# Patient Record
Sex: FEMALE | Race: WHITE | NOT HISPANIC OR LATINO | Employment: UNEMPLOYED | URBAN - METROPOLITAN AREA
[De-identification: names, ages, dates, MRNs, and addresses within clinical notes are randomized per-mention and may not be internally consistent; named-entity substitution may affect disease eponyms.]

---

## 2017-09-16 ENCOUNTER — ANESTHESIA (OUTPATIENT)
Dept: PERIOP | Facility: HOSPITAL | Age: 22
End: 2017-09-16
Payer: COMMERCIAL

## 2017-09-16 ENCOUNTER — APPOINTMENT (EMERGENCY)
Dept: ULTRASOUND IMAGING | Facility: HOSPITAL | Age: 22
End: 2017-09-16
Payer: COMMERCIAL

## 2017-09-16 ENCOUNTER — HOSPITAL ENCOUNTER (EMERGENCY)
Facility: HOSPITAL | Age: 22
End: 2017-09-16
Attending: EMERGENCY MEDICINE
Payer: COMMERCIAL

## 2017-09-16 ENCOUNTER — ANESTHESIA EVENT (OUTPATIENT)
Dept: PERIOP | Facility: HOSPITAL | Age: 22
End: 2017-09-16
Payer: COMMERCIAL

## 2017-09-16 ENCOUNTER — HOSPITAL ENCOUNTER (OUTPATIENT)
Facility: HOSPITAL | Age: 22
Setting detail: OBSERVATION
Discharge: HOME | End: 2017-09-17
Attending: OBSTETRICS & GYNECOLOGY | Admitting: OBSTETRICS & GYNECOLOGY
Payer: COMMERCIAL

## 2017-09-16 VITALS
RESPIRATION RATE: 16 BRPM | SYSTOLIC BLOOD PRESSURE: 106 MMHG | WEIGHT: 125 LBS | DIASTOLIC BLOOD PRESSURE: 69 MMHG | HEART RATE: 79 BPM | OXYGEN SATURATION: 100 % | TEMPERATURE: 98.2 F

## 2017-09-16 DIAGNOSIS — O00.90 ECTOPIC PREGNANCY: Primary | ICD-10-CM

## 2017-09-16 DIAGNOSIS — R10.32 LLQ PAIN: ICD-10-CM

## 2017-09-16 DIAGNOSIS — E87.6 HYPOKALEMIA: ICD-10-CM

## 2017-09-16 LAB
ABO GROUP BLD: NORMAL
ALBUMIN SERPL BCP-MCNC: 4.3 G/DL (ref 3.5–5)
ALP SERPL-CCNC: 45 U/L (ref 46–116)
ALT SERPL W P-5'-P-CCNC: 17 U/L (ref 12–78)
ANION GAP SERPL CALCULATED.3IONS-SCNC: 10 MMOL/L (ref 4–13)
APTT PPP: 30 SECONDS (ref 23–35)
AST SERPL W P-5'-P-CCNC: 7 U/L (ref 5–45)
B-HCG SERPL-ACNC: 193 MIU/ML
BACTERIA UR QL AUTO: ABNORMAL /HPF
BASOPHILS # BLD AUTO: 0.02 THOUSANDS/ΜL (ref 0–0.1)
BASOPHILS # BLD AUTO: 0.04 THOUSANDS/ΜL (ref 0–0.1)
BASOPHILS NFR BLD AUTO: 0 % (ref 0–1)
BASOPHILS NFR BLD AUTO: 0 % (ref 0–1)
BILIRUB SERPL-MCNC: 0.4 MG/DL (ref 0.2–1)
BILIRUB UR QL STRIP: ABNORMAL
BLD GP AB SCN SERPL QL: NEGATIVE
BUN SERPL-MCNC: 7 MG/DL (ref 5–25)
CALCIUM SERPL-MCNC: 8.3 MG/DL (ref 8.3–10.1)
CHLORIDE SERPL-SCNC: 106 MMOL/L (ref 100–108)
CLARITY UR: ABNORMAL
CO2 SERPL-SCNC: 26 MMOL/L (ref 21–32)
COLOR UR: ABNORMAL
CREAT SERPL-MCNC: 0.59 MG/DL (ref 0.6–1.3)
EOSINOPHIL # BLD AUTO: 0.07 THOUSAND/ΜL (ref 0–0.61)
EOSINOPHIL # BLD AUTO: 0.16 THOUSAND/ΜL (ref 0–0.61)
EOSINOPHIL NFR BLD AUTO: 1 % (ref 0–6)
EOSINOPHIL NFR BLD AUTO: 2 % (ref 0–6)
ERYTHROCYTE [DISTWIDTH] IN BLOOD BY AUTOMATED COUNT: 12.1 % (ref 11.6–15.1)
ERYTHROCYTE [DISTWIDTH] IN BLOOD BY AUTOMATED COUNT: 12.3 % (ref 11.6–15.1)
EXT PREG TEST URINE: NORMAL
GFR SERPL CREATININE-BSD FRML MDRD: 129 ML/MIN/1.73SQ M
GLUCOSE SERPL-MCNC: 108 MG/DL (ref 65–140)
GLUCOSE UR STRIP-MCNC: NEGATIVE MG/DL
HCT VFR BLD AUTO: 22.9 % (ref 34.8–46.1)
HCT VFR BLD AUTO: 39.7 % (ref 34.8–46.1)
HGB BLD-MCNC: 13.6 G/DL (ref 11.5–15.4)
HGB BLD-MCNC: 7.6 G/DL (ref 11.5–15.4)
HGB UR QL STRIP.AUTO: ABNORMAL
INR PPP: 1.07 (ref 0.86–1.16)
KETONES UR STRIP-MCNC: NEGATIVE MG/DL
LACTATE SERPL-SCNC: 0.9 MMOL/L (ref 0.5–2)
LEUKOCYTE ESTERASE UR QL STRIP: NEGATIVE
LYMPHOCYTES # BLD AUTO: 2.16 THOUSANDS/ΜL (ref 0.6–4.47)
LYMPHOCYTES # BLD AUTO: 2.78 THOUSANDS/ΜL (ref 0.6–4.47)
LYMPHOCYTES NFR BLD AUTO: 27 % (ref 14–44)
LYMPHOCYTES NFR BLD AUTO: 33 % (ref 14–44)
MCH RBC QN AUTO: 30.4 PG (ref 26.8–34.3)
MCH RBC QN AUTO: 31.1 PG (ref 26.8–34.3)
MCHC RBC AUTO-ENTMCNC: 33.2 G/DL (ref 31.4–37.4)
MCHC RBC AUTO-ENTMCNC: 34.3 G/DL (ref 31.4–37.4)
MCV RBC AUTO: 91 FL (ref 82–98)
MCV RBC AUTO: 92 FL (ref 82–98)
MONOCYTES # BLD AUTO: 0.42 THOUSAND/ΜL (ref 0.17–1.22)
MONOCYTES # BLD AUTO: 0.66 THOUSAND/ΜL (ref 0.17–1.22)
MONOCYTES NFR BLD AUTO: 6 % (ref 4–12)
MONOCYTES NFR BLD AUTO: 7 % (ref 4–12)
NEUTROPHILS # BLD AUTO: 3.81 THOUSANDS/ΜL (ref 1.85–7.62)
NEUTROPHILS # BLD AUTO: 6.69 THOUSANDS/ΜL (ref 1.85–7.62)
NEUTS SEG NFR BLD AUTO: 59 % (ref 43–75)
NEUTS SEG NFR BLD AUTO: 65 % (ref 43–75)
NITRITE UR QL STRIP: NEGATIVE
NON-SQ EPI CELLS URNS QL MICRO: ABNORMAL /HPF
NRBC BLD AUTO-RTO: 0 /100 WBCS
PH UR STRIP.AUTO: 5.5 [PH] (ref 4.5–8)
PLATELET # BLD AUTO: 171 THOUSANDS/UL (ref 149–390)
PLATELET # BLD AUTO: 299 THOUSANDS/UL (ref 149–390)
PMV BLD AUTO: 9.2 FL (ref 8.9–12.7)
PMV BLD AUTO: 9.3 FL (ref 8.9–12.7)
POTASSIUM SERPL-SCNC: 3.3 MMOL/L (ref 3.5–5.3)
PROT SERPL-MCNC: 6.9 G/DL (ref 6.4–8.2)
PROT UR STRIP-MCNC: ABNORMAL MG/DL
PROTHROMBIN TIME: 13.8 SECONDS (ref 12.1–14.4)
RBC # BLD AUTO: 2.5 MILLION/UL (ref 3.81–5.12)
RBC # BLD AUTO: 4.38 MILLION/UL (ref 3.81–5.12)
RBC #/AREA URNS AUTO: ABNORMAL /HPF
RH BLD: POSITIVE
SODIUM SERPL-SCNC: 142 MMOL/L (ref 136–145)
SP GR UR STRIP.AUTO: >=1.03 (ref 1–1.03)
SPECIMEN EXPIRATION DATE: NORMAL
UROBILINOGEN UR QL STRIP.AUTO: 0.2 E.U./DL
WBC # BLD AUTO: 10.33 THOUSAND/UL (ref 4.31–10.16)
WBC # BLD AUTO: 6.49 THOUSAND/UL (ref 4.31–10.16)
WBC #/AREA URNS AUTO: ABNORMAL /HPF

## 2017-09-16 PROCEDURE — 96366 THER/PROPH/DIAG IV INF ADDON: CPT

## 2017-09-16 PROCEDURE — 76815 OB US LIMITED FETUS(S): CPT

## 2017-09-16 PROCEDURE — 96376 TX/PRO/DX INJ SAME DRUG ADON: CPT

## 2017-09-16 PROCEDURE — 96365 THER/PROPH/DIAG IV INF INIT: CPT

## 2017-09-16 PROCEDURE — 88305 TISSUE EXAM BY PATHOLOGIST: CPT | Performed by: OBSTETRICS & GYNECOLOGY

## 2017-09-16 PROCEDURE — 81001 URINALYSIS AUTO W/SCOPE: CPT | Performed by: EMERGENCY MEDICINE

## 2017-09-16 PROCEDURE — 83605 ASSAY OF LACTIC ACID: CPT | Performed by: EMERGENCY MEDICINE

## 2017-09-16 PROCEDURE — 36415 COLL VENOUS BLD VENIPUNCTURE: CPT | Performed by: OBSTETRICS & GYNECOLOGY

## 2017-09-16 PROCEDURE — 86850 RBC ANTIBODY SCREEN: CPT | Performed by: OBSTETRICS & GYNECOLOGY

## 2017-09-16 PROCEDURE — 82947 ASSAY GLUCOSE BLOOD QUANT: CPT

## 2017-09-16 PROCEDURE — 87591 N.GONORRHOEAE DNA AMP PROB: CPT | Performed by: EMERGENCY MEDICINE

## 2017-09-16 PROCEDURE — 82803 BLOOD GASES ANY COMBINATION: CPT

## 2017-09-16 PROCEDURE — 86900 BLOOD TYPING SEROLOGIC ABO: CPT | Performed by: OBSTETRICS & GYNECOLOGY

## 2017-09-16 PROCEDURE — 99285 EMERGENCY DEPT VISIT HI MDM: CPT

## 2017-09-16 PROCEDURE — 85025 COMPLETE CBC W/AUTO DIFF WBC: CPT | Performed by: EMERGENCY MEDICINE

## 2017-09-16 PROCEDURE — 87491 CHLMYD TRACH DNA AMP PROBE: CPT | Performed by: EMERGENCY MEDICINE

## 2017-09-16 PROCEDURE — 85730 THROMBOPLASTIN TIME PARTIAL: CPT | Performed by: EMERGENCY MEDICINE

## 2017-09-16 PROCEDURE — 76770 US EXAM ABDO BACK WALL COMP: CPT

## 2017-09-16 PROCEDURE — 84295 ASSAY OF SERUM SODIUM: CPT

## 2017-09-16 PROCEDURE — 85610 PROTHROMBIN TIME: CPT | Performed by: EMERGENCY MEDICINE

## 2017-09-16 PROCEDURE — 85025 COMPLETE CBC W/AUTO DIFF WBC: CPT | Performed by: OBSTETRICS & GYNECOLOGY

## 2017-09-16 PROCEDURE — 84702 CHORIONIC GONADOTROPIN TEST: CPT | Performed by: EMERGENCY MEDICINE

## 2017-09-16 PROCEDURE — 80053 COMPREHEN METABOLIC PANEL: CPT | Performed by: EMERGENCY MEDICINE

## 2017-09-16 PROCEDURE — 86901 BLOOD TYPING SEROLOGIC RH(D): CPT | Performed by: OBSTETRICS & GYNECOLOGY

## 2017-09-16 PROCEDURE — 96361 HYDRATE IV INFUSION ADD-ON: CPT

## 2017-09-16 PROCEDURE — 96375 TX/PRO/DX INJ NEW DRUG ADDON: CPT

## 2017-09-16 PROCEDURE — 85014 HEMATOCRIT: CPT

## 2017-09-16 PROCEDURE — 82330 ASSAY OF CALCIUM: CPT

## 2017-09-16 PROCEDURE — 84132 ASSAY OF SERUM POTASSIUM: CPT

## 2017-09-16 PROCEDURE — 81025 URINE PREGNANCY TEST: CPT | Performed by: EMERGENCY MEDICINE

## 2017-09-16 RX ORDER — FENTANYL CITRATE 50 UG/ML
INJECTION, SOLUTION INTRAMUSCULAR; INTRAVENOUS AS NEEDED
Status: DISCONTINUED | OUTPATIENT
Start: 2017-09-16 | End: 2017-09-17 | Stop reason: SURG

## 2017-09-16 RX ORDER — SODIUM CHLORIDE, SODIUM LACTATE, POTASSIUM CHLORIDE, CALCIUM CHLORIDE 600; 310; 30; 20 MG/100ML; MG/100ML; MG/100ML; MG/100ML
INJECTION, SOLUTION INTRAVENOUS CONTINUOUS PRN
Status: DISCONTINUED | OUTPATIENT
Start: 2017-09-16 | End: 2017-09-17 | Stop reason: SURG

## 2017-09-16 RX ORDER — DIPHENHYDRAMINE HCL 25 MG
25 TABLET ORAL ONCE
Status: DISCONTINUED | OUTPATIENT
Start: 2017-09-16 | End: 2021-08-24

## 2017-09-16 RX ORDER — POTASSIUM CHLORIDE 14.9 MG/ML
20 INJECTION INTRAVENOUS ONCE
Status: COMPLETED | OUTPATIENT
Start: 2017-09-16 | End: 2017-09-16

## 2017-09-16 RX ORDER — PROPOFOL 10 MG/ML
INJECTION, EMULSION INTRAVENOUS AS NEEDED
Status: DISCONTINUED | OUTPATIENT
Start: 2017-09-16 | End: 2017-09-17 | Stop reason: SURG

## 2017-09-16 RX ORDER — ONDANSETRON 2 MG/ML
INJECTION INTRAMUSCULAR; INTRAVENOUS AS NEEDED
Status: DISCONTINUED | OUTPATIENT
Start: 2017-09-16 | End: 2017-09-17 | Stop reason: SURG

## 2017-09-16 RX ORDER — KETOROLAC TROMETHAMINE 30 MG/ML
INJECTION, SOLUTION INTRAMUSCULAR; INTRAVENOUS AS NEEDED
Status: DISCONTINUED | OUTPATIENT
Start: 2017-09-16 | End: 2017-09-17 | Stop reason: SURG

## 2017-09-16 RX ORDER — HYDROCODONE BITARTRATE AND ACETAMINOPHEN 5; 325 MG/1; MG/1
1 TABLET ORAL EVERY 6 HOURS PRN
Status: DISCONTINUED | OUTPATIENT
Start: 2017-09-16 | End: 2017-09-17

## 2017-09-16 RX ORDER — LIDOCAINE HYDROCHLORIDE 10 MG/ML
INJECTION, SOLUTION INFILTRATION; PERINEURAL AS NEEDED
Status: DISCONTINUED | OUTPATIENT
Start: 2017-09-16 | End: 2017-09-17 | Stop reason: SURG

## 2017-09-16 RX ORDER — HYDROCODONE BITARTRATE AND ACETAMINOPHEN 5; 325 MG/1; MG/1
2 TABLET ORAL EVERY 6 HOURS PRN
Status: DISCONTINUED | OUTPATIENT
Start: 2017-09-16 | End: 2017-09-17

## 2017-09-16 RX ORDER — FENTANYL CITRATE 50 UG/ML
INJECTION, SOLUTION INTRAMUSCULAR; INTRAVENOUS
Status: COMPLETED
Start: 2017-09-16 | End: 2017-09-16

## 2017-09-16 RX ORDER — ONDANSETRON 2 MG/ML
4 INJECTION INTRAMUSCULAR; INTRAVENOUS ONCE
Status: COMPLETED | OUTPATIENT
Start: 2017-09-16 | End: 2017-09-16

## 2017-09-16 RX ORDER — ACETAMINOPHEN 325 MG/1
650 TABLET ORAL ONCE
Status: DISCONTINUED | OUTPATIENT
Start: 2017-09-16 | End: 2021-08-24

## 2017-09-16 RX ORDER — FENTANYL CITRATE 50 UG/ML
25 INJECTION, SOLUTION INTRAMUSCULAR; INTRAVENOUS ONCE
Status: COMPLETED | OUTPATIENT
Start: 2017-09-16 | End: 2017-09-16

## 2017-09-16 RX ORDER — ROCURONIUM BROMIDE 10 MG/ML
INJECTION, SOLUTION INTRAVENOUS AS NEEDED
Status: DISCONTINUED | OUTPATIENT
Start: 2017-09-16 | End: 2017-09-17 | Stop reason: SURG

## 2017-09-16 RX ADMIN — SODIUM CHLORIDE, SODIUM LACTATE, POTASSIUM CHLORIDE, AND CALCIUM CHLORIDE 500 ML: .6; .31; .03; .02 INJECTION, SOLUTION INTRAVENOUS at 21:51

## 2017-09-16 RX ADMIN — SODIUM CHLORIDE 1000 ML: 0.9 INJECTION, SOLUTION INTRAVENOUS at 16:42

## 2017-09-16 RX ADMIN — DEXAMETHASONE SODIUM PHOSPHATE 6 MG: 10 INJECTION INTRAMUSCULAR; INTRAVENOUS at 23:26

## 2017-09-16 RX ADMIN — POTASSIUM CHLORIDE 20 MEQ: 200 INJECTION, SOLUTION INTRAVENOUS at 16:41

## 2017-09-16 RX ADMIN — ONDANSETRON 4 MG: 2 INJECTION INTRAMUSCULAR; INTRAVENOUS at 17:51

## 2017-09-16 RX ADMIN — SODIUM CHLORIDE, SODIUM LACTATE, POTASSIUM CHLORIDE, AND CALCIUM CHLORIDE: .6; .31; .03; .02 INJECTION, SOLUTION INTRAVENOUS at 23:10

## 2017-09-16 RX ADMIN — HYDROCODONE BITARTRATE AND ACETAMINOPHEN 2 TABLET: 5; 325 TABLET ORAL at 22:02

## 2017-09-16 RX ADMIN — FENTANYL CITRATE 50 MCG: 50 INJECTION, SOLUTION INTRAMUSCULAR; INTRAVENOUS at 23:19

## 2017-09-16 RX ADMIN — LIDOCAINE HYDROCHLORIDE 50 MG: 10 INJECTION, SOLUTION INFILTRATION; PERINEURAL at 23:19

## 2017-09-16 RX ADMIN — FENTANYL CITRATE 25 MCG: 50 INJECTION INTRAMUSCULAR; INTRAVENOUS at 17:45

## 2017-09-16 RX ADMIN — SODIUM CHLORIDE 1000 ML: 0.9 INJECTION, SOLUTION INTRAVENOUS at 15:38

## 2017-09-16 RX ADMIN — FENTANYL CITRATE 25 MCG: 50 INJECTION INTRAMUSCULAR; INTRAVENOUS at 16:44

## 2017-09-16 RX ADMIN — KETOROLAC TROMETHAMINE 15 MG: 30 INJECTION, SOLUTION INTRAMUSCULAR at 23:53

## 2017-09-16 RX ADMIN — ONDANSETRON 4 MG: 2 INJECTION INTRAMUSCULAR; INTRAVENOUS at 23:53

## 2017-09-16 RX ADMIN — ROCURONIUM BROMIDE 30 MG: 10 INJECTION, SOLUTION INTRAVENOUS at 23:19

## 2017-09-16 RX ADMIN — PROPOFOL 200 MG: 10 INJECTION, EMULSION INTRAVENOUS at 23:19

## 2017-09-17 VITALS
RESPIRATION RATE: 20 BRPM | TEMPERATURE: 98.3 F | OXYGEN SATURATION: 99 % | BODY MASS INDEX: 22.15 KG/M2 | SYSTOLIC BLOOD PRESSURE: 101 MMHG | HEART RATE: 53 BPM | DIASTOLIC BLOOD PRESSURE: 57 MMHG | HEIGHT: 63 IN | WEIGHT: 125 LBS

## 2017-09-17 PROBLEM — O00.90 ECTOPIC PREGNANCY: Status: ACTIVE | Noted: 2017-09-17

## 2017-09-17 LAB
BASE EXCESS BLDA CALC-SCNC: -2 MMOL/L (ref -2–3)
BASOPHILS # BLD MANUAL: 0 THOUSAND/UL (ref 0–0.1)
BASOPHILS NFR MAR MANUAL: 0 % (ref 0–1)
BURR CELLS BLD QL SMEAR: PRESENT
CA-I BLD-SCNC: 1.11 MMOL/L (ref 1.12–1.32)
EOSINOPHIL # BLD MANUAL: 0 THOUSAND/UL (ref 0–0.4)
EOSINOPHIL NFR BLD MANUAL: 0 % (ref 0–6)
ERYTHROCYTE [DISTWIDTH] IN BLOOD BY AUTOMATED COUNT: 12.3 % (ref 11.6–15.1)
GIANT PLATELETS BLD QL SMEAR: PRESENT
GLUCOSE SERPL-MCNC: 84 MG/DL (ref 65–140)
HCO3 BLDA-SCNC: 23.7 MMOL/L (ref 24–30)
HCT VFR BLD AUTO: 37.8 % (ref 34.8–46.1)
HCT VFR BLD CALC: 37 % (ref 34.8–46.1)
HGB BLD-MCNC: 13.1 G/DL (ref 11.5–15.4)
HGB BLDA-MCNC: 12.6 G/DL (ref 11.5–15.4)
LYMPHOCYTES # BLD AUTO: 0.44 THOUSAND/UL (ref 0.6–4.47)
LYMPHOCYTES # BLD AUTO: 4 % (ref 14–44)
MCH RBC QN AUTO: 31 PG (ref 26.8–34.3)
MCHC RBC AUTO-ENTMCNC: 34.7 G/DL (ref 31.4–37.4)
MCV RBC AUTO: 90 FL (ref 82–98)
MONOCYTES # BLD AUTO: 0 THOUSAND/UL (ref 0–1.22)
MONOCYTES NFR BLD: 0 % (ref 4–12)
NEUTROPHILS # BLD MANUAL: 10.39 THOUSAND/UL (ref 1.85–7.62)
NEUTS SEG NFR BLD AUTO: 95 % (ref 43–75)
NRBC BLD AUTO-RTO: 0 /100 WBCS
PCO2 BLD: 25 MMOL/L (ref 21–32)
PCO2 BLD: 41.1 MM HG (ref 42–50)
PH BLD: 7.37 [PH] (ref 7.3–7.4)
PLATELET # BLD AUTO: 280 THOUSANDS/UL (ref 149–390)
PLATELET BLD QL SMEAR: ADEQUATE
PMV BLD AUTO: 9.8 FL (ref 8.9–12.7)
PO2 BLD: 502 MM HG (ref 35–45)
POIKILOCYTOSIS BLD QL SMEAR: PRESENT
POTASSIUM BLD-SCNC: 3.4 MMOL/L (ref 3.5–5.3)
RBC # BLD AUTO: 4.22 MILLION/UL (ref 3.81–5.12)
RBC MORPH BLD: PRESENT
SAO2 % BLD FROM PO2: 100 % (ref 95–98)
SODIUM BLD-SCNC: 139 MMOL/L (ref 136–145)
SPECIMEN SOURCE: ABNORMAL
VARIANT LYMPHS # BLD AUTO: 1 %
WBC # BLD AUTO: 10.94 THOUSAND/UL (ref 4.31–10.16)

## 2017-09-17 PROCEDURE — 85007 BL SMEAR W/DIFF WBC COUNT: CPT | Performed by: OBSTETRICS & GYNECOLOGY

## 2017-09-17 PROCEDURE — C9399 UNCLASSIFIED DRUGS OR BIOLOG: HCPCS | Performed by: NURSE ANESTHETIST, CERTIFIED REGISTERED

## 2017-09-17 PROCEDURE — 85027 COMPLETE CBC AUTOMATED: CPT | Performed by: OBSTETRICS & GYNECOLOGY

## 2017-09-17 RX ORDER — SODIUM CHLORIDE, SODIUM LACTATE, POTASSIUM CHLORIDE, CALCIUM CHLORIDE 600; 310; 30; 20 MG/100ML; MG/100ML; MG/100ML; MG/100ML
100 INJECTION, SOLUTION INTRAVENOUS CONTINUOUS
Status: DISCONTINUED | OUTPATIENT
Start: 2017-09-17 | End: 2017-09-17

## 2017-09-17 RX ORDER — HYDROCODONE BITARTRATE AND ACETAMINOPHEN 5; 325 MG/1; MG/1
1-2 TABLET ORAL EVERY 6 HOURS PRN
Qty: 28 TABLET | Refills: 0 | Status: SHIPPED | OUTPATIENT
Start: 2017-09-17 | End: 2017-09-17

## 2017-09-17 RX ORDER — DIPHENHYDRAMINE HCL 25 MG
25 TABLET ORAL EVERY 6 HOURS PRN
Status: DISCONTINUED | OUTPATIENT
Start: 2017-09-17 | End: 2017-09-17 | Stop reason: HOSPADM

## 2017-09-17 RX ORDER — ONDANSETRON 2 MG/ML
4 INJECTION INTRAMUSCULAR; INTRAVENOUS ONCE AS NEEDED
Status: DISCONTINUED | OUTPATIENT
Start: 2017-09-17 | End: 2017-09-17 | Stop reason: HOSPADM

## 2017-09-17 RX ORDER — ONDANSETRON 2 MG/ML
4 INJECTION INTRAMUSCULAR; INTRAVENOUS EVERY 6 HOURS PRN
Status: DISCONTINUED | OUTPATIENT
Start: 2017-09-17 | End: 2017-09-17 | Stop reason: HOSPADM

## 2017-09-17 RX ORDER — HYDROCODONE BITARTRATE AND ACETAMINOPHEN 5; 325 MG/1; MG/1
1-2 TABLET ORAL EVERY 6 HOURS PRN
Qty: 28 TABLET | Refills: 0 | Status: SHIPPED | OUTPATIENT
Start: 2017-09-17 | End: 2017-09-27

## 2017-09-17 RX ORDER — SODIUM CHLORIDE 9 MG/ML
INJECTION, SOLUTION INTRAVENOUS AS NEEDED
Status: DISCONTINUED | OUTPATIENT
Start: 2017-09-16 | End: 2017-09-17 | Stop reason: HOSPADM

## 2017-09-17 RX ORDER — CALCIUM CARBONATE 200(500)MG
1000 TABLET,CHEWABLE ORAL DAILY PRN
Status: DISCONTINUED | OUTPATIENT
Start: 2017-09-17 | End: 2017-09-17 | Stop reason: HOSPADM

## 2017-09-17 RX ORDER — HYDROCODONE BITARTRATE AND ACETAMINOPHEN 5; 325 MG/1; MG/1
2 TABLET ORAL EVERY 6 HOURS PRN
Status: DISCONTINUED | OUTPATIENT
Start: 2017-09-17 | End: 2017-09-17 | Stop reason: HOSPADM

## 2017-09-17 RX ORDER — SODIUM CHLORIDE, SODIUM LACTATE, POTASSIUM CHLORIDE, CALCIUM CHLORIDE 600; 310; 30; 20 MG/100ML; MG/100ML; MG/100ML; MG/100ML
125 INJECTION, SOLUTION INTRAVENOUS CONTINUOUS
Status: DISCONTINUED | OUTPATIENT
Start: 2017-09-17 | End: 2017-09-17 | Stop reason: HOSPADM

## 2017-09-17 RX ORDER — PROMETHAZINE HYDROCHLORIDE 25 MG/ML
12.5 INJECTION, SOLUTION INTRAMUSCULAR; INTRAVENOUS ONCE AS NEEDED
Status: DISCONTINUED | OUTPATIENT
Start: 2017-09-17 | End: 2017-09-17 | Stop reason: HOSPADM

## 2017-09-17 RX ORDER — IBUPROFEN 600 MG/1
600 TABLET ORAL EVERY 6 HOURS PRN
Qty: 60 TABLET | Refills: 0 | Status: SHIPPED | OUTPATIENT
Start: 2017-09-17 | End: 2017-11-26

## 2017-09-17 RX ORDER — FENTANYL CITRATE/PF 50 MCG/ML
50 SYRINGE (ML) INJECTION
Status: DISCONTINUED | OUTPATIENT
Start: 2017-09-17 | End: 2017-09-17 | Stop reason: HOSPADM

## 2017-09-17 RX ORDER — DIPHENHYDRAMINE HYDROCHLORIDE 50 MG/ML
25 INJECTION INTRAMUSCULAR; INTRAVENOUS ONCE
Status: COMPLETED | OUTPATIENT
Start: 2017-09-17 | End: 2017-09-17

## 2017-09-17 RX ORDER — MORPHINE SULFATE 2 MG/ML
2 INJECTION, SOLUTION INTRAMUSCULAR; INTRAVENOUS EVERY 4 HOURS PRN
Status: DISCONTINUED | OUTPATIENT
Start: 2017-09-17 | End: 2017-09-17 | Stop reason: HOSPADM

## 2017-09-17 RX ORDER — IBUPROFEN 600 MG/1
600 TABLET ORAL EVERY 6 HOURS PRN
Qty: 30 TABLET | Refills: 0
Start: 2017-09-17 | End: 2017-09-17

## 2017-09-17 RX ORDER — BUPIVACAINE HYDROCHLORIDE 2.5 MG/ML
INJECTION, SOLUTION EPIDURAL; INFILTRATION; INTRACAUDAL AS NEEDED
Status: DISCONTINUED | OUTPATIENT
Start: 2017-09-16 | End: 2017-09-17 | Stop reason: HOSPADM

## 2017-09-17 RX ORDER — IBUPROFEN 600 MG/1
600 TABLET ORAL EVERY 6 HOURS PRN
Status: DISCONTINUED | OUTPATIENT
Start: 2017-09-17 | End: 2017-09-17 | Stop reason: HOSPADM

## 2017-09-17 RX ORDER — HYDROCODONE BITARTRATE AND ACETAMINOPHEN 5; 325 MG/1; MG/1
1 TABLET ORAL EVERY 6 HOURS PRN
Status: DISCONTINUED | OUTPATIENT
Start: 2017-09-17 | End: 2017-09-17 | Stop reason: HOSPADM

## 2017-09-17 RX ORDER — SIMETHICONE 80 MG
80 TABLET,CHEWABLE ORAL EVERY 6 HOURS PRN
Status: DISCONTINUED | OUTPATIENT
Start: 2017-09-17 | End: 2017-09-17 | Stop reason: HOSPADM

## 2017-09-17 RX ORDER — FENTANYL CITRATE/PF 50 MCG/ML
25 SYRINGE (ML) INJECTION
Status: COMPLETED | OUTPATIENT
Start: 2017-09-17 | End: 2017-09-17

## 2017-09-17 RX ORDER — SODIUM CHLORIDE, SODIUM LACTATE, POTASSIUM CHLORIDE, CALCIUM CHLORIDE 600; 310; 30; 20 MG/100ML; MG/100ML; MG/100ML; MG/100ML
125 INJECTION, SOLUTION INTRAVENOUS CONTINUOUS
Status: DISCONTINUED | OUTPATIENT
Start: 2017-09-17 | End: 2017-09-17 | Stop reason: SDUPTHER

## 2017-09-17 RX ORDER — ALBUTEROL SULFATE 2.5 MG/3ML
2.5 SOLUTION RESPIRATORY (INHALATION) EVERY 4 HOURS PRN
Status: DISCONTINUED | OUTPATIENT
Start: 2017-09-17 | End: 2017-09-17 | Stop reason: HOSPADM

## 2017-09-17 RX ADMIN — FENTANYL CITRATE 25 MCG: 50 INJECTION INTRAMUSCULAR; INTRAVENOUS at 00:32

## 2017-09-17 RX ADMIN — DIPHENHYDRAMINE HYDROCHLORIDE 25 MG: 50 INJECTION, SOLUTION INTRAMUSCULAR; INTRAVENOUS at 00:53

## 2017-09-17 RX ADMIN — FENTANYL CITRATE 25 MCG: 50 INJECTION INTRAMUSCULAR; INTRAVENOUS at 00:37

## 2017-09-17 RX ADMIN — SIMETHICONE CHEW TAB 80 MG 80 MG: 80 TABLET ORAL at 02:46

## 2017-09-17 RX ADMIN — MORPHINE SULFATE 2 MG: 2 INJECTION, SOLUTION INTRAMUSCULAR; INTRAVENOUS at 11:01

## 2017-09-17 RX ADMIN — SUGAMMADEX 227 MG: 100 INJECTION, SOLUTION INTRAVENOUS at 00:00

## 2017-09-17 RX ADMIN — FENTANYL CITRATE 50 MCG: 50 INJECTION, SOLUTION INTRAMUSCULAR; INTRAVENOUS at 00:07

## 2017-09-17 RX ADMIN — HYDROCODONE BITARTRATE AND ACETAMINOPHEN 2 TABLET: 5; 325 TABLET ORAL at 17:52

## 2017-09-17 RX ADMIN — HYDROCODONE BITARTRATE AND ACETAMINOPHEN 2 TABLET: 5; 325 TABLET ORAL at 06:44

## 2017-09-17 RX ADMIN — MORPHINE SULFATE 2 MG: 2 INJECTION, SOLUTION INTRAMUSCULAR; INTRAVENOUS at 02:46

## 2017-09-17 RX ADMIN — HYDROMORPHONE HYDROCHLORIDE 0.4 MG: 1 INJECTION, SOLUTION INTRAMUSCULAR; INTRAVENOUS; SUBCUTANEOUS at 00:40

## 2017-09-17 RX ADMIN — IBUPROFEN 600 MG: 600 TABLET, FILM COATED ORAL at 10:08

## 2017-09-17 RX ADMIN — SODIUM CHLORIDE, SODIUM LACTATE, POTASSIUM CHLORIDE, AND CALCIUM CHLORIDE 125 ML/HR: .6; .31; .03; .02 INJECTION, SOLUTION INTRAVENOUS at 01:23

## 2017-09-17 RX ADMIN — FENTANYL CITRATE 25 MCG: 50 INJECTION INTRAMUSCULAR; INTRAVENOUS at 00:26

## 2017-09-17 RX ADMIN — FENTANYL CITRATE 25 MCG: 50 INJECTION INTRAMUSCULAR; INTRAVENOUS at 00:20

## 2017-09-17 RX ADMIN — HYDROCODONE BITARTRATE AND ACETAMINOPHEN 2 TABLET: 5; 325 TABLET ORAL at 12:44

## 2017-09-18 LAB
CHLAMYDIA DNA CVX QL NAA+PROBE: NORMAL
N GONORRHOEA DNA GENITAL QL NAA+PROBE: NORMAL

## 2017-11-26 ENCOUNTER — APPOINTMENT (EMERGENCY)
Dept: ULTRASOUND IMAGING | Facility: HOSPITAL | Age: 22
End: 2017-11-26
Payer: COMMERCIAL

## 2017-11-26 ENCOUNTER — HOSPITAL ENCOUNTER (EMERGENCY)
Facility: HOSPITAL | Age: 22
Discharge: HOME | End: 2017-11-27
Attending: EMERGENCY MEDICINE | Admitting: EMERGENCY MEDICINE
Payer: COMMERCIAL

## 2017-11-26 VITALS
DIASTOLIC BLOOD PRESSURE: 54 MMHG | WEIGHT: 125 LBS | SYSTOLIC BLOOD PRESSURE: 100 MMHG | OXYGEN SATURATION: 95 % | HEIGHT: 63 IN | TEMPERATURE: 97.3 F | HEART RATE: 76 BPM | RESPIRATION RATE: 17 BRPM | BODY MASS INDEX: 22.15 KG/M2

## 2017-11-26 DIAGNOSIS — R10.32 LLQ PAIN: ICD-10-CM

## 2017-11-26 DIAGNOSIS — Z34.91 FIRST TRIMESTER PREGNANCY: Primary | ICD-10-CM

## 2017-11-26 LAB
ALBUMIN SERPL BCP-MCNC: 3.9 G/DL (ref 3.5–5)
ALP SERPL-CCNC: 35 U/L (ref 46–116)
ALT SERPL W P-5'-P-CCNC: 21 U/L (ref 12–78)
ANION GAP SERPL CALCULATED.3IONS-SCNC: 9 MMOL/L (ref 4–13)
AST SERPL W P-5'-P-CCNC: 7 U/L (ref 5–45)
B-HCG SERPL-ACNC: 2140 MIU/ML
BASOPHILS # BLD AUTO: 0.03 THOUSANDS/ΜL (ref 0–0.1)
BASOPHILS NFR BLD AUTO: 0 % (ref 0–1)
BILIRUB SERPL-MCNC: 0.2 MG/DL (ref 0.2–1)
BILIRUB UR QL STRIP: NEGATIVE
BUN SERPL-MCNC: 9 MG/DL (ref 5–25)
CALCIUM SERPL-MCNC: 8.7 MG/DL (ref 8.3–10.1)
CHLORIDE SERPL-SCNC: 104 MMOL/L (ref 100–108)
CLARITY UR: CLEAR
CO2 SERPL-SCNC: 27 MMOL/L (ref 21–32)
COLOR UR: YELLOW
CREAT SERPL-MCNC: 0.66 MG/DL (ref 0.6–1.3)
EOSINOPHIL # BLD AUTO: 0.13 THOUSAND/ΜL (ref 0–0.61)
EOSINOPHIL NFR BLD AUTO: 1 % (ref 0–6)
ERYTHROCYTE [DISTWIDTH] IN BLOOD BY AUTOMATED COUNT: 12 % (ref 11.6–15.1)
GFR SERPL CREATININE-BSD FRML MDRD: 124 ML/MIN/1.73SQ M
GLUCOSE SERPL-MCNC: 72 MG/DL (ref 65–140)
GLUCOSE UR STRIP-MCNC: NEGATIVE MG/DL
HCT VFR BLD AUTO: 39.2 % (ref 34.8–46.1)
HGB BLD-MCNC: 13.6 G/DL (ref 11.5–15.4)
HGB UR QL STRIP.AUTO: NEGATIVE
INR PPP: 1.09 (ref 0.86–1.16)
KETONES UR STRIP-MCNC: NEGATIVE MG/DL
LEUKOCYTE ESTERASE UR QL STRIP: NEGATIVE
LYMPHOCYTES # BLD AUTO: 3.16 THOUSANDS/ΜL (ref 0.6–4.47)
LYMPHOCYTES NFR BLD AUTO: 29 % (ref 14–44)
MCH RBC QN AUTO: 31.6 PG (ref 26.8–34.3)
MCHC RBC AUTO-ENTMCNC: 34.7 G/DL (ref 31.4–37.4)
MCV RBC AUTO: 91 FL (ref 82–98)
MONOCYTES # BLD AUTO: 0.73 THOUSAND/ΜL (ref 0.17–1.22)
MONOCYTES NFR BLD AUTO: 7 % (ref 4–12)
NEUTROPHILS # BLD AUTO: 6.98 THOUSANDS/ΜL (ref 1.85–7.62)
NEUTS SEG NFR BLD AUTO: 63 % (ref 43–75)
NITRITE UR QL STRIP: NEGATIVE
PH UR STRIP.AUTO: 6 [PH] (ref 4.5–8)
PLATELET # BLD AUTO: 280 THOUSANDS/UL (ref 149–390)
PMV BLD AUTO: 9.3 FL (ref 8.9–12.7)
POTASSIUM SERPL-SCNC: 3.5 MMOL/L (ref 3.5–5.3)
PROT SERPL-MCNC: 6.5 G/DL (ref 6.4–8.2)
PROT UR STRIP-MCNC: NEGATIVE MG/DL
PROTHROMBIN TIME: 14 SECONDS (ref 12.1–14.4)
RBC # BLD AUTO: 4.31 MILLION/UL (ref 3.81–5.12)
SODIUM SERPL-SCNC: 140 MMOL/L (ref 136–145)
SP GR UR STRIP.AUTO: 1.02 (ref 1–1.03)
UROBILINOGEN UR QL STRIP.AUTO: 0.2 E.U./DL
WBC # BLD AUTO: 11.03 THOUSAND/UL (ref 4.31–10.16)

## 2017-11-26 PROCEDURE — 85610 PROTHROMBIN TIME: CPT | Performed by: EMERGENCY MEDICINE

## 2017-11-26 PROCEDURE — 80053 COMPREHEN METABOLIC PANEL: CPT | Performed by: EMERGENCY MEDICINE

## 2017-11-26 PROCEDURE — 85025 COMPLETE CBC W/AUTO DIFF WBC: CPT | Performed by: EMERGENCY MEDICINE

## 2017-11-26 PROCEDURE — 36415 COLL VENOUS BLD VENIPUNCTURE: CPT | Performed by: EMERGENCY MEDICINE

## 2017-11-26 PROCEDURE — 76801 OB US < 14 WKS SINGLE FETUS: CPT

## 2017-11-26 PROCEDURE — 84702 CHORIONIC GONADOTROPIN TEST: CPT | Performed by: EMERGENCY MEDICINE

## 2017-11-26 PROCEDURE — 81003 URINALYSIS AUTO W/O SCOPE: CPT | Performed by: EMERGENCY MEDICINE

## 2017-11-26 RX ORDER — ACETAMINOPHEN 325 MG/1
650 TABLET ORAL ONCE
Status: COMPLETED | OUTPATIENT
Start: 2017-11-26 | End: 2017-11-26

## 2017-11-26 RX ADMIN — ACETAMINOPHEN 650 MG: 325 TABLET, FILM COATED ORAL at 22:15

## 2017-11-27 PROCEDURE — 99284 EMERGENCY DEPT VISIT MOD MDM: CPT

## 2017-11-27 NOTE — ED NOTES
Pt comfortable in bed  Call bell within reach  Awaiting for ultrasound       La Nena Quiles RN  11/26/17 7742

## 2017-11-27 NOTE — ED PROVIDER NOTES
History  Chief Complaint   Patient presents with    Abdominal Pain Pregnant     c/o L sided lower abdominal pain that started 3 days ago  Denies any bleeding/spotting  Hx of ectopic pregnancy  which resulted in removal R fallopian tube  Denies fall/ trauma  HPI     23 yoF  with prior ectopic s/p right salpingectomy and old D&C as a teenager presents with LLQ pain  Sharp and stabbing, mostly with movements  Had a + home preg test   Pain not present now  Took no meds for it  No vaginal discharge or foul odor or pain with sex  No rashes  No other abd pain  No n/v/d/c  No fevers, chills, sweats  Prior history of multiple ruptured ovarian cysts s/p lap evaluation but no oophorectomy  MDM:  Imp: LLQ pain, pregnant, h/o ectopic, worrisome for ovarian cyst, ectopic, round lig pain etc   Will perform labs and if HCG is beyond discriminatory zone will have OB US ordered  None       Past Medical History:   Diagnosis Date    Ectopic pregnancy     History of unilateral fallopian tube excision     RIGHT removed    Ovarian cyst     Wears glasses        Past Surgical History:   Procedure Laterality Date    DILATION AND CURETTAGE OF UTERUS      ECTOPIC PREGNANCY SURGERY      LAPAROSCOPY      IN LAP,DIAGNOSTIC ABDOMEN N/A 2017    Procedure: LAPAROSCOPY DIAGNOSTIC, left salpingectomy;  Surgeon: Stevie Padilla MD;  Location: [facility];  Service: Gynecology    WISDOM TOOTH EXTRACTION         History reviewed  No pertinent family history  I have reviewed and agree with the history as documented  Social History   Substance Use Topics    Smoking status: Current Every Day Smoker     Packs/day: 1 00     Years: 6 00     Types: Cigarettes    Smokeless tobacco: Never Used    Alcohol use Yes      Comment: "occasional"        Review of Systems   Constitutional: Negative for chills, fatigue and fever     HENT: Negative for congestion, ear pain, rhinorrhea, sinus pressure, sore throat, trouble swallowing and voice change  Eyes: Negative for pain and visual disturbance  Respiratory: Negative for cough, choking, shortness of breath and stridor  Cardiovascular: Negative for chest pain, palpitations and leg swelling  Gastrointestinal: Positive for abdominal pain  Negative for blood in stool, constipation, diarrhea, nausea and vomiting  Endocrine: Negative  Genitourinary: Negative for dysuria, flank pain, hematuria, pelvic pain and urgency  Musculoskeletal: Negative  Skin: Negative  Allergic/Immunologic: Negative  Neurological: Negative for dizziness, speech difficulty, weakness, light-headedness and numbness  Hematological: Negative  Psychiatric/Behavioral: Negative  Physical Exam  ED Triage Vitals [11/26/17 2041]   Temperature Pulse Respirations Blood Pressure SpO2   (!) 97 3 °F (36 3 °C) 64 16 122/80 100 %      Temp Source Heart Rate Source Patient Position - Orthostatic VS BP Location FiO2 (%)   Temporal Monitor Lying Left arm --      Pain Score       No Pain           Orthostatic Vital Signs  Vitals:    11/26/17 2041 11/26/17 2138 11/26/17 2145 11/26/17 2200   BP: 122/80 108/55 108/55 108/58   Pulse: 64 62 69 69   Patient Position - Orthostatic VS: Lying Sitting  Lying       Physical Exam   Constitutional: She is oriented to person, place, and time  She appears well-developed and well-nourished  No distress  HENT:   Head: Normocephalic and atraumatic  Nose: Nose normal    Mouth/Throat: Oropharynx is clear and moist    Eyes: Conjunctivae and EOM are normal  Pupils are equal, round, and reactive to light  Neck: Normal range of motion  Neck supple  Cardiovascular: Normal rate, regular rhythm, normal heart sounds and intact distal pulses  Exam reveals no gallop and no friction rub  No murmur heard  Pulmonary/Chest: Effort normal and breath sounds normal  No stridor  No respiratory distress  She has no wheezes  She has no rales   She exhibits no tenderness  Abdominal: Soft  Bowel sounds are normal  She exhibits no distension  There is no tenderness  There is no rebound and no guarding  No hernia  Musculoskeletal: Normal range of motion  She exhibits no deformity  Neurological: She is alert and oriented to person, place, and time  She exhibits normal muscle tone  Skin: Skin is warm and dry  No rash noted  No erythema  Psychiatric: She has a normal mood and affect  Vitals reviewed  ED Medications  Medications   acetaminophen (TYLENOL) tablet 650 mg (650 mg Oral Given 11/26/17 2215)       Diagnostic Studies  Results Reviewed     Procedure Component Value Units Date/Time    Comprehensive metabolic panel [[id number]]  (Abnormal) Collected:  11/26/17 2057    Lab Status:  Final result Specimen:  Blood from Arm, Right Updated:  11/26/17 2144     Sodium 140 mmol/L      Potassium 3 5 mmol/L      Chloride 104 mmol/L      CO2 27 mmol/L      Anion Gap 9 mmol/L      BUN 9 mg/dL      Creatinine 0 66 mg/dL      Glucose 72 mg/dL      Calcium 8 7 mg/dL      AST 7 U/L      ALT 21 U/L      Alkaline Phosphatase 35 (L) U/L      Total Protein 6 5 g/dL      Albumin 3 9 g/dL      Total Bilirubin 0 20 mg/dL      eGFR 124 ml/min/1 73sq m     Narrative:         National Kidney Disease Education Program recommendations are as follows:  GFR calculation is accurate only with a steady state creatinine  Chronic Kidney disease less than 60 ml/min/1 73 sq  meters  Kidney failure less than 15 ml/min/1 73 sq  meters      hCG, quantitative [[id number]]  (Abnormal) Collected:  11/26/17 2057    Lab Status:  Final result Specimen:  Blood from Arm, Right Updated:  11/26/17 2144     HCG, Quant 2,140 (H) mIU/mL     Narrative:          Expected Ranges:     Approximate               Approximate HCG  Gestation age          Concentration ( mIU/mL)  _____________          ______________________   [employer]                      HCG values  0 2-1                       5-50  1-2   2-3                         100-5000  3-4                         500-81217  4-5                         1000-99549  5-6                         [id number]  6-8                         [id number]  8-12                        [id number]    Protime-INR [[id number]]  (Normal) Collected:  11/26/17 2057    Lab Status:  Final result Specimen:  Blood from Arm, Right Updated:  11/26/17 2119     Protime 14 0 seconds      INR 1 09    UA w Reflex to Microscopic w Reflex to Culture [[id number]] Collected:  11/26/17 2053    Lab Status:  Final result Specimen:  Urine from Urine, Clean Catch Updated:  11/26/17 2107     Color, UA Yellow     Clarity, UA Clear     Specific Gravity, UA 1 025     pH, UA 6 0     Leukocytes, UA Negative     Nitrite, UA Negative     Protein, UA Negative mg/dl      Glucose, UA Negative mg/dl      Ketones, UA Negative mg/dl      Urobilinogen, UA 0 2 E U /dl      Bilirubin, UA Negative     Blood, UA Negative    CBC and differential [[id number]]  (Abnormal) Collected:  11/26/17 2057    Lab Status:  Final result Specimen:  Blood from Arm, Right Updated:  11/26/17 2104     WBC 11 03 (H) Thousand/uL      RBC 4 31 Million/uL      Hemoglobin 13 6 g/dL      Hematocrit 39 2 %      MCV 91 fL      MCH 31 6 pg      MCHC 34 7 g/dL      RDW 12 0 %      MPV 9 3 fL      Platelets 183 Thousands/uL      Neutrophils Relative 63 %      Lymphocytes Relative 29 %      Monocytes Relative 7 %      Eosinophils Relative 1 %      Basophils Relative 0 %      Neutrophils Absolute 6 98 Thousands/µL      Lymphocytes Absolute 3 16 Thousands/µL      Monocytes Absolute 0 73 Thousand/µL      Eosinophils Absolute 0 13 Thousand/µL      Basophils Absolute 0 03 Thousands/µL                  US OB < 14 weeks single or first gestation level 1    (Results Pending)        VRAD: small IUP evidenced by gestational sac  Small sub-chorionic hemorrhage possible        Procedures  Procedures       Phone Contacts  ED Phone Contact    ED Course  ED Course as of Nov 27 0309   Brent Steve Nov 26, 2017   7223 Awaiting formal US results  Tech said pregnancy is in uterus  Patient has no pain  Soon to DC  [facility]  [facility] Time    Disposition  Final diagnoses:   None     ED Disposition     None      Follow-up Information    None       Patient's Medications   Discharge Prescriptions    No medications on file     No discharge procedures on file      ED Provider  Electronically Signed by           Denver Vee DO  11/27/17 [place]

## 2017-11-27 NOTE — ED NOTES
Pt comfortable in bed  Call bell within reach  Awaiting results       Saba Gonzalez RN  11/26/17 9923

## 2017-11-27 NOTE — DISCHARGE INSTRUCTIONS
Pregnancy   WHAT YOU NEED TO KNOW:   A normal pregnancy lasts about 40 weeks  The first trimester lasts from your last period through the 12th week of pregnancy  The second trimester lasts from the 13th week of your pregnancy through the 23rd week  The third trimester lasts from your 24th week of pregnancy until your baby is born  If you know the date of your last period, your healthcare provider can estimate your due date  You may give birth to your baby any time from 37 weeks to 2 weeks after your due date  DISCHARGE INSTRUCTIONS:   Return to the emergency department if:   · You develop a severe headache that does not go away  · You have new or increased vision changes, such as blurred or spotted vision  · You have new or increased swelling in your face or hands  · You have pain or cramping in your abdomen or low back  · You have vaginal bleeding  Contact your healthcare provider or obstetrician if:   · You have abdominal cramps, pressure, or tightening  · You have a change in vaginal discharge  · You cannot keep food or drinks down, and you are losing weight  · You have chills or a fever  · You have vaginal itching, burning, or pain  · You have yellow, green, white, or foul-smelling vaginal discharge  · You have pain or burning when you urinate, less urine than usual, or pink or bloody urine  · You have questions or concerns about your condition or care  Medicines:   · Prenatal vitamins  provide some of the extra vitamins and minerals you need during pregnancy  Prenatal vitamins may also help to decrease the risk of certain birth defects  · Take your medicine as directed  Contact your healthcare provider if you think your medicine is not helping or if you have side effects  Tell him or her if you are allergic to any medicine  Keep a list of the medicines, vitamins, and herbs you take  Include the amounts, and when and why you take them   Bring the list or the pill bottles to follow-up visits  Carry your medicine list with you in case of an emergency  Follow up with your healthcare provider or obstetrician as directed:  Go to all of your prenatal visits during your pregnancy  Write down your questions so you remember to ask them during your visits  Body changes that may occur during your pregnancy:   · Breast changes  you will experience include tenderness and tingling during the early part of your pregnancy  Your breasts will become larger  You may need to use a support bra  You may see a thin, yellow fluid, called colostrum, leak from your nipples during the second trimester  Colostrum is a liquid that changes to milk about 3 days after you give birth  · Skin changes and stretch marks  may occur during your pregnancy  You may have red marks, called stretch marks, on your skin  Stretch marks will usually fade after pregnancy  Use lotion if your skin is dry and itchy  The skin on your face, around your nipples, and below your belly button may darken  Most of the time, your skin will return to its normal color after your baby is born  · Morning sickness  is nausea and vomiting that can happen at any time of day  Avoid fatty and spicy foods  Eat small meals throughout the day instead of large meals  Freya may help to decrease nausea  Ask your healthcare provider about other ways of decreasing nausea and vomiting  · Heartburn  may be caused by changes in your hormones during pregnancy  Your growing uterus may also push your stomach upward and force stomach acid to back up into your esophagus  Eat 4 or 5 small meals each day instead of large meals  Avoid spicy foods  Avoid eating right before bedtime  · Constipation  may develop during your pregnancy  To treat constipation, eat foods high in fiber such as fiber cereals, beans, fruits, vegetables, whole-grain breads, and prune juice  Get regular exercise and drink plenty of water   Your healthcare provider may also suggest a fiber supplement to soften your bowel movements  Talk to your healthcare provider before you use any medicines to decrease constipation  · Hemorrhoids  are enlarged veins in the rectal area  They may cause pain, itching, and bright red bleeding from your rectum  To decrease your risk of hemorrhoids, prevent constipation and do not strain to have a bowel movement  If you have hemorrhoids, soak in a tub of warm water to ease discomfort  Ask your healthcare provider how you can treat hemorrhoids  · Leg cramps and swelling  may be caused by low calcium levels or the added weight of pregnancy  Raise your legs above the level of your heart to decrease swelling  During a leg cramp, stretch or massage the muscle that has the cramp  Heat may help decrease pain and muscle spasms  Apply heat on your muscle for 20 to 30 minutes every 2 hours for as many days as directed  · Back pain  may occur as your baby grows  Do not stand for long periods of time or lift heavy items  Use good posture while you stand, squat, or bend  Wear low-heeled shoes with good support  Rest may also help to relieve back pain  Ask your healthcare provider about exercises you can do to strengthen your back muscles  Stay healthy during your pregnancy:   · Eat a variety of healthy foods  Healthy foods include fruits, vegetables, whole-grain breads, low-fat dairy foods, beans, lean meats, and fish  Drink liquids as directed  Ask how much liquid to drink each day and which liquids are best for you  Limit caffeine to less than 200 milligrams each day  Limit your intake of fish to 2 servings each week  Choose fish low in mercury such as canned light tuna, shrimp, crab, salmon, cod, or tilapia  Do not  eat fish high in mercury such as swordfish, tilefish, grayson mackerel, and shark  · Take prenatal vitamins as directed  Your need for certain vitamins and minerals, such as folic acid, increases during pregnancy   Prenatal vitamins provide some of the extra vitamins and minerals you need  Prenatal vitamins may also help to decrease the risk of certain birth defects  · Ask how much weight you should gain during your pregnancy  Too much or too little weight gain can be unhealthy for you and your baby  · Talk to your healthcare provider about exercise  Moderate exercise can help you stay fit  Your healthcare provider will help you plan an exercise program that is safe for you during pregnancy  · Do not smoke  If you smoke, it is never too late to quit  Smoking increases your risk of a miscarriage and other health problems during your pregnancy  Smoking can cause your baby to be born too early or weigh less at birth  Ask your healthcare provider for information if you need help quitting  · Do not drink alcohol  Alcohol passes from your body to your baby through the placenta  It can affect your baby's brain development and cause fetal alcohol syndrome (FAS)  FAS is a group of conditions that causes mental, behavior, and growth problems  · Talk to your healthcare provider before you take any medicines  Many medicines may harm your baby if you take them when you are pregnant  Do not take any medicines, vitamins, herbs, or supplements without first talking to your healthcare provider  Never use illegal or street drugs (such as marijuana or cocaine) while you are pregnant  Safety tips:   · Avoid hot tubs and saunas  Do not use a hot tub or sauna while you are pregnant, especially during your first trimester  Hot tubs and saunas may raise your baby's temperature and increase the risk of birth defects  · Avoid toxoplasmosis  This is an infection caused by eating raw meat or being around infected cat feces  It can cause birth defects, miscarriages, and other problems  Wash your hands after you touch raw meat  Make sure any meat is well-cooked before you eat it  Avoid raw eggs and unpasteurized milk   Use gloves or ask someone else to clean your cat's litter box while you are pregnant  · Ask your healthcare provider about travel  The most comfortable time to travel is during the second trimester  Ask your healthcare provider if you can travel after 36 weeks  You may not be able to travel in an airplane after 36 weeks  He may also recommend that you avoid long road trips  © 2017 [employer] Information is for End User's use only and may not be sold, redistributed or otherwise used for commercial purposes  All illustrations and images included in CareNotes® are the copyrighted property of [employer]  or [employer]  The above information is an  only  It is not intended as medical advice for individual conditions or treatments  Talk to your doctor, nurse or pharmacist before following any medical regimen to see if it is safe and effective for you

## 2018-03-15 LAB
6MAM UR QL: NEGATIVE NG/ML
AMPHETAMINES UR QL: NEGATIVE NG/ML
BARBITURATES UR QL: NEGATIVE NG/ML
BENZODIAZ UR QL: NEGATIVE NG/ML
BENZODIAZ UR SCN-MCNC: NORMAL NG/ML
BZE UR QL: NEGATIVE NG/ML
CREAT UR-MCNC: 162.4 MG/DL
ETHANOL UR QL: NEGATIVE NG/ML
METHADONE UR QL: NEGATIVE NG/ML
OPIATES UR QL: NEGATIVE NG/ML
OXIDANTS UR QL: NEGATIVE MCG/ML
OXYCODONE UR QL: NEGATIVE NG/ML
PCP UR QL: NEGATIVE NG/ML
PH UR: 6.73 [PH]
SL AMB MEDMATCH 6 ACETYLMORPHINE: NORMAL
SL AMB MEDMATCH ALCOHOL METAB: NORMAL
SL AMB MEDMATCH AMPTHETAMINES: NORMAL
SL AMB MEDMATCH BARBITUATES: NORMAL
SL AMB MEDMATCH COCAINE METABOLITE: NORMAL
SL AMB MEDMATCH MARIJUANA METABOLITE: NORMAL
SL AMB MEDMATCH METHADONE METABOLITE: NORMAL
SL AMB MEDMATCH OPIATES: NORMAL
SL AMB MEDMATCH OXYCODONE: NORMAL
SL AMB MEDMATCH PHENCYCLIDINE: NORMAL
THC UR QL: NEGATIVE NG/ML

## 2018-05-10 LAB
6MAM UR QL: NEGATIVE NG/ML
AMPHETAMINES UR QL: NEGATIVE NG/ML
BARBITURATES UR QL: NEGATIVE NG/ML
BENZODIAZ UR QL: NEGATIVE NG/ML
BENZODIAZ UR SCN-MCNC: NORMAL NG/ML
BZE UR QL: NEGATIVE NG/ML
CREAT UR-MCNC: 221.4 MG/DL
ETHANOL UR QL: NEGATIVE NG/ML
METHADONE UR QL: NEGATIVE NG/ML
OPIATES UR QL: NEGATIVE NG/ML
OXIDANTS UR QL: NEGATIVE MCG/ML
OXYCODONE UR QL: NEGATIVE NG/ML
PCP UR QL: NEGATIVE NG/ML
PH UR: 6.88 [PH]
SL AMB MEDMATCH 6 ACETYLMORPHINE: NORMAL
SL AMB MEDMATCH ALCOHOL METAB: NORMAL
SL AMB MEDMATCH AMPTHETAMINES: NORMAL
SL AMB MEDMATCH BARBITUATES: NORMAL
SL AMB MEDMATCH COCAINE METABOLITE: NORMAL
SL AMB MEDMATCH MARIJUANA METABOLITE: NORMAL
SL AMB MEDMATCH METHADONE METABOLITE: NORMAL
SL AMB MEDMATCH OPIATES: NORMAL
SL AMB MEDMATCH OXYCODONE: NORMAL
SL AMB MEDMATCH PHENCYCLIDINE: NORMAL
THC UR QL: NEGATIVE NG/ML

## 2018-06-21 ENCOUNTER — HOSPITAL ENCOUNTER (EMERGENCY)
Facility: HOSPITAL | Age: 23
Discharge: HOME | End: 2018-06-21
Admitting: EMERGENCY MEDICINE
Payer: COMMERCIAL

## 2018-06-21 VITALS
HEIGHT: 63 IN | TEMPERATURE: 98.3 F | DIASTOLIC BLOOD PRESSURE: 66 MMHG | RESPIRATION RATE: 17 BRPM | OXYGEN SATURATION: 99 % | HEART RATE: 103 BPM | SYSTOLIC BLOOD PRESSURE: 119 MMHG | BODY MASS INDEX: 27.46 KG/M2 | WEIGHT: 154.98 LBS

## 2018-06-21 DIAGNOSIS — S91.339A PUNCTURE WOUND OF FOOT: Primary | ICD-10-CM

## 2018-06-21 PROCEDURE — 99282 EMERGENCY DEPT VISIT SF MDM: CPT

## 2018-06-21 PROCEDURE — 90471 IMMUNIZATION ADMIN: CPT

## 2018-06-21 PROCEDURE — 90715 TDAP VACCINE 7 YRS/> IM: CPT | Performed by: PHYSICIAN ASSISTANT

## 2018-06-21 RX ADMIN — TETANUS TOXOID, REDUCED DIPHTHERIA TOXOID AND ACELLULAR PERTUSSIS VACCINE, ADSORBED 0.5 ML: 5; 2.5; 8; 8; 2.5 SUSPENSION INTRAMUSCULAR at 13:11

## 2018-06-21 NOTE — DISCHARGE INSTRUCTIONS
Puncture Wound   AMBULATORY CARE:   A puncture wound is a hole in the skin made by a sharp, pointed object  Signs and symptoms of puncture wounds may include  a bruised or swollen area  You may have bleeding, pain, or trouble moving the affected area  Seek care immediately if:   · You have severe pain  · You have numbness or tingling in the area of your wound  · Your wound starts bleeding and does not stop, even after you apply pressure  Contact your healthcare provider if:   · You have new drainage or a bad odor coming from the wound  · You have a fever  · You have increased swelling, redness, or pain  · You have red streaks on your skin coming from your wound  · You have questions or concerns about your condition or care  Treatment  depends on how severe the wound is, its location, and whether other areas are affected  It may also depend on your health and the length of time you have had the wound  You may need any of the following:  · Wound cleaning  may be needed to remove dirt or debris  This will decrease the chance of infection  Before the wound is cleaned, your healthcare provider may give you medicine to numb the area and help you relax  · Medicine  to treat pain or a bacterial infection may be given  Tell your healthcare provider if you have had the tetanus vaccine or a booster within the last 5 years  You may be given a tetanus shot, if needed  · Surgery  may be needed if your wound needs a lot of cleaning or removal of foreign objects  Your wound may be left open until it heals, or it may be closed with stitches  Wound care:  Keep your wound clean and dry  When you are allowed to bathe, carefully wash the wound with soap and water  Dry the area and put on new, clean bandages as directed  Change your bandages when they get wet or dirty  Manage your symptoms:   · Rest  your injured area as much as possible   If the puncture wound is in your leg or foot, use crutches as directed  This will help keep the weight off your injured leg or foot as it heals  · Elevate  your injured area above the level of your heart as often as you can  This will help decrease swelling and pain  Prop your injured area on pillows or blankets to keep it elevated comfortably  Follow up with your healthcare provider in 2 to 3 days:  Write down your questions so you remember to ask them during your visits  © 2017 [employer] Information is for End User's use only and may not be sold, redistributed or otherwise used for commercial purposes  All illustrations and images included in CareNotes® are the copyrighted property of [employer]  or [employer]  The above information is an  only  It is not intended as medical advice for individual conditions or treatments  Talk to your doctor, nurse or pharmacist before following any medical regimen to see if it is safe and effective for you

## 2018-06-21 NOTE — ED PROVIDER NOTES
History  Chief Complaint   Patient presents with    Puncture Wound     Patient was taking apart a deck yesterday and stepped on a nail  Patient is not up to date with shots  Patient presents to the emergency department today for essentially tetanus immunization  Patient is 32 weeks pregnant and last evening stepped on a nail  Denies bleeding drainage pain warmth  States she was not wearing any rubber soled shoes at that time  Patient denies any problems with the pregnancy and states the baby is moving around belly as per normal  Again she does not complain of pain in the foot  Prior to Admission Medications   Prescriptions Last Dose Informant Patient Reported? Taking? Pediatric Multiple Vit-C-FA (FLINSTONES GUMMIES OMEGA-3 DHA PO)  Self Yes Yes   Sig: Take by mouth      Facility-Administered Medications: None       Past Medical History:   Diagnosis Date    Ectopic pregnancy     History of unilateral fallopian tube excision     RIGHT removed    Ovarian cyst     Wears glasses        Past Surgical History:   Procedure Laterality Date    DILATION AND CURETTAGE OF UTERUS      ECTOPIC PREGNANCY SURGERY      LAPAROSCOPY      [place] LAP,DIAGNOSTIC ABDOMEN N/A 9/16/2017    Procedure: LAPAROSCOPY DIAGNOSTIC, left salpingectomy;  Surgeon: Eugene Bates MD;  [facility]: [facility];  Service: Gynecology    WISDOM TOOTH EXTRACTION         History reviewed  No pertinent family history  I have reviewed and agree with the history as documented  Social History   Substance Use Topics    Smoking status: Current Every Day Smoker     Packs/day: 1 00     Years: 6 00     Types: Cigarettes    Smokeless tobacco: Never Used    Alcohol use Yes      Comment: "occasional"        Review of Systems   Constitutional: Negative  Respiratory: Negative  Cardiovascular: Negative  Gastrointestinal: Negative  Musculoskeletal: Negative      Skin:        Puncture wound sole left foot Psychiatric/Behavioral: Negative  All other systems reviewed and are negative  Physical Exam  Physical Exam   Constitutional: She is oriented to person, place, and time  She appears well-developed and well-nourished  No distress  Eyes: EOM are normal    Cardiovascular: Normal rate, regular rhythm, normal heart sounds and intact distal pulses  Pulmonary/Chest: Effort normal and breath sounds normal  No respiratory distress  She has no wheezes  She has no rales  Musculoskeletal: Normal range of motion  She exhibits no edema, tenderness or deformity  Neurological: She is alert and oriented to person, place, and time  Skin: Capillary refill takes less than 2 seconds  She is not diaphoretic  Patient has a tiny puncture wound soled the left foot without surrounding erythema bleeding or drainage  No palpable foreign bodies per   Psychiatric: She has a normal mood and affect  Vitals reviewed        Vital Signs  ED Triage Vitals [06/21/18 1259]   Temperature Pulse Respirations Blood Pressure SpO2   98 3 °F (36 8 °C) 103 17 119/66 99 %      Temp Source Heart Rate Source Patient Position - Orthostatic VS BP Location FiO2 (%)   Temporal Monitor Sitting Right arm --      Pain Score       No Pain           Vitals:    06/21/18 1259   BP: 119/66   Pulse: 103   Patient Position - Orthostatic VS: Sitting       Visual Acuity      ED Medications  Medications   tetanus-diphtheria-acellular pertussis (BOOSTRIX) IM injection 0 5 mL (0 5 mL Intramuscular Given 6/21/18 1311)       Diagnostic Studies  Results Reviewed     None                 No orders to display              Procedures  Procedures       Phone Contacts  ED Phone Contact    ED Course                               MDM  CritCare Time    Disposition  Final diagnoses:   Puncture wound of foot     Time reflects when diagnosis was documented in both MDM as applicable and the Disposition within this note     Time User Action Codes Description Comment 6/21/2018  1:06 PM Jeanne Juarez Add [V65 540C] Puncture wound of foot       ED Disposition     ED Disposition Condition Comment    Discharge  Vijay Villeda discharge to home/self care  Condition at discharge: Good        Follow-up Information     Follow up With Specialties Details Why Contact Info Additional [address] [facility] Emergency Department Emergency Medicine  If symptoms worsen Mei Humphrey 1949  [phone] [place] ED, [address], [facility] [place], [place], [address]          Discharge Medication List as of 6/21/2018  1:06 PM      CONTINUE these medications which have NOT CHANGED    Details   Pediatric Multiple Vit-C-FA (FLINSTONES GUMMIES OMEGA-3 DHA PO) Take by mouth, Historical Med           No discharge procedures on file      ED Provider  Electronically Signed by           Ana Goodwin PA-C  06/21/18 7443

## 2018-10-20 ENCOUNTER — HOSPITAL ENCOUNTER (EMERGENCY)
Facility: HOSPITAL | Age: 23
Discharge: HOME | End: 2018-10-21
Attending: EMERGENCY MEDICINE | Admitting: EMERGENCY MEDICINE
Payer: COMMERCIAL

## 2018-10-20 DIAGNOSIS — R10.9 ABDOMINAL PAIN: Primary | ICD-10-CM

## 2018-10-20 DIAGNOSIS — K59.00 CONSTIPATION: ICD-10-CM

## 2018-10-20 LAB
BACTERIA UR QL AUTO: ABNORMAL /HPF
BILIRUB UR QL STRIP: NEGATIVE
CLARITY UR: ABNORMAL
COLOR UR: YELLOW
EXT PREG TEST URINE: NEGATIVE
GLUCOSE UR STRIP-MCNC: NEGATIVE MG/DL
HGB UR QL STRIP.AUTO: ABNORMAL
KETONES UR STRIP-MCNC: ABNORMAL MG/DL
LEUKOCYTE ESTERASE UR QL STRIP: ABNORMAL
NITRITE UR QL STRIP: NEGATIVE
NON-SQ EPI CELLS URNS QL MICRO: ABNORMAL /HPF
PH UR STRIP.AUTO: 7 [PH] (ref 4.5–8)
PROT UR STRIP-MCNC: ABNORMAL MG/DL
RBC #/AREA URNS AUTO: ABNORMAL /HPF
SP GR UR STRIP.AUTO: 1.01 (ref 1–1.03)
UROBILINOGEN UR QL STRIP.AUTO: 0.2 E.U./DL
WBC #/AREA URNS AUTO: ABNORMAL /HPF

## 2018-10-20 PROCEDURE — 99284 EMERGENCY DEPT VISIT MOD MDM: CPT

## 2018-10-20 PROCEDURE — 81025 URINE PREGNANCY TEST: CPT | Performed by: EMERGENCY MEDICINE

## 2018-10-20 PROCEDURE — 81001 URINALYSIS AUTO W/SCOPE: CPT | Performed by: EMERGENCY MEDICINE

## 2018-10-21 ENCOUNTER — APPOINTMENT (EMERGENCY)
Dept: CT IMAGING | Facility: HOSPITAL | Age: 23
End: 2018-10-21
Payer: COMMERCIAL

## 2018-10-21 VITALS
RESPIRATION RATE: 18 BRPM | SYSTOLIC BLOOD PRESSURE: 107 MMHG | OXYGEN SATURATION: 97 % | BODY MASS INDEX: 24.26 KG/M2 | HEIGHT: 63 IN | TEMPERATURE: 98.1 F | DIASTOLIC BLOOD PRESSURE: 65 MMHG | WEIGHT: 136.91 LBS | HEART RATE: 81 BPM

## 2018-10-21 LAB
ALBUMIN SERPL BCP-MCNC: 3.6 G/DL (ref 3.5–5)
ALP SERPL-CCNC: 51 U/L (ref 46–116)
ALT SERPL W P-5'-P-CCNC: 29 U/L (ref 12–78)
ANION GAP SERPL CALCULATED.3IONS-SCNC: 8 MMOL/L (ref 4–13)
AST SERPL W P-5'-P-CCNC: 10 U/L (ref 5–45)
BASOPHILS # BLD AUTO: 0.07 THOUSANDS/ΜL (ref 0–0.1)
BASOPHILS NFR BLD AUTO: 1 % (ref 0–1)
BILIRUB SERPL-MCNC: 0.1 MG/DL (ref 0.2–1)
BUN SERPL-MCNC: 11 MG/DL (ref 5–25)
CALCIUM SERPL-MCNC: 8.4 MG/DL (ref 8.3–10.1)
CHLORIDE SERPL-SCNC: 108 MMOL/L (ref 100–108)
CO2 SERPL-SCNC: 28 MMOL/L (ref 21–32)
CREAT SERPL-MCNC: 0.64 MG/DL (ref 0.6–1.3)
EOSINOPHIL # BLD AUTO: 0.24 THOUSAND/ΜL (ref 0–0.61)
EOSINOPHIL NFR BLD AUTO: 3 % (ref 0–6)
ERYTHROCYTE [DISTWIDTH] IN BLOOD BY AUTOMATED COUNT: 12.9 % (ref 11.6–15.1)
GFR SERPL CREATININE-BSD FRML MDRD: 124 ML/MIN/1.73SQ M
GLUCOSE SERPL-MCNC: 117 MG/DL (ref 65–140)
HCT VFR BLD AUTO: 39.7 % (ref 34.8–46.1)
HGB BLD-MCNC: 13 G/DL (ref 11.5–15.4)
IMM GRANULOCYTES # BLD AUTO: 0.03 THOUSAND/UL (ref 0–0.2)
IMM GRANULOCYTES NFR BLD AUTO: 0 % (ref 0–2)
LIPASE SERPL-CCNC: 176 U/L (ref 73–393)
LYMPHOCYTES # BLD AUTO: 2.58 THOUSANDS/ΜL (ref 0.6–4.47)
LYMPHOCYTES NFR BLD AUTO: 32 % (ref 14–44)
MCH RBC QN AUTO: 29.9 PG (ref 26.8–34.3)
MCHC RBC AUTO-ENTMCNC: 32.7 G/DL (ref 31.4–37.4)
MCV RBC AUTO: 91 FL (ref 82–98)
MONOCYTES # BLD AUTO: 0.56 THOUSAND/ΜL (ref 0.17–1.22)
MONOCYTES NFR BLD AUTO: 7 % (ref 4–12)
NEUTROPHILS # BLD AUTO: 4.53 THOUSANDS/ΜL (ref 1.85–7.62)
NEUTS SEG NFR BLD AUTO: 57 % (ref 43–75)
NRBC BLD AUTO-RTO: 0 /100 WBCS
PLATELET # BLD AUTO: 276 THOUSANDS/UL (ref 149–390)
PMV BLD AUTO: 10.2 FL (ref 8.9–12.7)
POTASSIUM SERPL-SCNC: 3.3 MMOL/L (ref 3.5–5.3)
PROT SERPL-MCNC: 6.3 G/DL (ref 6.4–8.2)
RBC # BLD AUTO: 4.35 MILLION/UL (ref 3.81–5.12)
SODIUM SERPL-SCNC: 144 MMOL/L (ref 136–145)
WBC # BLD AUTO: 8.01 THOUSAND/UL (ref 4.31–10.16)

## 2018-10-21 PROCEDURE — 74177 CT ABD & PELVIS W/CONTRAST: CPT

## 2018-10-21 PROCEDURE — 36415 COLL VENOUS BLD VENIPUNCTURE: CPT | Performed by: EMERGENCY MEDICINE

## 2018-10-21 PROCEDURE — 83690 ASSAY OF LIPASE: CPT | Performed by: EMERGENCY MEDICINE

## 2018-10-21 PROCEDURE — 96374 THER/PROPH/DIAG INJ IV PUSH: CPT

## 2018-10-21 PROCEDURE — 80053 COMPREHEN METABOLIC PANEL: CPT | Performed by: EMERGENCY MEDICINE

## 2018-10-21 PROCEDURE — 96361 HYDRATE IV INFUSION ADD-ON: CPT

## 2018-10-21 PROCEDURE — 85025 COMPLETE CBC W/AUTO DIFF WBC: CPT | Performed by: EMERGENCY MEDICINE

## 2018-10-21 RX ORDER — POLYETHYLENE GLYCOL 3350 17 G/17G
17 POWDER, FOR SOLUTION ORAL DAILY
Qty: 14 EACH | Refills: 0 | Status: SHIPPED | OUTPATIENT
Start: 2018-10-21 | End: 2019-12-20 | Stop reason: ALTCHOICE

## 2018-10-21 RX ORDER — KETOROLAC TROMETHAMINE 30 MG/ML
30 INJECTION, SOLUTION INTRAMUSCULAR; INTRAVENOUS ONCE
Status: COMPLETED | OUTPATIENT
Start: 2018-10-21 | End: 2018-10-21

## 2018-10-21 RX ADMIN — SODIUM CHLORIDE 1000 ML: 0.9 INJECTION, SOLUTION INTRAVENOUS at 00:30

## 2018-10-21 RX ADMIN — KETOROLAC TROMETHAMINE 30 MG: 30 INJECTION, SOLUTION INTRAMUSCULAR at 00:30

## 2018-10-21 RX ADMIN — IOHEXOL 100 ML: 350 INJECTION, SOLUTION INTRAVENOUS at 01:42

## 2018-10-21 NOTE — ED PROVIDER NOTES
Pt Name: Dustin Gosselin  MRN: [id number]  [place] 1995  Age/Sex: 20 y o  female  Date of evaluation: 10/20/2018  PCP: Renita Siegel [address] [place] [address]    Chief Complaint   Patient presents with    Abdominal Pain     Pt c/o crampy pain LLQ since yesterday - describes as similar to contractions - denies UTI sx, N or V - unable to sleep due to pain         HPI    Carmen Chilel presents to the Emergency Department complaining of abdominal pain  No vomiting or diarrhea  HPI      Past Medical and Surgical History    Past Medical History:   Diagnosis Date    Ectopic pregnancy     History of unilateral fallopian tube excision     RIGHT removed    Ovarian cyst     Wears glasses        Past Surgical History:   Procedure Laterality Date    DILATION AND CURETTAGE OF UTERUS      ECTOPIC PREGNANCY SURGERY      LAPAROSCOPY      [place] LAP,DIAGNOSTIC ABDOMEN N/A 9/16/2017    Procedure: LAPAROSCOPY DIAGNOSTIC, left salpingectomy;  Surgeon: Leeanna Willams MD;  [facility];  Service: Gynecology    WISDOM TOOTH EXTRACTION         History reviewed  No pertinent family history  Social History   Substance Use Topics    Smoking status: Current Every Day Smoker     Packs/day: 1 00     Years: 6 00     Types: Cigarettes    Smokeless tobacco: Never Used    Alcohol use Yes      Comment: "occasional"           Allergies    Allergies   Allergen Reactions    Oxycodone Itching    Tramadol Itching       Home Medications    Prior to Admission medications    Medication Sig Start Date End Date Taking? Authorizing Provider   Pediatric Multiple Vit-C-FA (FLINSTONES GUMMIES OMEGA-3 DHA PO) Take by mouth    Historical Provider, MD           Review of Systems    Review of Systems   Constitutional: Negative for activity change, appetite change, chills, diaphoresis, fatigue and fever  HENT: Negative for congestion, postnasal drip, rhinorrhea, sinus pressure, sneezing and sore throat      Eyes: Negative for pain and visual disturbance  Respiratory: Negative for cough, chest tightness and shortness of breath  Cardiovascular: Negative for chest pain, palpitations and leg swelling  Gastrointestinal: Positive for abdominal pain and constipation  Negative for abdominal distention, diarrhea, nausea and vomiting  Endocrine: Negative for polydipsia, polyphagia and polyuria  Genitourinary: Positive for vaginal bleeding  Negative for decreased urine volume, difficulty urinating, dysuria, flank pain, frequency and hematuria  Musculoskeletal: Negative for arthralgias, gait problem, joint swelling and neck pain  Skin: Negative for pallor and rash  Allergic/Immunologic: Negative for immunocompromised state  Neurological: Negative for syncope, speech difficulty, weakness, light-headedness, numbness and headaches  All other systems reviewed and are negative  Physical Exam      ED Triage Vitals   Temperature Pulse Respirations Blood Pressure SpO2   10/20/18 2330 10/20/18 2330 10/21/18 0250 10/20/18 2330 10/20/18 2330   98 1 °F (36 7 °C) 85 18 107/65 96 %      Temp Source Heart Rate Source Patient Position - Orthostatic VS BP Location FiO2 (%)   10/20/18 2330 10/20/18 2330 10/20/18 2330 10/20/18 2330 --   Temporal Monitor Sitting Left arm       Pain Score       10/20/18 2330       6               Physical Exam   Constitutional: She is oriented to person, place, and time  She appears well-developed and well-nourished  No distress  HENT:   Head: Normocephalic and atraumatic  Nose: Nose normal    Mouth/Throat: Oropharynx is clear and moist    Eyes: Pupils are equal, round, and reactive to light  Conjunctivae, EOM and lids are normal    Neck: Normal range of motion  Neck supple  Cardiovascular: Normal rate, regular rhythm and normal heart sounds  Exam reveals no gallop and no friction rub  No murmur heard  Pulmonary/Chest: Effort normal and breath sounds normal  No accessory muscle usage   No respiratory distress  She has no wheezes  She has no rales  Abdominal: Soft  She exhibits no distension  There is no tenderness  There is no rebound and no guarding  Neurological: She is alert and oriented to person, place, and time  No cranial nerve deficit or sensory deficit  Skin: Skin is warm and dry  No rash noted  She is not diaphoretic  No erythema  Psychiatric: She has a normal mood and affect  Her speech is normal and behavior is normal  Judgment and thought content normal    Nursing note and vitals reviewed  Assessment and Plan    Madelyn Saeed is a 20 y o  female who presents with LLQ abdominal pain  Physical examination remarkable for tenderness  Differential diagnosis (not completely inclusive) includes intraabdominal pathology  Plan will be to perform diagnostic testing and treat symptomatically        MDM    Diagnostic Results        Labs:    Results for orders placed or performed during the hospital encounter of 10/20/18   UA w Reflex to Microscopic w Reflex to Culture   Result Value Ref Range    Color, UA Yellow     Clarity, UA Cloudy     Specific [place], UA 1 015 1 003 - 1 030    pH, UA 7 0 4 5 - 8 0    Leukocytes, UA Trace (A) Negative    Nitrite, UA Negative Negative    Protein, UA 30 (1+) (A) Negative mg/dl    Glucose, UA Negative Negative mg/dl    Ketones, UA Trace (A) Negative mg/dl    Urobilinogen, UA 0 2 0 2, 1 0 E U /dl E U /dl    Bilirubin, UA Negative Negative    Blood, UA Large (A) Negative   Urine Microscopic   Result Value Ref Range    RBC, UA Innumerable (A) None Seen, 0-5 /hpf    WBC, UA 2-4 (A) None Seen, 0-5, 5-55, 5-65 /hpf    Epithelial Cells Occasional None Seen, Occasional /hpf    Bacteria, UA Occasional None Seen, Occasional /hpf   CBC and differential   Result Value Ref Range    WBC 8 01 4 31 - 10 16 Thousand/uL    RBC 4 35 3 81 - 5 12 Million/uL    Hemoglobin 13 0 11 5 - 15 4 g/dL    Hematocrit 39 7 34 8 - 46 1 %    MCV 91 82 - 98 fL    MCH 29 9 26 8 - 34 3 pg    MCHC 32 7 31 4 - 37 4 g/dL    RDW 12 9 11 6 - 15 1 %    MPV 10 2 8 9 - 12 7 fL    Platelets 012 547 - 824 Thousands/uL    nRBC 0 /100 WBCs    Neutrophils Relative 57 43 - 75 %    Immat GRANS % 0 0 - 2 %    Lymphocytes Relative 32 14 - 44 %    Monocytes Relative 7 4 - 12 %    Eosinophils Relative 3 0 - 6 %    Basophils Relative 1 0 - 1 %    Neutrophils Absolute 4 53 1 85 - 7 62 Thousands/µL    Immature Grans Absolute 0 03 0 00 - 0 20 Thousand/uL    Lymphocytes Absolute 2 58 0 60 - 4 47 Thousands/µL    Monocytes Absolute 0 56 0 17 - 1 22 Thousand/µL    Eosinophils Absolute 0 24 0 00 - 0 61 Thousand/µL    Basophils Absolute 0 07 0 00 - 0 10 Thousands/µL   Comprehensive metabolic panel   Result Value Ref Range    Sodium 144 136 - 145 mmol/L    Potassium 3 3 (L) 3 5 - 5 3 mmol/L    Chloride 108 100 - 108 mmol/L    CO2 28 21 - 32 mmol/L    ANION GAP 8 4 - 13 mmol/L    BUN 11 5 - 25 mg/dL    Creatinine 0 64 0 60 - 1 30 mg/dL    Glucose 117 65 - 140 mg/dL    Calcium 8 4 8 3 - 10 1 mg/dL    AST 10 5 - 45 U/L    ALT 29 12 - 78 U/L    Alkaline Phosphatase 51 46 - 116 U/L    Total Protein 6 3 (L) 6 4 - 8 2 g/dL    Albumin 3 6 3 5 - 5 0 g/dL    Total Bilirubin 0 10 (L) 0 20 - 1 00 mg/dL    eGFR 124 ml/min/1 73sq m   Lipase   Result Value Ref Range    Lipase 176 73 - 393 u/L   POCT pregnancy, urine   Result Value Ref Range    EXT PREG TEST UR (Ref: Negative) negative        All labs reviewed and utilized in the medical decision making process    Radiology:    CT abdomen pelvis with contrast   Final Result      There is a moderate amount of stool throughout the colon  This suggests a degree of constipation  Clinical correlation is recommended  There is a 4 cm low-density rounded structure in the right adnexal region  This likely represents a right ovarian cyst   Please see discussion              Workstation performed: [id number]             All radiology studies independently viewed by me and interpreted by the radiologist     Procedure    Procedures    CritCare Time      ED Course of Care and Re-Assessments        Medications   sodium chloride 0 9 % bolus 1,000 mL (0 mL Intravenous Stopped 10/21/18 0130)   ketorolac (TORADOL) injection 30 mg (30 mg Intravenous Given 10/21/18 0030)   iohexol (OMNIPAQUE) 350 MG/ML injection (SINGLE-DOSE) 100 mL (100 mL Intravenous Given 10/21/18 0142)           FINAL IMPRESSION    Final diagnoses:   Abdominal pain   Constipation         DISPOSITION/PLAN    Time reflects when diagnosis was documented in both MDM as applicable and the Disposition within this note     Time User Action Codes Description Comment    10/21/2018  2:45 AM Everrett Spina L Add [R10 9] Abdominal pain     10/21/2018  2:45 AM Everrett Spina L Add [K59 00] Constipation       ED Disposition     ED Disposition Condition Comment    Discharge  [address] discharge to home/self care      Condition at discharge: Good        Follow-up Information     Follow up With Specialties Details Why Contact Info Additional Information    ZOIE Caro Nurse Practitioner Schedule an appointment as soon as possible for a visit  [address] [place] [address] [phone]       [facility] Emergency Department Emergency Medicine Go to As needed, If symptoms worsen Mei Humphrey [address]  [phone] [place] ED, [address], [address]            PATIENT REFERRED TO:    aGrry Valdez, [address] [facility]  [phone]    Schedule an appointment as soon as possible for a visit      [facility] Emergency Department  [place] [id number] [phone]  Go to  As needed, If symptoms worsen      DISCHARGE MEDICATIONS:    Discharge Medication List as of 10/21/2018  2:49 AM      START taking these medications    Details   polyethylene glycol (MIRALAX) 17 g packet Take 17 g by mouth daily, Starting Sun 10/21/2018, Normal         CONTINUE these medications which have NOT CHANGED    Details   Pediatric Multiple Vit-C-FA (FLINSTONES GUMMIES OMEGA-3 DHA PO) Take by mouth, Historical Med             No discharge procedures on file           Amirah Peng, DO Amirah Peng,   10/22/18 1991

## 2018-10-21 NOTE — DISCHARGE INSTRUCTIONS
Abdominal Pain   WHAT YOU NEED TO KNOW:   Abdominal pain can be dull, achy, or sharp  You may have pain in one area of your abdomen, or in your entire abdomen  Your pain may be caused by a condition such as constipation, food sensitivity or poisoning, infection, or a blockage  Abdominal pain can also be from a hernia, appendicitis, or an ulcer  Liver, gallbladder, or kidney conditions can also cause abdominal pain  The cause of your abdominal pain may be unknown  DISCHARGE INSTRUCTIONS:   Return to the emergency department if:   · You have new chest pain or shortness of breath  · You have pulsing pain in your upper abdomen or lower back that suddenly becomes constant  · Your pain is in the right lower abdominal area and worsens with movement  · You have a fever over 100 4°F (38°C) or shaking chills  · You are vomiting and cannot keep food or liquids down  · Your pain does not improve or gets worse over the next 8 to 12 hours  · You see blood in your vomit or bowel movements, or they look black and tarry  · Your skin or the whites of your eyes turn yellow  · You are a woman and have a large amount of vaginal bleeding that is not your monthly period  Contact your healthcare provider if:   · You have pain in your lower back  · You are a man and have pain in your testicles  · You have pain when you urinate  · You have questions or concerns about your condition or care  Follow up with your healthcare provider within 24 hours or as directed:  Write down your questions so you remember to ask them during your visits  Medicines:   · Medicines  may be given to calm your stomach and prevent vomiting or to decrease pain  Ask how to take pain medicine safely  · Take your medicine as directed  Contact your healthcare provider if you think your medicine is not helping or if you have side effects  Tell him of her if you are allergic to any medicine   Keep a list of the medicines, vitamins, and herbs you take  Include the amounts, and when and why you take them  Bring the list or the pill bottles to follow-up visits  Carry your medicine list with you in case of an emergency  © 2017 [facility] Information is for End User's use only and may not be sold, redistributed or otherwise used for commercial purposes  All illustrations and images included in CareNotes® are the copyrighted property of A D A M , Inc  or [employer]  The above information is an  only  It is not intended as medical advice for individual conditions or treatments  Talk to your doctor, nurse or pharmacist before following any medical regimen to see if it is safe and effective for you

## 2019-12-20 ENCOUNTER — OFFICE VISIT (OUTPATIENT)
Dept: URGENT CARE | Facility: CLINIC | Age: 24
End: 2019-12-20
Payer: COMMERCIAL

## 2019-12-20 VITALS
OXYGEN SATURATION: 100 % | HEIGHT: 63 IN | BODY MASS INDEX: 25.34 KG/M2 | WEIGHT: 143 LBS | HEART RATE: 88 BPM | SYSTOLIC BLOOD PRESSURE: 130 MMHG | TEMPERATURE: 98.6 F | DIASTOLIC BLOOD PRESSURE: 67 MMHG | RESPIRATION RATE: 20 BRPM

## 2019-12-20 DIAGNOSIS — J01.00 ACUTE MAXILLARY SINUSITIS, RECURRENCE NOT SPECIFIED: Primary | ICD-10-CM

## 2019-12-20 PROCEDURE — S9088 SERVICES PROVIDED IN URGENT: HCPCS | Performed by: PHYSICIAN ASSISTANT

## 2019-12-20 PROCEDURE — 99213 OFFICE O/P EST LOW 20 MIN: CPT | Performed by: PHYSICIAN ASSISTANT

## 2019-12-20 RX ORDER — AMOXICILLIN AND CLAVULANATE POTASSIUM 875; 125 MG/1; MG/1
1 TABLET, FILM COATED ORAL EVERY 12 HOURS SCHEDULED
Qty: 20 TABLET | Refills: 0 | Status: SHIPPED | OUTPATIENT
Start: 2019-12-20 | End: 2019-12-30

## 2019-12-20 NOTE — PROGRESS NOTES
[address]  (office) [phone]  (fax) [phone]        NAME: Otis Schwartz is a 30 y o  female  : 1995    MRN: [id number]  DATE: 2019  TIME: 1:28 PM    Assessment and Plan   Acute maxillary sinusitis, recurrence not specified [J01 00]  1  Acute maxillary sinusitis, recurrence not specified  amoxicillin-clavulanate (AUGMENTIN) 875-125 mg per tablet       Patient Instructions   I have prescribed an antibiotic for the infection  Please take the antibiotic as prescribed and finish the entire prescription  I recommend that the patient takes an over the counter probiotic or eats yogurt with live cultures in it [place]) to keep good bacteria in the gut and help prevent diarrhea  Wash hands frequently to prevent the spread of infection  Can use over the counter cough and cold medications to help with symptoms  Ibuprofen and/or tylenol as needed for pain or fever  If not improving over the next 3-5 days, follow up with PCP  To present to the ER if symptoms worsen  Chief Complaint     Chief Complaint   Patient presents with    Cold Like Symptoms     x 1week    Sore Throat    Cough    Headache         History of Present Illness   Vijay Sandraemanuel Razo presents to the clinic c/o    Sinusitis   This is a new problem  The current episode started 1 to 4 weeks ago  The problem has been gradually worsening since onset  There has been no fever  The pain is moderate  Associated symptoms include congestion and sinus pressure  Pertinent negatives include no chills, coughing, diaphoresis, ear pain, headaches, hoarse voice, neck pain, shortness of breath, sneezing, sore throat or swollen glands  Past treatments include nothing  The treatment provided no relief  Review of Systems   Review of Systems   Constitutional: Negative for activity change, appetite change, chills, diaphoresis, fatigue and fever     HENT: Positive for congestion, sinus pressure and sinus pain  Negative for ear discharge, ear pain, facial swelling, hoarse voice, rhinorrhea, sneezing and sore throat  Eyes: Negative for photophobia, pain, discharge, redness, itching and visual disturbance  Respiratory: Negative for apnea, cough, chest tightness, shortness of breath and wheezing  Cardiovascular: Negative for chest pain  Gastrointestinal: Negative for abdominal distention, abdominal pain, constipation, diarrhea, nausea and vomiting  Genitourinary: Negative for dysuria, flank pain, frequency, hematuria and urgency  Musculoskeletal: Negative for arthralgias, back pain, gait problem, joint swelling, myalgias, neck pain and neck stiffness  Skin: Negative for color change, rash and wound  Allergic/Immunologic: Negative for immunocompromised state  Neurological: Negative for dizziness and headaches  Hematological: Negative for adenopathy  Psychiatric/Behavioral: Negative for confusion  Current Medications     No long-term medications on file         Current Allergies     Allergies as of 12/20/2019 - Reviewed 12/20/2019   Allergen Reaction Noted    Oxycodone-acetaminophen  04/22/2012    Oxycodone Itching 09/16/2017    Tramadol Itching 09/16/2017            The following portions of the patient's history were reviewed and updated as appropriate: allergies, current medications, past family history, past medical history, past social history, past surgical history and problem list   Past Medical History:   Diagnosis Date    Ectopic pregnancy     History of unilateral fallopian tube excision     RIGHT removed    Ovarian cyst     Wears glasses      Past Surgical History:   Procedure Laterality Date    DILATION AND CURETTAGE OF UTERUS      ECTOPIC PREGNANCY SURGERY      LAPAROSCOPY      OR LAP,DIAGNOSTIC ABDOMEN N/A 9/16/2017    Procedure: LAPAROSCOPY DIAGNOSTIC, left salpingectomy;  Surgeon: Solitario Mcgill MD;  Location: [facility]; Service: Gynecology    WISDOM TOOTH EXTRACTION       Social History     Socioeconomic History    Marital status: Single     Spouse name: Not on file    Number of children: Not on file    Years of education: Not on file    Highest education level: Not on file   Occupational History    Not on file   Social Needs    Financial resource strain: Not on file    Food insecurity:     Worry: Not on file     Inability: Not on file    Transportation needs:     Medical: Not on file     Non-medical: Not on file   Tobacco Use    Smoking status: Current Every Day Smoker     Packs/day: 1 00     Years: 6 00     Pack years: 6 00     Types: Cigarettes    Smokeless tobacco: Never Used   Substance and Sexual Activity    Alcohol use: Yes     Comment: "occasional"    Drug use: No    Sexual activity: Yes     Partners: Male     Birth control/protection: None   Lifestyle    Physical activity:     Days per week: Not on file     Minutes per session: Not on file    Stress: Not on file   Relationships    Social connections:     Talks on phone: Not on file     Gets together: Not on file     Attends [employer] service: Not on file     Active member of club or organization: Not on file     Attends meetings of clubs or organizations: Not on file     Relationship status: Not on file    Intimate partner violence:     Fear of current or ex partner: Not on file     Emotionally abused: Not on file     Physically abused: Not on file     Forced sexual activity: Not on file   Other Topics Concern    Not on file   Social History Narrative    Not on file       Objective   /67 (BP Location: Right arm, Patient Position: Sitting, Cuff Size: Standard)   Pulse 88   Temp 98 6 °F (37 °C) (Tympanic)   Resp 20   Ht 5' 3" (1 6 m)   Wt 64 9 kg (143 lb)   LMP 12/05/2019   SpO2 100%   BMI 25 33 kg/m²      Physical Exam     Physical Exam   Constitutional: She is oriented to person, place, and time   She appears well-developed and well-nourished  No distress  HENT:   Head: Normocephalic and atraumatic  Right Ear: Tympanic membrane and external ear normal    Left Ear: Tympanic membrane and external ear normal    Nose: Mucosal edema present  Right sinus exhibits maxillary sinus tenderness  Right sinus exhibits no frontal sinus tenderness  Left sinus exhibits maxillary sinus tenderness  Left sinus exhibits no frontal sinus tenderness  Mouth/Throat: Posterior oropharyngeal erythema present  No oropharyngeal exudate  Eyes: Pupils are equal, round, and reactive to light  Conjunctivae and EOM are normal  Right eye exhibits no discharge  Left eye exhibits no discharge  No scleral icterus  Neck: Normal range of motion  Neck supple  No JVD present  No tracheal deviation present  No thyromegaly present  Cardiovascular: Normal rate, regular rhythm and normal heart sounds  Exam reveals no gallop and no friction rub  No murmur heard  Pulmonary/Chest: Effort normal and breath sounds normal  No stridor  No respiratory distress  She has no decreased breath sounds  She has no wheezes  She has no rhonchi  She has no rales  She exhibits no tenderness  Musculoskeletal: Normal range of motion  She exhibits no tenderness or deformity  Lymphadenopathy:     She has no cervical adenopathy  Neurological: She is alert and oriented to person, place, and time  Coordination normal    Skin: Skin is warm and dry  No rash noted  She is not diaphoretic  No erythema  No pallor  Psychiatric: She has a normal mood and affect  Her behavior is normal  Judgment and thought content normal    Nursing note and vitals reviewed        Anjali Barlow PA-C

## 2020-08-10 ENCOUNTER — OFFICE VISIT (OUTPATIENT)
Dept: URGENT CARE | Facility: CLINIC | Age: 25
End: 2020-08-10
Payer: COMMERCIAL

## 2020-08-10 VITALS
DIASTOLIC BLOOD PRESSURE: 62 MMHG | TEMPERATURE: 98.4 F | OXYGEN SATURATION: 99 % | RESPIRATION RATE: 18 BRPM | HEART RATE: 79 BPM | SYSTOLIC BLOOD PRESSURE: 133 MMHG

## 2020-08-10 DIAGNOSIS — R10.9 LEFT FLANK PAIN: Primary | ICD-10-CM

## 2020-08-10 DIAGNOSIS — N89.8 VAGINAL ITCHING: ICD-10-CM

## 2020-08-10 DIAGNOSIS — M54.50 LOW BACK PAIN WITHOUT SCIATICA, UNSPECIFIED BACK PAIN LATERALITY, UNSPECIFIED CHRONICITY: ICD-10-CM

## 2020-08-10 LAB
SL AMB  POCT GLUCOSE, UA: ABNORMAL
SL AMB LEUKOCYTE ESTERASE,UA: ABNORMAL
SL AMB POCT BILIRUBIN,UA: ABNORMAL
SL AMB POCT BLOOD,UA: ABNORMAL
SL AMB POCT CLARITY,UA: CLEAR
SL AMB POCT COLOR,UA: YELLOW
SL AMB POCT KETONES,UA: ABNORMAL
SL AMB POCT NITRITE,UA: ABNORMAL
SL AMB POCT PH,UA: 6
SL AMB POCT SPECIFIC GRAVITY,UA: 1.01
SL AMB POCT URINE PROTEIN: ABNORMAL
SL AMB POCT UROBILINOGEN: 0.2

## 2020-08-10 PROCEDURE — S9088 SERVICES PROVIDED IN URGENT: HCPCS | Performed by: NURSE PRACTITIONER

## 2020-08-10 PROCEDURE — 87086 URINE CULTURE/COLONY COUNT: CPT | Performed by: NURSE PRACTITIONER

## 2020-08-10 PROCEDURE — 99213 OFFICE O/P EST LOW 20 MIN: CPT | Performed by: NURSE PRACTITIONER

## 2020-08-10 PROCEDURE — 81002 URINALYSIS NONAUTO W/O SCOPE: CPT | Performed by: NURSE PRACTITIONER

## 2020-08-10 RX ORDER — CIPROFLOXACIN 500 MG/1
500 TABLET, FILM COATED ORAL EVERY 12 HOURS SCHEDULED
Qty: 10 TABLET | Refills: 0 | Status: SHIPPED | OUTPATIENT
Start: 2020-08-10 | End: 2020-08-14 | Stop reason: HOSPADM

## 2020-08-10 RX ORDER — FLUCONAZOLE 150 MG/1
150 TABLET ORAL
Qty: 3 TABLET | Refills: 0 | Status: SHIPPED | OUTPATIENT
Start: 2020-08-10 | End: 2020-08-14 | Stop reason: HOSPADM

## 2020-08-10 RX ORDER — TAMSULOSIN HYDROCHLORIDE 0.4 MG/1
0.4 CAPSULE ORAL
Qty: 10 CAPSULE | Refills: 0 | Status: SHIPPED | OUTPATIENT
Start: 2020-08-10 | End: 2020-08-14 | Stop reason: HOSPADM

## 2020-08-11 ENCOUNTER — APPOINTMENT (EMERGENCY)
Dept: CT IMAGING | Facility: HOSPITAL | Age: 25
DRG: 249 | End: 2020-08-11
Payer: COMMERCIAL

## 2020-08-11 ENCOUNTER — HOSPITAL ENCOUNTER (INPATIENT)
Facility: HOSPITAL | Age: 25
LOS: 2 days | Discharge: HOME | DRG: 249 | End: 2020-08-14
Attending: EMERGENCY MEDICINE | Admitting: INTERNAL MEDICINE
Payer: COMMERCIAL

## 2020-08-11 DIAGNOSIS — M54.9 BACK PAIN: ICD-10-CM

## 2020-08-11 DIAGNOSIS — D72.829 LEUKOCYTOSIS, UNSPECIFIED TYPE: ICD-10-CM

## 2020-08-11 DIAGNOSIS — Z72.0 TOBACCO USE: ICD-10-CM

## 2020-08-11 DIAGNOSIS — E87.6 HYPOKALEMIA: ICD-10-CM

## 2020-08-11 DIAGNOSIS — K52.9 GASTROENTERITIS: ICD-10-CM

## 2020-08-11 DIAGNOSIS — R10.9 FLANK PAIN: Primary | ICD-10-CM

## 2020-08-11 DIAGNOSIS — R80.9 MICROALBUMINURIA: ICD-10-CM

## 2020-08-11 DIAGNOSIS — N17.9 AKI (ACUTE KIDNEY INJURY) (HCC): ICD-10-CM

## 2020-08-11 DIAGNOSIS — R76.8 HEPATITIS C ANTIBODY TEST POSITIVE: ICD-10-CM

## 2020-08-11 DIAGNOSIS — R19.7 DIARRHEA, UNSPECIFIED TYPE: ICD-10-CM

## 2020-08-11 DIAGNOSIS — R00.1 BRADYCARDIA: ICD-10-CM

## 2020-08-11 DIAGNOSIS — R31.29 MICROSCOPIC HEMATURIA: ICD-10-CM

## 2020-08-11 LAB
ALBUMIN SERPL BCP-MCNC: 3.8 G/DL (ref 3.5–5)
ALP SERPL-CCNC: 50 U/L (ref 46–116)
ALT SERPL W P-5'-P-CCNC: 15 U/L (ref 12–78)
ANION GAP SERPL CALCULATED.3IONS-SCNC: 10 MMOL/L (ref 4–13)
AST SERPL W P-5'-P-CCNC: 11 U/L (ref 5–45)
BACTERIA UR CULT: NORMAL
BACTERIA UR QL AUTO: ABNORMAL /HPF
BASOPHILS # BLD AUTO: 0.07 THOUSANDS/ΜL (ref 0–0.1)
BASOPHILS NFR BLD AUTO: 1 % (ref 0–1)
BILIRUB DIRECT SERPL-MCNC: 0.1 MG/DL (ref 0–0.2)
BILIRUB SERPL-MCNC: 0.3 MG/DL (ref 0.2–1)
BILIRUB UR QL STRIP: NEGATIVE
BUN SERPL-MCNC: 10 MG/DL (ref 5–25)
CALCIUM SERPL-MCNC: 8.9 MG/DL (ref 8.3–10.1)
CHLORIDE SERPL-SCNC: 104 MMOL/L (ref 100–108)
CLARITY UR: CLEAR
CO2 SERPL-SCNC: 26 MMOL/L (ref 21–32)
COLOR UR: YELLOW
CREAT SERPL-MCNC: 1.61 MG/DL (ref 0.6–1.3)
EOSINOPHIL # BLD AUTO: 0.08 THOUSAND/ΜL (ref 0–0.61)
EOSINOPHIL NFR BLD AUTO: 1 % (ref 0–6)
ERYTHROCYTE [DISTWIDTH] IN BLOOD BY AUTOMATED COUNT: 11.8 % (ref 11.6–15.1)
EXT PREG TEST URINE: NEGATIVE
EXT. CONTROL ED NAV: NORMAL
GFR SERPL CREATININE-BSD FRML MDRD: 44 ML/MIN/1.73SQ M
GLUCOSE SERPL-MCNC: 101 MG/DL (ref 65–140)
GLUCOSE UR STRIP-MCNC: NEGATIVE MG/DL
HCT VFR BLD AUTO: 39.7 % (ref 34.8–46.1)
HGB BLD-MCNC: 13.4 G/DL (ref 11.5–15.4)
HGB UR QL STRIP.AUTO: ABNORMAL
IMM GRANULOCYTES # BLD AUTO: 0.06 THOUSAND/UL (ref 0–0.2)
IMM GRANULOCYTES NFR BLD AUTO: 0 % (ref 0–2)
KETONES UR STRIP-MCNC: NEGATIVE MG/DL
LEUKOCYTE ESTERASE UR QL STRIP: NEGATIVE
LIPASE SERPL-CCNC: 101 U/L (ref 73–393)
LYMPHOCYTES # BLD AUTO: 1.38 THOUSANDS/ΜL (ref 0.6–4.47)
LYMPHOCYTES NFR BLD AUTO: 10 % (ref 14–44)
MCH RBC QN AUTO: 31.5 PG (ref 26.8–34.3)
MCHC RBC AUTO-ENTMCNC: 33.8 G/DL (ref 31.4–37.4)
MCV RBC AUTO: 93 FL (ref 82–98)
MONOCYTES # BLD AUTO: 1 THOUSAND/ΜL (ref 0.17–1.22)
MONOCYTES NFR BLD AUTO: 7 % (ref 4–12)
NEUTROPHILS # BLD AUTO: 11.87 THOUSANDS/ΜL (ref 1.85–7.62)
NEUTS SEG NFR BLD AUTO: 81 % (ref 43–75)
NITRITE UR QL STRIP: NEGATIVE
NON-SQ EPI CELLS URNS QL MICRO: ABNORMAL /HPF
NRBC BLD AUTO-RTO: 0 /100 WBCS
PH UR STRIP.AUTO: 6 [PH]
PLATELET # BLD AUTO: 270 THOUSANDS/UL (ref 149–390)
PMV BLD AUTO: 9.8 FL (ref 8.9–12.7)
POTASSIUM SERPL-SCNC: 3.3 MMOL/L (ref 3.5–5.3)
PROT SERPL-MCNC: 6.3 G/DL (ref 6.4–8.2)
PROT UR STRIP-MCNC: NEGATIVE MG/DL
RBC # BLD AUTO: 4.26 MILLION/UL (ref 3.81–5.12)
RBC #/AREA URNS AUTO: ABNORMAL /HPF
SODIUM SERPL-SCNC: 140 MMOL/L (ref 136–145)
SP GR UR STRIP.AUTO: <=1.005 (ref 1–1.03)
UROBILINOGEN UR QL STRIP.AUTO: 0.2 E.U./DL
WBC # BLD AUTO: 14.46 THOUSAND/UL (ref 4.31–10.16)
WBC #/AREA URNS AUTO: ABNORMAL /HPF

## 2020-08-11 PROCEDURE — 81025 URINE PREGNANCY TEST: CPT | Performed by: EMERGENCY MEDICINE

## 2020-08-11 PROCEDURE — 83690 ASSAY OF LIPASE: CPT | Performed by: EMERGENCY MEDICINE

## 2020-08-11 PROCEDURE — 74177 CT ABD & PELVIS W/CONTRAST: CPT

## 2020-08-11 PROCEDURE — 80076 HEPATIC FUNCTION PANEL: CPT | Performed by: EMERGENCY MEDICINE

## 2020-08-11 PROCEDURE — 85025 COMPLETE CBC W/AUTO DIFF WBC: CPT | Performed by: EMERGENCY MEDICINE

## 2020-08-11 PROCEDURE — 80048 BASIC METABOLIC PNL TOTAL CA: CPT | Performed by: EMERGENCY MEDICINE

## 2020-08-11 PROCEDURE — G1004 CDSM NDSC: HCPCS

## 2020-08-11 PROCEDURE — 96374 THER/PROPH/DIAG INJ IV PUSH: CPT

## 2020-08-11 PROCEDURE — 96375 TX/PRO/DX INJ NEW DRUG ADDON: CPT

## 2020-08-11 PROCEDURE — 99285 EMERGENCY DEPT VISIT HI MDM: CPT

## 2020-08-11 PROCEDURE — 96361 HYDRATE IV INFUSION ADD-ON: CPT

## 2020-08-11 PROCEDURE — 81001 URINALYSIS AUTO W/SCOPE: CPT | Performed by: EMERGENCY MEDICINE

## 2020-08-11 RX ORDER — FENTANYL CITRATE 50 UG/ML
50 INJECTION, SOLUTION INTRAMUSCULAR; INTRAVENOUS ONCE
Status: COMPLETED | OUTPATIENT
Start: 2020-08-11 | End: 2020-08-11

## 2020-08-11 RX ORDER — ONDANSETRON 2 MG/ML
4 INJECTION INTRAMUSCULAR; INTRAVENOUS ONCE
Status: COMPLETED | OUTPATIENT
Start: 2020-08-11 | End: 2020-08-11

## 2020-08-11 RX ADMIN — FENTANYL CITRATE 50 MCG: 50 INJECTION, SOLUTION INTRAMUSCULAR; INTRAVENOUS at 21:48

## 2020-08-11 RX ADMIN — ONDANSETRON 4 MG: 2 INJECTION INTRAMUSCULAR; INTRAVENOUS at 21:47

## 2020-08-11 RX ADMIN — SODIUM CHLORIDE 1000 ML: 0.9 INJECTION, SOLUTION INTRAVENOUS at 21:47

## 2020-08-11 RX ADMIN — IOHEXOL 100 ML: 350 INJECTION, SOLUTION INTRAVENOUS at 23:25

## 2020-08-11 NOTE — PATIENT INSTRUCTIONS
Take the cipro (antibiotic) and diflucan (antifungal) as ordered  Take the flomax (officially for men with enlarged prostates) to help dilate your urinary system since I suspect a left sided kidney stone  If you pain gets worse--more frequent or more severe, you should proceed to the ER for further evaluation and treatment  Kidney Stones   AMBULATORY CARE:   Kidney stones  form in the urinary system when the water and waste in your urine are out of balance  When this happens, certain types of waste crystals separate from the urine  The crystals build up and form kidney stones  Kidney stones can be made of uric acid, calcium, phosphate, or oxalate crystals  You may have 1 or more kidney stones  Common symptoms include the following:   · Pain in the middle of your back that moves across to your side or that may spread to your groin    · Nausea and vomiting    · Urge to urinate often, burning feeling when you urinate, or pink or red urine    · Tenderness in your lower back, side, or stomach  Seek care immediately if:   · You have vomiting that is not relieved by medicine  Contact your healthcare provider if:   · You have a fever  · You have trouble passing urine  · You see blood in your urine  · You have severe pain  · You have any questions or concerns about your condition or care  Treatment for kidney stones  may include any of the following:  · NSAIDs , such as ibuprofen, help decrease swelling, pain, and fever  This medicine is available with or without a doctor's order  NSAIDs can cause stomach bleeding or kidney problems in certain people  If you take blood thinner medicine, always ask your healthcare provider if NSAIDs are safe for you  Always read the medicine label and follow directions  · Prescription medicine  may be given  Ask how to take this medicine safely  · Medicines  to balance your electrolytes may be needed       · A procedure or surgery  to remove the kidney stones may be needed if they do not pass on their own  Your treatment will depend on the size and location of your kidney stones  Manage your symptoms:   · Drink plenty of liquids  Your healthcare provider may tell you to drink at least 8 to 12 (eight-ounce) cups of liquids each day  This helps flush out the kidney stones when you urinate  Water is the best liquid to drink  · Strain your urine every time you go to the bathroom  Urinate through a strainer or a piece of thin cloth to catch the stones  Take the stones to your healthcare provider so they can be sent to the lab for tests  This will help your healthcare providers plan the best treatment for you  · Eat a variety of healthy foods  Healthy foods include fruits, vegetables, whole-grain breads, low-fat dairy products, beans, and fish  You may need to limit how much sodium (salt) or protein you eat  Ask for information about the best foods for you  · Exercise regularly  Your stones may pass more easily if you stay active  Ask about the best activities for you  After you pass your kidney stones:  Once you have passed your kidney stones, your healthcare provider may  order a 24-hour urine test  Results from a 24-hour urine test will help your healthcare provider plan ways to prevent more stones from forming  If you are told to do a 24-hour test, your healthcare provider will give you more instructions  Follow up with your healthcare provider as directed:  Write down your questions so you remember to ask them during your visits  © 2017 [employer] Information is for End User's use only and may not be sold, redistributed or otherwise used for commercial purposes  All illustrations and images included in CareNotes® are the copyrighted property of [employer]  or [employer]  The above information is an  only  It is not intended as medical advice for individual conditions or treatments   Talk to your doctor, nurse or pharmacist before following any medical regimen to see if it is safe and effective for you  Urinary Tract Infection in Women   AMBULATORY CARE:   A urinary tract infection (UTI)  is caused by bacteria that get inside your urinary tract  Most bacteria that enter your urinary tract come out when you urinate  If the bacteria stay in your urinary tract, you may get an infection  Your urinary tract includes your kidneys, ureters, bladder, and urethra  Urine is made in your kidneys, and it flows from the ureters to the bladder  Urine leaves the bladder through the urethra  A UTI is more common in your lower urinary tract, which includes your bladder and urethra  Common symptoms include the following:   · Urinating more often or waking from sleep to urinate    · Pain or burning when you urinate    · Pain or pressure in your lower abdomen     · Urine that smells bad    · Blood in your urine    · Leaking urine  Seek care immediately if:   · You are urinating very little or not at all  · You have a high fever with shaking chills  · You have side or back pain that gets worse  Contact your healthcare provider if:   · You have a fever  · You do not feel better after 2 days of taking antibiotics  · You are vomiting  · You have questions or concerns about your condition or care  Treatment for a UTI  may include medicines to treat a bacterial infection  You may also need medicines to decrease pain and burning, or decrease the urge to urinate often  Prevent a UTI:   · Empty your bladder often  Urinate and empty your bladder as soon as you feel the need  Do not hold your urine for long periods of time  · Wipe from front to back after you urinate or have a bowel movement  This will help prevent germs from getting into your urinary tract through your urethra  · Drink liquids as directed  Ask how much liquid to drink each day and which liquids are best for you   You may need to drink more liquids than usual to help flush out the bacteria  Do not drink alcohol, caffeine, or citrus juices  These can irritate your bladder and increase your symptoms  Your healthcare provider may recommend cranberry juice to help prevent a UTI  · Urinate after you have sex  This can help flush out bacteria passed during sex  · Do not douche or use feminine deodorants  These can change the chemical balance in your vagina  · Change sanitary pads or tampons often  This will help prevent germs from getting into your urinary tract  · Do pelvic muscle exercises often  Pelvic muscle exercises may help you start and stop urinating  Strong pelvic muscles may help you empty your bladder easier  Squeeze these muscles tightly for 5 seconds like you are trying to hold back urine  Then relax for 5 seconds  Gradually work up to squeezing for 10 seconds  Do 3 sets of 15 repetitions a day, or as directed  Follow up with your healthcare provider as directed:  Write down your questions so you remember to ask them during your visits  © 2017 [address] [facility] Information is for End User's use only and may not be sold, redistributed or otherwise used for commercial purposes  All illustrations and images included in CareNotes® are the copyrighted property of A D [employer]  or [employer]  The above information is an  only  It is not intended as medical advice for individual conditions or treatments  Talk to your doctor, nurse or pharmacist before following any medical regimen to see if it is safe and effective for you  Vulvovaginal Candidiasis   AMBULATORY CARE:   Vulvovaginal candidiasis,  or yeast infection, is a common vaginal infection  Vulvovaginal candidiasis is caused by a fungus, or yeast-like germ  Fungi are normally found in your vagina  When there are too many fungi, it can cause an infection  Seek care immediately if:   · You have fever and chills      · You are bleeding from your vagina and it is not your monthly period  · You develop abdominal or pelvic pain  Contact your healthcare provider if:   · Your signs and symptoms get worse, even after treatment  · You have questions or concerns about your condition or care  Signs and symptoms:   · Thick, white, cheese-like discharge from your vagina    · Itching, swelling, or redness in your vagina    · Burning when you urinate  Treatment for vulvovaginal candidiasis  includes medicines to treat the fungal infection and decrease inflammation  The medicine may be a pill, topical cream, or vaginal suppository  Manage your symptoms:   · Wear cotton underwear  · Keep the vaginal area clean and dry  · Do not have sex until your symptoms are gone  · Do not douche  · Do not use feminine hygiene sprays, powders, or bubble bath  Prevent another infection:   · Take showers instead of baths  · Eat yogurt  · Limit the amount of alcohol you drink  · Control your blood sugar if you are diabetic  · Limit your time in hot tubs  Follow up with your healthcare provider as directed:  Write down your questions so you remember to ask them during your visits  © 2017 [address] [employer]  Information is for End User's use only and may not be sold, redistributed or otherwise used for commercial purposes  All illustrations and images included in CareNotes® are the copyrighted property of A D A M [employer]  or [employer]  The above information is an  only  It is not intended as medical advice for individual conditions or treatments  Talk to your doctor, nurse or pharmacist before following any medical regimen to see if it is safe and effective for you

## 2020-08-11 NOTE — PROGRESS NOTES
[facility]        NAME: Jasmina Tariq is a 30 y o  female  : 1995    MRN: [id number]  DATE: August 15, 2020  TIME: 8:18 AM    Assessment and Plan   Left flank pain [R10 9]  1  Left flank pain  DISCONTINUED: ciprofloxacin (CIPRO) 500 mg tablet    DISCONTINUED: tamsulosin (FLOMAX) 0 4 mg   2  Low back pain without sciatica, unspecified back pain laterality, unspecified chronicity  POCT urine dip    Urine culture    DISCONTINUED: ciprofloxacin (CIPRO) 500 mg tablet   3  Vaginal itching  DISCONTINUED: fluconazole (DIFLUCAN) 150 mg tablet         Patient Instructions     Patient Instructions     Take the cipro (antibiotic) and diflucan (antifungal) as ordered  Take the flomax (officially for men with enlarged prostates) to help dilate your urinary system since I suspect a left sided kidney stone  If you pain gets worse--more frequent or more severe, you should proceed to the ER for further evaluation and treatment  Kidney Stones   AMBULATORY CARE:   Kidney stones  form in the urinary system when the water and waste in your urine are out of balance  When this happens, certain types of waste crystals separate from the urine  The crystals build up and form kidney stones  Kidney stones can be made of uric acid, calcium, phosphate, or oxalate crystals  You may have 1 or more kidney stones  Common symptoms include the following:   · Pain in the middle of your back that moves across to your side or that may spread to your groin    · Nausea and vomiting    · Urge to urinate often, burning feeling when you urinate, or pink or red urine    · Tenderness in your lower back, side, or stomach  Seek care immediately if:   · You have vomiting that is not relieved by medicine  Contact your healthcare provider if:   · You have a fever  · You have trouble passing urine  · You see blood in your urine  · You have severe pain      · You have any questions or concerns about your condition or care   Treatment for kidney stones  may include any of the following:  · NSAIDs , such as ibuprofen, help decrease swelling, pain, and fever  This medicine is available with or without a doctor's order  NSAIDs can cause stomach bleeding or kidney problems in certain people  If you take blood thinner medicine, always ask your healthcare provider if NSAIDs are safe for you  Always read the medicine label and follow directions  · Prescription medicine  may be given  Ask how to take this medicine safely  · Medicines  to balance your electrolytes may be needed  · A procedure or surgery  to remove the kidney stones may be needed if they do not pass on their own  Your treatment will depend on the size and location of your kidney stones  Manage your symptoms:   · Drink plenty of liquids  Your healthcare provider may tell you to drink at least 8 to 12 (eight-ounce) cups of liquids each day  This helps flush out the kidney stones when you urinate  Water is the best liquid to drink  · Strain your urine every time you go to the bathroom  Urinate through a strainer or a piece of thin cloth to catch the stones  Take the stones to your healthcare provider so they can be sent to the lab for tests  This will help your healthcare providers plan the best treatment for you  · Eat a variety of healthy foods  Healthy foods include fruits, vegetables, whole-grain breads, low-fat dairy products, beans, and fish  You may need to limit how much sodium (salt) or protein you eat  Ask for information about the best foods for you  · Exercise regularly  Your stones may pass more easily if you stay active  Ask about the best activities for you  After you pass your kidney stones:  Once you have passed your kidney stones, your healthcare provider may  order a 24-hour urine test  Results from a 24-hour urine test will help your healthcare provider plan ways to prevent more stones from forming   If you are told to do a 24-hour test, your healthcare provider will give you more instructions  Follow up with your healthcare provider as directed:  Write down your questions so you remember to ask them during your visits  © 2017 [employer] Information is for End User's use only and may not be sold, redistributed or otherwise used for commercial purposes  All illustrations and images included in CareNotes® are the copyrighted property of [employer]  or [employer]  The above information is an  only  It is not intended as medical advice for individual conditions or treatments  Talk to your doctor, nurse or pharmacist before following any medical regimen to see if it is safe and effective for you  Urinary Tract Infection in Women   AMBULATORY CARE:   A urinary tract infection (UTI)  is caused by bacteria that get inside your urinary tract  Most bacteria that enter your urinary tract come out when you urinate  If the bacteria stay in your urinary tract, you may get an infection  Your urinary tract includes your kidneys, ureters, bladder, and urethra  Urine is made in your kidneys, and it flows from the ureters to the bladder  Urine leaves the bladder through the urethra  A UTI is more common in your lower urinary tract, which includes your bladder and urethra  Common symptoms include the following:   · Urinating more often or waking from sleep to urinate    · Pain or burning when you urinate    · Pain or pressure in your lower abdomen     · Urine that smells bad    · Blood in your urine    · Leaking urine  Seek care immediately if:   · You are urinating very little or not at all  · You have a high fever with shaking chills  · You have side or back pain that gets worse  Contact your healthcare provider if:   · You have a fever  · You do not feel better after 2 days of taking antibiotics  · You are vomiting       · You have questions or concerns about your condition or care  Treatment for a UTI  may include medicines to treat a bacterial infection  You may also need medicines to decrease pain and burning, or decrease the urge to urinate often  Prevent a UTI:   · Empty your bladder often  Urinate and empty your bladder as soon as you feel the need  Do not hold your urine for long periods of time  · Wipe from front to back after you urinate or have a bowel movement  This will help prevent germs from getting into your urinary tract through your urethra  · Drink liquids as directed  Ask how much liquid to drink each day and which liquids are best for you  You may need to drink more liquids than usual to help flush out the bacteria  Do not drink alcohol, caffeine, or citrus juices  These can irritate your bladder and increase your symptoms  Your healthcare provider may recommend cranberry juice to help prevent a UTI  · Urinate after you have sex  This can help flush out bacteria passed during sex  · Do not douche or use feminine deodorants  These can change the chemical balance in your vagina  · Change sanitary pads or tampons often  This will help prevent germs from getting into your urinary tract  · Do pelvic muscle exercises often  Pelvic muscle exercises may help you start and stop urinating  Strong pelvic muscles may help you empty your bladder easier  Squeeze these muscles tightly for 5 seconds like you are trying to hold back urine  Then relax for 5 seconds  Gradually work up to squeezing for 10 seconds  Do 3 sets of 15 repetitions a day, or as directed  Follow up with your healthcare provider as directed:  Write down your questions so you remember to ask them during your visits  © 2017 [employer]  Information is for End User's use only and may not be sold, redistributed or otherwise used for commercial purposes   All illustrations and images included in CareNotes® are the copyrighted property of A [employer] M , Inc  or [facility] ([employer]  The above information is an  only  It is not intended as medical advice for individual conditions or treatments  Talk to your doctor, nurse or pharmacist before following any medical regimen to see if it is safe and effective for you  Vulvovaginal Candidiasis   AMBULATORY CARE:   Vulvovaginal candidiasis,  or yeast infection, is a common vaginal infection  Vulvovaginal candidiasis is caused by a fungus, or yeast-like germ  Fungi are normally found in your vagina  When there are too many fungi, it can cause an infection  Seek care immediately if:   · You have fever and chills  · You are bleeding from your vagina and it is not your monthly period  · You develop abdominal or pelvic pain  Contact your healthcare provider if:   · Your signs and symptoms get worse, even after treatment  · You have questions or concerns about your condition or care  Signs and symptoms:   · Thick, white, cheese-like discharge from your vagina    · Itching, swelling, or redness in your vagina    · Burning when you urinate  Treatment for vulvovaginal candidiasis  includes medicines to treat the fungal infection and decrease inflammation  The medicine may be a pill, topical cream, or vaginal suppository  Manage your symptoms:   · Wear cotton underwear  · Keep the vaginal area clean and dry  · Do not have sex until your symptoms are gone  · Do not douche  · Do not use feminine hygiene sprays, powders, or bubble bath  Prevent another infection:   · Take showers instead of baths  · Eat yogurt  · Limit the amount of alcohol you drink  · Control your blood sugar if you are diabetic  · Limit your time in hot tubs  Follow up with your healthcare provider as directed:  Write down your questions so you remember to ask them during your visits     © 2017 [employer] Information is for End User's use only and may not be sold, redistributed or otherwise used for commercial purposes  All illustrations and images included in CareNotes® are the copyrighted property of [employer]  or [employer]  The above information is an  only  It is not intended as medical advice for individual conditions or treatments  Talk to your doctor, nurse or pharmacist before following any medical regimen to see if it is safe and effective for you  Follow up with PCP in 3-5 days  Proceed to  ER if symptoms worsen  Chief Complaint     Chief Complaint   Patient presents with    Possible UTI     Pt c/o lower back pain and urinary frequency since this morning  History of Present Illness       Patient reports some vaginal discomfort and itching over the last 24 hours  She states she has a follow-up appointment with her OBGYN for this Wednesday but suspects a yeast infection  She also reports that she has developed left lower back pain that wraps around her lower abdomen towards the front since this morning  She has had UTIs in the past, and states that this feels different  She denies burning or urgency although she does acknowledge some frequency  She states that she has a constant ache but the pain will intermittently become severe to the point she cannot move and then it will alleviate to the level is at now  She denies any history kidney stones  Review of Systems   Review of Systems   Genitourinary: Positive for flank pain, frequency and vaginal discharge (Irritation)  Negative for decreased urine volume, dysuria, hematuria, pelvic pain, urgency and vaginal bleeding  All other systems reviewed and are negative          Current Medications       Current Outpatient Medications:     HYDROcodone-acetaminophen (NORCO) 5-325 mg per tablet, Take 1 tablet by mouth every 6 (six) hours as needed for painMax Daily Amount: 4 tablets, Disp: 5 tablet, Rfl: 0    [START ON 8/28/2020] nicotine (NICODERM CQ) 14 mg/24hr TD 24 hr patch, Place 1 patch on the skin every 24 hours for 14 days, Disp: 14 patch, Rfl: 0    nicotine (NICODERM CQ) 21 mg/24 hr TD 24 hr patch, Place 1 patch on the skin every 24 hours for 14 days, Disp: 14 patch, Rfl: 0    [START ON 9/11/2020] nicotine (NICODERM CQ) 7 mg/24hr TD 24 hr patch, Place 1 patch on the skin every 24 hours for 14 days, Disp: 28 patch, Rfl: 0    promethazine (PHENERGAN) 25 mg tablet, Take 1 tablet (25 mg total) by mouth every 6 (six) hours as needed for nausea or vomiting, Disp: 10 tablet, Rfl: 0  No current facility-administered medications for this visit  Facility-Administered Medications Ordered in Other Visits:     acetaminophen (TYLENOL) tablet 650 mg, 650 mg, Oral, Once, Jayna Ee    diphenhydrAMINE (BENADRYL) tablet 25 mg, 25 mg, Oral, Once, [facility]    Current Allergies     Allergies as of 08/10/2020 - Reviewed 08/10/2020   Allergen Reaction Noted    Oxycodone-acetaminophen  04/22/2012    Oxycodone Itching 09/16/2017    Tramadol Itching 09/16/2017            The following portions of the patient's history were reviewed and updated as appropriate: allergies, current medications, past family history, past medical history, past social history, past surgical history and problem list      Past Medical History:   Diagnosis Date    Ectopic pregnancy     History of unilateral fallopian tube excision     RIGHT removed    Ovarian cyst     Wears glasses        Past Surgical History:   Procedure Laterality Date    DILATION AND CURETTAGE OF UTERUS      ECTOPIC PREGNANCY SURGERY      LAPAROSCOPY      CT LAP,DIAGNOSTIC ABDOMEN N/A 9/16/2017    Procedure: LAPAROSCOPY DIAGNOSTIC, left salpingectomy;  Surgeon: Yosef Mena MD;  Location:  MAIN OR;  Service: Gynecology    WISDOM TOOTH EXTRACTION         Family History   Problem Relation Age of Onset    No Known Problems Mother     No Known Problems Father          Medications have been verified          Objective /62   Pulse 79   Temp 98 4 °F (36 9 °C)   Resp 18   SpO2 99%        Physical Exam     Physical Exam  Vitals signs and nursing note reviewed  Constitutional:       General: She is not in acute distress  Appearance: She is well-developed  She is not diaphoretic  HENT:      Head: Normocephalic and atraumatic  Eyes:      Pupils: Pupils are equal, round, and reactive to light  Neck:      Musculoskeletal: Normal range of motion and neck supple  Pulmonary:      Effort: Pulmonary effort is normal  No respiratory distress  Abdominal:      General: There is no distension  Palpations: Abdomen is soft  Tenderness: There is left CVA tenderness  Comments: No pelvic tenderness  Left lower back pain appears to be flank not sciatic  I am suspicious for kidney stone despite lack of blood in urine dip   Musculoskeletal: Normal range of motion  Lumbar back: Normal  She exhibits no tenderness, no bony tenderness, no pain and no spasm  Skin:     General: Skin is warm and dry  Capillary Refill: Capillary refill takes less than 2 seconds  Neurological:      Mental Status: She is alert and oriented to person, place, and time  Psychiatric:         Behavior: Behavior normal          Thought Content:  Thought content normal          Judgment: Judgment normal

## 2020-08-12 ENCOUNTER — APPOINTMENT (INPATIENT)
Dept: ULTRASOUND IMAGING | Facility: HOSPITAL | Age: 25
DRG: 249 | End: 2020-08-12
Payer: COMMERCIAL

## 2020-08-12 PROBLEM — Z72.0 TOBACCO USE: Status: ACTIVE | Noted: 2020-08-12

## 2020-08-12 PROBLEM — E87.6 HYPOKALEMIA: Status: ACTIVE | Noted: 2020-08-12

## 2020-08-12 PROBLEM — R31.29 MICROSCOPIC HEMATURIA: Status: ACTIVE | Noted: 2020-08-12

## 2020-08-12 PROBLEM — D72.829 LEUKOCYTOSIS: Status: ACTIVE | Noted: 2020-08-12

## 2020-08-12 PROBLEM — N17.9 AKI (ACUTE KIDNEY INJURY) (HCC): Status: ACTIVE | Noted: 2020-08-12

## 2020-08-12 PROBLEM — R19.7 DIARRHEA: Status: ACTIVE | Noted: 2020-08-12

## 2020-08-12 PROBLEM — R10.9 FLANK PAIN: Status: ACTIVE | Noted: 2020-08-12

## 2020-08-12 LAB
ALBUMIN SERPL BCP-MCNC: 3 G/DL (ref 3.5–5)
ALP SERPL-CCNC: 47 U/L (ref 46–116)
ALT SERPL W P-5'-P-CCNC: 14 U/L (ref 12–78)
ANION GAP SERPL CALCULATED.3IONS-SCNC: 7 MMOL/L (ref 4–13)
ANION GAP SERPL CALCULATED.3IONS-SCNC: 7 MMOL/L (ref 4–13)
AST SERPL W P-5'-P-CCNC: 14 U/L (ref 5–45)
B-HCG SERPL-ACNC: <2 MIU/ML
BILIRUB SERPL-MCNC: 0.4 MG/DL (ref 0.2–1)
BUN SERPL-MCNC: 13 MG/DL (ref 5–25)
BUN SERPL-MCNC: 14 MG/DL (ref 5–25)
CALCIUM SERPL-MCNC: 7.5 MG/DL (ref 8.3–10.1)
CALCIUM SERPL-MCNC: 7.6 MG/DL (ref 8.3–10.1)
CHLORIDE SERPL-SCNC: 107 MMOL/L (ref 100–108)
CHLORIDE SERPL-SCNC: 108 MMOL/L (ref 100–108)
CO2 SERPL-SCNC: 25 MMOL/L (ref 21–32)
CO2 SERPL-SCNC: 26 MMOL/L (ref 21–32)
CREAT SERPL-MCNC: 1.69 MG/DL (ref 0.6–1.3)
CREAT SERPL-MCNC: 1.79 MG/DL (ref 0.6–1.3)
CREAT UR-MCNC: 54.9 MG/DL
CREAT UR-MCNC: 54.9 MG/DL
ERYTHROCYTE [DISTWIDTH] IN BLOOD BY AUTOMATED COUNT: 11.9 % (ref 11.6–15.1)
GFR SERPL CREATININE-BSD FRML MDRD: 38 ML/MIN/1.73SQ M
GFR SERPL CREATININE-BSD FRML MDRD: 41 ML/MIN/1.73SQ M
GLUCOSE SERPL-MCNC: 102 MG/DL (ref 65–140)
GLUCOSE SERPL-MCNC: 95 MG/DL (ref 65–140)
HCT VFR BLD AUTO: 39.5 % (ref 34.8–46.1)
HGB BLD-MCNC: 12.8 G/DL (ref 11.5–15.4)
LACTATE SERPL-SCNC: 1.2 MMOL/L (ref 0.5–2)
MAGNESIUM SERPL-MCNC: 1.6 MG/DL (ref 1.6–2.6)
MCH RBC QN AUTO: 30.8 PG (ref 26.8–34.3)
MCHC RBC AUTO-ENTMCNC: 32.4 G/DL (ref 31.4–37.4)
MCV RBC AUTO: 95 FL (ref 82–98)
MICROALBUMIN UR-MCNC: 46.1 MG/L (ref 0–20)
MICROALBUMIN/CREAT 24H UR: 84 MG/G CREATININE (ref 0–30)
PHOSPHATE SERPL-MCNC: 4.1 MG/DL (ref 2.7–4.5)
PLATELET # BLD AUTO: 192 THOUSANDS/UL (ref 149–390)
PMV BLD AUTO: 11.1 FL (ref 8.9–12.7)
POTASSIUM SERPL-SCNC: 3.5 MMOL/L (ref 3.5–5.3)
POTASSIUM SERPL-SCNC: 3.6 MMOL/L (ref 3.5–5.3)
PROCALCITONIN SERPL-MCNC: 0.15 NG/ML
PROT SERPL-MCNC: 5.5 G/DL (ref 6.4–8.2)
RBC # BLD AUTO: 4.15 MILLION/UL (ref 3.81–5.12)
SARS-COV-2 IGG+IGM SERPL QL IA: NORMAL
SODIUM 24H UR-SCNC: 65 MOL/L
SODIUM SERPL-SCNC: 139 MMOL/L (ref 136–145)
SODIUM SERPL-SCNC: 141 MMOL/L (ref 136–145)
UUN 24H UR-MCNC: 186 MG/DL
WBC # BLD AUTO: 12.19 THOUSAND/UL (ref 4.31–10.16)

## 2020-08-12 PROCEDURE — 82043 UR ALBUMIN QUANTITATIVE: CPT | Performed by: FAMILY MEDICINE

## 2020-08-12 PROCEDURE — 99254 IP/OBS CNSLTJ NEW/EST MOD 60: CPT | Performed by: INTERNAL MEDICINE

## 2020-08-12 PROCEDURE — 99255 IP/OBS CONSLTJ NEW/EST HI 80: CPT | Performed by: NURSE PRACTITIONER

## 2020-08-12 PROCEDURE — 82570 ASSAY OF URINE CREATININE: CPT | Performed by: FAMILY MEDICINE

## 2020-08-12 PROCEDURE — 76770 US EXAM ABDO BACK WALL COMP: CPT

## 2020-08-12 PROCEDURE — 86769 SARS-COV-2 COVID-19 ANTIBODY: CPT | Performed by: INTERNAL MEDICINE

## 2020-08-12 PROCEDURE — 84156 ASSAY OF PROTEIN URINE: CPT | Performed by: NURSE PRACTITIONER

## 2020-08-12 PROCEDURE — 80053 COMPREHEN METABOLIC PANEL: CPT | Performed by: NURSE PRACTITIONER

## 2020-08-12 PROCEDURE — 36415 COLL VENOUS BLD VENIPUNCTURE: CPT | Performed by: EMERGENCY MEDICINE

## 2020-08-12 PROCEDURE — 83735 ASSAY OF MAGNESIUM: CPT | Performed by: NURSE PRACTITIONER

## 2020-08-12 PROCEDURE — 85027 COMPLETE CBC AUTOMATED: CPT | Performed by: NURSE PRACTITIONER

## 2020-08-12 PROCEDURE — 96361 HYDRATE IV INFUSION ADD-ON: CPT

## 2020-08-12 PROCEDURE — 80048 BASIC METABOLIC PNL TOTAL CA: CPT | Performed by: EMERGENCY MEDICINE

## 2020-08-12 PROCEDURE — 87205 SMEAR GRAM STAIN: CPT | Performed by: NURSE PRACTITIONER

## 2020-08-12 PROCEDURE — 87040 BLOOD CULTURE FOR BACTERIA: CPT | Performed by: INTERNAL MEDICINE

## 2020-08-12 PROCEDURE — 83605 ASSAY OF LACTIC ACID: CPT | Performed by: INTERNAL MEDICINE

## 2020-08-12 PROCEDURE — 84540 ASSAY OF URINE/UREA-N: CPT | Performed by: FAMILY MEDICINE

## 2020-08-12 PROCEDURE — 84702 CHORIONIC GONADOTROPIN TEST: CPT | Performed by: INTERNAL MEDICINE

## 2020-08-12 PROCEDURE — 99223 1ST HOSP IP/OBS HIGH 75: CPT | Performed by: INTERNAL MEDICINE

## 2020-08-12 PROCEDURE — 87086 URINE CULTURE/COLONY COUNT: CPT | Performed by: INTERNAL MEDICINE

## 2020-08-12 PROCEDURE — 84100 ASSAY OF PHOSPHORUS: CPT | Performed by: NURSE PRACTITIONER

## 2020-08-12 PROCEDURE — 96376 TX/PRO/DX INJ SAME DRUG ADON: CPT

## 2020-08-12 PROCEDURE — 82570 ASSAY OF URINE CREATININE: CPT | Performed by: NURSE PRACTITIONER

## 2020-08-12 PROCEDURE — 84300 ASSAY OF URINE SODIUM: CPT | Performed by: FAMILY MEDICINE

## 2020-08-12 PROCEDURE — 99285 EMERGENCY DEPT VISIT HI MDM: CPT | Performed by: EMERGENCY MEDICINE

## 2020-08-12 PROCEDURE — 84145 PROCALCITONIN (PCT): CPT | Performed by: INTERNAL MEDICINE

## 2020-08-12 RX ORDER — MAGNESIUM SULFATE HEPTAHYDRATE 40 MG/ML
2 INJECTION, SOLUTION INTRAVENOUS ONCE
Status: COMPLETED | OUTPATIENT
Start: 2020-08-12 | End: 2020-08-12

## 2020-08-12 RX ORDER — PROMETHAZINE HYDROCHLORIDE 25 MG/ML
25 INJECTION, SOLUTION INTRAMUSCULAR; INTRAVENOUS EVERY 4 HOURS PRN
Status: DISCONTINUED | OUTPATIENT
Start: 2020-08-12 | End: 2020-08-12

## 2020-08-12 RX ORDER — PROMETHAZINE HYDROCHLORIDE 25 MG/ML
25 INJECTION, SOLUTION INTRAMUSCULAR; INTRAVENOUS EVERY 6 HOURS PRN
Status: DISCONTINUED | OUTPATIENT
Start: 2020-08-12 | End: 2020-08-13

## 2020-08-12 RX ORDER — FENTANYL CITRATE 50 UG/ML
50 INJECTION, SOLUTION INTRAMUSCULAR; INTRAVENOUS ONCE
Status: COMPLETED | OUTPATIENT
Start: 2020-08-12 | End: 2020-08-12

## 2020-08-12 RX ORDER — HYDROMORPHONE HCL/PF 1 MG/ML
0.5 SYRINGE (ML) INJECTION
Status: DISCONTINUED | OUTPATIENT
Start: 2020-08-12 | End: 2020-08-14 | Stop reason: HOSPADM

## 2020-08-12 RX ORDER — CEFTRIAXONE 1 G/50ML
1000 INJECTION, SOLUTION INTRAVENOUS EVERY 24 HOURS
Status: DISCONTINUED | OUTPATIENT
Start: 2020-08-12 | End: 2020-08-13

## 2020-08-12 RX ORDER — ONDANSETRON 2 MG/ML
INJECTION INTRAMUSCULAR; INTRAVENOUS
Status: DISPENSED
Start: 2020-08-12 | End: 2020-08-12

## 2020-08-12 RX ORDER — NICOTINE 21 MG/24HR
1 PATCH, TRANSDERMAL 24 HOURS TRANSDERMAL DAILY
Status: DISCONTINUED | OUTPATIENT
Start: 2020-08-12 | End: 2020-08-14 | Stop reason: HOSPADM

## 2020-08-12 RX ORDER — CIPROFLOXACIN 500 MG/1
500 TABLET, FILM COATED ORAL EVERY 12 HOURS SCHEDULED
Status: DISCONTINUED | OUTPATIENT
Start: 2020-08-12 | End: 2020-08-12

## 2020-08-12 RX ORDER — TAMSULOSIN HYDROCHLORIDE 0.4 MG/1
0.4 CAPSULE ORAL
Status: DISCONTINUED | OUTPATIENT
Start: 2020-08-12 | End: 2020-08-12

## 2020-08-12 RX ORDER — HYDROMORPHONE HCL/PF 1 MG/ML
0.5 SYRINGE (ML) INJECTION ONCE
Status: COMPLETED | OUTPATIENT
Start: 2020-08-12 | End: 2020-08-12

## 2020-08-12 RX ORDER — PROMETHAZINE HYDROCHLORIDE 25 MG/ML
25 INJECTION, SOLUTION INTRAMUSCULAR; INTRAVENOUS ONCE
Status: COMPLETED | OUTPATIENT
Start: 2020-08-12 | End: 2020-08-12

## 2020-08-12 RX ORDER — SODIUM CHLORIDE, SODIUM GLUCONATE, SODIUM ACETATE, POTASSIUM CHLORIDE, MAGNESIUM CHLORIDE, SODIUM PHOSPHATE, DIBASIC, AND POTASSIUM PHOSPHATE .53; .5; .37; .037; .03; .012; .00082 G/100ML; G/100ML; G/100ML; G/100ML; G/100ML; G/100ML; G/100ML
125 INJECTION, SOLUTION INTRAVENOUS CONTINUOUS
Status: DISCONTINUED | OUTPATIENT
Start: 2020-08-12 | End: 2020-08-14 | Stop reason: HOSPADM

## 2020-08-12 RX ORDER — FLUCONAZOLE 100 MG/1
150 TABLET ORAL
Status: DISCONTINUED | OUTPATIENT
Start: 2020-08-12 | End: 2020-08-12

## 2020-08-12 RX ORDER — SODIUM CHLORIDE 9 MG/ML
125 INJECTION, SOLUTION INTRAVENOUS CONTINUOUS
Status: DISCONTINUED | OUTPATIENT
Start: 2020-08-12 | End: 2020-08-12

## 2020-08-12 RX ORDER — POTASSIUM CHLORIDE 20 MEQ/1
20 TABLET, EXTENDED RELEASE ORAL ONCE
Status: COMPLETED | OUTPATIENT
Start: 2020-08-12 | End: 2020-08-12

## 2020-08-12 RX ORDER — ACETAMINOPHEN 325 MG/1
650 TABLET ORAL ONCE
Status: COMPLETED | OUTPATIENT
Start: 2020-08-12 | End: 2020-08-12

## 2020-08-12 RX ORDER — ONDANSETRON 2 MG/ML
4 INJECTION INTRAMUSCULAR; INTRAVENOUS ONCE
Status: COMPLETED | OUTPATIENT
Start: 2020-08-12 | End: 2020-08-12

## 2020-08-12 RX ORDER — ONDANSETRON 2 MG/ML
4 INJECTION INTRAMUSCULAR; INTRAVENOUS EVERY 6 HOURS PRN
Status: DISCONTINUED | OUTPATIENT
Start: 2020-08-12 | End: 2020-08-12

## 2020-08-12 RX ADMIN — SODIUM CHLORIDE 125 ML/HR: 0.9 INJECTION, SOLUTION INTRAVENOUS at 16:01

## 2020-08-12 RX ADMIN — PROMETHAZINE HYDROCHLORIDE 25 MG: 25 INJECTION INTRAMUSCULAR; INTRAVENOUS at 20:09

## 2020-08-12 RX ADMIN — NICOTINE 1 PATCH: 21 PATCH TRANSDERMAL at 08:28

## 2020-08-12 RX ADMIN — ENOXAPARIN SODIUM 40 MG: 40 INJECTION SUBCUTANEOUS at 08:27

## 2020-08-12 RX ADMIN — HYDROMORPHONE HYDROCHLORIDE 0.5 MG: 1 INJECTION, SOLUTION INTRAMUSCULAR; INTRAVENOUS; SUBCUTANEOUS at 08:28

## 2020-08-12 RX ADMIN — HYDROMORPHONE HYDROCHLORIDE 0.5 MG: 1 INJECTION, SOLUTION INTRAMUSCULAR; INTRAVENOUS; SUBCUTANEOUS at 18:34

## 2020-08-12 RX ADMIN — SODIUM CHLORIDE, SODIUM GLUCONATE, SODIUM ACETATE, POTASSIUM CHLORIDE, MAGNESIUM CHLORIDE, SODIUM PHOSPHATE, DIBASIC, AND POTASSIUM PHOSPHATE 125 ML/HR: .53; .5; .37; .037; .03; .012; .00082 INJECTION, SOLUTION INTRAVENOUS at 18:15

## 2020-08-12 RX ADMIN — POTASSIUM CHLORIDE 20 MEQ: 1500 TABLET, EXTENDED RELEASE ORAL at 18:14

## 2020-08-12 RX ADMIN — SODIUM CHLORIDE 125 ML/HR: 0.9 INJECTION, SOLUTION INTRAVENOUS at 04:05

## 2020-08-12 RX ADMIN — FLUCONAZOLE 150 MG: 100 TABLET ORAL at 04:05

## 2020-08-12 RX ADMIN — MAGNESIUM SULFATE IN WATER 2 G: 40 INJECTION, SOLUTION INTRAVENOUS at 09:11

## 2020-08-12 RX ADMIN — ACETAMINOPHEN 650 MG: 325 TABLET, FILM COATED ORAL at 00:50

## 2020-08-12 RX ADMIN — PROMETHAZINE HYDROCHLORIDE 25 MG: 25 INJECTION INTRAMUSCULAR; INTRAVENOUS at 14:01

## 2020-08-12 RX ADMIN — PROMETHAZINE HYDROCHLORIDE 25 MG: 25 INJECTION INTRAMUSCULAR; INTRAVENOUS at 08:24

## 2020-08-12 RX ADMIN — ONDANSETRON 4 MG: 2 INJECTION INTRAMUSCULAR; INTRAVENOUS at 00:54

## 2020-08-12 RX ADMIN — SODIUM CHLORIDE 1000 ML: 0.9 INJECTION, SOLUTION INTRAVENOUS at 00:49

## 2020-08-12 RX ADMIN — FENTANYL CITRATE 50 MCG: 50 INJECTION, SOLUTION INTRAMUSCULAR; INTRAVENOUS at 00:50

## 2020-08-12 RX ADMIN — ONDANSETRON 4 MG: 2 INJECTION INTRAMUSCULAR; INTRAVENOUS at 05:46

## 2020-08-12 RX ADMIN — FENTANYL CITRATE 50 MCG: 50 INJECTION, SOLUTION INTRAMUSCULAR; INTRAVENOUS at 05:46

## 2020-08-12 RX ADMIN — HYDROMORPHONE HYDROCHLORIDE 0.5 MG: 1 INJECTION, SOLUTION INTRAMUSCULAR; INTRAVENOUS; SUBCUTANEOUS at 16:00

## 2020-08-12 RX ADMIN — CEFTRIAXONE 1000 MG: 1 INJECTION, SOLUTION INTRAVENOUS at 08:30

## 2020-08-13 PROBLEM — R80.9 MICROALBUMINURIA: Status: ACTIVE | Noted: 2020-08-13

## 2020-08-13 PROBLEM — R00.1 BRADYCARDIA: Status: ACTIVE | Noted: 2020-08-13

## 2020-08-13 LAB
ALBUMIN SERPL BCP-MCNC: 3.1 G/DL (ref 3.5–5)
ALP SERPL-CCNC: 46 U/L (ref 46–116)
ALT SERPL W P-5'-P-CCNC: 13 U/L (ref 12–78)
ANION GAP SERPL CALCULATED.3IONS-SCNC: 6 MMOL/L (ref 4–13)
AST SERPL W P-5'-P-CCNC: 14 U/L (ref 5–45)
BACTERIA UR CULT: NORMAL
BASOPHILS # BLD AUTO: 0.04 THOUSANDS/ΜL (ref 0–0.1)
BASOPHILS NFR BLD AUTO: 1 % (ref 0–1)
BILIRUB SERPL-MCNC: 0.3 MG/DL (ref 0.2–1)
BUN SERPL-MCNC: 7 MG/DL (ref 5–25)
CA-I BLD-SCNC: 1.19 MMOL/L (ref 1.12–1.32)
CALCIUM SERPL-MCNC: 8.2 MG/DL (ref 8.3–10.1)
CHLORIDE SERPL-SCNC: 107 MMOL/L (ref 100–108)
CHOLEST SERPL-MCNC: 104 MG/DL (ref 50–200)
CK SERPL-CCNC: 55 U/L (ref 26–192)
CO2 SERPL-SCNC: 28 MMOL/L (ref 21–32)
CREAT SERPL-MCNC: 1.05 MG/DL (ref 0.6–1.3)
CREAT UR-MCNC: 54.6 MG/DL
EOSINOPHIL # BLD AUTO: 0.09 THOUSAND/ΜL (ref 0–0.61)
EOSINOPHIL NFR BLD AUTO: 1 % (ref 0–6)
EOSINOPHIL NFR URNS MANUAL: 0 %
ERYTHROCYTE [DISTWIDTH] IN BLOOD BY AUTOMATED COUNT: 11.9 % (ref 11.6–15.1)
EST. AVERAGE GLUCOSE BLD GHB EST-MCNC: 97 MG/DL
GFR SERPL CREATININE-BSD FRML MDRD: 73 ML/MIN/1.73SQ M
GLUCOSE SERPL-MCNC: 91 MG/DL (ref 65–140)
HAV IGM SER QL: ABNORMAL
HBA1C MFR BLD: 5 %
HBV CORE IGM SER QL: ABNORMAL
HBV SURFACE AG SER QL: ABNORMAL
HCT VFR BLD AUTO: 36.7 % (ref 34.8–46.1)
HCV AB SER QL: ABNORMAL
HDLC SERPL-MCNC: 36 MG/DL
HGB BLD-MCNC: 12.1 G/DL (ref 11.5–15.4)
IMM GRANULOCYTES # BLD AUTO: 0.03 THOUSAND/UL (ref 0–0.2)
IMM GRANULOCYTES NFR BLD AUTO: 0 % (ref 0–2)
LDLC SERPL CALC-MCNC: 54 MG/DL (ref 0–100)
LYMPHOCYTES # BLD AUTO: 1.87 THOUSANDS/ΜL (ref 0.6–4.47)
LYMPHOCYTES NFR BLD AUTO: 21 % (ref 14–44)
MAGNESIUM SERPL-MCNC: 2.2 MG/DL (ref 1.6–2.6)
MCH RBC QN AUTO: 31.2 PG (ref 26.8–34.3)
MCHC RBC AUTO-ENTMCNC: 33 G/DL (ref 31.4–37.4)
MCV RBC AUTO: 95 FL (ref 82–98)
MONOCYTES # BLD AUTO: 0.51 THOUSAND/ΜL (ref 0.17–1.22)
MONOCYTES NFR BLD AUTO: 6 % (ref 4–12)
NEUTROPHILS # BLD AUTO: 6.31 THOUSANDS/ΜL (ref 1.85–7.62)
NEUTS SEG NFR BLD AUTO: 71 % (ref 43–75)
NONHDLC SERPL-MCNC: 68 MG/DL
NRBC BLD AUTO-RTO: 0 /100 WBCS
PHOSPHATE SERPL-MCNC: 3.9 MG/DL (ref 2.7–4.5)
PLATELET # BLD AUTO: 232 THOUSANDS/UL (ref 149–390)
PMV BLD AUTO: 10.3 FL (ref 8.9–12.7)
POTASSIUM SERPL-SCNC: 3.4 MMOL/L (ref 3.5–5.3)
PROCALCITONIN SERPL-MCNC: <0.05 NG/ML
PROT SERPL-MCNC: 5.6 G/DL (ref 6.4–8.2)
PROT UR-MCNC: 10 MG/DL
PROT/CREAT UR: 0.18 MG/G{CREAT} (ref 0–0.1)
RBC # BLD AUTO: 3.88 MILLION/UL (ref 3.81–5.12)
SODIUM SERPL-SCNC: 141 MMOL/L (ref 136–145)
TRIGL SERPL-MCNC: 68 MG/DL
TROPONIN I SERPL-MCNC: <0.02 NG/ML
WBC # BLD AUTO: 8.85 THOUSAND/UL (ref 4.31–10.16)

## 2020-08-13 PROCEDURE — 83036 HEMOGLOBIN GLYCOSYLATED A1C: CPT | Performed by: NURSE PRACTITIONER

## 2020-08-13 PROCEDURE — 82550 ASSAY OF CK (CPK): CPT | Performed by: INTERNAL MEDICINE

## 2020-08-13 PROCEDURE — 99232 SBSQ HOSP IP/OBS MODERATE 35: CPT | Performed by: INTERNAL MEDICINE

## 2020-08-13 PROCEDURE — 85025 COMPLETE CBC W/AUTO DIFF WBC: CPT | Performed by: INTERNAL MEDICINE

## 2020-08-13 PROCEDURE — 80061 LIPID PANEL: CPT | Performed by: NURSE PRACTITIONER

## 2020-08-13 PROCEDURE — 84100 ASSAY OF PHOSPHORUS: CPT | Performed by: INTERNAL MEDICINE

## 2020-08-13 PROCEDURE — 80053 COMPREHEN METABOLIC PANEL: CPT | Performed by: INTERNAL MEDICINE

## 2020-08-13 PROCEDURE — 82330 ASSAY OF CALCIUM: CPT | Performed by: INTERNAL MEDICINE

## 2020-08-13 PROCEDURE — 84145 PROCALCITONIN (PCT): CPT | Performed by: INTERNAL MEDICINE

## 2020-08-13 PROCEDURE — 87389 HIV-1 AG W/HIV-1&-2 AB AG IA: CPT | Performed by: INTERNAL MEDICINE

## 2020-08-13 PROCEDURE — 80074 ACUTE HEPATITIS PANEL: CPT | Performed by: INTERNAL MEDICINE

## 2020-08-13 PROCEDURE — 84484 ASSAY OF TROPONIN QUANT: CPT | Performed by: INTERNAL MEDICINE

## 2020-08-13 PROCEDURE — 83735 ASSAY OF MAGNESIUM: CPT | Performed by: INTERNAL MEDICINE

## 2020-08-13 RX ORDER — POTASSIUM CHLORIDE 14.9 MG/ML
20 INJECTION INTRAVENOUS ONCE
Status: COMPLETED | OUTPATIENT
Start: 2020-08-13 | End: 2020-08-13

## 2020-08-13 RX ORDER — POTASSIUM CHLORIDE 20 MEQ/1
40 TABLET, EXTENDED RELEASE ORAL ONCE
Status: COMPLETED | OUTPATIENT
Start: 2020-08-13 | End: 2020-08-13

## 2020-08-13 RX ORDER — PROMETHAZINE HYDROCHLORIDE 25 MG/ML
25 INJECTION, SOLUTION INTRAMUSCULAR; INTRAVENOUS EVERY 4 HOURS PRN
Status: DISCONTINUED | OUTPATIENT
Start: 2020-08-13 | End: 2020-08-14 | Stop reason: HOSPADM

## 2020-08-13 RX ADMIN — NICOTINE 1 PATCH: 21 PATCH TRANSDERMAL at 08:57

## 2020-08-13 RX ADMIN — HYDROMORPHONE HYDROCHLORIDE 0.5 MG: 1 INJECTION, SOLUTION INTRAMUSCULAR; INTRAVENOUS; SUBCUTANEOUS at 17:29

## 2020-08-13 RX ADMIN — POTASSIUM CHLORIDE 20 MEQ: 14.9 INJECTION, SOLUTION INTRAVENOUS at 09:44

## 2020-08-13 RX ADMIN — POTASSIUM CHLORIDE 40 MEQ: 1500 TABLET, EXTENDED RELEASE ORAL at 08:57

## 2020-08-13 RX ADMIN — HYDROMORPHONE HYDROCHLORIDE 0.5 MG: 1 INJECTION, SOLUTION INTRAMUSCULAR; INTRAVENOUS; SUBCUTANEOUS at 20:30

## 2020-08-13 RX ADMIN — HYDROMORPHONE HYDROCHLORIDE 0.5 MG: 1 INJECTION, SOLUTION INTRAMUSCULAR; INTRAVENOUS; SUBCUTANEOUS at 07:38

## 2020-08-13 RX ADMIN — PROMETHAZINE HYDROCHLORIDE 25 MG: 25 INJECTION, SOLUTION INTRAMUSCULAR; INTRAVENOUS at 13:15

## 2020-08-13 RX ADMIN — HYDROMORPHONE HYDROCHLORIDE 0.5 MG: 1 INJECTION, SOLUTION INTRAMUSCULAR; INTRAVENOUS; SUBCUTANEOUS at 02:51

## 2020-08-13 RX ADMIN — PROMETHAZINE HYDROCHLORIDE 25 MG: 25 INJECTION, SOLUTION INTRAMUSCULAR; INTRAVENOUS at 21:33

## 2020-08-13 RX ADMIN — SODIUM CHLORIDE, SODIUM GLUCONATE, SODIUM ACETATE, POTASSIUM CHLORIDE, MAGNESIUM CHLORIDE, SODIUM PHOSPHATE, DIBASIC, AND POTASSIUM PHOSPHATE 125 ML/HR: .53; .5; .37; .037; .03; .012; .00082 INJECTION, SOLUTION INTRAVENOUS at 19:42

## 2020-08-13 RX ADMIN — ENOXAPARIN SODIUM 40 MG: 40 INJECTION SUBCUTANEOUS at 08:57

## 2020-08-13 RX ADMIN — SODIUM CHLORIDE, SODIUM GLUCONATE, SODIUM ACETATE, POTASSIUM CHLORIDE, MAGNESIUM CHLORIDE, SODIUM PHOSPHATE, DIBASIC, AND POTASSIUM PHOSPHATE 125 ML/HR: .53; .5; .37; .037; .03; .012; .00082 INJECTION, SOLUTION INTRAVENOUS at 11:18

## 2020-08-13 RX ADMIN — HYDROMORPHONE HYDROCHLORIDE 0.5 MG: 1 INJECTION, SOLUTION INTRAMUSCULAR; INTRAVENOUS; SUBCUTANEOUS at 23:54

## 2020-08-13 RX ADMIN — PROMETHAZINE HYDROCHLORIDE 25 MG: 25 INJECTION, SOLUTION INTRAMUSCULAR; INTRAVENOUS at 17:33

## 2020-08-13 RX ADMIN — PROMETHAZINE HYDROCHLORIDE 25 MG: 25 INJECTION INTRAMUSCULAR; INTRAVENOUS at 07:30

## 2020-08-13 RX ADMIN — CEFTRIAXONE 1000 MG: 1 INJECTION, SOLUTION INTRAVENOUS at 08:57

## 2020-08-13 RX ADMIN — HYDROMORPHONE HYDROCHLORIDE 0.5 MG: 1 INJECTION, SOLUTION INTRAMUSCULAR; INTRAVENOUS; SUBCUTANEOUS at 12:17

## 2020-08-13 RX ADMIN — SODIUM CHLORIDE, SODIUM GLUCONATE, SODIUM ACETATE, POTASSIUM CHLORIDE, MAGNESIUM CHLORIDE, SODIUM PHOSPHATE, DIBASIC, AND POTASSIUM PHOSPHATE 125 ML/HR: .53; .5; .37; .037; .03; .012; .00082 INJECTION, SOLUTION INTRAVENOUS at 02:45

## 2020-08-14 VITALS
HEIGHT: 66 IN | BODY MASS INDEX: 23.1 KG/M2 | RESPIRATION RATE: 18 BRPM | HEART RATE: 89 BPM | OXYGEN SATURATION: 98 % | WEIGHT: 143.74 LBS | DIASTOLIC BLOOD PRESSURE: 78 MMHG | TEMPERATURE: 98.4 F | SYSTOLIC BLOOD PRESSURE: 127 MMHG

## 2020-08-14 DIAGNOSIS — N17.9 AKI (ACUTE KIDNEY INJURY) (HCC): Primary | ICD-10-CM

## 2020-08-14 PROBLEM — K52.9 GASTROENTERITIS: Status: ACTIVE | Noted: 2020-08-14

## 2020-08-14 PROBLEM — E78.6 LOW HDL (UNDER 40): Status: ACTIVE | Noted: 2020-08-14

## 2020-08-14 PROBLEM — R76.8 HEPATITIS C ANTIBODY TEST POSITIVE: Status: ACTIVE | Noted: 2020-08-14

## 2020-08-14 LAB
HCV AB SER QL: ABNORMAL
HIV 1+2 AB+HIV1 P24 AG SERPL QL IA: NORMAL

## 2020-08-14 PROCEDURE — 87522 HEPATITIS C REVRS TRNSCRPJ: CPT | Performed by: INTERNAL MEDICINE

## 2020-08-14 PROCEDURE — 86803 HEPATITIS C AB TEST: CPT | Performed by: INTERNAL MEDICINE

## 2020-08-14 PROCEDURE — 87521 HEPATITIS C PROBE&RVRS TRNSC: CPT | Performed by: INTERNAL MEDICINE

## 2020-08-14 PROCEDURE — 99238 HOSP IP/OBS DSCHRG MGMT 30/<: CPT | Performed by: INTERNAL MEDICINE

## 2020-08-14 RX ORDER — NICOTINE 21 MG/24HR
1 PATCH, TRANSDERMAL 24 HOURS TRANSDERMAL EVERY 24 HOURS
Qty: 14 PATCH | Refills: 0 | Status: SHIPPED | OUTPATIENT
Start: 2020-08-28 | End: 2020-09-15 | Stop reason: ALTCHOICE

## 2020-08-14 RX ORDER — NICOTINE 21 MG/24HR
1 PATCH, TRANSDERMAL 24 HOURS TRANSDERMAL EVERY 24 HOURS
Qty: 14 PATCH | Refills: 0 | Status: SHIPPED | OUTPATIENT
Start: 2020-08-14 | End: 2020-09-15 | Stop reason: ALTCHOICE

## 2020-08-14 RX ORDER — PROMETHAZINE HYDROCHLORIDE 25 MG/1
25 TABLET ORAL EVERY 6 HOURS PRN
Qty: 10 TABLET | Refills: 0 | Status: SHIPPED | OUTPATIENT
Start: 2020-08-14 | End: 2020-08-18 | Stop reason: SDUPTHER

## 2020-08-14 RX ORDER — HYDROCODONE BITARTRATE AND ACETAMINOPHEN 5; 325 MG/1; MG/1
1 TABLET ORAL EVERY 6 HOURS PRN
Qty: 5 TABLET | Refills: 0 | Status: SHIPPED | OUTPATIENT
Start: 2020-08-14 | End: 2020-08-18

## 2020-08-14 RX ORDER — POTASSIUM CHLORIDE 20 MEQ/1
40 TABLET, EXTENDED RELEASE ORAL ONCE
Status: COMPLETED | OUTPATIENT
Start: 2020-08-14 | End: 2020-08-14

## 2020-08-14 RX ADMIN — NICOTINE 1 PATCH: 21 PATCH TRANSDERMAL at 08:01

## 2020-08-14 RX ADMIN — PROMETHAZINE HYDROCHLORIDE 25 MG: 25 INJECTION, SOLUTION INTRAMUSCULAR; INTRAVENOUS at 02:59

## 2020-08-14 RX ADMIN — HYDROMORPHONE HYDROCHLORIDE 0.5 MG: 1 INJECTION, SOLUTION INTRAMUSCULAR; INTRAVENOUS; SUBCUTANEOUS at 02:59

## 2020-08-14 RX ADMIN — PROMETHAZINE HYDROCHLORIDE 25 MG: 25 INJECTION, SOLUTION INTRAMUSCULAR; INTRAVENOUS at 07:53

## 2020-08-14 RX ADMIN — POTASSIUM CHLORIDE 40 MEQ: 1500 TABLET, EXTENDED RELEASE ORAL at 09:46

## 2020-08-14 RX ADMIN — SODIUM CHLORIDE, SODIUM GLUCONATE, SODIUM ACETATE, POTASSIUM CHLORIDE, MAGNESIUM CHLORIDE, SODIUM PHOSPHATE, DIBASIC, AND POTASSIUM PHOSPHATE 125 ML/HR: .53; .5; .37; .037; .03; .012; .00082 INJECTION, SOLUTION INTRAVENOUS at 04:21

## 2020-08-14 RX ADMIN — HYDROMORPHONE HYDROCHLORIDE 0.5 MG: 1 INJECTION, SOLUTION INTRAMUSCULAR; INTRAVENOUS; SUBCUTANEOUS at 07:53

## 2020-08-17 ENCOUNTER — NURSE TRIAGE (OUTPATIENT)
Dept: OTHER | Facility: OTHER | Age: 25
End: 2020-08-17

## 2020-08-17 ENCOUNTER — TELEPHONE (OUTPATIENT)
Dept: FAMILY MEDICINE CLINIC | Facility: CLINIC | Age: 25
End: 2020-08-17

## 2020-08-17 ENCOUNTER — OFFICE VISIT (OUTPATIENT)
Dept: FAMILY MEDICINE CLINIC | Facility: CLINIC | Age: 25
End: 2020-08-17
Payer: COMMERCIAL

## 2020-08-17 ENCOUNTER — APPOINTMENT (OUTPATIENT)
Dept: LAB | Facility: MEDICAL CENTER | Age: 25
End: 2020-08-17
Payer: COMMERCIAL

## 2020-08-17 ENCOUNTER — TELEPHONE (OUTPATIENT)
Dept: GASTROENTEROLOGY | Facility: AMBULARY SURGERY CENTER | Age: 25
End: 2020-08-17

## 2020-08-17 VITALS
HEART RATE: 106 BPM | RESPIRATION RATE: 20 BRPM | SYSTOLIC BLOOD PRESSURE: 154 MMHG | DIASTOLIC BLOOD PRESSURE: 86 MMHG | OXYGEN SATURATION: 98 % | BODY MASS INDEX: 22.4 KG/M2 | TEMPERATURE: 97.8 F | WEIGHT: 139.4 LBS | HEIGHT: 66 IN

## 2020-08-17 DIAGNOSIS — K52.9 GASTROENTERITIS: ICD-10-CM

## 2020-08-17 DIAGNOSIS — N17.9 AKI (ACUTE KIDNEY INJURY) (HCC): ICD-10-CM

## 2020-08-17 DIAGNOSIS — R10.9 FLANK PAIN: Primary | ICD-10-CM

## 2020-08-17 DIAGNOSIS — R19.7 DIARRHEA, UNSPECIFIED TYPE: ICD-10-CM

## 2020-08-17 DIAGNOSIS — D72.829 LEUKOCYTOSIS, UNSPECIFIED TYPE: ICD-10-CM

## 2020-08-17 DIAGNOSIS — E87.6 HYPOKALEMIA: ICD-10-CM

## 2020-08-17 LAB
ALBUMIN SERPL BCP-MCNC: 4.2 G/DL (ref 3.5–5)
ALP SERPL-CCNC: 52 U/L (ref 46–116)
ALT SERPL W P-5'-P-CCNC: 16 U/L (ref 12–78)
ANION GAP SERPL CALCULATED.3IONS-SCNC: 5 MMOL/L (ref 4–13)
AST SERPL W P-5'-P-CCNC: 7 U/L (ref 5–45)
BACTERIA BLD CULT: NORMAL
BACTERIA BLD CULT: NORMAL
BASOPHILS # BLD AUTO: 0.08 THOUSANDS/ΜL (ref 0–0.1)
BASOPHILS NFR BLD AUTO: 1 % (ref 0–1)
BILIRUB SERPL-MCNC: 0.18 MG/DL (ref 0.2–1)
BUN SERPL-MCNC: 13 MG/DL (ref 5–25)
CALCIUM SERPL-MCNC: 9.5 MG/DL (ref 8.3–10.1)
CHLORIDE SERPL-SCNC: 107 MMOL/L (ref 100–108)
CO2 SERPL-SCNC: 28 MMOL/L (ref 21–32)
CREAT SERPL-MCNC: 0.72 MG/DL (ref 0.6–1.3)
EOSINOPHIL # BLD AUTO: 0.21 THOUSAND/ΜL (ref 0–0.61)
EOSINOPHIL NFR BLD AUTO: 2 % (ref 0–6)
ERYTHROCYTE [DISTWIDTH] IN BLOOD BY AUTOMATED COUNT: 12.1 % (ref 11.6–15.1)
GFR SERPL CREATININE-BSD FRML MDRD: 115 ML/MIN/1.73SQ M
GLUCOSE P FAST SERPL-MCNC: 86 MG/DL (ref 65–99)
HCT VFR BLD AUTO: 43.5 % (ref 34.8–46.1)
HGB BLD-MCNC: 14.2 G/DL (ref 11.5–15.4)
IMM GRANULOCYTES # BLD AUTO: 0.03 THOUSAND/UL (ref 0–0.2)
IMM GRANULOCYTES NFR BLD AUTO: 0 % (ref 0–2)
LYMPHOCYTES # BLD AUTO: 2.2 THOUSANDS/ΜL (ref 0.6–4.47)
LYMPHOCYTES NFR BLD AUTO: 25 % (ref 14–44)
MAGNESIUM SERPL-MCNC: 2.1 MG/DL (ref 1.6–2.6)
MCH RBC QN AUTO: 30.9 PG (ref 26.8–34.3)
MCHC RBC AUTO-ENTMCNC: 32.6 G/DL (ref 31.4–37.4)
MCV RBC AUTO: 95 FL (ref 82–98)
MONOCYTES # BLD AUTO: 0.49 THOUSAND/ΜL (ref 0.17–1.22)
MONOCYTES NFR BLD AUTO: 6 % (ref 4–12)
NEUTROPHILS # BLD AUTO: 5.82 THOUSANDS/ΜL (ref 1.85–7.62)
NEUTS SEG NFR BLD AUTO: 66 % (ref 43–75)
NRBC BLD AUTO-RTO: 0 /100 WBCS
PLATELET # BLD AUTO: 333 THOUSANDS/UL (ref 149–390)
PMV BLD AUTO: 10.9 FL (ref 8.9–12.7)
POTASSIUM SERPL-SCNC: 4.8 MMOL/L (ref 3.5–5.3)
PROT SERPL-MCNC: 7.5 G/DL (ref 6.4–8.2)
RBC # BLD AUTO: 4.6 MILLION/UL (ref 3.81–5.12)
SL AMB  POCT GLUCOSE, UA: NORMAL
SL AMB LEUKOCYTE ESTERASE,UA: NORMAL
SL AMB POCT BILIRUBIN,UA: NORMAL
SL AMB POCT BLOOD,UA: NORMAL
SL AMB POCT CLARITY,UA: CLEAR
SL AMB POCT COLOR,UA: YELLOW
SL AMB POCT KETONES,UA: NORMAL
SL AMB POCT NITRITE,UA: NORMAL
SL AMB POCT PH,UA: 5
SL AMB POCT SPECIFIC GRAVITY,UA: 1.02
SL AMB POCT URINE PROTEIN: NORMAL
SL AMB POCT UROBILINOGEN: 0.2
SODIUM SERPL-SCNC: 140 MMOL/L (ref 136–145)
WBC # BLD AUTO: 8.83 THOUSAND/UL (ref 4.31–10.16)

## 2020-08-17 PROCEDURE — 36415 COLL VENOUS BLD VENIPUNCTURE: CPT

## 2020-08-17 PROCEDURE — 80053 COMPREHEN METABOLIC PANEL: CPT

## 2020-08-17 PROCEDURE — T1015 CLINIC SERVICE: HCPCS | Performed by: FAMILY MEDICINE

## 2020-08-17 PROCEDURE — 85025 COMPLETE CBC W/AUTO DIFF WBC: CPT

## 2020-08-17 PROCEDURE — 83735 ASSAY OF MAGNESIUM: CPT

## 2020-08-17 RX ORDER — TIZANIDINE 4 MG/1
4 TABLET ORAL EVERY 8 HOURS PRN
Qty: 30 TABLET | Refills: 1 | Status: SHIPPED | OUTPATIENT
Start: 2020-08-17 | End: 2020-12-30

## 2020-08-17 RX ORDER — HYDROCODONE BITARTRATE AND ACETAMINOPHEN 5; 325 MG/1; MG/1
1 TABLET ORAL EVERY 6 HOURS PRN
Qty: 15 TABLET | Refills: 0 | Status: SHIPPED | OUTPATIENT
Start: 2020-08-17 | End: 2020-08-18

## 2020-08-17 NOTE — ASSESSMENT & PLAN NOTE
Left lumbosacral pain radiating anterolaterally towards abdomen s/p hospitalization for TERRY 8/11-8/14  UA within normal limits  Significant tenderness to palpation of region suggests possible musculoskeletal etiology  Plan to order sacroiliac x-rays and sed rate  Will follow up on results of labs drawn earlier today (CMP, CBC, Mg) as well as Hep C antigen ordered during hospitalization  Sent in scripts for Norco 5-325 mg q6 hours prn pain and tizanidine 4 mg q8 hours prn muscle spasms  Patient scheduled to follow up with Nephrology tomorrow 8/18 and will RTC in 1 week

## 2020-08-17 NOTE — TELEPHONE ENCOUNTER
Regarding: Kidney pain   ----- Message from Lucita Nielson sent at 8/17/2020  6:20 PM EDT -----  " I need my doctor to refill some pain medication, my pharmacy said that they send a fax for the doctor to filled out   I do not have more pain mediation and I am in a lot of kidney pain L-side, I was just release from the hospital on Friday"

## 2020-08-17 NOTE — TELEPHONE ENCOUNTER
Patient must be seen in 1 week  Can patient be overbooked onto your schedule next week? (If any cancellations I will move around ) Please advise

## 2020-08-17 NOTE — TELEPHONE ENCOUNTER
Patients GI provider:  Dr Denise Andujar    Number to return call: [phone]    Reason for call: Pt mother Daniela Burrows calling to set up 1 week hospital f/u     Scheduled procedure/appointment date if applicable: Apt/procedure

## 2020-08-17 NOTE — TELEPHONE ENCOUNTER
Left message for patient to call office back   She needs to get the sed rate drawn since it can't be added on labs done today

## 2020-08-17 NOTE — TELEPHONE ENCOUNTER
Pt seen in office today, new Rx for Norco   Review of chart indicates order received by pharmacy at [phone]  Per pt, pharmacy needs prior auth completed for insurance  Pt instructed to be seen in ED if pain is uncontrolled tonight  She will contact office tomorrow am for prior auth  Pt agrees with plan of action  No further questions        Reason for Disposition   [1] Caller has NON-URGENT medication question about med that PCP prescribed AND [2] triager unable to answer question    Protocols used: MEDICATION QUESTION CALL-ADULT-

## 2020-08-17 NOTE — PROGRESS NOTES
Assessment/Plan:    Flank pain  Left lumbosacral pain radiating anterolaterally towards abdomen s/p hospitalization for TERRY 8/11-8/14  UA within normal limits  Significant tenderness to palpation of region suggests possible musculoskeletal etiology  Plan to order sacroiliac x-rays and sed rate  Will follow up on results of labs drawn earlier today (CMP, CBC, Mg) as well as Hep C antigen ordered during hospitalization  Sent in scripts for Norco 5-325 mg q6 hours prn pain and tizanidine 4 mg q8 hours prn muscle spasms  Patient scheduled to follow up with Nephrology tomorrow 8/18 and will RTC in 1 week  Problem List Items Addressed This Visit        Other    Flank pain - Primary     Left lumbosacral pain radiating anterolaterally towards abdomen s/p hospitalization for TERRY 8/11-8/14  UA within normal limits  Significant tenderness to palpation of region suggests possible musculoskeletal etiology  Plan to order sacroiliac x-rays and sed rate  Will follow up on results of labs drawn earlier today (CMP, CBC, Mg) as well as Hep C antigen ordered during hospitalization  Sent in scripts for Norco 5-325 mg q6 hours prn pain and tizanidine 4 mg q8 hours prn muscle spasms  Patient scheduled to follow up with Nephrology tomorrow 8/18 and will RTC in 1 week  Relevant Medications    HYDROcodone-acetaminophen (NORCO) 5-325 mg per tablet    tiZANidine (ZANAFLEX) 4 mg tablet    Other Relevant Orders    POCT urine dip    Sedimentation rate, automated    XR sacroiliac joints < 3 views            Subjective:      Patient ID: Bernadette Grace is a 30 y o  female  Hipolito Gregory is a 29 y/o female who presents today for f/u after hospitalization to establish care  She was admitted to the hospital on 8/11/20 presenting with flank pain, N/V, and diarrhea diagnosed as TERRY  Symptoms improved significantly and she was discharged to home on 8/14/20  Symptoms have been worsening since Saturday 8/15   The patient complains today of left lower back and flank pain that radiates around to her abdomen  She rates the pain 8/10 while sitting and 10/10 when standing  The patient explains that she is unable to stand up straight to walk due to the pain  She ran out of the [address] she was given at discharge and is currently not taking anything for the pain  She states she is urinating more frequently than usual (6-7 times per day) but attributes that to all the IV fluid she received in the hospital   She denies fever, dysuria, hematuria or change in urine color, clarity or odor  She is also complaining of continued diarrhea reporting she had 4 episodes yesterday and 3 thus far today  Stool is greenish and non-bloody  Her appetite is normal but she does complain of some nausea for which she is taking Phenergan  She admits to not drinking enough water but states she is avoiding caffeinated beverages  Her LMP was approximately 2 5 weeks ago, her cycle is regular, occurs every 28 days, and is accompanied by severe cramping  She had a tubal ligation in 2018  Of note, she was frequently taking Ibuprofen q4 hours for menstrual cramps but has stopped taking NSAIDs since hospitalization  She is scheduled to follow up with nephrology tomorrow 8/17  The following portions of the patient's history were reviewed and updated as appropriate: allergies, current medications, past family history, past medical history, past social history, past surgical history and problem list     Review of Systems   Constitutional: Positive for chills and diaphoresis  Negative for fever  Respiratory: Negative for shortness of breath  Cardiovascular: Negative for chest pain  Gastrointestinal: Positive for diarrhea and nausea  Negative for blood in stool and vomiting  Genitourinary: Positive for flank pain and frequency (Urinating 6-7 times per day)  Negative for decreased urine volume, difficulty urinating, dysuria and hematuria     All other systems reviewed and are negative  Objective:      /86 (BP Location: Right arm, Patient Position: Sitting)   Pulse (!) 106   Temp 97 8 °F (36 6 °C) (Tympanic)   Resp 20   Ht 5' 6" (1 676 m)   Wt 63 2 kg (139 lb 6 4 oz)   LMP  (LMP Unknown)   SpO2 98%   BMI 22 50 kg/m²          Physical Exam  Vitals signs and nursing note reviewed  Constitutional:       General: She is in acute distress  Appearance: Normal appearance  She is normal weight  HENT:      Head: Normocephalic and atraumatic  Cardiovascular:      Rate and Rhythm: Regular rhythm  Tachycardia present  Pulses: Normal pulses  Heart sounds: Normal heart sounds  Pulmonary:      Effort: Pulmonary effort is normal       Breath sounds: Normal breath sounds  Abdominal:      General: Abdomen is flat  Bowel sounds are normal  There is no distension  Palpations: Abdomen is soft  Tenderness: There is no abdominal tenderness  Musculoskeletal:         General: Tenderness present  Comments: Significant tenderness to palpation of musculature over left lumbosacral region   Skin:     General: Skin is warm and dry  Neurological:      Mental Status: She is alert and oriented to person, place, and time

## 2020-08-18 ENCOUNTER — OFFICE VISIT (OUTPATIENT)
Dept: NEPHROLOGY | Facility: CLINIC | Age: 25
End: 2020-08-18
Payer: COMMERCIAL

## 2020-08-18 VITALS
HEIGHT: 66 IN | WEIGHT: 137 LBS | BODY MASS INDEX: 22.02 KG/M2 | TEMPERATURE: 97 F | DIASTOLIC BLOOD PRESSURE: 82 MMHG | SYSTOLIC BLOOD PRESSURE: 112 MMHG | HEART RATE: 82 BPM | OXYGEN SATURATION: 99 %

## 2020-08-18 DIAGNOSIS — N25.81 SECONDARY HYPERPARATHYROIDISM OF RENAL ORIGIN (HCC): ICD-10-CM

## 2020-08-18 DIAGNOSIS — N11.9 CHRONIC TUBULOINTERSTITIAL NEPHRITIS: ICD-10-CM

## 2020-08-18 DIAGNOSIS — R80.9 MICROALBUMINURIA: ICD-10-CM

## 2020-08-18 DIAGNOSIS — K52.9 GASTROENTERITIS: ICD-10-CM

## 2020-08-18 DIAGNOSIS — E55.9 VITAMIN D DEFICIENCY: ICD-10-CM

## 2020-08-18 DIAGNOSIS — R11.2 NAUSEA AND VOMITING, INTRACTABILITY OF VOMITING NOT SPECIFIED, UNSPECIFIED VOMITING TYPE: ICD-10-CM

## 2020-08-18 DIAGNOSIS — N17.9 AKI (ACUTE KIDNEY INJURY) (HCC): Primary | ICD-10-CM

## 2020-08-18 LAB
HCV RNA SERPL NAA+PROBE-ACNC: NORMAL IU/ML
TEST INFORMATION: NORMAL

## 2020-08-18 PROCEDURE — 1111F DSCHRG MED/CURRENT MED MERGE: CPT | Performed by: NURSE PRACTITIONER

## 2020-08-18 PROCEDURE — 3008F BODY MASS INDEX DOCD: CPT | Performed by: NURSE PRACTITIONER

## 2020-08-18 PROCEDURE — 99213 OFFICE O/P EST LOW 20 MIN: CPT | Performed by: NURSE PRACTITIONER

## 2020-08-18 RX ORDER — HYDROCODONE BITARTRATE AND ACETAMINOPHEN 5; 325 MG/1; MG/1
1 TABLET ORAL EVERY 6 HOURS PRN
Qty: 15 TABLET | Refills: 0 | Status: SHIPPED | OUTPATIENT
Start: 2020-08-18 | End: 2020-09-12 | Stop reason: HOSPADM

## 2020-08-18 RX ORDER — PROMETHAZINE HYDROCHLORIDE 25 MG/1
25 TABLET ORAL EVERY 6 HOURS PRN
Qty: 10 TABLET | Refills: 0 | Status: SHIPPED | OUTPATIENT
Start: 2020-08-18 | End: 2020-09-12 | Stop reason: HOSPADM

## 2020-08-18 NOTE — TELEPHONE ENCOUNTER
Patient came in to office to ask about issue with prescription for Norco requiring prior authorization  S/w Dr Raul Gutiérrez who reordered meds  Explained to patient that prior auth would be initiated today but approval may take a few days  Patient expressed understanding

## 2020-08-18 NOTE — PROGRESS NOTES
Nephrology   Office Follow-Up  Aris Brownlee 30 y o  female MRN: [id number]    Encounter: [id number]        79 Jodi Colorado was seen in the [facility] office  Diagnoses and all orders for this visit:    1  TERRY (acute kidney injury) ([facility] [address] )- resolved  · Due to volume depleted state atop NSAID use  She received volume expansion with resolution  2  Probable Stage 2 Chronic Kidney Disease  · With medical renal disease b/l on ultrasound  She will have blood work done in three months and quantification of urine protein  · Her blood pressure is well controlled  She will avoid NSAIDs and reach out to OB/GYN for options regarding her severe dysmenorrhea   -     CBC and differential; Future   -     Comprehensive metabolic panel; Future   -     Iron Panel (Includes Ferritin, Iron Sat%, Iron, and TIBC); Future   -     Microalbumin / creatinine urine ratio; Future   -     Protein / creatinine ratio, urine; Future   -     Phosphorus; Future   -     Urinalysis with microscopic; Future  3  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  · Again she will avoid NSAIDs  She is aware of the negative effects continued use may have on her kidneys  4  Microalbuminuria  5  Nausea and vomiting, intractability of vomiting not specified, unspecified vomiting type   -     promethazine (PHENERGAN) 25 mg tablet; Take 1 tablet (25 mg total) by mouth every 6 (six) hours as needed for nausea or vomiting  6  Secondary hyperparathyroidism of renal origin ([facility] [address] )   -     PTH, intact; Future  7  Vitamin D deficiency   -     Vitamin D 1,25 dihydroxy; Future      HPI: Aris Brownlee is a 30 y o  female with an active problem list significant for , status post tubal ligation, dysmenorrhea who is here for hospital follow-up  Hawa Abbottvel was hospitalized at [facility] from - for intractable flank pain, nausea, vomiting, diarrhea   Renal consultation was requested during hospital stay for acute kidney injury which resolved with volume expansion  A renal ultrasound was completed and significant for medical renal disease which was not on previous ultrasound  It was revealed that Corinne Lazaro was taking high doses of NSAIDs for many years due to dysmenorrhea and she was counseled on importance of further abstinence from these medications  Upon discussion with Corinne Lazaro she states today is the first day she has felt well since discharge  Does still have flank pain for which PCP has ordered some pain medicine and muscle relaxants  Does complain of 1 time episode of stress incontinence  She will try Kegel exercises  She does still have nausea relieved by phenergan for which I have re-ordered small amount  She has not taken any more NSAIDs  Her menses is due soon which is concerning for the amount of pain she gets with this  Have advised to call ob/gyn for follow-up  She will have labs done in 3 months and RTO with nephrologist      The medical record, including Care Everywhere and media tabs were reviewed  ROS:   All the systems were reviewed and were negative except as documented on the HPI  Allergies: Nitrofurantoin;  Oxycodone-acetaminophen; Oxycodone; and Tramadol    Medications:   Current Outpatient Medications:     HYDROcodone-acetaminophen (NORCO) 5-325 mg per tablet, Take 1 tablet by mouth every 6 (six) hours as needed for painMax Daily Amount: 4 tablets, Disp: 5 tablet, Rfl: 0    promethazine (PHENERGAN) 25 mg tablet, Take 1 tablet (25 mg total) by mouth every 6 (six) hours as needed for nausea or vomiting, Disp: 10 tablet, Rfl: 0    tiZANidine (ZANAFLEX) 4 mg tablet, Take 1 tablet (4 mg total) by mouth every 8 (eight) hours as needed for muscle spasms, Disp: 30 tablet, Rfl: 1    [START ON 8/28/2020] nicotine (NICODERM CQ) 14 mg/24hr TD 24 hr patch, Place 1 patch on the skin every 24 hours for 14 days (Patient not taking: Reported on 8/18/2020), Disp: 14 patch, Rfl: 0    nicotine (NICODERM CQ) 21 mg/24 hr TD 24 hr patch, Place 1 patch on the skin every 24 hours for 14 days (Patient not taking: Reported on 8/18/2020), Disp: 14 patch, Rfl: 0    [START ON 9/11/2020] nicotine (NICODERM CQ) 7 mg/24hr TD 24 hr patch, Place 1 patch on the skin every 24 hours for 14 days (Patient not taking: Reported on 8/18/2020), Disp: 28 patch, Rfl: 0  No current facility-administered medications for this visit  Facility-Administered Medications Ordered in Other Visits:     acetaminophen (TYLENOL) tablet 650 mg, 650 mg, Oral, Once, Eduardo Stokes    diphenhydrAMINE (BENADRYL) tablet 25 mg, 25 mg, Oral, Once, Eduardo Stokes    Past Medical History:   Diagnosis Date    Ectopic pregnancy     History of unilateral fallopian tube excision     RIGHT removed    Ovarian cyst     Wears glasses      Past Surgical History:   Procedure Laterality Date    DILATION AND CURETTAGE OF UTERUS      ECTOPIC PREGNANCY SURGERY      LAPAROSCOPY      VT LAP,DIAGNOSTIC ABDOMEN N/A 9/16/2017    Procedure: LAPAROSCOPY DIAGNOSTIC, left salpingectomy;  Surgeon: Emilia Babb MD;  Location:  MAIN OR;  Service: Gynecology    WISDOM TOOTH EXTRACTION       Family History   Problem Relation Age of Onset    No Known Problems Mother     No Known Problems Father       reports that she has been smoking cigarettes  She has a 6 00 pack-year smoking history  She has never used smokeless tobacco  She reports current alcohol use  She reports current drug use  Drug: Marijuana  Physical Exam:   Vitals:    08/18/20 1100   BP: 112/82   Pulse: 82   Temp: (!) 97 °F (36 1 °C)   TempSrc: Tympanic   SpO2: 99%   Weight: 62 1 kg (137 lb)   Height: 5' 6" (1 676 m)     Body mass index is 22 11 kg/m²      General: conscious, cooperative, in no acute distress  Eyes: conjunctivae pink, anicteric sclerae  ENT: lips and mucous membranes moist  Neck: no masses, flat neck veins  Chest: clear breath sounds bilateral, no crackles, ronchus or wheezings  CVS: distinct S1 & S2, normal rate, regular rhythm  Abdomen: soft, non-tender, non-distended, normoactive bowel sounds  Extremities: no edema of both legs  Skin: no rash  Neuro: awake, alert, oriented      Diagnostic Data:  Lab: I have personally reviewed pertinent lab results  ,   CBC:  Results from last 7 days   Lab Units 08/17/20  1034   WBC Thousand/uL 8 83   HEMOGLOBIN g/dL 14 2   HEMATOCRIT % 43 5   PLATELETS Thousands/uL 333      CMP:   No results found for: SODIUM, K, CL, CO2, ANIONGAP, BUN, CREATININE, GLUCOSE, CALCIUM, AST, ALT, ALKPHOS, PROT, BILITOT, EGFR,   PT/INR: No results found for: PT, INR,   Magnesium: No components found for: MAG,  Phosphorous: No results found for: PHOS    Discussion:    Patient Instructions      Please call the office with any questions or concerns   Please bring updated medication list with you to your next appointment   Avoid NSAIDs/Gordon-2 inhibitors/Turmeric (supplement)      Examples of NSAIDS and Gordon-2 Inhibitors     Talk to your doctor or healthcare provider before stopping any medicine ordered for you   Celecoxib (CELEBREX)  Ketoprofen (ORUDIS, ORUVAIL)    Diclofenac (VOLTAREN, CATAFLAM)  Ketorolac (TORADOL)    Diflunisal (DOLOBID)  Meloxicam (MOBIC)    Etodolac (LODINE)  Nabumetone (RELAFEN)    Fenoprofen (NALFON)  Naproxen (ALEVE, NAPROSYN,    NAPRELAN, ANAPROX)    Flurbiprofen (ANSAID)  Oxaprozin (DAYPRO)    Ibuprofen (MOTRIN, ADVIL)  Piroxicam (FELDENE)    Indomethacin (INDOCIN)  Sulindac (CLINORIL)      Tolmetin (TOLETIN)      Please obtain labs prior to your next appointment   Have a great day! Kegel Exercises for Women   AMBULATORY CARE:   Kegel exercises  help strengthen your pelvic muscles  Pelvic muscles hold your pelvic organs, such as your bladder and uterus, in place  Kegel exercises help prevent or control problems with urine incontinence (leakage)   Incontinence may be caused by pregnancy, childbirth, or menopause  Contact your healthcare provider if:   · You cannot feel your muscles tighten or relax  · You continue to leak urine  · You have questions or concerns about your condition or care  Use the correct muscles:  Pelvic muscles are the muscles you use to control urine flow  To target these muscles, stop and start the flow of urine several times  This will help you become familiar with how it feels to tighten and relax these muscles  How to do Kegel exercises:   · Empty your bladder  You may lie down, stand up, or sit down to do these exercises  When you first try to do these exercises, it may be easier if you lie down  Tighten or squeeze your pelvic muscles slowly  It may feel like you are trying to hold back urine or gas  Hold this position for 3 seconds  Relax for 3 seconds  Repeat this cycle 10 times  · Do 10 sets of Kegel exercises, at least 3 times a day  Do not hold your breath when you do Kegel exercises  Keep your stomach, back, and leg muscles relaxed  · As your muscles get stronger, you will be able to hold the squeeze longer  Your healthcare provider may ask that you increase your pelvic muscle squeeze to 10 seconds  After you squeeze for 10 seconds, relax for 10 seconds  What else you should know:   · Once you know how to do Kegel exercises, use different positions  You can do these exercises while you lie on the floor, sit at your desk or watch TV, and while you stand  · You may notice improved bladder control within about 6 weeks  · Tighten your pelvic muscles before you sneeze, cough, or lift to prevent urine leakage  Follow up with your healthcare provider as directed:  Write down your questions so you remember to ask them during your visits  © 2017 [address] [employer] Information is for End User's use only and may not be sold, redistributed or otherwise used for commercial purposes   All illustrations and images included in CareNotes® are the copyrighted property of [employer]  or [employer]  The above information is an  only  It is not intended as medical advice for individual conditions or treatments  Talk to your doctor, nurse or pharmacist before following any medical regimen to see if it is safe and effective for you  Portions of the record may have been created with voice recognition software  Occasional wrong word or "sound a like" substitutions may have occurred due to the inherent limitations of voice recognition software  Read the chart carefully and recognize, using context, where substitutions have occurred  If you have any questions, please contact the dictating provider

## 2020-08-18 NOTE — PATIENT INSTRUCTIONS
 Please call the office with any questions or concerns   Please bring updated medication list with you to your next appointment   Avoid NSAIDs/Gordon-2 inhibitors/Turmeric (supplement)      Examples of NSAIDS and Gordon-2 Inhibitors     Talk to your doctor or healthcare provider before stopping any medicine ordered for you   Celecoxib (CELEBREX)  Ketoprofen (ORUDIS, ORUVAIL)    Diclofenac (VOLTAREN, CATAFLAM)  Ketorolac (TORADOL)    Diflunisal (DOLOBID)  Meloxicam (MOBIC)    Etodolac (LODINE)  Nabumetone (RELAFEN)    Fenoprofen (NALFON)  Naproxen (ALEVE, NAPROSYN,    NAPRELAN, ANAPROX)    Flurbiprofen (ANSAID)  Oxaprozin (DAYPRO)    Ibuprofen (MOTRIN, ADVIL)  Piroxicam (FELDENE)    Indomethacin (INDOCIN)  Sulindac (CLINORIL)      Tolmetin (TOLETIN)      Please obtain labs prior to your next appointment   Have a great day! Kegel Exercises for Women   AMBULATORY CARE:   Kegel exercises  help strengthen your pelvic muscles  Pelvic muscles hold your pelvic organs, such as your bladder and uterus, in place  Kegel exercises help prevent or control problems with urine incontinence (leakage)  Incontinence may be caused by pregnancy, childbirth, or menopause  Contact your healthcare provider if:   · You cannot feel your muscles tighten or relax  · You continue to leak urine  · You have questions or concerns about your condition or care  Use the correct muscles:  Pelvic muscles are the muscles you use to control urine flow  To target these muscles, stop and start the flow of urine several times  This will help you become familiar with how it feels to tighten and relax these muscles  How to do Kegel exercises:   · Empty your bladder  You may lie down, stand up, or sit down to do these exercises  When you first try to do these exercises, it may be easier if you lie down  Tighten or squeeze your pelvic muscles slowly  It may feel like you are trying to hold back urine or gas   Hold this position for 3 seconds  Relax for 3 seconds  Repeat this cycle 10 times  · Do 10 sets of Kegel exercises, at least 3 times a day  Do not hold your breath when you do Kegel exercises  Keep your stomach, back, and leg muscles relaxed  · As your muscles get stronger, you will be able to hold the squeeze longer  Your healthcare provider may ask that you increase your pelvic muscle squeeze to 10 seconds  After you squeeze for 10 seconds, relax for 10 seconds  What else you should know:   · Once you know how to do Kegel exercises, use different positions  You can do these exercises while you lie on the floor, sit at your desk or watch TV, and while you stand  · You may notice improved bladder control within about 6 weeks  · Tighten your pelvic muscles before you sneeze, cough, or lift to prevent urine leakage  Follow up with your healthcare provider as directed:  Write down your questions so you remember to ask them during your visits  © 2017 [facility] Information is for End User's use only and may not be sold, redistributed or otherwise used for commercial purposes  All illustrations and images included in CareNotes® are the copyrighted property of [employer]  or [employer]  The above information is an  only  It is not intended as medical advice for individual conditions or treatments  Talk to your doctor, nurse or pharmacist before following any medical regimen to see if it is safe and effective for you

## 2020-08-19 ENCOUNTER — TELEPHONE (OUTPATIENT)
Dept: FAMILY MEDICINE CLINIC | Facility: CLINIC | Age: 25
End: 2020-08-19

## 2020-08-19 LAB — HCV RNA SERPL QL NAA+PROBE: NEGATIVE

## 2020-08-19 NOTE — TELEPHONE ENCOUNTER
10 pills is fine  I do not want her on this medication long-term  I only wrote for 15 pills the first time

## 2020-08-19 NOTE — TELEPHONE ENCOUNTER
Patients prior auth medication was denied for [place]  They states they will need chart notes stating why she needs this medication and why other have not worked and what others have been tried  They will give her however a 10 pills 4 day cover so that patient does not go without medication until information is sent  Called patient to make her aware left message to call office  Patient retuned call made her aware

## 2020-09-09 ENCOUNTER — APPOINTMENT (EMERGENCY)
Dept: CT IMAGING | Facility: HOSPITAL | Age: 25
DRG: 249 | End: 2020-09-09
Payer: COMMERCIAL

## 2020-09-09 ENCOUNTER — APPOINTMENT (EMERGENCY)
Dept: ULTRASOUND IMAGING | Facility: HOSPITAL | Age: 25
DRG: 249 | End: 2020-09-09
Payer: COMMERCIAL

## 2020-09-09 ENCOUNTER — HOSPITAL ENCOUNTER (INPATIENT)
Facility: HOSPITAL | Age: 25
LOS: 2 days | Discharge: HOME | DRG: 249 | End: 2020-09-12
Attending: EMERGENCY MEDICINE | Admitting: FAMILY MEDICINE
Payer: COMMERCIAL

## 2020-09-09 DIAGNOSIS — R10.9 FLANK PAIN: ICD-10-CM

## 2020-09-09 DIAGNOSIS — N39.0 UTI (URINARY TRACT INFECTION): ICD-10-CM

## 2020-09-09 DIAGNOSIS — D72.829 LEUKOCYTOSIS: ICD-10-CM

## 2020-09-09 DIAGNOSIS — R10.9 ABDOMINAL PAIN: Primary | ICD-10-CM

## 2020-09-09 LAB
ALBUMIN SERPL BCP-MCNC: 4.5 G/DL (ref 3.5–5)
ALP SERPL-CCNC: 52 U/L (ref 46–116)
ALT SERPL W P-5'-P-CCNC: 19 U/L (ref 12–78)
ANION GAP SERPL CALCULATED.3IONS-SCNC: 8 MMOL/L (ref 4–13)
AST SERPL W P-5'-P-CCNC: 7 U/L (ref 5–45)
BASOPHILS # BLD AUTO: 0.09 THOUSANDS/ΜL (ref 0–0.1)
BASOPHILS NFR BLD AUTO: 1 % (ref 0–1)
BILIRUB SERPL-MCNC: 0.4 MG/DL (ref 0.2–1)
BILIRUB UR QL STRIP: NEGATIVE
BUN SERPL-MCNC: 9 MG/DL (ref 5–25)
CALCIUM SERPL-MCNC: 9.1 MG/DL (ref 8.3–10.1)
CHLORIDE SERPL-SCNC: 104 MMOL/L (ref 100–108)
CLARITY UR: CLEAR
CO2 SERPL-SCNC: 28 MMOL/L (ref 21–32)
COLOR UR: YELLOW
CREAT SERPL-MCNC: 0.74 MG/DL (ref 0.6–1.3)
EOSINOPHIL # BLD AUTO: 0.07 THOUSAND/ΜL (ref 0–0.61)
EOSINOPHIL NFR BLD AUTO: 0 % (ref 0–6)
ERYTHROCYTE [DISTWIDTH] IN BLOOD BY AUTOMATED COUNT: 12.4 % (ref 11.6–15.1)
EXT PREG TEST URINE: NEGATIVE
EXT. CONTROL ED NAV: NORMAL
GFR SERPL CREATININE-BSD FRML MDRD: 111 ML/MIN/1.73SQ M
GLUCOSE SERPL-MCNC: 101 MG/DL (ref 65–140)
GLUCOSE UR STRIP-MCNC: NEGATIVE MG/DL
HCT VFR BLD AUTO: 41.8 % (ref 34.8–46.1)
HGB BLD-MCNC: 14.1 G/DL (ref 11.5–15.4)
HGB UR QL STRIP.AUTO: NEGATIVE
HOLD SPECIMEN: NORMAL
IMM GRANULOCYTES # BLD AUTO: 0.08 THOUSAND/UL (ref 0–0.2)
IMM GRANULOCYTES NFR BLD AUTO: 1 % (ref 0–2)
KETONES UR STRIP-MCNC: ABNORMAL MG/DL
LEUKOCYTE ESTERASE UR QL STRIP: NEGATIVE
LIPASE SERPL-CCNC: 74 U/L (ref 73–393)
LYMPHOCYTES # BLD AUTO: 2.18 THOUSANDS/ΜL (ref 0.6–4.47)
LYMPHOCYTES NFR BLD AUTO: 13 % (ref 14–44)
MCH RBC QN AUTO: 31.5 PG (ref 26.8–34.3)
MCHC RBC AUTO-ENTMCNC: 33.7 G/DL (ref 31.4–37.4)
MCV RBC AUTO: 93 FL (ref 82–98)
MONOCYTES # BLD AUTO: 0.75 THOUSAND/ΜL (ref 0.17–1.22)
MONOCYTES NFR BLD AUTO: 4 % (ref 4–12)
NEUTROPHILS # BLD AUTO: 13.84 THOUSANDS/ΜL (ref 1.85–7.62)
NEUTS SEG NFR BLD AUTO: 81 % (ref 43–75)
NITRITE UR QL STRIP: NEGATIVE
NRBC BLD AUTO-RTO: 0 /100 WBCS
PH UR STRIP.AUTO: 6 [PH]
PLATELET # BLD AUTO: 314 THOUSANDS/UL (ref 149–390)
PMV BLD AUTO: 9.6 FL (ref 8.9–12.7)
POTASSIUM SERPL-SCNC: 3.4 MMOL/L (ref 3.5–5.3)
PROT SERPL-MCNC: 7.3 G/DL (ref 6.4–8.2)
PROT UR STRIP-MCNC: NEGATIVE MG/DL
RBC # BLD AUTO: 4.48 MILLION/UL (ref 3.81–5.12)
SARS-COV-2 RNA RESP QL NAA+PROBE: NEGATIVE
SODIUM SERPL-SCNC: 140 MMOL/L (ref 136–145)
SP GR UR STRIP.AUTO: 1.02 (ref 1–1.03)
TROPONIN I SERPL-MCNC: <0.02 NG/ML
UROBILINOGEN UR QL STRIP.AUTO: 0.2 E.U./DL
WBC # BLD AUTO: 17.01 THOUSAND/UL (ref 4.31–10.16)

## 2020-09-09 PROCEDURE — G1004 CDSM NDSC: HCPCS

## 2020-09-09 PROCEDURE — 83690 ASSAY OF LIPASE: CPT

## 2020-09-09 PROCEDURE — 99285 EMERGENCY DEPT VISIT HI MDM: CPT | Performed by: EMERGENCY MEDICINE

## 2020-09-09 PROCEDURE — 74176 CT ABD & PELVIS W/O CONTRAST: CPT

## 2020-09-09 PROCEDURE — 87040 BLOOD CULTURE FOR BACTERIA: CPT | Performed by: EMERGENCY MEDICINE

## 2020-09-09 PROCEDURE — 93005 ELECTROCARDIOGRAM TRACING: CPT

## 2020-09-09 PROCEDURE — 84484 ASSAY OF TROPONIN QUANT: CPT | Performed by: EMERGENCY MEDICINE

## 2020-09-09 PROCEDURE — 87591 N.GONORRHOEAE DNA AMP PROB: CPT | Performed by: EMERGENCY MEDICINE

## 2020-09-09 PROCEDURE — 87491 CHLMYD TRACH DNA AMP PROBE: CPT | Performed by: EMERGENCY MEDICINE

## 2020-09-09 PROCEDURE — 81025 URINE PREGNANCY TEST: CPT

## 2020-09-09 PROCEDURE — 96361 HYDRATE IV INFUSION ADD-ON: CPT

## 2020-09-09 PROCEDURE — 99285 EMERGENCY DEPT VISIT HI MDM: CPT

## 2020-09-09 PROCEDURE — 96375 TX/PRO/DX INJ NEW DRUG ADDON: CPT

## 2020-09-09 PROCEDURE — 85025 COMPLETE CBC W/AUTO DIFF WBC: CPT

## 2020-09-09 PROCEDURE — 87635 SARS-COV-2 COVID-19 AMP PRB: CPT | Performed by: INTERNAL MEDICINE

## 2020-09-09 PROCEDURE — 36415 COLL VENOUS BLD VENIPUNCTURE: CPT | Performed by: EMERGENCY MEDICINE

## 2020-09-09 PROCEDURE — 81003 URINALYSIS AUTO W/O SCOPE: CPT

## 2020-09-09 PROCEDURE — 99220 PR INITIAL OBSERVATION CARE/DAY 70 MINUTES: CPT | Performed by: NURSE PRACTITIONER

## 2020-09-09 PROCEDURE — 96374 THER/PROPH/DIAG INJ IV PUSH: CPT

## 2020-09-09 PROCEDURE — 76705 ECHO EXAM OF ABDOMEN: CPT

## 2020-09-09 PROCEDURE — 80053 COMPREHEN METABOLIC PANEL: CPT

## 2020-09-09 RX ORDER — NICOTINE 21 MG/24HR
1 PATCH, TRANSDERMAL 24 HOURS TRANSDERMAL DAILY
Status: DISCONTINUED | OUTPATIENT
Start: 2020-09-09 | End: 2020-09-12 | Stop reason: HOSPADM

## 2020-09-09 RX ORDER — HYDROMORPHONE HCL/PF 1 MG/ML
0.5 SYRINGE (ML) INJECTION
Status: DISCONTINUED | OUTPATIENT
Start: 2020-09-09 | End: 2020-09-10

## 2020-09-09 RX ORDER — METOCLOPRAMIDE HYDROCHLORIDE 5 MG/ML
10 INJECTION INTRAMUSCULAR; INTRAVENOUS ONCE
Status: COMPLETED | OUTPATIENT
Start: 2020-09-09 | End: 2020-09-09

## 2020-09-09 RX ORDER — KETAMINE HCL IN NACL, ISO-OSM 100MG/10ML
6 SYRINGE (ML) INJECTION ONCE
Status: COMPLETED | OUTPATIENT
Start: 2020-09-09 | End: 2020-09-09

## 2020-09-09 RX ORDER — MORPHINE SULFATE 4 MG/ML
4 INJECTION, SOLUTION INTRAMUSCULAR; INTRAVENOUS ONCE
Status: COMPLETED | OUTPATIENT
Start: 2020-09-09 | End: 2020-09-09

## 2020-09-09 RX ORDER — CIPROFLOXACIN 2 MG/ML
400 INJECTION, SOLUTION INTRAVENOUS EVERY 12 HOURS
Status: DISCONTINUED | OUTPATIENT
Start: 2020-09-09 | End: 2020-09-10

## 2020-09-09 RX ORDER — DIPHENHYDRAMINE HCL 25 MG
25 TABLET ORAL EVERY 6 HOURS PRN
Status: DISCONTINUED | OUTPATIENT
Start: 2020-09-09 | End: 2020-09-12 | Stop reason: HOSPADM

## 2020-09-09 RX ORDER — NICOTINE 21 MG/24HR
1 PATCH, TRANSDERMAL 24 HOURS TRANSDERMAL DAILY
Status: DISCONTINUED | OUTPATIENT
Start: 2020-09-10 | End: 2020-09-09

## 2020-09-09 RX ORDER — PROMETHAZINE HYDROCHLORIDE 25 MG/ML
25 INJECTION, SOLUTION INTRAMUSCULAR; INTRAVENOUS EVERY 6 HOURS PRN
Status: DISCONTINUED | OUTPATIENT
Start: 2020-09-09 | End: 2020-09-12 | Stop reason: HOSPADM

## 2020-09-09 RX ORDER — SODIUM CHLORIDE 9 MG/ML
125 INJECTION, SOLUTION INTRAVENOUS CONTINUOUS
Status: DISCONTINUED | OUTPATIENT
Start: 2020-09-09 | End: 2020-09-11

## 2020-09-09 RX ORDER — OXYCODONE HYDROCHLORIDE 5 MG/1
5 TABLET ORAL EVERY 6 HOURS PRN
Status: DISCONTINUED | OUTPATIENT
Start: 2020-09-09 | End: 2020-09-12 | Stop reason: HOSPADM

## 2020-09-09 RX ADMIN — MORPHINE SULFATE 4 MG: 4 INJECTION INTRAVENOUS at 13:07

## 2020-09-09 RX ADMIN — HYDROMORPHONE HYDROCHLORIDE 0.5 MG: 1 INJECTION, SOLUTION INTRAMUSCULAR; INTRAVENOUS; SUBCUTANEOUS at 22:33

## 2020-09-09 RX ADMIN — CIPROFLOXACIN 400 MG: 2 INJECTION, SOLUTION INTRAVENOUS at 20:11

## 2020-09-09 RX ADMIN — METRONIDAZOLE 500 MG: 500 INJECTION, SOLUTION INTRAVENOUS at 19:33

## 2020-09-09 RX ADMIN — METOCLOPRAMIDE HYDROCHLORIDE 10 MG: 5 INJECTION INTRAMUSCULAR; INTRAVENOUS at 13:07

## 2020-09-09 RX ADMIN — DIPHENHYDRAMINE HCL 25 MG: 25 TABLET, COATED ORAL at 21:13

## 2020-09-09 RX ADMIN — SODIUM CHLORIDE, SODIUM LACTATE, POTASSIUM CHLORIDE, AND CALCIUM CHLORIDE 1000 ML: .6; .31; .03; .02 INJECTION, SOLUTION INTRAVENOUS at 17:53

## 2020-09-09 RX ADMIN — SODIUM CHLORIDE 1000 ML: 0.9 INJECTION, SOLUTION INTRAVENOUS at 13:06

## 2020-09-09 RX ADMIN — MORPHINE SULFATE 2 MG: 2 INJECTION, SOLUTION INTRAMUSCULAR; INTRAVENOUS at 17:54

## 2020-09-09 RX ADMIN — SODIUM CHLORIDE 100 ML/HR: 0.9 INJECTION, SOLUTION INTRAVENOUS at 19:40

## 2020-09-09 RX ADMIN — Medication 6 MG: at 16:39

## 2020-09-09 RX ADMIN — NICOTINE 1 PATCH: 21 PATCH TRANSDERMAL at 21:13

## 2020-09-09 RX ADMIN — PROMETHAZINE HYDROCHLORIDE 25 MG: 25 INJECTION INTRAMUSCULAR; INTRAVENOUS at 22:33

## 2020-09-09 RX ADMIN — HYDROMORPHONE HYDROCHLORIDE 0.5 MG: 1 INJECTION, SOLUTION INTRAMUSCULAR; INTRAVENOUS; SUBCUTANEOUS at 19:40

## 2020-09-10 LAB
ALBUMIN SERPL BCP-MCNC: 3.4 G/DL (ref 3.5–5)
ALP SERPL-CCNC: 43 U/L (ref 46–116)
ALT SERPL W P-5'-P-CCNC: 11 U/L (ref 12–78)
ANION GAP SERPL CALCULATED.3IONS-SCNC: 9 MMOL/L (ref 4–13)
AST SERPL W P-5'-P-CCNC: 10 U/L (ref 5–45)
ATRIAL RATE: 78 BPM
BILIRUB SERPL-MCNC: 0.8 MG/DL (ref 0.2–1)
BUN SERPL-MCNC: 4 MG/DL (ref 5–25)
CALCIUM SERPL-MCNC: 8.1 MG/DL (ref 8.3–10.1)
CHLORIDE SERPL-SCNC: 104 MMOL/L (ref 100–108)
CO2 SERPL-SCNC: 25 MMOL/L (ref 21–32)
CREAT SERPL-MCNC: 0.63 MG/DL (ref 0.6–1.3)
ERYTHROCYTE [DISTWIDTH] IN BLOOD BY AUTOMATED COUNT: 12.2 % (ref 11.6–15.1)
GFR SERPL CREATININE-BSD FRML MDRD: 123 ML/MIN/1.73SQ M
GLUCOSE SERPL-MCNC: 98 MG/DL (ref 65–140)
HCT VFR BLD AUTO: 37.2 % (ref 34.8–46.1)
HGB BLD-MCNC: 12.5 G/DL (ref 11.5–15.4)
MAGNESIUM SERPL-MCNC: 1.7 MG/DL (ref 1.6–2.6)
MCH RBC QN AUTO: 31.1 PG (ref 26.8–34.3)
MCHC RBC AUTO-ENTMCNC: 33.6 G/DL (ref 31.4–37.4)
MCV RBC AUTO: 93 FL (ref 82–98)
P AXIS: 41 DEGREES
PHOSPHATE SERPL-MCNC: 3.5 MG/DL (ref 2.7–4.5)
PLATELET # BLD AUTO: 231 THOUSANDS/UL (ref 149–390)
PMV BLD AUTO: 9.9 FL (ref 8.9–12.7)
POTASSIUM SERPL-SCNC: 3.4 MMOL/L (ref 3.5–5.3)
PR INTERVAL: 152 MS
PROT SERPL-MCNC: 6 G/DL (ref 6.4–8.2)
QRS AXIS: -78 DEGREES
QRSD INTERVAL: 106 MS
QT INTERVAL: 396 MS
QTC INTERVAL: 451 MS
RBC # BLD AUTO: 4.02 MILLION/UL (ref 3.81–5.12)
SODIUM SERPL-SCNC: 138 MMOL/L (ref 136–145)
T WAVE AXIS: 47 DEGREES
VENTRICULAR RATE: 78 BPM
WBC # BLD AUTO: 11.96 THOUSAND/UL (ref 4.31–10.16)

## 2020-09-10 PROCEDURE — 99232 SBSQ HOSP IP/OBS MODERATE 35: CPT | Performed by: FAMILY MEDICINE

## 2020-09-10 PROCEDURE — 93010 ELECTROCARDIOGRAM REPORT: CPT | Performed by: INTERNAL MEDICINE

## 2020-09-10 PROCEDURE — 80053 COMPREHEN METABOLIC PANEL: CPT | Performed by: NURSE PRACTITIONER

## 2020-09-10 PROCEDURE — 83735 ASSAY OF MAGNESIUM: CPT | Performed by: NURSE PRACTITIONER

## 2020-09-10 PROCEDURE — 99254 IP/OBS CNSLTJ NEW/EST MOD 60: CPT | Performed by: SURGERY

## 2020-09-10 PROCEDURE — 84100 ASSAY OF PHOSPHORUS: CPT | Performed by: NURSE PRACTITIONER

## 2020-09-10 PROCEDURE — 85027 COMPLETE CBC AUTOMATED: CPT | Performed by: NURSE PRACTITIONER

## 2020-09-10 RX ORDER — HYDROMORPHONE HCL/PF 1 MG/ML
0.5 SYRINGE (ML) INJECTION EVERY 4 HOURS PRN
Status: DISCONTINUED | OUTPATIENT
Start: 2020-09-10 | End: 2020-09-11

## 2020-09-10 RX ORDER — ONDANSETRON 2 MG/ML
4 INJECTION INTRAMUSCULAR; INTRAVENOUS EVERY 6 HOURS PRN
Status: DISCONTINUED | OUTPATIENT
Start: 2020-09-10 | End: 2020-09-12 | Stop reason: HOSPADM

## 2020-09-10 RX ORDER — HYDROMORPHONE HCL/PF 1 MG/ML
0.2 SYRINGE (ML) INJECTION EVERY 2 HOUR PRN
Status: DISCONTINUED | OUTPATIENT
Start: 2020-09-10 | End: 2020-09-12

## 2020-09-10 RX ADMIN — ONDANSETRON 4 MG: 2 INJECTION INTRAMUSCULAR; INTRAVENOUS at 03:26

## 2020-09-10 RX ADMIN — DIPHENHYDRAMINE HCL 25 MG: 25 TABLET, COATED ORAL at 15:17

## 2020-09-10 RX ADMIN — HYDROMORPHONE HYDROCHLORIDE 0.5 MG: 1 INJECTION, SOLUTION INTRAMUSCULAR; INTRAVENOUS; SUBCUTANEOUS at 03:26

## 2020-09-10 RX ADMIN — SODIUM CHLORIDE 100 ML/HR: 0.9 INJECTION, SOLUTION INTRAVENOUS at 10:10

## 2020-09-10 RX ADMIN — NICOTINE 1 PATCH: 21 PATCH TRANSDERMAL at 08:54

## 2020-09-10 RX ADMIN — HYDROMORPHONE HYDROCHLORIDE 0.5 MG: 1 INJECTION, SOLUTION INTRAMUSCULAR; INTRAVENOUS; SUBCUTANEOUS at 22:40

## 2020-09-10 RX ADMIN — HYDROMORPHONE HYDROCHLORIDE 0.5 MG: 1 INJECTION, SOLUTION INTRAMUSCULAR; INTRAVENOUS; SUBCUTANEOUS at 07:45

## 2020-09-10 RX ADMIN — OXYCODONE HYDROCHLORIDE 5 MG: 5 TABLET ORAL at 15:17

## 2020-09-10 RX ADMIN — METRONIDAZOLE 500 MG: 500 INJECTION, SOLUTION INTRAVENOUS at 03:27

## 2020-09-10 RX ADMIN — HYDROMORPHONE HYDROCHLORIDE 0.5 MG: 1 INJECTION, SOLUTION INTRAMUSCULAR; INTRAVENOUS; SUBCUTANEOUS at 16:39

## 2020-09-10 RX ADMIN — OXYCODONE HYDROCHLORIDE 5 MG: 5 TABLET ORAL at 05:14

## 2020-09-10 RX ADMIN — DIPHENHYDRAMINE HCL 25 MG: 25 TABLET, COATED ORAL at 05:14

## 2020-09-10 RX ADMIN — CIPROFLOXACIN 400 MG: 2 INJECTION, SOLUTION INTRAVENOUS at 07:45

## 2020-09-10 RX ADMIN — OXYCODONE HYDROCHLORIDE 5 MG: 5 TABLET ORAL at 21:49

## 2020-09-10 RX ADMIN — HYDROMORPHONE HYDROCHLORIDE 0.2 MG: 1 INJECTION, SOLUTION INTRAMUSCULAR; INTRAVENOUS; SUBCUTANEOUS at 19:52

## 2020-09-10 RX ADMIN — HYDROMORPHONE HYDROCHLORIDE 0.5 MG: 1 INJECTION, SOLUTION INTRAMUSCULAR; INTRAVENOUS; SUBCUTANEOUS at 12:30

## 2020-09-11 ENCOUNTER — APPOINTMENT (INPATIENT)
Dept: RADIOLOGY | Facility: HOSPITAL | Age: 25
DRG: 249 | End: 2020-09-11
Payer: COMMERCIAL

## 2020-09-11 LAB
ALBUMIN SERPL BCP-MCNC: 3.3 G/DL (ref 3.5–5)
ALP SERPL-CCNC: 44 U/L (ref 46–116)
ALT SERPL W P-5'-P-CCNC: 18 U/L (ref 12–78)
ANION GAP SERPL CALCULATED.3IONS-SCNC: 7 MMOL/L (ref 4–13)
AST SERPL W P-5'-P-CCNC: 16 U/L (ref 5–45)
BASOPHILS # BLD AUTO: 0.03 THOUSANDS/ΜL (ref 0–0.1)
BASOPHILS NFR BLD AUTO: 0 % (ref 0–1)
BILIRUB SERPL-MCNC: 0.6 MG/DL (ref 0.2–1)
BUN SERPL-MCNC: 4 MG/DL (ref 5–25)
C TRACH DNA SPEC QL NAA+PROBE: NEGATIVE
CALCIUM SERPL-MCNC: 7.9 MG/DL (ref 8.3–10.1)
CHLORIDE SERPL-SCNC: 103 MMOL/L (ref 100–108)
CO2 SERPL-SCNC: 26 MMOL/L (ref 21–32)
CREAT SERPL-MCNC: 0.63 MG/DL (ref 0.6–1.3)
CRP SERPL QL: 77.4 MG/L
EOSINOPHIL # BLD AUTO: 0.05 THOUSAND/ΜL (ref 0–0.61)
EOSINOPHIL NFR BLD AUTO: 1 % (ref 0–6)
ERYTHROCYTE [DISTWIDTH] IN BLOOD BY AUTOMATED COUNT: 12 % (ref 11.6–15.1)
GFR SERPL CREATININE-BSD FRML MDRD: 123 ML/MIN/1.73SQ M
GLUCOSE SERPL-MCNC: 76 MG/DL (ref 65–140)
HCT VFR BLD AUTO: 36.7 % (ref 34.8–46.1)
HGB BLD-MCNC: 11.9 G/DL (ref 11.5–15.4)
IMM GRANULOCYTES # BLD AUTO: 0.04 THOUSAND/UL (ref 0–0.2)
IMM GRANULOCYTES NFR BLD AUTO: 0 % (ref 0–2)
LACTATE SERPL-SCNC: 0.5 MMOL/L (ref 0.5–2)
LYMPHOCYTES # BLD AUTO: 1.2 THOUSANDS/ΜL (ref 0.6–4.47)
LYMPHOCYTES NFR BLD AUTO: 12 % (ref 14–44)
MAGNESIUM SERPL-MCNC: 1.5 MG/DL (ref 1.6–2.6)
MCH RBC QN AUTO: 30.1 PG (ref 26.8–34.3)
MCHC RBC AUTO-ENTMCNC: 32.4 G/DL (ref 31.4–37.4)
MCV RBC AUTO: 93 FL (ref 82–98)
MONOCYTES # BLD AUTO: 0.73 THOUSAND/ΜL (ref 0.17–1.22)
MONOCYTES NFR BLD AUTO: 8 % (ref 4–12)
N GONORRHOEA DNA SPEC QL NAA+PROBE: NEGATIVE
NEUTROPHILS # BLD AUTO: 7.64 THOUSANDS/ΜL (ref 1.85–7.62)
NEUTS SEG NFR BLD AUTO: 79 % (ref 43–75)
NRBC BLD AUTO-RTO: 0 /100 WBCS
PLATELET # BLD AUTO: 220 THOUSANDS/UL (ref 149–390)
PMV BLD AUTO: 10.3 FL (ref 8.9–12.7)
POTASSIUM SERPL-SCNC: 3.2 MMOL/L (ref 3.5–5.3)
PROT SERPL-MCNC: 6.1 G/DL (ref 6.4–8.2)
RBC # BLD AUTO: 3.95 MILLION/UL (ref 3.81–5.12)
SODIUM SERPL-SCNC: 136 MMOL/L (ref 136–145)
WBC # BLD AUTO: 9.69 THOUSAND/UL (ref 4.31–10.16)

## 2020-09-11 PROCEDURE — 99232 SBSQ HOSP IP/OBS MODERATE 35: CPT | Performed by: SURGERY

## 2020-09-11 PROCEDURE — 86140 C-REACTIVE PROTEIN: CPT | Performed by: PHYSICIAN ASSISTANT

## 2020-09-11 PROCEDURE — 87046 STOOL CULTR AEROBIC BACT EA: CPT

## 2020-09-11 PROCEDURE — 85025 COMPLETE CBC W/AUTO DIFF WBC: CPT | Performed by: FAMILY MEDICINE

## 2020-09-11 PROCEDURE — 99232 SBSQ HOSP IP/OBS MODERATE 35: CPT | Performed by: FAMILY MEDICINE

## 2020-09-11 PROCEDURE — 99223 1ST HOSP IP/OBS HIGH 75: CPT | Performed by: INTERNAL MEDICINE

## 2020-09-11 PROCEDURE — 83735 ASSAY OF MAGNESIUM: CPT | Performed by: FAMILY MEDICINE

## 2020-09-11 PROCEDURE — 87427 SHIGA-LIKE TOXIN AG IA: CPT

## 2020-09-11 PROCEDURE — C9113 INJ PANTOPRAZOLE SODIUM, VIA: HCPCS | Performed by: PHYSICIAN ASSISTANT

## 2020-09-11 PROCEDURE — 83605 ASSAY OF LACTIC ACID: CPT | Performed by: FAMILY MEDICINE

## 2020-09-11 PROCEDURE — NC001 PR NO CHARGE

## 2020-09-11 PROCEDURE — 71045 X-RAY EXAM CHEST 1 VIEW: CPT

## 2020-09-11 PROCEDURE — 80053 COMPREHEN METABOLIC PANEL: CPT | Performed by: FAMILY MEDICINE

## 2020-09-11 RX ORDER — PANTOPRAZOLE SODIUM 40 MG/1
40 INJECTION, POWDER, FOR SOLUTION INTRAVENOUS EVERY 12 HOURS SCHEDULED
Status: DISCONTINUED | OUTPATIENT
Start: 2020-09-11 | End: 2020-09-12 | Stop reason: HOSPADM

## 2020-09-11 RX ORDER — SUCRALFATE ORAL 1 G/10ML
1000 SUSPENSION ORAL EVERY 6 HOURS SCHEDULED
Status: DISCONTINUED | OUTPATIENT
Start: 2020-09-11 | End: 2020-09-12 | Stop reason: HOSPADM

## 2020-09-11 RX ORDER — POTASSIUM CHLORIDE 14.9 MG/ML
20 INJECTION INTRAVENOUS ONCE
Status: COMPLETED | OUTPATIENT
Start: 2020-09-11 | End: 2020-09-11

## 2020-09-11 RX ORDER — LANOLIN ALCOHOL/MO/W.PET/CERES
3 CREAM (GRAM) TOPICAL ONCE
Status: COMPLETED | OUTPATIENT
Start: 2020-09-11 | End: 2020-09-11

## 2020-09-11 RX ORDER — SODIUM CHLORIDE AND POTASSIUM CHLORIDE .9; .15 G/100ML; G/100ML
100 SOLUTION INTRAVENOUS CONTINUOUS
Status: DISCONTINUED | OUTPATIENT
Start: 2020-09-11 | End: 2020-09-12 | Stop reason: HOSPADM

## 2020-09-11 RX ORDER — MAGNESIUM SULFATE 1 G/100ML
1 INJECTION INTRAVENOUS
Status: COMPLETED | OUTPATIENT
Start: 2020-09-11 | End: 2020-09-11

## 2020-09-11 RX ORDER — POLYETHYLENE GLYCOL 3350 17 G/17G
17 POWDER, FOR SOLUTION ORAL DAILY
Status: DISCONTINUED | OUTPATIENT
Start: 2020-09-11 | End: 2020-09-12 | Stop reason: HOSPADM

## 2020-09-11 RX ORDER — DICYCLOMINE HYDROCHLORIDE 10 MG/1
10 CAPSULE ORAL
Status: DISCONTINUED | OUTPATIENT
Start: 2020-09-11 | End: 2020-09-12 | Stop reason: HOSPADM

## 2020-09-11 RX ORDER — HYDROMORPHONE HCL/PF 1 MG/ML
1 SYRINGE (ML) INJECTION EVERY 4 HOURS PRN
Status: DISCONTINUED | OUTPATIENT
Start: 2020-09-11 | End: 2020-09-12

## 2020-09-11 RX ADMIN — HYDROMORPHONE HYDROCHLORIDE 1 MG: 1 INJECTION, SOLUTION INTRAMUSCULAR; INTRAVENOUS; SUBCUTANEOUS at 23:02

## 2020-09-11 RX ADMIN — SUCRALFATE 1000 MG: 1 SUSPENSION ORAL at 18:45

## 2020-09-11 RX ADMIN — DIPHENHYDRAMINE HCL 25 MG: 25 TABLET, COATED ORAL at 04:11

## 2020-09-11 RX ADMIN — HYDROMORPHONE HYDROCHLORIDE 1 MG: 1 INJECTION, SOLUTION INTRAMUSCULAR; INTRAVENOUS; SUBCUTANEOUS at 11:49

## 2020-09-11 RX ADMIN — HYDROMORPHONE HYDROCHLORIDE 0.2 MG: 1 INJECTION, SOLUTION INTRAMUSCULAR; INTRAVENOUS; SUBCUTANEOUS at 01:09

## 2020-09-11 RX ADMIN — NICOTINE 1 PATCH: 21 PATCH TRANSDERMAL at 09:07

## 2020-09-11 RX ADMIN — HYDROMORPHONE HYDROCHLORIDE 1 MG: 1 INJECTION, SOLUTION INTRAMUSCULAR; INTRAVENOUS; SUBCUTANEOUS at 18:31

## 2020-09-11 RX ADMIN — HYDROMORPHONE HYDROCHLORIDE 1 MG: 1 INJECTION, SOLUTION INTRAMUSCULAR; INTRAVENOUS; SUBCUTANEOUS at 06:41

## 2020-09-11 RX ADMIN — HYDROMORPHONE HYDROCHLORIDE 0.2 MG: 1 INJECTION, SOLUTION INTRAMUSCULAR; INTRAVENOUS; SUBCUTANEOUS at 04:16

## 2020-09-11 RX ADMIN — DICYCLOMINE HYDROCHLORIDE 10 MG: 10 CAPSULE ORAL at 21:35

## 2020-09-11 RX ADMIN — OXYCODONE HYDROCHLORIDE 5 MG: 5 TABLET ORAL at 01:17

## 2020-09-11 RX ADMIN — SUCRALFATE 1000 MG: 1 SUSPENSION ORAL at 23:02

## 2020-09-11 RX ADMIN — HYDROMORPHONE HYDROCHLORIDE 0.2 MG: 1 INJECTION, SOLUTION INTRAMUSCULAR; INTRAVENOUS; SUBCUTANEOUS at 14:20

## 2020-09-11 RX ADMIN — HYDROMORPHONE HYDROCHLORIDE 0.2 MG: 1 INJECTION, SOLUTION INTRAMUSCULAR; INTRAVENOUS; SUBCUTANEOUS at 09:06

## 2020-09-11 RX ADMIN — DICYCLOMINE HYDROCHLORIDE 10 MG: 10 CAPSULE ORAL at 15:31

## 2020-09-11 RX ADMIN — SODIUM CHLORIDE AND POTASSIUM CHLORIDE 100 ML/HR: .9; .15 SOLUTION INTRAVENOUS at 11:37

## 2020-09-11 RX ADMIN — PANTOPRAZOLE SODIUM 40 MG: 40 INJECTION, POWDER, FOR SOLUTION INTRAVENOUS at 11:38

## 2020-09-11 RX ADMIN — DIPHENHYDRAMINE HCL 25 MG: 25 TABLET, COATED ORAL at 15:26

## 2020-09-11 RX ADMIN — SODIUM CHLORIDE AND POTASSIUM CHLORIDE 100 ML/HR: .9; .15 SOLUTION INTRAVENOUS at 21:34

## 2020-09-11 RX ADMIN — PANTOPRAZOLE SODIUM 40 MG: 40 INJECTION, POWDER, FOR SOLUTION INTRAVENOUS at 21:35

## 2020-09-11 RX ADMIN — FAMOTIDINE 20 MG: 10 INJECTION INTRAVENOUS at 09:05

## 2020-09-11 RX ADMIN — MAGNESIUM SULFATE HEPTAHYDRATE 1 G: 1 INJECTION, SOLUTION INTRAVENOUS at 10:21

## 2020-09-11 RX ADMIN — MAGNESIUM SULFATE HEPTAHYDRATE 1 G: 1 INJECTION, SOLUTION INTRAVENOUS at 11:41

## 2020-09-11 RX ADMIN — MELATONIN 3 MG: at 23:02

## 2020-09-11 RX ADMIN — HYDROMORPHONE HYDROCHLORIDE 0.2 MG: 1 INJECTION, SOLUTION INTRAMUSCULAR; INTRAVENOUS; SUBCUTANEOUS at 20:52

## 2020-09-11 RX ADMIN — ONDANSETRON 4 MG: 2 INJECTION INTRAMUSCULAR; INTRAVENOUS at 19:19

## 2020-09-11 RX ADMIN — POTASSIUM CHLORIDE 20 MEQ: 14.9 INJECTION, SOLUTION INTRAVENOUS at 09:06

## 2020-09-11 RX ADMIN — HYDROMORPHONE HYDROCHLORIDE 1 MG: 1 INJECTION, SOLUTION INTRAMUSCULAR; INTRAVENOUS; SUBCUTANEOUS at 02:41

## 2020-09-12 VITALS
RESPIRATION RATE: 18 BRPM | OXYGEN SATURATION: 100 % | HEART RATE: 81 BPM | HEIGHT: 66 IN | BODY MASS INDEX: 22.96 KG/M2 | DIASTOLIC BLOOD PRESSURE: 68 MMHG | TEMPERATURE: 97.8 F | SYSTOLIC BLOOD PRESSURE: 111 MMHG | WEIGHT: 142.86 LBS

## 2020-09-12 LAB
ALBUMIN SERPL BCP-MCNC: 3.4 G/DL (ref 3.5–5)
ALP SERPL-CCNC: 47 U/L (ref 46–116)
ALT SERPL W P-5'-P-CCNC: 13 U/L (ref 12–78)
ANION GAP SERPL CALCULATED.3IONS-SCNC: 15 MMOL/L (ref 4–13)
AST SERPL W P-5'-P-CCNC: 17 U/L (ref 5–45)
BASOPHILS # BLD AUTO: 0.03 THOUSANDS/ΜL (ref 0–0.1)
BASOPHILS NFR BLD AUTO: 0 % (ref 0–1)
BILIRUB SERPL-MCNC: 0.4 MG/DL (ref 0.2–1)
BUN SERPL-MCNC: 4 MG/DL (ref 5–25)
CALCIUM SERPL-MCNC: 8.3 MG/DL (ref 8.3–10.1)
CHLORIDE SERPL-SCNC: 102 MMOL/L (ref 100–108)
CO2 SERPL-SCNC: 22 MMOL/L (ref 21–32)
CREAT SERPL-MCNC: 0.61 MG/DL (ref 0.6–1.3)
EOSINOPHIL # BLD AUTO: 0.1 THOUSAND/ΜL (ref 0–0.61)
EOSINOPHIL NFR BLD AUTO: 1 % (ref 0–6)
ERYTHROCYTE [DISTWIDTH] IN BLOOD BY AUTOMATED COUNT: 11.8 % (ref 11.6–15.1)
GFR SERPL CREATININE-BSD FRML MDRD: 125 ML/MIN/1.73SQ M
GLUCOSE SERPL-MCNC: 65 MG/DL (ref 65–140)
HCT VFR BLD AUTO: 36.7 % (ref 34.8–46.1)
HGB BLD-MCNC: 12.6 G/DL (ref 11.5–15.4)
IMM GRANULOCYTES # BLD AUTO: 0.03 THOUSAND/UL (ref 0–0.2)
IMM GRANULOCYTES NFR BLD AUTO: 0 % (ref 0–2)
LYMPHOCYTES # BLD AUTO: 1.66 THOUSANDS/ΜL (ref 0.6–4.47)
LYMPHOCYTES NFR BLD AUTO: 23 % (ref 14–44)
MAGNESIUM SERPL-MCNC: 2 MG/DL (ref 1.6–2.6)
MCH RBC QN AUTO: 31.4 PG (ref 26.8–34.3)
MCHC RBC AUTO-ENTMCNC: 34.3 G/DL (ref 31.4–37.4)
MCV RBC AUTO: 92 FL (ref 82–98)
MONOCYTES # BLD AUTO: 0.71 THOUSAND/ΜL (ref 0.17–1.22)
MONOCYTES NFR BLD AUTO: 10 % (ref 4–12)
NEUTROPHILS # BLD AUTO: 4.74 THOUSANDS/ΜL (ref 1.85–7.62)
NEUTS SEG NFR BLD AUTO: 66 % (ref 43–75)
NRBC BLD AUTO-RTO: 0 /100 WBCS
PHOSPHATE SERPL-MCNC: 3.2 MG/DL (ref 2.7–4.5)
PLATELET # BLD AUTO: 242 THOUSANDS/UL (ref 149–390)
PMV BLD AUTO: 10.4 FL (ref 8.9–12.7)
POTASSIUM SERPL-SCNC: 3.6 MMOL/L (ref 3.5–5.3)
PROT SERPL-MCNC: 6.5 G/DL (ref 6.4–8.2)
RBC # BLD AUTO: 4.01 MILLION/UL (ref 3.81–5.12)
SODIUM SERPL-SCNC: 139 MMOL/L (ref 136–145)
TROPONIN I SERPL-MCNC: <0.02 NG/ML
WBC # BLD AUTO: 7.27 THOUSAND/UL (ref 4.31–10.16)

## 2020-09-12 PROCEDURE — 93005 ELECTROCARDIOGRAM TRACING: CPT

## 2020-09-12 PROCEDURE — 84484 ASSAY OF TROPONIN QUANT: CPT | Performed by: PHYSICIAN ASSISTANT

## 2020-09-12 PROCEDURE — C9113 INJ PANTOPRAZOLE SODIUM, VIA: HCPCS | Performed by: PHYSICIAN ASSISTANT

## 2020-09-12 PROCEDURE — 85025 COMPLETE CBC W/AUTO DIFF WBC: CPT | Performed by: FAMILY MEDICINE

## 2020-09-12 PROCEDURE — 87045 FECES CULTURE AEROBIC BACT: CPT | Performed by: FAMILY MEDICINE

## 2020-09-12 PROCEDURE — 99239 HOSP IP/OBS DSCHRG MGMT >30: CPT | Performed by: FAMILY MEDICINE

## 2020-09-12 PROCEDURE — 83735 ASSAY OF MAGNESIUM: CPT | Performed by: FAMILY MEDICINE

## 2020-09-12 PROCEDURE — 99232 SBSQ HOSP IP/OBS MODERATE 35: CPT | Performed by: PHYSICIAN ASSISTANT

## 2020-09-12 PROCEDURE — 84100 ASSAY OF PHOSPHORUS: CPT | Performed by: FAMILY MEDICINE

## 2020-09-12 PROCEDURE — 80053 COMPREHEN METABOLIC PANEL: CPT | Performed by: FAMILY MEDICINE

## 2020-09-12 RX ORDER — PANTOPRAZOLE SODIUM 40 MG/1
40 TABLET, DELAYED RELEASE ORAL
Qty: 60 TABLET | Refills: 0 | Status: SHIPPED | OUTPATIENT
Start: 2020-09-12 | End: 2020-12-30 | Stop reason: SDUPTHER

## 2020-09-12 RX ORDER — SUCRALFATE ORAL 1 G/10ML
1000 SUSPENSION ORAL EVERY 6 HOURS SCHEDULED
Qty: 420 ML | Refills: 0 | Status: SHIPPED | OUTPATIENT
Start: 2020-09-12 | End: 2020-09-28 | Stop reason: SDUPTHER

## 2020-09-12 RX ORDER — CEPHALEXIN 500 MG/1
500 CAPSULE ORAL EVERY 8 HOURS SCHEDULED
Qty: 9 CAPSULE | Refills: 0 | Status: SHIPPED | OUTPATIENT
Start: 2020-09-12 | End: 2020-09-15 | Stop reason: ALTCHOICE

## 2020-09-12 RX ORDER — FLUCONAZOLE 200 MG/1
200 TABLET ORAL DAILY
Qty: 2 TABLET | Refills: 0 | Status: SHIPPED | OUTPATIENT
Start: 2020-09-13 | End: 2020-09-15 | Stop reason: ALTCHOICE

## 2020-09-12 RX ORDER — FLUCONAZOLE 100 MG/1
200 TABLET ORAL DAILY
Status: DISCONTINUED | OUTPATIENT
Start: 2020-09-12 | End: 2020-09-12 | Stop reason: HOSPADM

## 2020-09-12 RX ORDER — OXYCODONE HYDROCHLORIDE 5 MG/1
5 TABLET ORAL EVERY 8 HOURS PRN
Qty: 10 TABLET | Refills: 0 | Status: SHIPPED | OUTPATIENT
Start: 2020-09-12 | End: 2020-09-15 | Stop reason: ALTCHOICE

## 2020-09-12 RX ORDER — CEPHALEXIN 250 MG/1
500 CAPSULE ORAL EVERY 8 HOURS SCHEDULED
Status: DISCONTINUED | OUTPATIENT
Start: 2020-09-12 | End: 2020-09-12 | Stop reason: HOSPADM

## 2020-09-12 RX ADMIN — HYDROMORPHONE HYDROCHLORIDE 0.2 MG: 1 INJECTION, SOLUTION INTRAMUSCULAR; INTRAVENOUS; SUBCUTANEOUS at 06:08

## 2020-09-12 RX ADMIN — FLUCONAZOLE 200 MG: 100 TABLET ORAL at 10:32

## 2020-09-12 RX ADMIN — FAMOTIDINE 20 MG: 10 INJECTION INTRAVENOUS at 04:13

## 2020-09-12 RX ADMIN — DIPHENHYDRAMINE HCL 25 MG: 25 TABLET, COATED ORAL at 10:32

## 2020-09-12 RX ADMIN — DICYCLOMINE HYDROCHLORIDE 10 MG: 10 CAPSULE ORAL at 06:08

## 2020-09-12 RX ADMIN — CEPHALEXIN 500 MG: 250 CAPSULE ORAL at 13:09

## 2020-09-12 RX ADMIN — SODIUM CHLORIDE AND POTASSIUM CHLORIDE 100 ML/HR: .9; .15 SOLUTION INTRAVENOUS at 08:05

## 2020-09-12 RX ADMIN — DICYCLOMINE HYDROCHLORIDE 10 MG: 10 CAPSULE ORAL at 11:52

## 2020-09-12 RX ADMIN — ONDANSETRON 4 MG: 2 INJECTION INTRAMUSCULAR; INTRAVENOUS at 03:45

## 2020-09-12 RX ADMIN — HYDROMORPHONE HYDROCHLORIDE 1 MG: 1 INJECTION, SOLUTION INTRAMUSCULAR; INTRAVENOUS; SUBCUTANEOUS at 03:44

## 2020-09-12 RX ADMIN — NICOTINE 1 PATCH: 21 PATCH TRANSDERMAL at 08:12

## 2020-09-12 RX ADMIN — POLYETHYLENE GLYCOL 3350 17 G: 17 POWDER, FOR SOLUTION ORAL at 08:11

## 2020-09-12 RX ADMIN — SUCRALFATE 1000 MG: 1 SUSPENSION ORAL at 11:53

## 2020-09-12 RX ADMIN — OXYCODONE HYDROCHLORIDE 5 MG: 5 TABLET ORAL at 10:32

## 2020-09-12 RX ADMIN — PANTOPRAZOLE SODIUM 40 MG: 40 INJECTION, POWDER, FOR SOLUTION INTRAVENOUS at 08:05

## 2020-09-12 RX ADMIN — HYDROMORPHONE HYDROCHLORIDE 1 MG: 1 INJECTION, SOLUTION INTRAMUSCULAR; INTRAVENOUS; SUBCUTANEOUS at 07:54

## 2020-09-12 RX ADMIN — SUCRALFATE 1000 MG: 1 SUSPENSION ORAL at 06:08

## 2020-09-14 ENCOUNTER — TELEPHONE (OUTPATIENT)
Dept: GASTROENTEROLOGY | Facility: CLINIC | Age: 25
End: 2020-09-14

## 2020-09-14 ENCOUNTER — TELEPHONE (OUTPATIENT)
Dept: FAMILY MEDICINE CLINIC | Facility: CLINIC | Age: 25
End: 2020-09-14

## 2020-09-14 LAB
ATRIAL RATE: 70 BPM
ATRIAL RATE: 75 BPM
P AXIS: 45 DEGREES
P AXIS: 68 DEGREES
PR INTERVAL: 130 MS
PR INTERVAL: 154 MS
QRS AXIS: -36 DEGREES
QRS AXIS: 192 DEGREES
QRSD INTERVAL: 100 MS
QRSD INTERVAL: 90 MS
QT INTERVAL: 366 MS
QT INTERVAL: 374 MS
QTC INTERVAL: 403 MS
QTC INTERVAL: 408 MS
T WAVE AXIS: 57 DEGREES
T WAVE AXIS: 60 DEGREES
VENTRICULAR RATE: 70 BPM
VENTRICULAR RATE: 75 BPM

## 2020-09-14 PROCEDURE — 93010 ELECTROCARDIOGRAM REPORT: CPT | Performed by: INTERNAL MEDICINE

## 2020-09-15 ENCOUNTER — DOCUMENTATION (OUTPATIENT)
Dept: FAMILY MEDICINE CLINIC | Facility: CLINIC | Age: 25
End: 2020-09-15

## 2020-09-15 ENCOUNTER — OFFICE VISIT (OUTPATIENT)
Dept: FAMILY MEDICINE CLINIC | Facility: CLINIC | Age: 25
End: 2020-09-15
Payer: COMMERCIAL

## 2020-09-15 ENCOUNTER — TELEPHONE (OUTPATIENT)
Dept: FAMILY MEDICINE CLINIC | Facility: CLINIC | Age: 25
End: 2020-09-15

## 2020-09-15 ENCOUNTER — TRANSITIONAL CARE MANAGEMENT (OUTPATIENT)
Dept: FAMILY MEDICINE CLINIC | Facility: CLINIC | Age: 25
End: 2020-09-15

## 2020-09-15 VITALS
HEIGHT: 66 IN | HEART RATE: 98 BPM | WEIGHT: 138 LBS | SYSTOLIC BLOOD PRESSURE: 134 MMHG | DIASTOLIC BLOOD PRESSURE: 72 MMHG | OXYGEN SATURATION: 97 % | BODY MASS INDEX: 22.18 KG/M2 | TEMPERATURE: 98.1 F

## 2020-09-15 DIAGNOSIS — R10.9 ABDOMINAL PAIN: ICD-10-CM

## 2020-09-15 DIAGNOSIS — Z76.89 ENCOUNTER FOR SUPPORT AND COORDINATION OF TRANSITION OF CARE: ICD-10-CM

## 2020-09-15 DIAGNOSIS — R10.10 PAIN OF UPPER ABDOMEN: ICD-10-CM

## 2020-09-15 DIAGNOSIS — K52.9 GASTROENTERITIS: Primary | ICD-10-CM

## 2020-09-15 DIAGNOSIS — K52.9 GASTROENTERITIS: ICD-10-CM

## 2020-09-15 DIAGNOSIS — N94.6 DYSMENORRHEA: ICD-10-CM

## 2020-09-15 LAB
BACTERIA BLD CULT: NORMAL
BACTERIA BLD CULT: NORMAL

## 2020-09-15 PROCEDURE — T1015 CLINIC SERVICE: HCPCS | Performed by: FAMILY MEDICINE

## 2020-09-15 PROCEDURE — 99496 TRANSJ CARE MGMT HIGH F2F 7D: CPT | Performed by: FAMILY MEDICINE

## 2020-09-15 RX ORDER — DICYCLOMINE HYDROCHLORIDE 10 MG/1
10 CAPSULE ORAL
Qty: 120 CAPSULE | Refills: 0 | Status: SHIPPED | OUTPATIENT
Start: 2020-09-15 | End: 2020-12-30

## 2020-09-15 RX ORDER — OXYCODONE HYDROCHLORIDE 5 MG/1
5 TABLET ORAL EVERY 6 HOURS PRN
Qty: 40 TABLET | Refills: 0 | Status: SHIPPED | OUTPATIENT
Start: 2020-09-15 | End: 2020-09-25

## 2020-09-15 RX ORDER — DIPHENHYDRAMINE HCL 25 MG
25 TABLET ORAL EVERY 6 HOURS PRN
Qty: 30 TABLET | Refills: 0 | Status: SHIPPED | OUTPATIENT
Start: 2020-09-15 | End: 2020-12-30

## 2020-09-15 RX ORDER — POLYETHYLENE GLYCOL 3350 17 G/17G
17 POWDER, FOR SOLUTION ORAL DAILY
Qty: 225 G | Refills: 0 | Status: SHIPPED | OUTPATIENT
Start: 2020-09-15 | End: 2020-09-28 | Stop reason: SDUPTHER

## 2020-09-15 RX ORDER — DICYCLOMINE HYDROCHLORIDE 10 MG/5ML
SOLUTION ORAL
Qty: 473 ML | Refills: 0 | OUTPATIENT
Start: 2020-09-15

## 2020-09-15 RX ORDER — DICYCLOMINE HYDROCHLORIDE 10 MG/5ML
20 SOLUTION ORAL
Qty: 473 ML | Refills: 0 | Status: SHIPPED | OUTPATIENT
Start: 2020-09-15 | End: 2020-09-15

## 2020-09-15 NOTE — PROGRESS NOTES
Assessment/Plan:    No problem-specific Assessment & Plan notes found for this encounter  Diagnoses and all orders for this visit:    Gastroenteritis  -     dicyclomine (BENTYL) 10 mg/5 mL oral solution; Take 10 mL (20 mg total) by mouth 4 (four) times a day (before meals and at bedtime) for 12 days    Pain of upper abdomen    Abdominal pain  Comments:  periumbilical, RUQ  Orders:  -     dicyclomine (BENTYL) 10 mg/5 mL oral solution; Take 10 mL (20 mg total) by mouth 4 (four) times a day (before meals and at bedtime) for 12 days  -     oxyCODONE (ROXICODONE) 5 mg immediate release tablet; Take 1 tablet (5 mg total) by mouth every 6 (six) hours as needed for moderate pain or severe pain for up to 10 daysMax Daily Amount: 20 mg  -     polyethylene glycol (GLYCOLAX) 17 GM/SCOOP powder; Take 17 g by mouth daily for 10 days  -     diphenhydrAMINE (BENADRYL) 25 mg tablet; Take 1 tablet (25 mg total) by mouth every 6 (six) hours as needed for itching    Dysmenorrhea    Encounter for support and coordination of transition of care        -cont protonix and carafate  Will resume bentyl for abdominal cramping  Agreed to short course of oxycodone given patient's impatient requirement for diladudid for pain control and continues abdominal pain  She will take with miralax to prevent constipation as well as benedryl given her reaction of itching associated with oxycodone    -close follow up with GI  -reviewed urgent symptoms requiring ER follow up  Patient verbalized understanding  -RTC 3 months or sooner if needed    Subjective:      Patient ID: Vicky Vick is a 30 y o  female presents for TCM following hospitalization for abdominal pain and diagnosed with gastroenteritis by CT  She was evaluated by both GI and general surgery  She continues with lower abdominal and periumbilical pain and cramping  It is improved since hospital discharge  She has no fever, nausea, or vomiting   Her stools have been more formed but are painful to pass  She is unsure of the color or presence of blood  Last BM this morning  She is scheduled to see GI for outpatient follow up on Sept 21  She has a history of endometrieosis and is seeing GYN tomorrow for pelvic U/S  She has continued pleuritic chest tightness with deep inspiration  This started during her hospitalization, it is unchanged  Denies SOB, CP, palpitation  EKG and CXR both normal      HPI    The following portions of the patient's history were reviewed and updated as appropriate: allergies, current medications, past family history, past medical history, past social history, past surgical history and problem list     Review of Systems   Constitutional: Positive for appetite change  Negative for activity change, fatigue, fever and unexpected weight change  HENT: Negative  Eyes: Negative  Respiratory: Positive for chest tightness  Negative for cough, shortness of breath, wheezing and stridor  Cardiovascular: Negative for chest pain, palpitations and leg swelling  Gastrointestinal: Positive for abdominal pain  Negative for abdominal distention, blood in stool, constipation, diarrhea, nausea and vomiting  Loose stool     Endocrine: Negative  Genitourinary: Negative  Musculoskeletal: Negative  Skin: Negative  Allergic/Immunologic: Negative  Neurological: Negative  Hematological: Negative  Psychiatric/Behavioral: Negative  Objective:      /72   Pulse 98   Temp 98 1 °F (36 7 °C) (Tympanic)   Ht 5' 6" (1 676 m)   Wt 62 6 kg (138 lb)   SpO2 97%   BMI 22 27 kg/m²          Physical Exam  Constitutional:       General: She is not in acute distress  Appearance: Normal appearance  She is well-developed and normal weight  HENT:      Head: Normocephalic and atraumatic  Eyes:      Extraocular Movements: Extraocular movements intact        Conjunctiva/sclera: Conjunctivae normal       Pupils: Pupils are equal, round, and reactive to light  Cardiovascular:      Rate and Rhythm: Normal rate and regular rhythm  Heart sounds: Normal heart sounds  No murmur  Pulmonary:      Effort: Pulmonary effort is normal  No respiratory distress  Breath sounds: Normal breath sounds  No wheezing  Abdominal:      General: Abdomen is flat  Bowel sounds are normal  There is no distension  Palpations: Abdomen is soft  Tenderness: There is abdominal tenderness in the right lower quadrant, periumbilical area and left lower quadrant  There is no guarding or rebound  Negative signs include Phan's sign, Rovsing's sign and McBurney's sign  Musculoskeletal: Normal range of motion  Skin:     General: Skin is warm and dry  Capillary Refill: Capillary refill takes less than 2 seconds  Neurological:      General: No focal deficit present  Mental Status: She is alert and oriented to person, place, and time  Mental status is at baseline  Psychiatric:         Mood and Affect: Mood normal          Behavior: Behavior normal          Thought Content:  Thought content normal          Judgment: Judgment normal

## 2020-09-21 LAB — MISCELLANEOUS LAB TEST RESULT: NORMAL

## 2020-09-23 ENCOUNTER — OFFICE VISIT (OUTPATIENT)
Dept: GASTROENTEROLOGY | Facility: CLINIC | Age: 25
End: 2020-09-23
Payer: COMMERCIAL

## 2020-09-23 VITALS
TEMPERATURE: 98.6 F | WEIGHT: 138.6 LBS | HEIGHT: 66 IN | SYSTOLIC BLOOD PRESSURE: 117 MMHG | BODY MASS INDEX: 22.28 KG/M2 | DIASTOLIC BLOOD PRESSURE: 68 MMHG | HEART RATE: 93 BPM

## 2020-09-23 DIAGNOSIS — R10.84 GENERALIZED ABDOMINAL PAIN: ICD-10-CM

## 2020-09-23 DIAGNOSIS — R11.0 NAUSEA: ICD-10-CM

## 2020-09-23 DIAGNOSIS — K52.9 GASTROENTERITIS: Primary | ICD-10-CM

## 2020-09-23 DIAGNOSIS — R76.8 HEPATITIS C ANTIBODY TEST POSITIVE: ICD-10-CM

## 2020-09-23 PROCEDURE — 99214 OFFICE O/P EST MOD 30 MIN: CPT | Performed by: PHYSICIAN ASSISTANT

## 2020-09-23 PROCEDURE — 4004F PT TOBACCO SCREEN RCVD TLK: CPT | Performed by: PHYSICIAN ASSISTANT

## 2020-09-23 RX ORDER — PROMETHAZINE HYDROCHLORIDE 25 MG/1
25 TABLET ORAL EVERY 6 HOURS PRN
Qty: 60 TABLET | Refills: 1 | Status: SHIPPED | OUTPATIENT
Start: 2020-09-23 | End: 2020-12-30

## 2020-09-23 NOTE — PATIENT INSTRUCTIONS
Scheduled patient with Dr Espino Hidden on 11/3/2020 @ [facility] for a EGD/Colonoscopy   Follow up after Procedure with Lissette Campbell PA-C on 12/17/2020  @ 2:00 pm

## 2020-09-23 NOTE — PROGRESS NOTES
Assessment/Plan:     Diagnoses and all orders for this visit:    Gastroenteritis  Generalized abdominal pain  Nausea    Patient was admitted to the hospital twice in the last 2 months with nausea, abdominal pain, loose bowel movements and imaging suggestive of gastroenteritis  She states her symptoms have persisted since being discharged from the hospital   She had a significantly elevated CRP greater than 77 which is concerning for possible Crohn's disease  Her stool culture was negative  Was recommended that if her symptoms persist to proceed with endoscopy and colonoscopy  I did discuss this with her and she is agreeable  She was also found to have sludge in her gallbladder however given her pain is more generalized in location I feel this is less likely the source of her symptoms however if her EGD and colonoscopy are negative can consider HIDA scan for further evaluation  -     promethazine (PHENERGAN) 25 mg tablet; Take 1 tablet (25 mg total) by mouth every 6 (six) hours as needed for nausea or vomiting   -     Ambulatory referral to Gastroenterology    Hepatitis C antibody test positive  She was found to have positive hepatitis C antibody test however viral load was negative indicating the fact that she cleared the infection on her own  No further testing is necessary   -     Ambulatory referral to Gastroenterology    Will see her back after procedure discuss all results  Subjective:      Patient ID: Chip Charles is a 30 y o  female  HPI     This is a hospital follow-up  Patient was initially seen in August for flank/abdominal pain and poor p o  intake with associated loose bowel movements  It was felt her symptoms were likely secondary to gastroenteritis  She then returned to the hospital on 09/11 with severe abdominal cramping, associated with nausea, vomiting and loose bowels  CT read as mild gastroenteritis     I did discuss this the radiologist as this was not mentioned in body report  He felt given that this was a noncontrast study and given her symptoms this was most likely explanation  She states since discharge she continues with intermittent episodes of abdominal pain she states this is throughout her entire abdomen  It is made worse with eating  She continues with nausea but no vomiting  She denies any diarrhea at this time and states she typically has a formed bowel movement every day or every other  Stool studies were negative for Campylobacter, E coli, salmonella & shigella  CRP was significantly increased but calprotectin was not checked       Patient Active Problem List   Diagnosis    Ectopic pregnancy    T[facility]Y [facility]) ([facility] [address] )    Flank pain    Diarrhea    Tobacco use    Hypokalemia    Leukocytosis    Microscopic hematuria    Microalbuminuria    Bradycardia    Low HDL (under 40)    Hepatitis C antibody test positive    Gastroenteritis    Chronic tubulointerstitial nephritis    Abdominal pain    Dysmenorrhea    Encounter for support and coordination of transition of care    Nausea     Allergies   Allergen Reactions    Nitrofurantoin Itching    Nsaids     Oxycodone-Acetaminophen      Reaction Date: [id number];     Oxycodone Itching    Tramadol Itching     Reaction Date: [id number];      Current Outpatient Medications on File Prior to Visit   Medication Sig    dicyclomine (BENTYL) 10 mg capsule Take 1 capsule (10 mg total) by mouth 4 (four) times a day (before meals and at bedtime)    diphenhydrAMINE (BENADRYL) 25 mg tablet Take 1 tablet (25 mg total) by mouth every 6 (six) hours as needed for itching    oxyCODONE (ROXICODONE) 5 mg immediate release tablet Take 1 tablet (5 mg total) by mouth every 6 (six) hours as needed for moderate pain or severe pain for up to 10 daysMax Daily Amount: 20 mg    pantoprazole (PROTONIX) 40 mg tablet Take 1 tablet (40 mg total) by mouth 2 (two) times a day before breakfast and lunch    polyethylene glycol (GLYCOLAX) 17 GM/SCOOP powder Take 17 g by mouth daily for 10 days    sucralfate (CARAFATE) 1 g/10 mL suspension Take 10 mL (1,000 mg total) by mouth every 6 (six) hours    tiZANidine (ZANAFLEX) 4 mg tablet Take 1 tablet (4 mg total) by mouth every 8 (eight) hours as needed for muscle spasms     Current Facility-Administered Medications on File Prior to Visit   Medication    acetaminophen (TYLENOL) tablet 650 mg    diphenhydrAMINE (BENADRYL) tablet 25 mg     Family History   Problem Relation Age of Onset    No Known Problems Mother     No Known Problems Father      Past Medical History:   Diagnosis Date    Ectopic pregnancy     History of unilateral fallopian tube excision     RIGHT removed    Ovarian cyst     Wears glasses      Social History     Socioeconomic History    Marital status: Single     Spouse name: None    Number of children: None    Years of education: None    Highest education level: None   Occupational History    None   Social Needs    Financial resource strain: None    Food insecurity     Worry: None     Inability: None    Transportation needs     Medical: None     Non-medical: None   Tobacco Use    Smoking status: Current Every Day Smoker     Packs/day: 1 00     Years: 6 00     Pack years: 6 00     Types: Cigarettes    Smokeless tobacco: Never Used   Substance and Sexual Activity    Alcohol use: Yes     Frequency: 2-4 times a month     Drinks per session: 1 or 2     Binge frequency: Never     Comment: "occasional"    Drug use: Yes     Types: Marijuana    Sexual activity: Yes     Partners: Male     Birth control/protection: None   Lifestyle    Physical activity     Days per week: None     Minutes per session: None    Stress: None   Relationships    Social connections     Talks on phone: None     Gets together: None     Attends Yarsani service: None     Active member of club or organization: None     Attends meetings of clubs or organizations: None     Relationship status: None    Intimate partner violence     Fear of current or ex partner: None     Emotionally abused: None     Physically abused: None     Forced sexual activity: None   Other Topics Concern    None   Social History Narrative    None     Past Surgical History:   Procedure Laterality Date    DILATION AND CURETTAGE OF UTERUS      ECTOPIC PREGNANCY SURGERY      LAPAROSCOPY      [place] LAP,DIAGNOSTIC ABDOMEN N/A 9/16/2017    Procedure: LAPAROSCOPY DIAGNOSTIC, left salpingectomy;  Surgeon: Nichole Mohs, MD;  [facility]: [facility];  Service: Gynecology    WISDOM TOOTH EXTRACTION           Review of Systems   All other systems reviewed and are negative  Objective:      /68 (BP Location: Right arm, Patient Position: Sitting, Cuff Size: Adult)   Pulse 93   Temp 98 6 °F (37 °C) (Tympanic)   Ht 5' 6" (1 676 m)   Wt 62 9 kg (138 lb 9 6 oz)   BMI 22 37 kg/m²          Physical Exam  Constitutional:       Appearance: She is well-developed  HENT:      Head: Normocephalic and atraumatic  Eyes:      Conjunctiva/sclera: Conjunctivae normal    Neck:      Musculoskeletal: Normal range of motion  Cardiovascular:      Rate and Rhythm: Normal rate and regular rhythm  Pulmonary:      Effort: Pulmonary effort is normal       Breath sounds: Normal breath sounds  Abdominal:      General: Bowel sounds are normal  There is no distension  Palpations: Abdomen is soft  Tenderness: There is abdominal tenderness  Musculoskeletal: Normal range of motion  Skin:     General: Skin is warm and dry  Neurological:      Mental Status: She is alert and oriented to person, place, and time     Psychiatric:         Mood and Affect: Mood normal          Behavior: Behavior normal

## 2020-09-28 DIAGNOSIS — R10.9 ABDOMINAL PAIN: ICD-10-CM

## 2020-09-29 RX ORDER — SUCRALFATE ORAL 1 G/10ML
1000 SUSPENSION ORAL EVERY 6 HOURS SCHEDULED
Qty: 420 ML | Refills: 0 | Status: SHIPPED | OUTPATIENT
Start: 2020-09-29 | End: 2020-12-21 | Stop reason: SDUPTHER

## 2020-09-29 RX ORDER — POLYETHYLENE GLYCOL 3350 17 G/17G
17 POWDER, FOR SOLUTION ORAL DAILY
Qty: 238 G | Refills: 0 | Status: SHIPPED | OUTPATIENT
Start: 2020-09-29 | End: 2021-01-27 | Stop reason: SDUPTHER

## 2020-09-30 ENCOUNTER — TELEMEDICINE (OUTPATIENT)
Dept: FAMILY MEDICINE CLINIC | Facility: CLINIC | Age: 25
End: 2020-09-30
Payer: COMMERCIAL

## 2020-09-30 DIAGNOSIS — R10.84 GENERALIZED ABDOMINAL PAIN: Primary | ICD-10-CM

## 2020-09-30 DIAGNOSIS — Z72.0 TOBACCO USE: ICD-10-CM

## 2020-09-30 DIAGNOSIS — M54.50 ACUTE MIDLINE LOW BACK PAIN WITHOUT SCIATICA: ICD-10-CM

## 2020-09-30 PROCEDURE — 99213 OFFICE O/P EST LOW 20 MIN: CPT | Performed by: PHYSICIAN ASSISTANT

## 2020-09-30 RX ORDER — OXYCODONE HYDROCHLORIDE 5 MG/1
5 TABLET ORAL EVERY 6 HOURS PRN
Qty: 120 TABLET | Refills: 0 | Status: SHIPPED | OUTPATIENT
Start: 2020-09-30 | End: 2020-11-02 | Stop reason: SDUPTHER

## 2020-09-30 RX ORDER — LIDOCAINE 50 MG/G
1 PATCH TOPICAL DAILY
Qty: 30 PATCH | Refills: 0 | Status: SHIPPED | OUTPATIENT
Start: 2020-09-30 | End: 2020-12-02 | Stop reason: SDUPTHER

## 2020-09-30 RX ORDER — ALBUTEROL SULFATE 90 UG/1
2 AEROSOL, METERED RESPIRATORY (INHALATION) EVERY 6 HOURS PRN
Qty: 1 INHALER | Refills: 2 | Status: SHIPPED | OUTPATIENT
Start: 2020-09-30 | End: 2020-11-02 | Stop reason: SDUPTHER

## 2020-09-30 RX ORDER — NICOTINE 21 MG/24HR
1 PATCH, TRANSDERMAL 24 HOURS TRANSDERMAL EVERY 24 HOURS
Qty: 28 PATCH | Refills: 0 | Status: SHIPPED | OUTPATIENT
Start: 2020-09-30 | End: 2020-12-30

## 2020-09-30 NOTE — PROGRESS NOTES
Virtual Regular Visit      Assessment/Plan:    Problem List Items Addressed This Visit        Other    Tobacco use    Relevant Medications    nicotine (NICODERM CQ) 14 mg/24hr TD 24 hr patch    albuterol (Ventolin HFA) 90 mcg/act inhaler    Abdominal pain - Primary    Relevant Medications    oxyCODONE (ROXICODONE) 5 mg immediate release tablet      Other Visit Diagnoses     Acute midline low back pain without sciatica        Relevant Medications    lidocaine (LIDODERM) 5 %        -PDMP reviewed  Agreed to send refill until EGD/colonoscopy can be performed given patient's severe symptoms and lack of clear etiology  Advised to use for only severe pain  Continue current meds, close follow up with GI  -will try albuterol for chest tightness, recommend smoking cessation, which she is doing via nicotine patches  -RTc 3 months or sooner if needed         Reason for visit is   Chief Complaint   Patient presents with    Medication Refill    Virtual Regular Visit        Encounter provider Elzbieta Gurrola PA-C    Provider located at [address] [place] [id number]      Recent Visits  No visits were found meeting these conditions  Showing recent visits within past 7 days and meeting all other requirements     Today's Visits  Date Type Provider Dept   09/30/20 Telemedicine Alethea Goetz PA-C [facility] [address] [facility] today's visits and meeting all other requirements     Future Appointments  No visits were found meeting these conditions  Showing future appointments within next 150 days and meeting all other requirements        The patient was identified by name and date of birth  Josecindy Nick was informed that this is a telemedicine visit and that the visit is being conducted through [employer] and patient was informed that this is not a secure, HIPAA-complaint platform  She agrees to proceed     My office door was closed   No one else was in the room  She acknowledged consent and understanding of privacy and security of the video platform  The patient has agreed to participate and understands they can discontinue the visit at any time  Patient is aware this is a billable service  Heriberto Hamilton is a 30 y o  female presents via telemedicine for medication refill  She has continued abdominal pain requiring impatient hospitalization 9/9-9/12 requring diludid for pain control  She would like a refill on her oxycodone as it is improving her pain  She takes with benadryl to avoid s/e of itching  She is scheduled for EGD/colonoscopy 11/3/2020 and follows with GI  She is requesting "step 2" nicotine patches  When she was d/c from hospital she was only given "step 1" and "step 3"  She continues with chest tightness with deep inspiration, ongoing since d/c  She denies CP, palpitations and cough  Shelvy Mingle     HPI     Past Medical History:   Diagnosis Date    Ectopic pregnancy     History of unilateral fallopian tube excision     RIGHT removed    Ovarian cyst     Wears glasses        Past Surgical History:   Procedure Laterality Date    DILATION AND CURETTAGE OF UTERUS      ECTOPIC PREGNANCY SURGERY      LAPAROSCOPY      [place] LAP,DIAGNOSTIC ABDOMEN N/A 9/16/2017    Procedure: LAPAROSCOPY DIAGNOSTIC, left salpingectomy;  Surgeon: Yesika Milton MD;  [facility];  Service: Gynecology    WISDOM TOOTH EXTRACTION         Current Outpatient Medications   Medication Sig Dispense Refill    dicyclomine (BENTYL) 10 mg capsule Take 1 capsule (10 mg total) by mouth 4 (four) times a day (before meals and at bedtime) 120 capsule 0    diphenhydrAMINE (BENADRYL) 25 mg tablet Take 1 tablet (25 mg total) by mouth every 6 (six) hours as needed for itching 30 tablet 0    pantoprazole (PROTONIX) 40 mg tablet Take 1 tablet (40 mg total) by mouth 2 (two) times a day before breakfast and lunch 60 tablet 0    polyethylene glycol (GLYCOLAX) 17 GM/SCOOP powder Take 17 g by mouth daily for 10 days 238 g 0    promethazine (PHENERGAN) 25 mg tablet Take 1 tablet (25 mg total) by mouth every 6 (six) hours as needed for nausea or vomiting 60 tablet 1    sucralfate (CARAFATE) 1 g/10 mL suspension Take 10 mL (1,000 mg total) by mouth every 6 (six) hours 420 mL 0    tiZANidine (ZANAFLEX) 4 mg tablet Take 1 tablet (4 mg total) by mouth every 8 (eight) hours as needed for muscle spasms 30 tablet 1    albuterol (Ventolin HFA) 90 mcg/act inhaler Inhale 2 puffs every 6 (six) hours as needed for wheezing or shortness of breath 1 Inhaler 2    lidocaine (LIDODERM) 5 % Apply 1 patch topically daily Remove & Discard patch within 12 hours or as directed by MD 30 patch 0    nicotine (NICODERM CQ) 14 mg/24hr TD 24 hr patch Place 1 patch on the skin every 24 hours 28 patch 0    oxyCODONE (ROXICODONE) 5 mg immediate release tablet Take 1 tablet (5 mg total) by mouth every 6 (six) hours as needed for severe painMax Daily Amount: 20 mg 120 tablet 0     No current facility-administered medications for this visit  Facility-Administered Medications Ordered in Other Visits   Medication Dose Route Frequency Provider Last Rate Last Dose    acetaminophen (TYLENOL) tablet 650 mg  650 mg Oral Once Elisha Aletha        diphenhydrAMINE (BENADRYL) tablet 25 mg  25 mg Oral Once Elisha Aletha            Allergies   Allergen Reactions    Nitrofurantoin Itching    Nsaids     Oxycodone-Acetaminophen      Reaction Date: [id number];     Oxycodone Itching    Tramadol Itching     Reaction Date: [id number];        Review of Systems   Constitutional: Positive for appetite change and unexpected weight change  Negative for chills, diaphoresis, fatigue and fever  HENT: Negative  Eyes: Negative  Respiratory: Positive for chest tightness and shortness of breath  Negative for cough and wheezing  Cardiovascular: Negative      Gastrointestinal: Positive for abdominal pain, diarrhea and nausea  Negative for blood in stool, constipation and vomiting  Endocrine: Negative  Genitourinary: Negative  Musculoskeletal: Positive for back pain  Skin: Negative  Allergic/Immunologic: Negative  Neurological: Negative  Hematological: Negative  Psychiatric/Behavioral: Negative  Video Exam    There were no vitals filed for this visit  Physical Exam  Constitutional:       General: She is not in acute distress  Appearance: Normal appearance  She is not ill-appearing or toxic-appearing  Neck:      Musculoskeletal: Normal range of motion and neck supple  Pulmonary:      Effort: Pulmonary effort is normal  No respiratory distress  Neurological:      Mental Status: She is alert and oriented to person, place, and time  Mental status is at baseline  Psychiatric:         Mood and Affect: Mood normal          Behavior: Behavior normal          Thought Content: Thought content normal          Judgment: Judgment normal           I spent 10 minutes with patient today in which greater than 50% of the time was spent in counseling/coordination of care regarding assessment and plan of care      VIRTUAL VISIT [address] [facility] acknowledges that she has consented to an online visit or consultation  She understands that the online visit is based solely on information provided by her, and that, in the absence of a face-to-face physical evaluation by the physician, the diagnosis she receives is both limited and provisional in terms of accuracy and completeness  This is not intended to replace a full medical face-to-face evaluation by the physician  Lory Neves understands and accepts these terms

## 2020-10-29 ENCOUNTER — TELEPHONE (OUTPATIENT)
Dept: GASTROENTEROLOGY | Facility: CLINIC | Age: 25
End: 2020-10-29

## 2020-11-02 ENCOUNTER — OFFICE VISIT (OUTPATIENT)
Dept: FAMILY MEDICINE CLINIC | Facility: CLINIC | Age: 25
End: 2020-11-02
Payer: COMMERCIAL

## 2020-11-02 VITALS
HEART RATE: 100 BPM | SYSTOLIC BLOOD PRESSURE: 122 MMHG | DIASTOLIC BLOOD PRESSURE: 74 MMHG | WEIGHT: 133.4 LBS | BODY MASS INDEX: 21.53 KG/M2 | RESPIRATION RATE: 16 BRPM | TEMPERATURE: 97.6 F | OXYGEN SATURATION: 97 %

## 2020-11-02 DIAGNOSIS — R10.84 GENERALIZED ABDOMINAL PAIN: ICD-10-CM

## 2020-11-02 DIAGNOSIS — J06.9 UPPER RESPIRATORY TRACT INFECTION, UNSPECIFIED TYPE: ICD-10-CM

## 2020-11-02 DIAGNOSIS — M54.50 ACUTE MIDLINE LOW BACK PAIN WITHOUT SCIATICA: Primary | ICD-10-CM

## 2020-11-02 DIAGNOSIS — F11.90 CHRONIC, CONTINUOUS USE OF OPIOIDS: ICD-10-CM

## 2020-11-02 DIAGNOSIS — Z72.0 TOBACCO USE: ICD-10-CM

## 2020-11-02 PROCEDURE — T1015 CLINIC SERVICE: HCPCS | Performed by: FAMILY MEDICINE

## 2020-11-02 RX ORDER — OXYCODONE HYDROCHLORIDE 5 MG/1
5 TABLET ORAL EVERY 6 HOURS PRN
Qty: 120 TABLET | Refills: 0 | Status: SHIPPED | OUTPATIENT
Start: 2020-11-02 | End: 2020-12-02 | Stop reason: SDUPTHER

## 2020-11-02 RX ORDER — OXYCODONE HYDROCHLORIDE 5 MG/1
5 TABLET ORAL EVERY 6 HOURS PRN
Qty: 120 TABLET | Refills: 0 | Status: CANCELLED | OUTPATIENT
Start: 2020-11-02 | End: 2020-12-02

## 2020-11-02 RX ORDER — ALBUTEROL SULFATE 90 UG/1
2 AEROSOL, METERED RESPIRATORY (INHALATION) EVERY 6 HOURS PRN
Qty: 1 INHALER | Refills: 2 | Status: SHIPPED | OUTPATIENT
Start: 2020-11-02 | End: 2020-12-02

## 2020-11-02 RX ORDER — BENZONATATE 100 MG/1
100 CAPSULE ORAL 3 TIMES DAILY PRN
Qty: 30 CAPSULE | Refills: 0 | Status: SHIPPED | OUTPATIENT
Start: 2020-11-02 | End: 2020-11-12

## 2020-11-05 ENCOUNTER — APPOINTMENT (OUTPATIENT)
Dept: RADIOLOGY | Facility: MEDICAL CENTER | Age: 25
End: 2020-11-05
Payer: COMMERCIAL

## 2020-11-05 ENCOUNTER — LAB (OUTPATIENT)
Dept: LAB | Facility: MEDICAL CENTER | Age: 25
End: 2020-11-05
Payer: COMMERCIAL

## 2020-11-05 DIAGNOSIS — R10.9 FLANK PAIN: ICD-10-CM

## 2020-11-05 DIAGNOSIS — E55.9 VITAMIN D DEFICIENCY: ICD-10-CM

## 2020-11-05 DIAGNOSIS — N17.9 AKI (ACUTE KIDNEY INJURY) (HCC): ICD-10-CM

## 2020-11-05 DIAGNOSIS — M54.50 ACUTE MIDLINE LOW BACK PAIN WITHOUT SCIATICA: ICD-10-CM

## 2020-11-05 DIAGNOSIS — N25.81 SECONDARY HYPERPARATHYROIDISM OF RENAL ORIGIN (HCC): ICD-10-CM

## 2020-11-05 LAB
ALBUMIN SERPL BCP-MCNC: 3.9 G/DL (ref 3.5–5)
ALP SERPL-CCNC: 48 U/L (ref 46–116)
ALT SERPL W P-5'-P-CCNC: 12 U/L (ref 12–78)
ANION GAP SERPL CALCULATED.3IONS-SCNC: 2 MMOL/L (ref 4–13)
AST SERPL W P-5'-P-CCNC: 5 U/L (ref 5–45)
BASOPHILS # BLD AUTO: 0.07 THOUSANDS/ΜL (ref 0–0.1)
BASOPHILS NFR BLD AUTO: 1 % (ref 0–1)
BILIRUB SERPL-MCNC: 0.26 MG/DL (ref 0.2–1)
BUN SERPL-MCNC: 8 MG/DL (ref 5–25)
CALCIUM SERPL-MCNC: 8.7 MG/DL (ref 8.3–10.1)
CHLORIDE SERPL-SCNC: 112 MMOL/L (ref 100–108)
CO2 SERPL-SCNC: 30 MMOL/L (ref 21–32)
CREAT SERPL-MCNC: 0.77 MG/DL (ref 0.6–1.3)
EOSINOPHIL # BLD AUTO: 0.15 THOUSAND/ΜL (ref 0–0.61)
EOSINOPHIL NFR BLD AUTO: 2 % (ref 0–6)
ERYTHROCYTE [DISTWIDTH] IN BLOOD BY AUTOMATED COUNT: 12.9 % (ref 11.6–15.1)
ERYTHROCYTE [SEDIMENTATION RATE] IN BLOOD: 2 MM/HOUR (ref 0–19)
FERRITIN SERPL-MCNC: 24 NG/ML (ref 8–388)
GFR SERPL CREATININE-BSD FRML MDRD: 106 ML/MIN/1.73SQ M
GLUCOSE SERPL-MCNC: 69 MG/DL (ref 65–140)
HCT VFR BLD AUTO: 41 % (ref 34.8–46.1)
HGB BLD-MCNC: 13.3 G/DL (ref 11.5–15.4)
IMM GRANULOCYTES # BLD AUTO: 0.03 THOUSAND/UL (ref 0–0.2)
IMM GRANULOCYTES NFR BLD AUTO: 0 % (ref 0–2)
IRON SATN MFR SERPL: 9 %
IRON SERPL-MCNC: 28 UG/DL (ref 50–170)
LYMPHOCYTES # BLD AUTO: 1.99 THOUSANDS/ΜL (ref 0.6–4.47)
LYMPHOCYTES NFR BLD AUTO: 23 % (ref 14–44)
MCH RBC QN AUTO: 30.8 PG (ref 26.8–34.3)
MCHC RBC AUTO-ENTMCNC: 32.4 G/DL (ref 31.4–37.4)
MCV RBC AUTO: 95 FL (ref 82–98)
MONOCYTES # BLD AUTO: 0.51 THOUSAND/ΜL (ref 0.17–1.22)
MONOCYTES NFR BLD AUTO: 6 % (ref 4–12)
NEUTROPHILS # BLD AUTO: 6 THOUSANDS/ΜL (ref 1.85–7.62)
NEUTS SEG NFR BLD AUTO: 68 % (ref 43–75)
NRBC BLD AUTO-RTO: 0 /100 WBCS
PHOSPHATE SERPL-MCNC: 3 MG/DL (ref 2.7–4.5)
PLATELET # BLD AUTO: 310 THOUSANDS/UL (ref 149–390)
PMV BLD AUTO: 10.6 FL (ref 8.9–12.7)
POTASSIUM SERPL-SCNC: 3.8 MMOL/L (ref 3.5–5.3)
PROT SERPL-MCNC: 6.6 G/DL (ref 6.4–8.2)
PTH-INTACT SERPL-MCNC: 31.6 PG/ML (ref 18.4–80.1)
RBC # BLD AUTO: 4.32 MILLION/UL (ref 3.81–5.12)
SODIUM SERPL-SCNC: 144 MMOL/L (ref 136–145)
TIBC SERPL-MCNC: 319 UG/DL (ref 250–450)
WBC # BLD AUTO: 8.75 THOUSAND/UL (ref 4.31–10.16)

## 2020-11-05 PROCEDURE — 83540 ASSAY OF IRON: CPT

## 2020-11-05 PROCEDURE — 83970 ASSAY OF PARATHORMONE: CPT

## 2020-11-05 PROCEDURE — 84100 ASSAY OF PHOSPHORUS: CPT

## 2020-11-05 PROCEDURE — 80307 DRUG TEST PRSMV CHEM ANLYZR: CPT | Performed by: PHYSICIAN ASSISTANT

## 2020-11-05 PROCEDURE — 83550 IRON BINDING TEST: CPT

## 2020-11-05 PROCEDURE — 72110 X-RAY EXAM L-2 SPINE 4/>VWS: CPT

## 2020-11-05 PROCEDURE — 36415 COLL VENOUS BLD VENIPUNCTURE: CPT

## 2020-11-05 PROCEDURE — 80053 COMPREHEN METABOLIC PANEL: CPT

## 2020-11-05 PROCEDURE — 82652 VIT D 1 25-DIHYDROXY: CPT

## 2020-11-05 PROCEDURE — 85025 COMPLETE CBC W/AUTO DIFF WBC: CPT

## 2020-11-05 PROCEDURE — 85652 RBC SED RATE AUTOMATED: CPT

## 2020-11-05 PROCEDURE — 82728 ASSAY OF FERRITIN: CPT

## 2020-11-06 ENCOUNTER — TELEPHONE (OUTPATIENT)
Dept: FAMILY MEDICINE CLINIC | Facility: CLINIC | Age: 25
End: 2020-11-06

## 2020-11-09 ENCOUNTER — TELEPHONE (OUTPATIENT)
Dept: NEPHROLOGY | Facility: CLINIC | Age: 25
End: 2020-11-09

## 2020-11-09 DIAGNOSIS — D50.9 IRON DEFICIENCY ANEMIA, UNSPECIFIED IRON DEFICIENCY ANEMIA TYPE: Primary | ICD-10-CM

## 2020-11-09 LAB
1,25(OH)2D3 SERPL-MCNC: 39.9 PG/ML (ref 19.9–79.3)
AMPHETAMINES UR QL SCN: NEGATIVE NG/ML
BARBITURATES UR QL SCN: NEGATIVE NG/ML
BENZODIAZ UR QL: NEGATIVE NG/ML
BZE UR QL: NEGATIVE NG/ML
CANNABINOIDS UR QL SCN: POSITIVE
METHADONE UR QL SCN: NEGATIVE NG/ML
OPIATES UR QL: NEGATIVE NG/ML
PCP UR QL: NEGATIVE NG/ML
PROPOXYPH UR QL SCN: NEGATIVE NG/ML

## 2020-11-09 RX ORDER — SODIUM CHLORIDE 9 MG/ML
20 INJECTION, SOLUTION INTRAVENOUS ONCE
Status: CANCELLED | OUTPATIENT
Start: 2020-11-16

## 2020-11-09 RX ORDER — FERROUS SULFATE TAB EC 324 MG (65 MG FE EQUIVALENT) 324 (65 FE) MG
324 TABLET DELAYED RESPONSE ORAL EVERY OTHER DAY
Qty: 45 TABLET | Refills: 1 | Status: SHIPPED | OUTPATIENT
Start: 2020-11-09 | End: 2020-12-30

## 2020-11-25 ENCOUNTER — TELEPHONE (OUTPATIENT)
Dept: GASTROENTEROLOGY | Facility: CLINIC | Age: 25
End: 2020-11-25

## 2020-12-01 ENCOUNTER — EVALUATION (OUTPATIENT)
Dept: PHYSICAL THERAPY | Facility: CLINIC | Age: 25
End: 2020-12-01
Payer: COMMERCIAL

## 2020-12-01 DIAGNOSIS — M54.42 CHRONIC MIDLINE LOW BACK PAIN WITH LEFT-SIDED SCIATICA: Primary | ICD-10-CM

## 2020-12-01 DIAGNOSIS — G89.29 CHRONIC MIDLINE LOW BACK PAIN WITH LEFT-SIDED SCIATICA: Primary | ICD-10-CM

## 2020-12-01 PROCEDURE — 97140 MANUAL THERAPY 1/> REGIONS: CPT | Performed by: PHYSICAL THERAPIST

## 2020-12-01 PROCEDURE — 97535 SELF CARE MNGMENT TRAINING: CPT | Performed by: PHYSICAL THERAPIST

## 2020-12-01 PROCEDURE — 97110 THERAPEUTIC EXERCISES: CPT | Performed by: PHYSICAL THERAPIST

## 2020-12-01 PROCEDURE — 97161 PT EVAL LOW COMPLEX 20 MIN: CPT | Performed by: PHYSICAL THERAPIST

## 2020-12-02 ENCOUNTER — OFFICE VISIT (OUTPATIENT)
Dept: FAMILY MEDICINE CLINIC | Facility: CLINIC | Age: 25
End: 2020-12-02
Payer: COMMERCIAL

## 2020-12-02 VITALS
SYSTOLIC BLOOD PRESSURE: 128 MMHG | TEMPERATURE: 97.9 F | HEIGHT: 66 IN | HEART RATE: 78 BPM | OXYGEN SATURATION: 97 % | WEIGHT: 124.4 LBS | BODY MASS INDEX: 19.99 KG/M2 | DIASTOLIC BLOOD PRESSURE: 82 MMHG

## 2020-12-02 DIAGNOSIS — Z72.0 TOBACCO USE: ICD-10-CM

## 2020-12-02 DIAGNOSIS — R10.84 GENERALIZED ABDOMINAL PAIN: ICD-10-CM

## 2020-12-02 DIAGNOSIS — M54.50 ACUTE MIDLINE LOW BACK PAIN WITHOUT SCIATICA: Primary | ICD-10-CM

## 2020-12-02 DIAGNOSIS — K08.89 PAIN, DENTAL: ICD-10-CM

## 2020-12-02 DIAGNOSIS — R09.82 PND (POST-NASAL DRIP): ICD-10-CM

## 2020-12-02 PROCEDURE — T1015 CLINIC SERVICE: HCPCS | Performed by: FAMILY MEDICINE

## 2020-12-02 RX ORDER — LORATADINE 10 MG/1
10 TABLET ORAL DAILY
Qty: 30 TABLET | Refills: 2 | Status: SHIPPED | OUTPATIENT
Start: 2020-12-02 | End: 2020-12-30

## 2020-12-02 RX ORDER — AMOXICILLIN 500 MG/1
CAPSULE ORAL
Qty: 10 CAPSULE | Refills: 0 | Status: SHIPPED | OUTPATIENT
Start: 2020-12-02 | End: 2020-12-05

## 2020-12-02 RX ORDER — OXYCODONE HYDROCHLORIDE 5 MG/1
5 TABLET ORAL EVERY 6 HOURS PRN
Qty: 120 TABLET | Refills: 0 | Status: SHIPPED | OUTPATIENT
Start: 2020-12-02 | End: 2020-12-30 | Stop reason: SDUPTHER

## 2020-12-02 RX ORDER — FLUTICASONE PROPIONATE 50 MCG
1 SPRAY, SUSPENSION (ML) NASAL DAILY
Qty: 1 BOTTLE | Refills: 2 | Status: SHIPPED | OUTPATIENT
Start: 2020-12-02 | End: 2020-12-30

## 2020-12-02 RX ORDER — LIDOCAINE 50 MG/G
1 PATCH TOPICAL DAILY
Qty: 30 PATCH | Refills: 0 | Status: SHIPPED | OUTPATIENT
Start: 2020-12-02 | End: 2020-12-30 | Stop reason: SDUPTHER

## 2020-12-03 ENCOUNTER — OFFICE VISIT (OUTPATIENT)
Dept: PHYSICAL THERAPY | Facility: CLINIC | Age: 25
End: 2020-12-03
Payer: COMMERCIAL

## 2020-12-03 DIAGNOSIS — M54.42 CHRONIC MIDLINE LOW BACK PAIN WITH LEFT-SIDED SCIATICA: Primary | ICD-10-CM

## 2020-12-03 DIAGNOSIS — G89.29 CHRONIC MIDLINE LOW BACK PAIN WITH LEFT-SIDED SCIATICA: Primary | ICD-10-CM

## 2020-12-03 PROCEDURE — 97112 NEUROMUSCULAR REEDUCATION: CPT | Performed by: PHYSICAL THERAPIST

## 2020-12-03 PROCEDURE — 97110 THERAPEUTIC EXERCISES: CPT | Performed by: PHYSICAL THERAPIST

## 2020-12-03 PROCEDURE — 97140 MANUAL THERAPY 1/> REGIONS: CPT | Performed by: PHYSICAL THERAPIST

## 2020-12-04 ENCOUNTER — HOSPITAL ENCOUNTER (OUTPATIENT)
Dept: PERIOP | Facility: HOSPITAL | Age: 25
Setting detail: OUTPATIENT SURGERY
Discharge: HOME | End: 2020-12-04

## 2020-12-11 ENCOUNTER — APPOINTMENT (OUTPATIENT)
Dept: PHYSICAL THERAPY | Facility: CLINIC | Age: 25
End: 2020-12-11
Payer: COMMERCIAL

## 2020-12-14 ENCOUNTER — APPOINTMENT (OUTPATIENT)
Dept: PHYSICAL THERAPY | Facility: CLINIC | Age: 25
End: 2020-12-14
Payer: COMMERCIAL

## 2020-12-16 ENCOUNTER — APPOINTMENT (OUTPATIENT)
Dept: PHYSICAL THERAPY | Facility: CLINIC | Age: 25
End: 2020-12-16
Payer: COMMERCIAL

## 2020-12-16 ENCOUNTER — TELEPHONE (OUTPATIENT)
Dept: GASTROENTEROLOGY | Facility: CLINIC | Age: 25
End: 2020-12-16

## 2020-12-21 ENCOUNTER — OFFICE VISIT (OUTPATIENT)
Dept: PHYSICAL THERAPY | Facility: CLINIC | Age: 25
End: 2020-12-21
Payer: COMMERCIAL

## 2020-12-21 DIAGNOSIS — M54.42 CHRONIC MIDLINE LOW BACK PAIN WITH LEFT-SIDED SCIATICA: Primary | ICD-10-CM

## 2020-12-21 DIAGNOSIS — G89.29 CHRONIC MIDLINE LOW BACK PAIN WITH LEFT-SIDED SCIATICA: Primary | ICD-10-CM

## 2020-12-21 DIAGNOSIS — R10.9 ABDOMINAL PAIN: ICD-10-CM

## 2020-12-21 PROCEDURE — 97140 MANUAL THERAPY 1/> REGIONS: CPT | Performed by: PHYSICAL THERAPIST

## 2020-12-21 PROCEDURE — 97112 NEUROMUSCULAR REEDUCATION: CPT | Performed by: PHYSICAL THERAPIST

## 2020-12-21 PROCEDURE — 97110 THERAPEUTIC EXERCISES: CPT | Performed by: PHYSICAL THERAPIST

## 2020-12-22 RX ORDER — SUCRALFATE ORAL 1 G/10ML
1000 SUSPENSION ORAL EVERY 6 HOURS SCHEDULED
Qty: 420 ML | Refills: 0 | Status: SHIPPED | OUTPATIENT
Start: 2020-12-22 | End: 2021-01-27 | Stop reason: SDUPTHER

## 2020-12-23 ENCOUNTER — APPOINTMENT (OUTPATIENT)
Dept: PHYSICAL THERAPY | Facility: CLINIC | Age: 25
End: 2020-12-23
Payer: COMMERCIAL

## 2020-12-28 ENCOUNTER — APPOINTMENT (OUTPATIENT)
Dept: PHYSICAL THERAPY | Facility: CLINIC | Age: 25
End: 2020-12-28
Payer: COMMERCIAL

## 2020-12-30 ENCOUNTER — APPOINTMENT (OUTPATIENT)
Dept: PHYSICAL THERAPY | Facility: CLINIC | Age: 25
End: 2020-12-30
Payer: COMMERCIAL

## 2020-12-30 ENCOUNTER — OFFICE VISIT (OUTPATIENT)
Dept: FAMILY MEDICINE CLINIC | Facility: CLINIC | Age: 25
End: 2020-12-30
Payer: COMMERCIAL

## 2020-12-30 VITALS
WEIGHT: 121 LBS | RESPIRATION RATE: 18 BRPM | HEIGHT: 66 IN | DIASTOLIC BLOOD PRESSURE: 80 MMHG | SYSTOLIC BLOOD PRESSURE: 122 MMHG | HEART RATE: 71 BPM | BODY MASS INDEX: 19.44 KG/M2 | OXYGEN SATURATION: 97 % | TEMPERATURE: 98.3 F

## 2020-12-30 DIAGNOSIS — R09.82 PND (POST-NASAL DRIP): ICD-10-CM

## 2020-12-30 DIAGNOSIS — M54.50 ACUTE MIDLINE LOW BACK PAIN WITHOUT SCIATICA: Primary | ICD-10-CM

## 2020-12-30 DIAGNOSIS — R10.84 GENERALIZED ABDOMINAL PAIN: ICD-10-CM

## 2020-12-30 DIAGNOSIS — Z91.89 NEED FOR DENTAL CARE: ICD-10-CM

## 2020-12-30 DIAGNOSIS — Z72.0 TOBACCO USE: ICD-10-CM

## 2020-12-30 PROCEDURE — T1015 CLINIC SERVICE: HCPCS | Performed by: FAMILY MEDICINE

## 2020-12-30 RX ORDER — LIDOCAINE 50 MG/G
1 PATCH TOPICAL DAILY
Qty: 30 PATCH | Refills: 0 | Status: SHIPPED | OUTPATIENT
Start: 2020-12-30 | End: 2021-01-27 | Stop reason: SDUPTHER

## 2020-12-30 RX ORDER — FEXOFENADINE HCL 180 MG/1
180 TABLET ORAL DAILY
Qty: 30 TABLET | Refills: 2 | Status: SHIPPED | OUTPATIENT
Start: 2020-12-30 | End: 2022-02-07 | Stop reason: ALTCHOICE

## 2020-12-30 RX ORDER — PANTOPRAZOLE SODIUM 40 MG/1
40 TABLET, DELAYED RELEASE ORAL
Qty: 60 TABLET | Refills: 2 | Status: SHIPPED | OUTPATIENT
Start: 2020-12-30 | End: 2021-04-23 | Stop reason: SDUPTHER

## 2020-12-30 RX ORDER — NICOTINE 21 MG/24HR
1 PATCH, TRANSDERMAL 24 HOURS TRANSDERMAL EVERY 24 HOURS
Qty: 28 PATCH | Refills: 0 | Status: SHIPPED | OUTPATIENT
Start: 2020-12-30 | End: 2022-02-07 | Stop reason: SDDI

## 2020-12-30 RX ORDER — OXYCODONE HYDROCHLORIDE 5 MG/1
5 TABLET ORAL EVERY 6 HOURS PRN
Qty: 120 TABLET | Refills: 0 | Status: SHIPPED | OUTPATIENT
Start: 2020-12-30 | End: 2021-01-27 | Stop reason: SDUPTHER

## 2020-12-31 ENCOUNTER — TELEPHONE (OUTPATIENT)
Dept: ADMINISTRATIVE | Facility: OTHER | Age: 25
End: 2020-12-31

## 2020-12-31 NOTE — TELEPHONE ENCOUNTER
----- Message from Jimmy Rogers PA-C sent at 12/30/2020 12:37 PM EST -----  Regarding: Care Gap Request  12/30/20 12:37 PM    Jasiel, our patient Sapphire Rodgers has had Pap Smear (HPV) aka Cervical Cancer Screening completed/performed  Please assist in updating the patient chart by pulling the Care Everywhere (CE) document  The date of service is 3/2016       Thank you,  Jimmy Rogers PA-C  [facility] [address]

## 2021-01-02 ENCOUNTER — OFFICE VISIT (OUTPATIENT)
Dept: URGENT CARE | Facility: CLINIC | Age: 26
End: 2021-01-02
Payer: COMMERCIAL

## 2021-01-02 ENCOUNTER — APPOINTMENT (OUTPATIENT)
Dept: RADIOLOGY | Facility: CLINIC | Age: 26
End: 2021-01-02
Payer: COMMERCIAL

## 2021-01-02 VITALS
SYSTOLIC BLOOD PRESSURE: 122 MMHG | DIASTOLIC BLOOD PRESSURE: 66 MMHG | RESPIRATION RATE: 18 BRPM | BODY MASS INDEX: 19.44 KG/M2 | TEMPERATURE: 97.8 F | HEART RATE: 83 BPM | OXYGEN SATURATION: 100 % | WEIGHT: 121 LBS | HEIGHT: 66 IN

## 2021-01-02 DIAGNOSIS — S49.92XA ARM INJURY, LEFT, INITIAL ENCOUNTER: ICD-10-CM

## 2021-01-02 DIAGNOSIS — S60.212A CONTUSION OF LEFT WRIST, INITIAL ENCOUNTER: Primary | ICD-10-CM

## 2021-01-02 PROCEDURE — 29125 APPL SHORT ARM SPLINT STATIC: CPT | Performed by: NURSE PRACTITIONER

## 2021-01-02 PROCEDURE — S9088 SERVICES PROVIDED IN URGENT: HCPCS | Performed by: NURSE PRACTITIONER

## 2021-01-02 PROCEDURE — 99213 OFFICE O/P EST LOW 20 MIN: CPT | Performed by: NURSE PRACTITIONER

## 2021-01-02 PROCEDURE — 73130 X-RAY EXAM OF HAND: CPT

## 2021-01-02 PROCEDURE — 73090 X-RAY EXAM OF FOREARM: CPT

## 2021-01-02 PROCEDURE — 73110 X-RAY EXAM OF WRIST: CPT

## 2021-01-02 PROCEDURE — 73080 X-RAY EXAM OF ELBOW: CPT

## 2021-01-03 NOTE — PATIENT INSTRUCTIONS
No fractures seen on x-ray  Radiology does the final read; if they see anything I did not, we will call you  Rest, ice often, wear the wrist splint as needed, elevate arm at rest use tylenol or your previously prescribed oxycodone as needed  The first 24-48 hours post injury are the most painful  Contusion in Adults   AMBULATORY CARE:   A contusion  is a bruise that appears on your skin after an injury  A bruise happens when small blood vessels tear but skin does not  Blood leaks into nearby tissue, such as soft tissue or muscle  Other signs and symptoms you may have with a contusion:   · Pain that increases when you touch the bruise, walk, or use the area around the bruise    · Swelling or a lump at the site of the bruise or near it    · Red, blue, or black skin that may change to green or yellow after a few days    · Stiffness or problems moving the bruised area of your body    Seek care immediately if:   · You have new trouble moving the injured area  · You have tingling or numbness in or near the injured area  · Your hand or foot below the bruise gets cold or turns pale  Call your doctor if:   · You find a new lump in the injured area  · Your symptoms do not improve with treatment after 4 to 5 days  · You have questions or concerns about your condition or care  Treatment  may not be needed  The following may be needed if you have a serious injury:  · NSAIDs , such as ibuprofen, help decrease swelling, pain, and fever  This medicine is available with or without a doctor's order  NSAIDs can cause stomach bleeding or kidney problems in certain people  If you take blood thinner medicine, always ask your healthcare provider if NSAIDs are safe for you  Always read the medicine label and follow directions  · Prescription pain medicine  may be given  Ask your healthcare provider how to take this medicine safely  Some prescription pain medicines contain acetaminophen   Do not take other medicines that contain acetaminophen without talking to your healthcare provider  Too much acetaminophen may cause liver damage  Prescription pain medicine may cause constipation  Ask your healthcare provider how to prevent or treat constipation  · Aspiration  is a procedure to drain pooled blood in your muscle  This may help prevent increased pressure in the muscle  · Surgery  may be done to repair a tear in the muscle or relieve pressure in the muscle caused by swelling  Help a contusion heal:   · Rest the injured area  or use it less than usual  If you bruised your leg or foot, you may need crutches or a cane to help you walk  This will help you keep weight off your injured body part  · Apply ice  to decrease swelling and pain  Ice may also help prevent tissue damage  Use an ice pack, or put crushed ice in a plastic bag  Cover it with a towel and place it on your bruise for 15 to 20 minutes every hour or as directed  · Use compression  to support the area and decrease swelling  Wrap an elastic bandage around the area over the bruised muscle  Make sure the bandage is not too tight  You should be able to fit 1 finger between the bandage and your skin  · Elevate (raise) your injured body part  above the level of your heart to help decrease pain and swelling  Use pillows, blankets, or rolled towels to elevate the area as often as you can  · Do not drink alcohol  as directed  Alcohol may slow healing  · Do not stretch injured muscles  right after your injury  Ask your healthcare provider when and how you may safely stretch after your injury  Gentle stretches can help increase your flexibility  · Do not massage the area or put heating pads  on the bruise right after your injury  Heat and massage may slow healing  Your healthcare provider may tell you to apply heat after several days   At that time, heat will start to help the injury heal     Follow up with your doctor as directed:  Write down your questions so you remember to ask them during your visits  © Copyright [address] Information is for End User's use only and may not be sold, redistributed or otherwise used for commercial purposes  All illustrations and images included in CareNotes® are the copyrighted property of A D A M , Inc  or Re Perera  The above information is an  only  It is not intended as medical advice for individual conditions or treatments  Talk to your doctor, nurse or pharmacist before following any medical regimen to see if it is safe and effective for you  Musculoskeletal Pain   WHAT YOU NEED TO KNOW:   Muscle pain can be dull, achy, or sharp  You may have pain and tenderness to the touch as well  It can occur anywhere on your body and is often brought on by exercise  Muscle pain may occur from an injury, such as a sprain, tendonitis, or bone fracture  Muscle pain can also be the result of medical conditions, such as polymyositis, fibromyalgia, and connective tissue disorders  DISCHARGE INSTRUCTIONS:   Self care:   · Rest  as directed and avoid activity that causes pain  You may be able to return to normal activity when you can move without pain  Follow directions for rest and activity  You are at risk for injury for 3 weeks after your symptoms go away  · Ice  your painful muscle area to decrease pain and swelling  Use an ice pack, or put ice in a plastic bag and cover it with a towel  Always  put a cloth between the ice and your skin  Apply the ice as often as directed for the first 24 to 48 hours  · Compression  with a splint, brace, or elastic bandage helps decrease pain and swelling  This may be needed for muscle pain in arms or legs  A splint, brace, or bandage will also help protect the painful area when you move around  · Elevate  a painful arm or leg to reduce swelling and pain  Elevate your limb while you are sitting or lying down   Prop a painful leg on pillows to keep it above the level of your heart  Medicines:   · NSAIDs  help decrease swelling and pain or fever  This medicine is available with or without a doctor's order  NSAIDs can cause stomach bleeding or kidney problems in certain people  If you take blood thinner medicine, always ask your healthcare provider if NSAIDs are safe for you  Always read the medicine label and follow directions  · Acetaminophen  is used to decrease pain  It is available without a doctor's order  Ask your healthcare provider how much to take and when to take it  Follow directions  Acetaminophen can cause liver damage if not taken correctly  Do not take more than one medicine that contains acetaminophen unless directed  · Muscle relaxers  help relax your muscles to decrease pain and muscle spasms  · Steroids  may be given to decrease redness, pain, and swelling  · Take your medicine as directed  Contact your healthcare provider if you think your medicine is not helping or if you have side effects  Tell him if you are allergic to any medicine  Keep a list of the medicines, vitamins, and herbs you take  Include the amounts, and when and why you take them  Bring the list or the pill bottles to follow-up visits  Carry your medicine list with you in case of an emergency  Follow up with your healthcare provider as directed: You may need more tests to help healthcare providers find the cause of your muscle pain  You may need physical therapy to learn muscle strengthening exercises  Write down your questions so you remember to ask them during your visits  Contact your healthcare provider if:   · You have a fever  · You have trouble sleeping because of your pain  · Your painful area becomes more tender, red, and warm to the touch  · You have decreased movement of the painful area  · You have questions or concerns about your condition or care      Return to the emergency department if:   · You have increased severe pain when you move the painful muscle area  · You lose feeling in your painful muscle area  · You have new or worse swelling in the painful area  Your skin may feel tight  · You have increased muscle pain or pain that does not improve with treatment  © Copyright [address] Information is for End User's use only and may not be sold, redistributed or otherwise used for commercial purposes  All illustrations and images included in CareNotes® are the copyrighted property of A D A M , Inc  or [facility] Batsheva Costa   The above information is an  only  It is not intended as medical advice for individual conditions or treatments  Talk to your doctor, nurse or pharmacist before following any medical regimen to see if it is safe and effective for you

## 2021-01-04 ENCOUNTER — APPOINTMENT (OUTPATIENT)
Dept: PHYSICAL THERAPY | Facility: CLINIC | Age: 26
End: 2021-01-04
Payer: COMMERCIAL

## 2021-01-04 NOTE — TELEPHONE ENCOUNTER
Upon review of the In Basket request we were able to locate, review, and update the patient chart as requested for Pap Smear (HPV) aka Cervical Cancer Screening  Any additional questions or concerns should be emailed to the Practice Liaisons via [email or web address] email, please do not reply via In Basket      Thank you  Kalen Baptiste
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
## 2021-01-06 ENCOUNTER — OFFICE VISIT (OUTPATIENT)
Dept: PHYSICAL THERAPY | Facility: CLINIC | Age: 26
End: 2021-01-06
Payer: COMMERCIAL

## 2021-01-06 DIAGNOSIS — M54.42 CHRONIC MIDLINE LOW BACK PAIN WITH LEFT-SIDED SCIATICA: Primary | ICD-10-CM

## 2021-01-06 DIAGNOSIS — G89.29 CHRONIC MIDLINE LOW BACK PAIN WITH LEFT-SIDED SCIATICA: Primary | ICD-10-CM

## 2021-01-06 PROCEDURE — 97110 THERAPEUTIC EXERCISES: CPT | Performed by: PHYSICAL THERAPIST

## 2021-01-06 PROCEDURE — 97112 NEUROMUSCULAR REEDUCATION: CPT | Performed by: PHYSICAL THERAPIST

## 2021-01-06 PROCEDURE — 97140 MANUAL THERAPY 1/> REGIONS: CPT | Performed by: PHYSICAL THERAPIST

## 2021-01-06 NOTE — PROGRESS NOTES
PT Re-Evaluation     Today's date: 2021  Patient name: Tyler Jason  : 1995  MRN: [id number]  Referring provider: Josefina Atkins PA-C  Dx:   Encounter Diagnosis     ICD-10-CM    1  Chronic midline low back pain with left-sided sciatica  M54 42     G89 29                   Assessment  Understanding of Dx/Px/POC: good   Prognosis: good    Goals  STGs: To be complete within 2-3 weeks  - Decrease/centralize pain to < 2/10 at worst  - Increase lumbar extension ROM to WNL  - Increase bilateral hip flexor flexibility to WNL  - Increase core stability and posterior lateral LE chain strength to > 4+/5  - Improve postural awareness capacity to > 60min before deficit    LTGs: To be complete within 4 weeks  - Able to sit for any extended amount of time without pain or limitation for increased safety and functional capacity with ADLs and home-related duty  - Able to repetitively bend over at trunk without pain or limitation for increased safety and functional capacity with ADLs and and home-related duty  - Able to complete all lifting/carrying activity without pain or limitation for increased safety and functional capacity with ADLs and whome-related duty  - Able to complete transfers repetitively without pain or limitation for increased safety and functional capacity with ADLs and home-related duty    Plan  Planned therapy interventions: manual therapy, neuromuscular re-education, self care, patient education, therapeutic activities and therapeutic exercise  Frequency: 1x week  Duration in weeks: 4       Upon completion of today's re-evaluation, as evidenced by present objective and subjective measures, Shiane's sx remain consistent with continued, fair to good-paced progress from L side lumbar derangement secondary to postural dysfunction and improper functional movement mechanics  Pt will benefit from continued skilled physical therapy to address current deficits          Subjective Evaluation    Pain  Current pain rating: 3  At best pain ratin  At worst pain ratin  Location: LBP with intermittent L LE radicular sx    Patient Goals  Patient goals for therapy: increased strength, decreased pain and increased motion         Pt reports she feels about 50% improved overall since IE  HEP going well  Reports her pain level continues decrease, as well as remain centralized for longer periods of time  Continues to see benefit from physical therapy at this time and would like to continue 1x/week  Objective Pain level ranges 1-5/10  AROM: Lumbar extension 25% decreased;  Post pelvic tilt 25% dereased  Strength: Core stability and post/lat LE chain strength 4/5  Postural Awareness: Fair and improving (Ant pelvic tilt)  Repeated movement testing: (+) for L side lumbar derangemnet  FOTO: 76; GOAL 64  Lumbo-pelvic mobility: Mod deficits  Hip flexor flexibility: Mod shortening bilaterally; (+) Jon Test  Unable sit without pain and limitation, but improving  Unable to bend over at trunk without pain and limitation, but improving  Unable to complete lifting/carrying activity without pain and limitation, but improving  Unable to complete transfers without pain and limitation, but improving             Precautions: None at this time    Daily Treatment Diary    HEP: Handout provided and discussed      Manuals 12/1/20 12/3/20 12/21/20 1/6/21         ART  x15' x15' x15'         PROM/Stretch             IASTM             STM/Triggerpoint             JM  x10' x10' x10'                      Neuro Re-Ed             PPT 3x10 3x10 3x10 3x10         Supine Hip Flexor Stretch 4x20'' ea 4x20'' ea 4x20'' ea 4x20'' ea         Prone wedge x10' x10' x10' x10'         No Shoe AE Steamboats  3x10 ea 3x10 3x10         Supine Hip flexor Stretch  6x20'' 6x20'' 6x20''                                   Ther Ex             SKTC 3x10 3x10 3x10 3x10         DKTC 3x10 3x10 3x10 3x10         MARIA LUISA x10' x10' x10' x10' Cat Backs  3x10 3x10 3x10         Bridge with Add Sq  3x10 3x10 3x10         Prone Hip IR   3x10 L2 3x10 L3         Prone Hip ER   3x10 3x10                                   Ther Activity                                       Gait Training                                       Modalities             MHP  Prone wedge x10' Prone wedge x10' Prone wedge x10'         CP Supine Knees bent x10'            US/Stim

## 2021-01-13 ENCOUNTER — APPOINTMENT (OUTPATIENT)
Dept: PHYSICAL THERAPY | Facility: CLINIC | Age: 26
End: 2021-01-13
Payer: COMMERCIAL

## 2021-01-14 ENCOUNTER — TELEPHONE (OUTPATIENT)
Dept: FAMILY MEDICINE CLINIC | Facility: CLINIC | Age: 26
End: 2021-01-14

## 2021-01-14 DIAGNOSIS — R05.9 COUGH: Primary | ICD-10-CM

## 2021-01-14 DIAGNOSIS — R06.02 SOB (SHORTNESS OF BREATH): ICD-10-CM

## 2021-01-14 DIAGNOSIS — R05.9 COUGH: ICD-10-CM

## 2021-01-14 PROCEDURE — U0005 INFEC AGEN DETEC AMPLI PROBE: HCPCS

## 2021-01-14 PROCEDURE — U0003 INFECTIOUS AGENT DETECTION BY NUCLEIC ACID (DNA OR RNA); SEVERE ACUTE RESPIRATORY SYNDROME CORONAVIRUS 2 (SARS-COV-2) (CORONAVIRUS DISEASE [COVID-19]), AMPLIFIED PROBE TECHNIQUE, MAKING USE OF HIGH THROUGHPUT TECHNOLOGIES AS DESCRIBED BY CMS-2020-01-R: HCPCS

## 2021-01-14 NOTE — TELEPHONE ENCOUNTER
COVID test placed  Please advise patient to self isolate until results are available  We will base f/u on test result  If her symptoms worsen please let her know she can be scheduled for a virtual visit  Thanks!

## 2021-01-14 NOTE — TELEPHONE ENCOUNTER
Pt complaining of feeling so fatigued, dizziness, coughing, SOB   This is day 4 or 5, she would like a covid test

## 2021-01-15 LAB — SARS-COV-2 RNA RESP QL NAA+PROBE: NEGATIVE

## 2021-01-18 ENCOUNTER — TELEPHONE (OUTPATIENT)
Dept: FAMILY MEDICINE CLINIC | Facility: CLINIC | Age: 26
End: 2021-01-18

## 2021-01-18 NOTE — TELEPHONE ENCOUNTER
----- Message from Katie Miller PA-C sent at 1/15/2021  4:26 PM EST -----  Please let patient know her COVID test was negative  She should scheduled a f/u appointment if her symptoms continue  Thanks!

## 2021-01-20 ENCOUNTER — OFFICE VISIT (OUTPATIENT)
Dept: PHYSICAL THERAPY | Facility: CLINIC | Age: 26
End: 2021-01-20
Payer: COMMERCIAL

## 2021-01-20 DIAGNOSIS — G89.29 CHRONIC MIDLINE LOW BACK PAIN WITH LEFT-SIDED SCIATICA: Primary | ICD-10-CM

## 2021-01-20 DIAGNOSIS — M54.42 CHRONIC MIDLINE LOW BACK PAIN WITH LEFT-SIDED SCIATICA: Primary | ICD-10-CM

## 2021-01-20 PROCEDURE — 97112 NEUROMUSCULAR REEDUCATION: CPT

## 2021-01-20 PROCEDURE — 97110 THERAPEUTIC EXERCISES: CPT

## 2021-01-20 PROCEDURE — 97140 MANUAL THERAPY 1/> REGIONS: CPT

## 2021-01-20 NOTE — PROGRESS NOTES
Daily Note     Today's date: 2021  Patient name: Lien De Paz  : 1995  MRN: [id number]  Referring provider: Nikki Shoemaker PA-C  Dx:   Encounter Diagnosis     ICD-10-CM    1  Chronic midline low back pain with left-sided sciatica  M54 42     G89 29                   Subjective: Pt reports she is doing well but cont to have tightness in LS  Objective: See treatment diary below      Assessment: Tolerated treatment fair  Patient demonstrated fatigue post treatment  Pt able to anca program with c/o tightness along LS  Pt completes program with no c/o radicular sx  Plan: Continue per plan of care          Precautions: None at this time    Daily Treatment Diary    HEP: Handout provided and discussed      Manuals 12/1/20 12/3/20 12/21/20 1/6/21 1/20/21        ART  x15' x15' x15'         PROM/Stretch             IASTM             STM/Triggerpoint     15'        JM  x10' x10' x10'                      Neuro Re-Ed             PPT 3x10 3x10 3x10 3x10         Supine Hip Flexor Stretch 4x20'' ea 4x20'' ea 4x20'' ea 4x20'' ea         Prone wedge x10' x10' x10' x10'         No Shoe AE Steamboats  3x10 ea 3x10 3x10 3/10        Supine Hip flexor Stretch  6x20'' 6x20'' 6x20'' 20"                                   Ther Ex             SKTC 3x10 3x10 3x10 3x10 3/10        DKTC 3x10 3x10 3x10 3x10 3/10        MARIA LUISA x10' x10' x10' x10' 10'        Cat Backs  3x10 3x10 3x10 3/10        Bridge with Add Sq  3x10 3x10 3x10 3/10        Prone Hip IR   3x10 L2 3x10 L3 3/10 L3        Prone Hip ER   3x10 3x10 3/10                                  Ther Activity                                       Gait Training                                       Modalities             MHP  Prone wedge x10' Prone wedge x10' Prone wedge x10' Prone 10'        CP Supine Knees bent x10'            US/Stim

## 2021-01-27 ENCOUNTER — APPOINTMENT (OUTPATIENT)
Dept: PHYSICAL THERAPY | Facility: CLINIC | Age: 26
End: 2021-01-27
Payer: COMMERCIAL

## 2021-01-27 ENCOUNTER — OFFICE VISIT (OUTPATIENT)
Dept: FAMILY MEDICINE CLINIC | Facility: HOME HEALTHCARE | Age: 26
End: 2021-01-27
Payer: COMMERCIAL

## 2021-01-27 VITALS
WEIGHT: 129.2 LBS | HEIGHT: 63 IN | OXYGEN SATURATION: 99 % | DIASTOLIC BLOOD PRESSURE: 68 MMHG | SYSTOLIC BLOOD PRESSURE: 112 MMHG | BODY MASS INDEX: 22.89 KG/M2 | HEART RATE: 100 BPM | RESPIRATION RATE: 18 BRPM | TEMPERATURE: 97.7 F

## 2021-01-27 DIAGNOSIS — R10.9 ABDOMINAL PAIN: ICD-10-CM

## 2021-01-27 DIAGNOSIS — Z12.4 SCREENING FOR CERVICAL CANCER: ICD-10-CM

## 2021-01-27 DIAGNOSIS — B37.3 VAGINAL YEAST INFECTION: ICD-10-CM

## 2021-01-27 DIAGNOSIS — M54.50 ACUTE MIDLINE LOW BACK PAIN WITHOUT SCIATICA: Primary | ICD-10-CM

## 2021-01-27 PROBLEM — N83.202 OVARIAN CYST, LEFT: Status: ACTIVE | Noted: 2020-02-20

## 2021-01-27 PROBLEM — Z72.0 TOBACCO ABUSE: Status: ACTIVE | Noted: 2020-02-20

## 2021-01-27 PROBLEM — R10.2 PELVIC PAIN IN FEMALE: Status: ACTIVE | Noted: 2020-08-31

## 2021-01-27 PROBLEM — K21.9 GERD (GASTROESOPHAGEAL REFLUX DISEASE): Status: ACTIVE | Noted: 2020-02-20

## 2021-01-27 PROCEDURE — T1015 CLINIC SERVICE: HCPCS | Performed by: FAMILY MEDICINE

## 2021-01-27 RX ORDER — LIDOCAINE 50 MG/G
1 PATCH TOPICAL DAILY
Qty: 30 PATCH | Refills: 0 | Status: SHIPPED | OUTPATIENT
Start: 2021-01-27 | End: 2021-02-24 | Stop reason: SDUPTHER

## 2021-01-27 RX ORDER — SUCRALFATE ORAL 1 G/10ML
1000 SUSPENSION ORAL EVERY 6 HOURS SCHEDULED
Qty: 420 ML | Refills: 0 | Status: SHIPPED | OUTPATIENT
Start: 2021-01-27 | End: 2021-03-24 | Stop reason: SDUPTHER

## 2021-01-27 RX ORDER — OXYCODONE HYDROCHLORIDE 5 MG/1
5 TABLET ORAL EVERY 6 HOURS PRN
Qty: 120 TABLET | Refills: 0 | Status: SHIPPED | OUTPATIENT
Start: 2021-01-27 | End: 2021-02-24 | Stop reason: SDUPTHER

## 2021-01-27 RX ORDER — POLYETHYLENE GLYCOL 3350 17 G/17G
17 POWDER, FOR SOLUTION ORAL DAILY
Qty: 238 G | Refills: 0 | Status: SHIPPED | OUTPATIENT
Start: 2021-01-27 | End: 2021-03-24 | Stop reason: SDUPTHER

## 2021-01-27 NOTE — PROGRESS NOTES
Assessment/Plan:     Diagnoses and all orders for this visit:    Acute midline low back pain without sciatica  -     lidocaine (LIDODERM) 5 %; Apply 1 patch topically daily Remove & Discard patch within 12 hours or as directed by MD  -     oxyCODONE (ROXICODONE) 5 mg immediate release tablet; Take 1 tablet (5 mg total) by mouth every 6 (six) hours as needed for severe painMax Daily Amount: 20 mg  -     Ambulatory referral to Pain Management; Future    Abdominal pain  -     polyethylene glycol (GLYCOLAX) 17 GM/SCOOP powder; Take 17 g by mouth daily for 10 days  -     oxyCODONE (ROXICODONE) 5 mg immediate release tablet; Take 1 tablet (5 mg total) by mouth every 6 (six) hours as needed for severe painMax Daily Amount: 20 mg  -     sucralfate (CARAFATE) 1 g/10 mL suspension; Take 10 mL (1,000 mg total) by mouth every 6 (six) hours  -     Ambulatory referral to Gastroenterology; Future    Screening for cervical cancer  -     Ambulatory referral to Gynecology; Future    Vaginal yeast infection  -     miconazole (MONISTAT 7) 100 mg vaginal suppository; Insert 1 suppository (100 mg total) into the vagina daily at bedtime    Other orders  -     Discontinue: terconazole (TERAZOL 7) 0 4 % vaginal cream; Insert 1 applicator into the vagina        - PDMP reviewed, refills sent  Continue PT as scheduled  Referral provided to spine & pain as patient continues with low back pain despite these interventions  - Carafate and miralax refilled  Patient encouraged to reschedule GI appointment  Contact information provided  - Will send script for miconazole as terconazole was not covered  Patient encouraged to follow up with GYN  Return in about 1 month (around 2/27/2021) for Next scheduled follow up  Subjective:        Patient ID: Karen Adams is a 30 y o  female      Chief Complaint   Patient presents with    Medication Refill    Wrist Pain     left        Kathrine Pinon is a 30year old female presenting for follow up and medication refill  Patient continues with abdominal pain and cramping, mildly controlled with oxycodone 5 mg, pantoprazole 40 mg BID and Carafate 1000mg q6 hours   She is being worked up for crohn's and was scheduled for a colonoscopy on 12/4, however, she did not have this completed  Denies N/V/D  Patient does not currently have follow up scheduled with GI      She also continues with ongoing back pain worse with bending and lifting activities  Denies numbness, tingling, paresthesia  No bowel/bladder incontinence  She has been using lidocaine patches and oxycodone 5 mg with some relief  She has been attending PT weekly but reports she has not noticed improvement  Lumbar spine xray from 11/2020 with L4-L5 degenerative disc change  Today patient reports that she received medication from her GYN for a yeast infection but this was not covered by her insurance  She is requesting an alternative be sent in for her        The following portions of the patient's history were reviewed and updated as appropriate: allergies, current medications, past family history, past medical history, past social history, past surgical history and problem list     Patient Active Problem List   Diagnosis    Ectopic pregnancy    TERRY (acute kidney injury) ([address] )    Flank pain    Diarrhea    Tobacco abuse    Hypokalemia    Leukocytosis    Microscopic hematuria    Microalbuminuria    Bradycardia    Low HDL (under 40)    Hepatitis C antibody test positive    Gastroenteritis    Chronic tubulointerstitial nephritis    Abdominal pain    Dysmenorrhea    Encounter for support and coordination of transition of care    Nausea    Iron deficiency anemia    GERD (gastroesophageal reflux disease)    Ovarian cyst, left    Pelvic pain in female       Current Outpatient Medications   Medication Sig Dispense Refill    Elastic Bandages & Supports (Wrist Splint/Elastic Left Med) MISC Use daily as needed (wrist pain) 1 each 0  fexofenadine (ALLEGRA) 180 MG tablet Take 1 tablet (180 mg total) by mouth daily 30 tablet 2    lidocaine (LIDODERM) 5 % Apply 1 patch topically daily Remove & Discard patch within 12 hours or as directed by MD 30 patch 0    oxyCODONE (ROXICODONE) 5 mg immediate release tablet Take 1 tablet (5 mg total) by mouth every 6 (six) hours as needed for severe painMax Daily Amount: 20 mg 120 tablet 0    pantoprazole (PROTONIX) 40 mg tablet Take 1 tablet (40 mg total) by mouth 2 (two) times a day before breakfast and lunch 60 tablet 2    polyethylene glycol (GLYCOLAX) 17 GM/SCOOP powder Take 17 g by mouth daily for 10 days 238 g 0    sucralfate (CARAFATE) 1 g/10 mL suspension Take 10 mL (1,000 mg total) by mouth every 6 (six) hours 420 mL 0    miconazole (MONISTAT 7) 100 mg vaginal suppository Insert 1 suppository (100 mg total) into the vagina daily at bedtime 7 suppository 0    nicotine (NICODERM CQ) 14 mg/24hr TD 24 hr patch Place 1 patch on the skin every 24 hours (Patient not taking: Reported on 1/2/2021) 28 patch 0     No current facility-administered medications for this visit        Facility-Administered Medications Ordered in Other Visits   Medication Dose Route Frequency Provider Last Rate Last Admin    acetaminophen (TYLENOL) tablet 650 mg  650 mg Oral Once Estanislado Armor        diphenhydrAMINE (BENADRYL) tablet 25 mg  25 mg Oral Once Estanislado Armor            Past Medical History:   Diagnosis Date    Allergic     Ectopic pregnancy     GERD (gastroesophageal reflux disease)     History of unilateral fallopian tube excision     RIGHT removed    Ovarian cyst     Wears glasses         Past Surgical History:   Procedure Laterality Date    DILATION AND CURETTAGE OF UTERUS      ECTOPIC PREGNANCY SURGERY      LAPAROSCOPY      [place] LAP,DIAGNOSTIC ABDOMEN N/A 9/16/2017    Procedure: LAPAROSCOPY DIAGNOSTIC, left salpingectomy;  Surgeon: Rachelle Lisa MD;  [facility]; Service: Gynecology    WISDOM TOOTH EXTRACTION          Social History     Socioeconomic History    Marital status: Single     Spouse name: Not on file    Number of children: Not on file    Years of education: Not on file    Highest education level: Not on file   Occupational History    Not on file   Social Needs    Financial resource strain: Not on file    Food insecurity     Worry: Not on file     Inability: Not on file    Transportation needs     Medical: Not on file     Non-medical: Not on file   Tobacco Use    Smoking status: Current Every Day Smoker     Packs/day: 1 50     Years: 6 00     Pack years: 9 00     Types: Cigarettes    Smokeless tobacco: Never Used   Substance and Sexual Activity    Alcohol use: Yes     Frequency: 2-4 times a month     Drinks per session: 1 or 2     Binge frequency: Never     Comment: "occasional"    Drug use: Yes     Types: Marijuana    Sexual activity: Yes     Partners: Male     Birth control/protection: None   Lifestyle    Physical activity     Days per week: Not on file     Minutes per session: Not on file    Stress: Not on file   Relationships    Social connections     Talks on phone: Not on file     Gets together: Not on file     Attends Restorationist service: Not on file     Active member of club or organization: Not on file     Attends meetings of clubs or organizations: Not on file     Relationship status: Not on file    Intimate partner violence     Fear of current or ex partner: Not on file     Emotionally abused: Not on file     Physically abused: Not on file     Forced sexual activity: Not on file   Other Topics Concern    Not on file   Social History Narrative    Not on file        Review of Systems   Constitutional: Negative for chills, diaphoresis and fever  Eyes: Negative for photophobia, pain, discharge, redness, itching and visual disturbance  Respiratory: Positive for cough  Negative for chest tightness, shortness of breath and wheezing  Cardiovascular: Negative for chest pain and palpitations  Gastrointestinal: Positive for abdominal pain  Negative for constipation, diarrhea, nausea and vomiting  Musculoskeletal: Positive for back pain and myalgias  Skin: Negative for rash and wound  Neurological: Negative for dizziness, syncope, light-headedness and headaches  Objective:      /68 (BP Location: Left arm, Patient Position: Sitting, Cuff Size: Adult)   Pulse 100   Temp 97 7 °F (36 5 °C) (Tympanic)   Resp 18   Ht 5' 3" (1 6 m)   Wt 58 6 kg (129 lb 3 2 oz)   SpO2 99%   BMI 22 89 kg/m²          Physical Exam  Vitals signs and nursing note reviewed  Constitutional:       General: She is not in acute distress  Appearance: Normal appearance  HENT:      Head: Normocephalic and atraumatic  Right Ear: Tympanic membrane, ear canal and external ear normal       Left Ear: Tympanic membrane, ear canal and external ear normal       Nose: Nose normal       Mouth/Throat:      Mouth: Mucous membranes are moist       Pharynx: Oropharynx is clear  No oropharyngeal exudate  Eyes:      Extraocular Movements: Extraocular movements intact  Conjunctiva/sclera: Conjunctivae normal       Pupils: Pupils are equal, round, and reactive to light  Cardiovascular:      Rate and Rhythm: Normal rate and regular rhythm  Heart sounds: Normal heart sounds  No murmur  Pulmonary:      Effort: Pulmonary effort is normal  No respiratory distress  Breath sounds: Normal breath sounds  No wheezing  Musculoskeletal:         General: Tenderness present  Lumbar back: She exhibits tenderness  Right lower leg: No edema  Left lower leg: No edema  Comments: Tenderness to palpation over the lumbar spine and paraspinal muscles   Skin:     General: Skin is warm and dry  Neurological:      General: No focal deficit present  Mental Status: She is alert and oriented to person, place, and time        Cranial Nerves: No cranial nerve deficit  Motor: No weakness     Psychiatric:         Mood and Affect: Mood normal          Behavior: Behavior normal

## 2021-02-08 NOTE — PROGRESS NOTES
PT Discharge    Today's date: 2021  Patient name: Otis Schwartz  : 1995  MRN: [id number]  Referring provider: Aiden Trejo PA-C  Dx:   Encounter Diagnosis     ICD-10-CM    1  Chronic midline low back pain with left-sided sciatica  M54 42     G89 29        Start Time: 0100  Stop Time: 0200  Total time in clinic (min): 60 minutes      Goals  STGs: To be complete within 2-3 weeks (partially met)  - Decrease/centralize pain to < 2/10 at worst  - Increase lumbar extension ROM to WNL  - Increase bilateral hip flexor flexibility to WNL  - Increase core stability and posterior lateral LE chain strength to > 4+/5  - Improve postural awareness capacity to > 60min before deficit    LTGs: To be complete within 4 weeks (partially met)  - Able to sit for any extended amount of time without pain or limitation for increased safety and functional capacity with ADLs and home-related duty  - Able to repetitively bend over at trunk without pain or limitation for increased safety and functional capacity with ADLs and and home-related duty  - Able to complete all lifting/carrying activity without pain or limitation for increased safety and functional capacity with ADLs and whome-related duty  - Able to complete transfers repetitively without pain or limitation for increased safety and functional capacity with ADLs and home-related duty       Pt will be D/C from physical therapy as she is going to attempt to transition to HEP  Pt has a good understanding of HEP and has been instructed to reach out with any questions/concerns/setbacks  Subjective Evaluation    Pain  At best pain ratin  At worst pain ratin  Location: LBP with intermittent L LE radicular sx    Patient Goals  Patient goals for therapy: increased strength, decreased pain and increased motion         Pt reports she is going to attempt transition to HEP  Will reach out with any questions/concerns/setbacks          Objective Pain level ranges 1-5/10  AROM: Lumbar extension 25% decreased;  Post pelvic tilt 25% dereased  Strength: Core stability and post/lat LE chain strength 4/5  Postural Awareness: Fair and improving (Ant pelvic tilt)  Repeated movement testing: (+) for L side lumbar derangemnet  FOTO: 76; GOAL 64  Lumbo-pelvic mobility: Mod deficits  Hip flexor flexibility: Mod shortening bilaterally; (+) Jon Test  Unable sit without pain and limitation, but improving  Unable to bend over at trunk without pain and limitation, but improving  Unable to complete lifting/carrying activity without pain and limitation, but improving  Unable to complete transfers without pain and limitation, but improving

## 2021-02-24 ENCOUNTER — OFFICE VISIT (OUTPATIENT)
Dept: FAMILY MEDICINE CLINIC | Facility: HOME HEALTHCARE | Age: 26
End: 2021-02-24
Payer: COMMERCIAL

## 2021-02-24 VITALS
DIASTOLIC BLOOD PRESSURE: 70 MMHG | HEART RATE: 96 BPM | TEMPERATURE: 97.9 F | WEIGHT: 129.4 LBS | BODY MASS INDEX: 22.93 KG/M2 | OXYGEN SATURATION: 98 % | HEIGHT: 63 IN | SYSTOLIC BLOOD PRESSURE: 108 MMHG | RESPIRATION RATE: 18 BRPM

## 2021-02-24 DIAGNOSIS — R06.02 SOB (SHORTNESS OF BREATH): ICD-10-CM

## 2021-02-24 DIAGNOSIS — K01.1 IMPACTED THIRD MOLAR TOOTH: ICD-10-CM

## 2021-02-24 DIAGNOSIS — M54.50 ACUTE MIDLINE LOW BACK PAIN WITHOUT SCIATICA: Primary | ICD-10-CM

## 2021-02-24 DIAGNOSIS — R10.84 GENERALIZED ABDOMINAL PAIN: ICD-10-CM

## 2021-02-24 DIAGNOSIS — Z91.89 NEED FOR DENTAL CARE: ICD-10-CM

## 2021-02-24 DIAGNOSIS — K08.89 PAIN, DENTAL: ICD-10-CM

## 2021-02-24 PROCEDURE — T1015 CLINIC SERVICE: HCPCS | Performed by: FAMILY MEDICINE

## 2021-02-24 RX ORDER — LIDOCAINE 50 MG/G
1 PATCH TOPICAL DAILY
Qty: 30 PATCH | Refills: 0 | Status: SHIPPED | OUTPATIENT
Start: 2021-02-24 | End: 2021-03-24 | Stop reason: SDUPTHER

## 2021-02-24 RX ORDER — BIOTIN 10000 MCG
1 CAPSULE ORAL DAILY
Status: ON HOLD | COMMUNITY
End: 2022-02-28

## 2021-02-24 RX ORDER — ALBUTEROL SULFATE 90 UG/1
2 AEROSOL, METERED RESPIRATORY (INHALATION) EVERY 6 HOURS PRN
COMMUNITY
End: 2021-02-24 | Stop reason: SDUPTHER

## 2021-02-24 RX ORDER — AMOXICILLIN 500 MG/1
CAPSULE ORAL
Qty: 11 CAPSULE | Refills: 0 | Status: SHIPPED | OUTPATIENT
Start: 2021-02-24 | End: 2021-02-27

## 2021-02-24 RX ORDER — OXYCODONE HYDROCHLORIDE 5 MG/1
5 TABLET ORAL EVERY 6 HOURS PRN
Qty: 120 TABLET | Refills: 0 | Status: SHIPPED | OUTPATIENT
Start: 2021-02-24 | End: 2021-03-24 | Stop reason: SDUPTHER

## 2021-02-24 RX ORDER — ALBUTEROL SULFATE 90 UG/1
2 AEROSOL, METERED RESPIRATORY (INHALATION) EVERY 6 HOURS PRN
Qty: 1 INHALER | Refills: 5 | Status: SHIPPED | OUTPATIENT
Start: 2021-02-24 | End: 2021-04-23 | Stop reason: SDUPTHER

## 2021-02-24 NOTE — PROGRESS NOTES
Assessment/Plan:     Diagnoses and all orders for this visit:    Acute midline low back pain without sciatica  -     lidocaine (LIDODERM) 5 %; Apply 1 patch topically daily Remove & Discard patch within 12 hours or as directed by MD  -     oxyCODONE (ROXICODONE) 5 mg immediate release tablet; Take 1 tablet (5 mg total) by mouth every 6 (six) hours as needed for severe painMax Daily Amount: 20 mg    Generalized abdominal pain  -     oxyCODONE (ROXICODONE) 5 mg immediate release tablet; Take 1 tablet (5 mg total) by mouth every 6 (six) hours as needed for severe painMax Daily Amount: 20 mg    SOB (shortness of breath)  -     albuterol (PROVENTIL HFA,VENTOLIN HFA) 90 mcg/act inhaler; Inhale 2 puffs every 6 (six) hours as needed for wheezing or shortness of breath    Need for dental care  -     Ambulatory referral to Dentistry; Future    Pain, dental  -     Ambulatory referral to Dentistry; Future  -     amoxicillin (AMOXIL) 500 mg capsule; Take 2 capsules (1000mg) x 1, then 500 mg PO q8h x 3 days    Impacted third molar tooth  -     Ambulatory referral to Dentistry; Future  -     amoxicillin (AMOXIL) 500 mg capsule; Take 2 capsules (1000mg) x 1, then 500 mg PO q8h x 3 days    Other orders  -     Biotin 10 MG CAPS; Take 1 capsule by mouth daily For acne/skin  -     Discontinue: albuterol (PROVENTIL HFA,VENTOLIN HFA) 90 mcg/act inhaler; Inhale 2 puffs every 6 (six) hours as needed for wheezing        - PDMP reviewed, refills sent  - Referral provided for dental   Will prescribe amoxicillin x 3 days for dental pain  Follow up with dental for extraction  - Patient was encouraged to follow up with GI and pain management as previously referred    Return in about 1 month (around 3/24/2021) for Next scheduled follow up  Subjective:        Patient ID: Romelia Borrero is a 30 y o  female      Chief Complaint   Patient presents with    Follow-up    Medication Refill       Krys Pinon is a 30year old female presenting for follow up and medication refill      Patient continues with abdominal pain and cramping, mildly controlled with oxycodone 5 mg, pantoprazole 40 mg BID and Carafate 1000mg q6 hours   She is being worked up for crohn's and was scheduled for a colonoscopy on 12/4, however, she did not have this completed  Denies N/V/D  Patient does not currently have follow up scheduled with GI      She also continues with ongoing back pain worse with bending and lifting activities  Denies numbness, tingling, paresthesia  No bowel/bladder incontinence  She has been using lidocaine patches and oxycodone 5 mg with some relief  She was attending PT weekly but reports she did not noticed improvement and has stopped going  Lumbar spine xray from 11/2020 with L4-L5 degenerative disc change  Patient was referred to pain management at last visit but has not scheduled  Today patient also complains of dental pain  Reports she has an impacted right lower wisdom tooth which has been causing increasing pain recently    She does not have a dentist       The following portions of the patient's history were reviewed and updated as appropriate: allergies, current medications, past family history, past medical history, past social history, past surgical history and problem list     Patient Active Problem List   Diagnosis    Ectopic pregnancy    TERRY (acute kidney injury) ([address] [facility] [address] )    Flank pain    Diarrhea    Tobacco abuse    Hypokalemia    Leukocytosis    Microscopic hematuria    Microalbuminuria    Bradycardia    Low HDL (under 40)    Hepatitis C antibody test positive    Gastroenteritis    Chronic tubulointerstitial nephritis    Abdominal pain    Dysmenorrhea    Encounter for support and coordination of transition of care    Nausea    Iron deficiency anemia    GERD (gastroesophageal reflux disease)    Ovarian cyst, left    Pelvic pain in female       Current Outpatient Medications   Medication Sig Dispense Refill    albuterol (PROVENTIL HFA,VENTOLIN HFA) 90 mcg/act inhaler Inhale 2 puffs every 6 (six) hours as needed for wheezing or shortness of breath 1 Inhaler 5    Biotin 10 MG CAPS Take 1 capsule by mouth daily For acne/skin      Elastic Bandages & Supports (Wrist Splint/Elastic Left Med) MISC Use daily as needed (wrist pain) 1 each 0    fexofenadine (ALLEGRA) 180 MG tablet Take 1 tablet (180 mg total) by mouth daily 30 tablet 2    lidocaine (LIDODERM) 5 % Apply 1 patch topically daily Remove & Discard patch within 12 hours or as directed by MD 30 patch 0    miconazole (MONISTAT 7) 100 mg vaginal suppository Insert 1 suppository (100 mg total) into the vagina daily at bedtime 7 suppository 0    oxyCODONE (ROXICODONE) 5 mg immediate release tablet Take 1 tablet (5 mg total) by mouth every 6 (six) hours as needed for severe painMax Daily Amount: 20 mg 120 tablet 0    pantoprazole (PROTONIX) 40 mg tablet Take 1 tablet (40 mg total) by mouth 2 (two) times a day before breakfast and lunch 60 tablet 2    polyethylene glycol (GLYCOLAX) 17 GM/SCOOP powder Take 17 g by mouth daily for 10 days 238 g 0    sucralfate (CARAFATE) 1 g/10 mL suspension Take 10 mL (1,000 mg total) by mouth every 6 (six) hours 420 mL 0    amoxicillin (AMOXIL) 500 mg capsule Take 2 capsules (1000mg) x 1, then 500 mg PO q8h x 3 days 11 capsule 0    nicotine (NICODERM CQ) 14 mg/24hr TD 24 hr patch Place 1 patch on the skin every 24 hours (Patient not taking: Reported on 1/2/2021) 28 patch 0     No current facility-administered medications for this visit        Facility-Administered Medications Ordered in Other Visits   Medication Dose Route Frequency Provider Last Rate Last Admin    acetaminophen (TYLENOL) tablet 650 mg  650 mg Oral Once Darliss         diphenhydrAMINE (BENADRYL) tablet 25 mg  25 mg Oral Once Darliss             Past Medical History:   Diagnosis Date    Allergic     Ectopic pregnancy     GERD (gastroesophageal reflux disease)     History of unilateral fallopian tube excision     RIGHT removed    Ovarian cyst     Wears glasses         Past Surgical History:   Procedure Laterality Date    DILATION AND CURETTAGE OF UTERUS      ECTOPIC PREGNANCY SURGERY      LAPAROSCOPY      [place] LAP,DIAGNOSTIC ABDOMEN N/A 9/16/2017    Procedure: LAPAROSCOPY DIAGNOSTIC, left salpingectomy;  Surgeon: Jim Figueroa MD;  Location[facility];  Service: Gynecology    WISDOM TOOTH EXTRACTION          Social History     Socioeconomic History    Marital status: Single     Spouse name: Not on file    Number of children: Not on file    Years of education: Not on file    Highest education level: Not on file   Occupational History    Not on file   Social Needs    Financial resource strain: Not on file    Food insecurity     Worry: Not on file     Inability: Not on file    Transportation needs     Medical: Not on file     Non-medical: Not on file   Tobacco Use    Smoking status: Current Every Day Smoker     Packs/day: 1 50     Years: 6 00     Pack years: 9 00     Types: Cigarettes    Smokeless tobacco: Never Used   Substance and Sexual Activity    Alcohol use: Yes     Frequency: 2-4 times a month     Drinks per session: 1 or 2     Binge frequency: Never     Comment: "occasional"    Drug use: Yes     Types: Marijuana    Sexual activity: Yes     Partners: Male     Birth control/protection: None   Lifestyle    Physical activity     Days per week: Not on file     Minutes per session: Not on file    Stress: Not on file   Relationships    Social connections     Talks on phone: Not on file     Gets together: Not on file     Attends Protestant service: Not on file     Active member of club or organization: Not on file     Attends meetings of clubs or organizations: Not on file     Relationship status: Not on file    Intimate partner violence     Fear of current or ex partner: Not on file     Emotionally abused: Not on file     Physically abused: Not on file     Forced sexual activity: Not on file   Other Topics Concern    Not on file   Social History Narrative    Not on file        Review of Systems   Constitutional: Negative for chills, diaphoresis and fever  HENT: Positive for dental problem  Eyes: Negative for photophobia, pain, discharge, redness, itching and visual disturbance  Respiratory: Positive for cough  Negative for chest tightness, shortness of breath and wheezing  Cardiovascular: Negative for chest pain and palpitations  Gastrointestinal: Positive for abdominal pain  Negative for constipation, diarrhea, nausea and vomiting  Musculoskeletal: Positive for back pain and myalgias  Skin: Negative for rash and wound  Neurological: Negative for dizziness, syncope, light-headedness and headaches  Objective:      /70 (BP Location: Left arm, Patient Position: Sitting, Cuff Size: Adult)   Pulse 96   Temp 97 9 °F (36 6 °C) (Temporal)   Resp 18   Ht 5' 3" (1 6 m)   Wt 58 7 kg (129 lb 6 4 oz)   LMP  (LMP Unknown)   SpO2 98%   BMI 22 92 kg/m²          Physical Exam  Vitals signs and nursing note reviewed  Constitutional:       General: She is not in acute distress  Appearance: Normal appearance  HENT:      Head: Normocephalic and atraumatic  Right Ear: Tympanic membrane, ear canal and external ear normal       Left Ear: Tympanic membrane, ear canal and external ear normal       Nose: Nose normal       Mouth/Throat:      Mouth: Mucous membranes are moist       Pharynx: Oropharynx is clear  No oropharyngeal exudate  Comments: Impacted right lower third molar  Eyes:      Extraocular Movements: Extraocular movements intact  Conjunctiva/sclera: Conjunctivae normal       Pupils: Pupils are equal, round, and reactive to light  Cardiovascular:      Rate and Rhythm: Normal rate and regular rhythm  Heart sounds: Normal heart sounds  No murmur     Pulmonary: Effort: Pulmonary effort is normal  No respiratory distress  Breath sounds: Normal breath sounds  No wheezing  Musculoskeletal:         General: Tenderness present  Lumbar back: She exhibits tenderness  Right lower leg: No edema  Left lower leg: No edema  Comments: Tenderness to palpation over the lumbar spine and paraspinal muscles   Skin:     General: Skin is warm and dry  Neurological:      General: No focal deficit present  Mental Status: She is alert and oriented to person, place, and time  Cranial Nerves: No cranial nerve deficit  Motor: No weakness     Psychiatric:         Mood and Affect: Mood normal          Behavior: Behavior normal

## 2021-03-24 ENCOUNTER — OFFICE VISIT (OUTPATIENT)
Dept: FAMILY MEDICINE CLINIC | Facility: HOME HEALTHCARE | Age: 26
End: 2021-03-24
Payer: COMMERCIAL

## 2021-03-24 ENCOUNTER — TELEPHONE (OUTPATIENT)
Dept: GASTROENTEROLOGY | Facility: CLINIC | Age: 26
End: 2021-03-24

## 2021-03-24 ENCOUNTER — PREP FOR PROCEDURE (OUTPATIENT)
Dept: SURGERY | Facility: CLINIC | Age: 26
End: 2021-03-24

## 2021-03-24 VITALS
TEMPERATURE: 98.4 F | OXYGEN SATURATION: 99 % | DIASTOLIC BLOOD PRESSURE: 60 MMHG | BODY MASS INDEX: 23.71 KG/M2 | RESPIRATION RATE: 18 BRPM | SYSTOLIC BLOOD PRESSURE: 108 MMHG | WEIGHT: 133.8 LBS | HEART RATE: 95 BPM | HEIGHT: 63 IN

## 2021-03-24 DIAGNOSIS — M54.50 ACUTE MIDLINE LOW BACK PAIN WITHOUT SCIATICA: Primary | ICD-10-CM

## 2021-03-24 DIAGNOSIS — K21.9 GERD (GASTROESOPHAGEAL REFLUX DISEASE): Primary | ICD-10-CM

## 2021-03-24 DIAGNOSIS — R10.84 GENERALIZED ABDOMINAL PAIN: ICD-10-CM

## 2021-03-24 DIAGNOSIS — H61.22 IMPACTED CERUMEN OF LEFT EAR: ICD-10-CM

## 2021-03-24 PROCEDURE — T1015 CLINIC SERVICE: HCPCS | Performed by: FAMILY MEDICINE

## 2021-03-24 RX ORDER — OXYCODONE HYDROCHLORIDE 5 MG/1
5 TABLET ORAL EVERY 6 HOURS PRN
Qty: 120 TABLET | Refills: 0 | Status: SHIPPED | OUTPATIENT
Start: 2021-03-24 | End: 2021-04-23 | Stop reason: SDUPTHER

## 2021-03-24 RX ORDER — SUCRALFATE ORAL 1 G/10ML
1000 SUSPENSION ORAL EVERY 6 HOURS SCHEDULED
Qty: 420 ML | Refills: 5 | Status: SHIPPED | OUTPATIENT
Start: 2021-03-24 | End: 2021-04-23 | Stop reason: SDUPTHER

## 2021-03-24 RX ORDER — LIDOCAINE 50 MG/G
1 PATCH TOPICAL DAILY
Qty: 30 PATCH | Refills: 2 | Status: SHIPPED | OUTPATIENT
Start: 2021-03-24 | End: 2021-04-23 | Stop reason: SDUPTHER

## 2021-03-24 RX ORDER — POLYETHYLENE GLYCOL 3350 17 G/17G
17 POWDER, FOR SOLUTION ORAL DAILY
Qty: 238 G | Refills: 5 | Status: SHIPPED | OUTPATIENT
Start: 2021-03-24 | End: 2022-05-09

## 2021-03-24 NOTE — PROGRESS NOTES
Assessment/Plan:     Diagnoses and all orders for this visit:    Acute midline low back pain without sciatica  -     oxyCODONE (ROXICODONE) 5 mg immediate release tablet; Take 1 tablet (5 mg total) by mouth every 6 (six) hours as needed for severe painMax Daily Amount: 20 mg  -     lidocaine (LIDODERM) 5 %; Apply 1 patch topically daily Remove & Discard patch within 12 hours or as directed by MD  -     Diclofenac Sodium (VOLTAREN) 1 %; Apply 2 g topically 4 (four) times a day    Generalized abdominal pain  -     polyethylene glycol (GLYCOLAX) 17 GM/SCOOP powder; Take 17 g by mouth daily for 10 days  -     oxyCODONE (ROXICODONE) 5 mg immediate release tablet; Take 1 tablet (5 mg total) by mouth every 6 (six) hours as needed for severe painMax Daily Amount: 20 mg  -     sucralfate (CARAFATE) 1 g/10 mL suspension; Take 10 mL (1,000 mg total) by mouth every 6 (six) hours    Impacted cerumen of left ear  -     carbamide peroxide (DEBROX) 6 5 % otic solution; Administer 5 drops into both ears 2 (two) times a day    Other orders  -     Cancel: PNEUMOCOCCAL POLYSACCHARIDE VACCINE 23-VALENT =>3YO SQ IM  -     Cancel: Ambulatory referral to Obstetrics / Gynecology; Future        - PDMP reviewed, refills sent  Will add diclofenac gel prn for back pain  - Encouraged to restart PT for back pain  - Follow up with EGD/colonoscopy as scheduled per GI  - Encouraged to follow up with her GYN for a pap smear, last done 2016    Return in about 1 month (around 4/24/2021) for Next scheduled follow up  Subjective:        Patient ID: Mckenzie Dukes is a 30 y o  female  Chief Complaint   Patient presents with   Lavern Ip is a 30year old female presenting for follow up and medication refill      Patient continues with abdominal pain and cramping, mildly controlled with oxycodone 5 mg, pantoprazole 40 mg BID and Carafate 1000mg q6 hours  She is using miralax prn for constipation    Denies N/V/D   She is being worked up for crohn's and was scheduled for a colonoscopy on 12/4, however, she did not have this completed  She is now rescheduled for EGD and colonoscopy on 5/14      She also continues with ongoing back pain worse with bending and lifting activities  Denies numbness, tingling, paresthesia  No bowel/bladder incontinence  She has been using lidocaine patches and oxycodone 5 mg with some relief  She was attending PT weekly but reports she did not noticed improvement and has stopped going  However, today she reports that since she has stopped PT the pain has been getting worse, therefore, she is considering restarting  Lumbar spine xray from 11/2020 with L4-L5 degenerative disc change  Patient was referred to pain management at last visit but has not scheduled    Patient reports she is scheduled for an oral surgery consult on 4/14 for wisdom tooth extraction        The following portions of the patient's history were reviewed and updated as appropriate: allergies, current medications, past family history, past medical history, past social history, past surgical history and problem list     Patient Active Problem List   Diagnosis    Ectopic pregnancy    [facility]acute [facility]) ([facility] [address] )    Flank pain    Diarrhea    Tobacco abuse    Hypokalemia    Leukocytosis    Microscopic hematuria    Microalbuminuria    Bradycardia    Low HDL (under 40)    Hepatitis C antibody test positive    Gastroenteritis    Chronic tubulointerstitial nephritis    Abdominal pain    Dysmenorrhea    Encounter for support and coordination of transition of care    Nausea    Iron deficiency anemia    GERD (gastroesophageal reflux disease)    Ovarian cyst, left    Pelvic pain in female    Acute midline low back pain without sciatica       Current Outpatient Medications   Medication Sig Dispense Refill    albuterol (PROVENTIL HFA,VENTOLIN HFA) 90 mcg/act inhaler Inhale 2 puffs every 6 (six) hours as needed for wheezing or shortness of breath 1 Inhaler 5    Biotin 10 MG CAPS Take 1 capsule by mouth daily For acne/skin      Elastic Bandages & Supports (Wrist Splint/Elastic Left Med) MISC Use daily as needed (wrist pain) 1 each 0    fexofenadine (ALLEGRA) 180 MG tablet Take 1 tablet (180 mg total) by mouth daily 30 tablet 2    lidocaine (LIDODERM) 5 % Apply 1 patch topically daily Remove & Discard patch within 12 hours or as directed by MD 30 patch 2    miconazole (MONISTAT 7) 100 mg vaginal suppository Insert 1 suppository (100 mg total) into the vagina daily at bedtime 7 suppository 0    nicotine (NICODERM CQ) 14 mg/24hr TD 24 hr patch Place 1 patch on the skin every 24 hours 28 patch 0    oxyCODONE (ROXICODONE) 5 mg immediate release tablet Take 1 tablet (5 mg total) by mouth every 6 (six) hours as needed for severe painMax Daily Amount: 20 mg 120 tablet 0    pantoprazole (PROTONIX) 40 mg tablet Take 1 tablet (40 mg total) by mouth 2 (two) times a day before breakfast and lunch 60 tablet 2    sucralfate (CARAFATE) 1 g/10 mL suspension Take 10 mL (1,000 mg total) by mouth every 6 (six) hours 420 mL 5    carbamide peroxide (DEBROX) 6 5 % otic solution Administer 5 drops into both ears 2 (two) times a day 15 mL 0    Diclofenac Sodium (VOLTAREN) 1 % Apply 2 g topically 4 (four) times a day 1 Tube 5    polyethylene glycol (GLYCOLAX) 17 GM/SCOOP powder Take 17 g by mouth daily for 10 days 238 g 5     No current facility-administered medications for this visit        Facility-Administered Medications Ordered in Other Visits   Medication Dose Route Frequency Provider Last Rate Last Admin    acetaminophen (TYLENOL) tablet 650 mg  650 mg Oral Once Anurag Oswald        diphenhydrAMINE (BENADRYL) tablet 25 mg  25 mg Oral Once Anurag Oswald            Past Medical History:   Diagnosis Date    Allergic     Ectopic pregnancy     GERD (gastroesophageal reflux disease)     History of unilateral fallopian tube excision     RIGHT removed    Ovarian cyst     Wears glasses         Past Surgical History:   Procedure Laterality Date    DILATION AND CURETTAGE OF UTERUS      ECTOPIC PREGNANCY SURGERY      LAPAROSCOPY      [facility] LAP,DIAGNOSTIC ABDOMEN N/A 9/16/2017    Procedure: LAPAROSCOPY DIAGNOSTIC, left salpingectomy;  Surgeon: Leeroy Pereyra MD;  [facility]: [facility];  Service: Gynecology    WISDOM TOOTH EXTRACTION          Social History     Socioeconomic History    Marital status: Single     Spouse name: Not on file    Number of children: Not on file    Years of education: Not on file    Highest education level: Not on file   Occupational History    Not on file   Social Needs    Financial resource strain: Not on file    Food insecurity     Worry: Not on file     Inability: Not on file    Transportation needs     Medical: Not on file     Non-medical: Not on file   Tobacco Use    Smoking status: Current Every Day Smoker     Packs/day: 1 50     Years: 6 00     Pack years: 9 00     Types: Cigarettes    Smokeless tobacco: Never Used   Substance and Sexual Activity    Alcohol use: Yes     Frequency: 2-4 times a month     Drinks per session: 1 or 2     Binge frequency: Never     Comment: "occasional"    Drug use: Yes     Types: Marijuana    Sexual activity: Yes     Partners: Male     Birth control/protection: None   Lifestyle    Physical activity     Days per week: Not on file     Minutes per session: Not on file    Stress: Not on file   Relationships    Social connections     Talks on phone: Not on file     Gets together: Not on file     Attends Zoroastrian service: Not on file     Active member of club or organization: Not on file     Attends meetings of clubs or organizations: Not on file     Relationship status: Not on file    Intimate partner violence     Fear of current or ex partner: Not on file     Emotionally abused: Not on file     Physically abused: Not on file Forced sexual activity: Not on file   Other Topics Concern    Not on file   Social History Narrative    Not on file        Review of Systems   Constitutional: Negative for chills, diaphoresis and fever  HENT: Positive for dental problem  Eyes: Negative for photophobia, pain, discharge, redness, itching and visual disturbance  Respiratory: Negative for cough, chest tightness, shortness of breath and wheezing  Cardiovascular: Negative for chest pain and palpitations  Gastrointestinal: Positive for abdominal pain  Negative for constipation, diarrhea, nausea and vomiting  Musculoskeletal: Positive for back pain and myalgias  Skin: Negative for rash and wound  Neurological: Negative for dizziness, syncope, light-headedness and headaches  Objective:      /60 (BP Location: Left arm, Patient Position: Sitting, Cuff Size: Adult)   Pulse 95   Temp 98 4 °F (36 9 °C) (Tympanic)   Resp 18   Ht 5' 3" (1 6 m)   Wt 60 7 kg (133 lb 12 8 oz)   LMP  (LMP Unknown)   SpO2 99%   BMI 23 70 kg/m²          Physical Exam  Vitals signs and nursing note reviewed  Constitutional:       General: She is not in acute distress  Appearance: Normal appearance  HENT:      Head: Normocephalic and atraumatic  Right Ear: Tympanic membrane, ear canal and external ear normal       Left Ear: Tympanic membrane, ear canal and external ear normal    Eyes:      Extraocular Movements: Extraocular movements intact  Conjunctiva/sclera: Conjunctivae normal       Pupils: Pupils are equal, round, and reactive to light  Cardiovascular:      Rate and Rhythm: Normal rate and regular rhythm  Heart sounds: Normal heart sounds  No murmur  Pulmonary:      Effort: Pulmonary effort is normal  No respiratory distress  Breath sounds: Normal breath sounds  No wheezing  Musculoskeletal:         General: Tenderness present  Lumbar back: She exhibits tenderness  Right lower leg: No edema  Left lower leg: No edema  Comments: Tenderness to palpation over the lumbar spine and paraspinal muscles   Skin:     General: Skin is warm and dry  Neurological:      General: No focal deficit present  Mental Status: She is alert and oriented to person, place, and time  Cranial Nerves: No cranial nerve deficit  Motor: No weakness     Psychiatric:         Mood and Affect: Mood normal          Behavior: Behavior normal

## 2021-03-24 NOTE — TELEPHONE ENCOUNTER
Scheduled EGD/Colon with Dr Julee Acevedo 5/14/21  Patient has procedure packet with with Clenpiq directions  She also stated she already has the prep  Went over procedure directions, patient expressed understanding  She would like to be contacted if there are any sooner cancellations

## 2021-04-23 ENCOUNTER — OFFICE VISIT (OUTPATIENT)
Dept: FAMILY MEDICINE CLINIC | Facility: HOME HEALTHCARE | Age: 26
End: 2021-04-23
Payer: COMMERCIAL

## 2021-04-23 VITALS
TEMPERATURE: 97.9 F | SYSTOLIC BLOOD PRESSURE: 102 MMHG | OXYGEN SATURATION: 97 % | RESPIRATION RATE: 18 BRPM | WEIGHT: 130.8 LBS | HEIGHT: 63 IN | BODY MASS INDEX: 23.18 KG/M2 | HEART RATE: 86 BPM | DIASTOLIC BLOOD PRESSURE: 60 MMHG

## 2021-04-23 DIAGNOSIS — K21.9 GASTROESOPHAGEAL REFLUX DISEASE, UNSPECIFIED WHETHER ESOPHAGITIS PRESENT: ICD-10-CM

## 2021-04-23 DIAGNOSIS — M54.41 CHRONIC BILATERAL LOW BACK PAIN WITH BILATERAL SCIATICA: Primary | ICD-10-CM

## 2021-04-23 DIAGNOSIS — M54.42 CHRONIC BILATERAL LOW BACK PAIN WITH BILATERAL SCIATICA: Primary | ICD-10-CM

## 2021-04-23 DIAGNOSIS — F41.9 ANXIETY: ICD-10-CM

## 2021-04-23 DIAGNOSIS — G89.29 CHRONIC BILATERAL LOW BACK PAIN WITH BILATERAL SCIATICA: Primary | ICD-10-CM

## 2021-04-23 DIAGNOSIS — J45.909 ASTHMA DUE TO SEASONAL ALLERGIES: ICD-10-CM

## 2021-04-23 DIAGNOSIS — R45.86 MOOD SWINGS: ICD-10-CM

## 2021-04-23 PROCEDURE — T1015 CLINIC SERVICE: HCPCS | Performed by: FAMILY MEDICINE

## 2021-04-23 RX ORDER — SUCRALFATE ORAL 1 G/10ML
1000 SUSPENSION ORAL EVERY 6 HOURS SCHEDULED
Qty: 420 ML | Refills: 5 | Status: SHIPPED | OUTPATIENT
Start: 2021-04-23 | End: 2021-06-22 | Stop reason: SDUPTHER

## 2021-04-23 RX ORDER — PANTOPRAZOLE SODIUM 40 MG/1
40 TABLET, DELAYED RELEASE ORAL DAILY
Qty: 30 TABLET | Refills: 3 | Status: SHIPPED | OUTPATIENT
Start: 2021-04-23

## 2021-04-23 RX ORDER — OXYCODONE HYDROCHLORIDE 5 MG/1
5 TABLET ORAL EVERY 6 HOURS PRN
Qty: 120 TABLET | Refills: 0 | Status: SHIPPED | OUTPATIENT
Start: 2021-04-23 | End: 2021-05-20 | Stop reason: SDUPTHER

## 2021-04-23 RX ORDER — ALBUTEROL SULFATE 90 UG/1
2 AEROSOL, METERED RESPIRATORY (INHALATION) EVERY 6 HOURS PRN
Qty: 1 INHALER | Refills: 5 | Status: SHIPPED | OUTPATIENT
Start: 2021-04-23 | End: 2022-02-09 | Stop reason: SDUPTHER

## 2021-04-23 RX ORDER — METRONIDAZOLE 7.5 MG/G
GEL VAGINAL
COMMUNITY
Start: 2021-04-21

## 2021-04-23 RX ORDER — LIDOCAINE 50 MG/G
1 PATCH TOPICAL DAILY
Qty: 30 PATCH | Refills: 2 | Status: SHIPPED | OUTPATIENT
Start: 2021-04-23 | End: 2022-02-09 | Stop reason: SDUPTHER

## 2021-04-23 NOTE — PROGRESS NOTES
[address]       NAME: Alisson Bhatti is a 30 y o  female  : 1995    MRN: [id number]  DATE: 2021  TIME: 2:59 PM    Assessment and Plan   Diagnoses and all orders for this visit:    Chronic bilateral low back pain with bilateral sciatica  -     Diclofenac Sodium (VOLTAREN) 1 %; Apply 2 g topically 4 (four) times a day  -     lidocaine (LIDODERM) 5 %; Apply 1 patch topically daily Remove & Discard patch within 12 hours or as directed by MD  -     oxyCODONE (ROXICODONE) 5 mg immediate release tablet; Take 1 tablet (5 mg total) by mouth every 6 (six) hours as needed for severe painMax Daily Amount: 20 mg    Mood swings  -     Ambulatory referral to Psychiatry; Future    Anxiety  -     Ambulatory referral to Psychiatry; Future    Asthma due to seasonal allergies  -     albuterol (PROVENTIL HFA,VENTOLIN HFA) 90 mcg/act inhaler; Inhale 2 puffs every 6 (six) hours as needed for wheezing or shortness of breath    Gastroesophageal reflux disease, unspecified whether esophagitis present  -     sucralfate (CARAFATE) 1 g/10 mL suspension; Take 10 mL (1,000 mg total) by mouth every 6 (six) hours  -     pantoprazole (PROTONIX) 40 mg tablet; Take 1 tablet (40 mg total) by mouth daily    Other orders  -     metroNIDAZOLE (METROGEL) 0 75 % vaginal gel; INSERT 1 APPLICATORFUL VAGINALLY NIGHTLY FOR 5 DAYS        No problem-specific Assessment & Plan notes found for this encounter  plan      Referral to pain management for chronic pain  Patient states has appointment with Gastroenterology for her reflux  Patient complaining frequent mood swings with concern that she may have bipolar  Referral to Psychiatry  Follow-up in 1 month or sooner if needed  Patient instructed to call with any questions or concerns        Chief Complaint     Chief Complaint   Patient presents with    Follow-up     med refills         History of Present Illness       HPI    47-year-old female here for medication refills  Patient with history of chronic lower back pain DDD with sciatica both lower extremities  Denies any saddle paresthesia, loss of bowel or bladder control  No changes in her pain since previous appointment  Patient did have referral to follow with pain management last visit and has not made appointment  Patient's only new complaint is that she has concerns that she may have underlying bipolar disorder  Feels like she does have some mild depression and occasional manic episodes  Also stating decreased concentration  Patient would like to be evaluated by Psychiatry  Patient has no thoughts to hurt herself or others  Review of Systems   Review of Systems   Constitutional: Negative for chills, fatigue and fever  HENT: Negative  Respiratory: Negative  Cardiovascular: Negative  Gastrointestinal: Negative  Musculoskeletal: Positive for back pain  Negative for myalgias  Neurological: Negative  Hematological: Negative  Psychiatric/Behavioral: Positive for decreased concentration and dysphoric mood  Negative for hallucinations and suicidal ideas  The patient is nervous/anxious  All other systems reviewed and are negative          Current Medications       Current Outpatient Medications:     albuterol (PROVENTIL HFA,VENTOLIN HFA) 90 mcg/act inhaler, Inhale 2 puffs every 6 (six) hours as needed for wheezing or shortness of breath, Disp: 1 Inhaler, Rfl: 5    Biotin 10 MG CAPS, Take 1 capsule by mouth daily For acne/skin, Disp: , Rfl:     Diclofenac Sodium (VOLTAREN) 1 %, Apply 2 g topically 4 (four) times a day, Disp: 1 Tube, Rfl: 5    fexofenadine (ALLEGRA) 180 MG tablet, Take 1 tablet (180 mg total) by mouth daily (Patient taking differently: Take 180 mg by mouth daily as needed ), Disp: 30 tablet, Rfl: 2    lidocaine (LIDODERM) 5 %, Apply 1 patch topically daily Remove & Discard patch within 12 hours or as directed by MD, Disp: 30 patch, Rfl: 2    metroNIDAZOLE (METROGEL) 0 75 % vaginal gel, INSERT 1 APPLICATORFUL VAGINALLY NIGHTLY FOR 5 DAYS, Disp: , Rfl:     miconazole (MONISTAT 7) 100 mg vaginal suppository, Insert 1 suppository (100 mg total) into the vagina daily at bedtime, Disp: 7 suppository, Rfl: 0    nicotine (NICODERM CQ) 14 mg/24hr TD 24 hr patch, Place 1 patch on the skin every 24 hours, Disp: 28 patch, Rfl: 0    oxyCODONE (ROXICODONE) 5 mg immediate release tablet, Take 1 tablet (5 mg total) by mouth every 6 (six) hours as needed for severe painMax Daily Amount: 20 mg, Disp: 120 tablet, Rfl: 0    pantoprazole (PROTONIX) 40 mg tablet, Take 1 tablet (40 mg total) by mouth daily, Disp: 30 tablet, Rfl: 3    polyethylene glycol (GLYCOLAX) 17 GM/SCOOP powder, Take 17 g by mouth daily for 10 days, Disp: 238 g, Rfl: 5    sucralfate (CARAFATE) 1 g/10 mL suspension, Take 10 mL (1,000 mg total) by mouth every 6 (six) hours, Disp: 420 mL, Rfl: 5    carbamide peroxide (DEBROX) 6 5 % otic solution, Administer 5 drops into both ears 2 (two) times a day (Patient not taking: Reported on 4/23/2021), Disp: 15 mL, Rfl: 0    Elastic Bandages & Supports (Wrist Splint/Elastic Left Med) MISC, Use daily as needed (wrist pain), Disp: 1 each, Rfl: 0  No current facility-administered medications for this visit       Facility-Administered Medications Ordered in Other Visits:     acetaminophen (TYLENOL) tablet 650 mg, 650 mg, Oral, Once, Yoandy Olson    diphenhydrAMINE (BENADRYL) tablet 25 mg, 25 mg, Oral, Once, Yoandy Olson    Current Allergies     Allergies as of 04/23/2021 - Reviewed 04/23/2021   Allergen Reaction Noted    Nitrofurantoin Itching 02/26/2020    Nsaids Other (See Comments) 08/31/2020    Oxycodone-acetaminophen Itching 04/22/2012    Oxycodone Itching 09/16/2017    Tramadol Itching 04/22/2012            The following portions of the patient's history were reviewed and updated as appropriate: allergies, current medications, past family history, past medical history, past social history, past surgical history and problem list      Past Medical History:   Diagnosis Date    Allergic     Ectopic pregnancy     GERD (gastroesophageal reflux disease)     History of unilateral fallopian tube excision     RIGHT removed    Ovarian cyst     Wears glasses        Past Surgical History:   Procedure Laterality Date    DILATION AND CURETTAGE OF UTERUS      ECTOPIC PREGNANCY SURGERY      LAPAROSCOPY      [facility] LAP,DIAGNOSTIC ABDOMEN N/A 9/16/2017    Procedure: LAPAROSCOPY DIAGNOSTIC, left salpingectomy;  Surgeon: Verma Spurling, MD;  [facility]: [facility];  Service: Gynecology    WISDOM TOOTH EXTRACTION         Family History   Problem Relation Age of Onset    No Known Problems Mother     No Known Problems Father          Medications have been verified  Objective   /60   Pulse 86   Temp 97 9 °F (36 6 °C) (Tympanic)   Resp 18   Ht 5' 3" (1 6 m)   Wt 59 3 kg (130 lb 12 8 oz)   SpO2 97%   BMI 23 17 kg/m²        Physical Exam     Physical Exam  Vitals signs and nursing note reviewed  Constitutional:       General: She is not in acute distress  Appearance: She is not ill-appearing, toxic-appearing or diaphoretic  HENT:      Head: Normocephalic  Nose: Nose normal       Mouth/Throat:      Mouth: Mucous membranes are moist       Pharynx: Oropharynx is clear  Eyes:      General: No scleral icterus  Conjunctiva/sclera: Conjunctivae normal       Pupils: Pupils are equal, round, and reactive to light  Cardiovascular:      Rate and Rhythm: Normal rate and regular rhythm  Pulses: Normal pulses  Heart sounds: Normal heart sounds  No murmur  Pulmonary:      Effort: Pulmonary effort is normal       Breath sounds: Normal breath sounds  Abdominal:      General: Bowel sounds are normal       Tenderness: There is no abdominal tenderness  Musculoskeletal:      Lumbar back: She exhibits tenderness   She exhibits normal range of motion, no edema, no deformity, no laceration and no spasm  Right lower leg: No edema  Left lower leg: No edema  Neurological:      General: No focal deficit present  Mental Status: She is alert and oriented to person, place, and time     Psychiatric:         Mood and Affect: Mood normal

## 2021-05-14 ENCOUNTER — TELEPHONE (OUTPATIENT)
Dept: SURGERY | Facility: CLINIC | Age: 26
End: 2021-05-14

## 2021-05-14 NOTE — TELEPHONE ENCOUNTER
----- Message from Niurka Brown sent at 5/13/2021  3:20 PM EDT -----  Regarding: please reschedule  Patient called the office to cancel her procedures,(Colonoscopy/EGD) I called same day surgery I  talked to Matt Book to cancel  She needs to reschedule on 7/27/2021 with Dr Peterson Pean  She has all  instructions  Patient expressed understanding

## 2021-05-20 DIAGNOSIS — M54.42 CHRONIC BILATERAL LOW BACK PAIN WITH BILATERAL SCIATICA: ICD-10-CM

## 2021-05-20 DIAGNOSIS — M54.41 CHRONIC BILATERAL LOW BACK PAIN WITH BILATERAL SCIATICA: ICD-10-CM

## 2021-05-20 DIAGNOSIS — G89.29 CHRONIC BILATERAL LOW BACK PAIN WITH BILATERAL SCIATICA: ICD-10-CM

## 2021-05-20 RX ORDER — OXYCODONE HYDROCHLORIDE 5 MG/1
5 TABLET ORAL EVERY 6 HOURS PRN
Qty: 12 TABLET | Refills: 0 | Status: SHIPPED | OUTPATIENT
Start: 2021-05-20 | End: 2021-05-24 | Stop reason: SDUPTHER

## 2021-05-20 NOTE — TELEPHONE ENCOUNTER
Called pt to reschedule appt from 05/20/21 due to a schedule conflict at the office  Pt was rescheduled to 05/24/21 with Davina Chino in [place] [id number] as Davina Chino has seen pt before and Carlos Manuel Julio did not have appt sooner  Pt concerned as she does not have oxycodone for the weekend now and is requesting short supply for weekend  Chart documents that pt was reminded at almost every appt that she should schedule with pain management but pt states on call today that she understood pain management to be "optional" and again states that she does not have appt scheduled with them  Please advise

## 2021-05-24 ENCOUNTER — OFFICE VISIT (OUTPATIENT)
Dept: FAMILY MEDICINE CLINIC | Facility: CLINIC | Age: 26
End: 2021-05-24
Payer: COMMERCIAL

## 2021-05-24 VITALS
BODY MASS INDEX: 22.46 KG/M2 | TEMPERATURE: 96.5 F | DIASTOLIC BLOOD PRESSURE: 84 MMHG | WEIGHT: 126.8 LBS | HEART RATE: 111 BPM | RESPIRATION RATE: 16 BRPM | OXYGEN SATURATION: 98 % | SYSTOLIC BLOOD PRESSURE: 128 MMHG

## 2021-05-24 DIAGNOSIS — M54.42 CHRONIC BILATERAL LOW BACK PAIN WITH BILATERAL SCIATICA: Primary | ICD-10-CM

## 2021-05-24 DIAGNOSIS — F41.9 ANXIETY: ICD-10-CM

## 2021-05-24 DIAGNOSIS — G89.29 CHRONIC BILATERAL LOW BACK PAIN WITH BILATERAL SCIATICA: Primary | ICD-10-CM

## 2021-05-24 DIAGNOSIS — M54.41 CHRONIC BILATERAL LOW BACK PAIN WITH BILATERAL SCIATICA: Primary | ICD-10-CM

## 2021-05-24 PROCEDURE — T1015 CLINIC SERVICE: HCPCS | Performed by: FAMILY MEDICINE

## 2021-05-24 RX ORDER — DULOXETIN HYDROCHLORIDE 20 MG/1
20 CAPSULE, DELAYED RELEASE ORAL DAILY
Qty: 30 CAPSULE | Refills: 2 | Status: SHIPPED | OUTPATIENT
Start: 2021-05-24 | End: 2022-02-07 | Stop reason: SDDI

## 2021-05-24 RX ORDER — OXYCODONE HYDROCHLORIDE 5 MG/1
5 TABLET ORAL EVERY 6 HOURS PRN
Qty: 120 TABLET | Refills: 0 | Status: SHIPPED | OUTPATIENT
Start: 2021-05-24 | End: 2021-06-22 | Stop reason: SDUPTHER

## 2021-05-24 NOTE — PROGRESS NOTES
Assessment/Plan:     Diagnoses and all orders for this visit:    Chronic bilateral low back pain with bilateral sciatica  -     oxyCODONE (ROXICODONE) 5 mg immediate release tablet; Take 1 tablet (5 mg total) by mouth every 6 (six) hours as needed for severe painMax Daily Amount: 20 mg  -     Diclofenac Sodium (VOLTAREN) 1 %; Apply 2 g topically 4 (four) times a day  -     DULoxetine (CYMBALTA) 20 mg capsule; Take 1 capsule (20 mg total) by mouth daily    Anxiety  -     DULoxetine (CYMBALTA) 20 mg capsule; Take 1 capsule (20 mg total) by mouth daily        - PDMP reviewed, refill sent  - Will start Cymbalta for both back pain and anxiety/depression with plan to start weaning oxycodone in the near future  - Advised follow up with pain management and psych as previously referred    Return in about 1 month (around 6/24/2021) for Next scheduled follow up  Subjective:        Patient ID: Angie Burrows is a 27 y o  female  Chief Complaint   Patient presents with    Follow-up    Back Pain    Medication Refill       Alvaro Saba is a 27year old female presenting for follow up and medication refill      Patient continues with abdominal pain and cramping, mildly controlled with oxycodone 5 mg, pantoprazole 40 mg BID and Carafate 1000mg q6 hours  She is using miralax prn for constipation  Denies N/V/D  Patient was scheduled for EGD and colonoscopy on 5/14 but cancelled this appointment  Now rescheduled to 7/27      She also continues with ongoing back pain worse with bending and lifting activities  Denies numbness, tingling, paresthesia  No bowel/bladder incontinence  She has been using lidocaine patches, diclofenac gel, and oxycodone 5 mg with some relief  Notes that the diclofenac gel is working better than the lidocaine patches  She has been alternating these  Lumbar spine xray from 11/2020 with L4-L5 degenerative disc change  Patient was referred to pain management but has not scheduled      At last visit, patient was concerned for possible bipolar disorder as she reported feeling depressed with occasional manic episodes and decreased concentration  She was provided with a referral to psych but has not yet established  Denies SI/HI        The following portions of the patient's history were reviewed and updated as appropriate: allergies, current medications, past family history, past medical history, past social history, past surgical history and problem list     Patient Active Problem List   Diagnosis    Ectopic pregnancy    [facility] ([facility] [address] )    Flank pain    Diarrhea    Tobacco abuse    Hypokalemia    Leukocytosis    Microscopic hematuria    Microalbuminuria    Bradycardia    Low HDL (under 40)    Hepatitis C antibody test positive    Gastroenteritis    Chronic tubulointerstitial nephritis    Abdominal pain    Dysmenorrhea    Encounter for support and coordination of transition of care    Nausea    Iron deficiency anemia    GERD (gastroesophageal reflux disease)    Ovarian cyst, left    Pelvic pain in female    Acute midline low back pain without sciatica       Current Outpatient Medications   Medication Sig Dispense Refill    Biotin 10 MG CAPS Take 1 capsule by mouth daily For acne/skin      Diclofenac Sodium (VOLTAREN) 1 % Apply 2 g topically 4 (four) times a day 200 g 5    fexofenadine (ALLEGRA) 180 MG tablet Take 1 tablet (180 mg total) by mouth daily (Patient taking differently: Take 180 mg by mouth daily as needed ) 30 tablet 2    lidocaine (LIDODERM) 5 % Apply 1 patch topically daily Remove & Discard patch within 12 hours or as directed by MD 30 patch 2    oxyCODONE (ROXICODONE) 5 mg immediate release tablet Take 1 tablet (5 mg total) by mouth every 6 (six) hours as needed for severe painMax Daily Amount: 20 mg 120 tablet 0    pantoprazole (PROTONIX) 40 mg tablet Take 1 tablet (40 mg total) by mouth daily 30 tablet 3    polyethylene glycol (GLYCOLAX) 17 GM/SCOOP powder Take 17 g by mouth daily for 10 days 238 g 5    sucralfate (CARAFATE) 1 g/10 mL suspension Take 10 mL (1,000 mg total) by mouth every 6 (six) hours 420 mL 5    albuterol (PROVENTIL HFA,VENTOLIN HFA) 90 mcg/act inhaler Inhale 2 puffs every 6 (six) hours as needed for wheezing or shortness of breath 1 Inhaler 5    carbamide peroxide (DEBROX) 6 5 % otic solution Administer 5 drops into both ears 2 (two) times a day (Patient not taking: Reported on 4/23/2021) 15 mL 0    DULoxetine (CYMBALTA) 20 mg capsule Take 1 capsule (20 mg total) by mouth daily 30 capsule 2    Elastic Bandages & Supports (Wrist Splint/Elastic Left Med) MISC Use daily as needed (wrist pain) (Patient not taking: Reported on 5/24/2021) 1 each 0    metroNIDAZOLE (METROGEL) 0 75 % vaginal gel INSERT 1 APPLICATORFUL VAGINALLY NIGHTLY FOR 5 DAYS      miconazole (MONISTAT 7) 100 mg vaginal suppository Insert 1 suppository (100 mg total) into the vagina daily at bedtime (Patient not taking: Reported on 5/24/2021) 7 suppository 0    nicotine (NICODERM CQ) 14 mg/24hr TD 24 hr patch Place 1 patch on the skin every 24 hours (Patient not taking: Reported on 5/24/2021) 28 patch 0     No current facility-administered medications for this visit        Facility-Administered Medications Ordered in Other Visits   Medication Dose Route Frequency Provider Last Rate Last Admin    acetaminophen (TYLENOL) tablet 650 mg  650 mg Oral Once Balaji Pier        diphenhydrAMINE (BENADRYL) tablet 25 mg  25 mg Oral Once Balaji Pier            Past Medical History:   Diagnosis Date    Allergic     Ectopic pregnancy     GERD (gastroesophageal reflux disease)     History of unilateral fallopian tube excision     RIGHT removed    Ovarian cyst     Wears glasses         Past Surgical History:   Procedure Laterality Date    DILATION AND CURETTAGE OF UTERUS      ECTOPIC PREGNANCY SURGERY      LAPAROSCOPY      [place] LAP,DIAGNOSTIC ABDOMEN N/A 9/16/2017    Procedure: LAPAROSCOPY DIAGNOSTIC, left salpingectomy;  Surgeon: Uday Jacobsen MD;  Location: [facility];  Service: Gynecology    WISDOM TOOTH EXTRACTION          Social History     Socioeconomic History    Marital status: Single     Spouse name: Not on file    Number of children: Not on file    Years of education: Not on file    Highest education level: Not on file   Occupational History    Not on file   Social Needs    Financial resource strain: Not on file    Food insecurity     Worry: Not on file     Inability: Not on file    Transportation needs     Medical: Not on file     Non-medical: Not on file   Tobacco Use    Smoking status: Current Every Day Smoker     Packs/day: 1 50     Years: 6 00     Pack years: 9 00     Types: Cigarettes    Smokeless tobacco: Never Used   Substance and Sexual Activity    Alcohol use: Yes     Frequency: 2-4 times a month     Drinks per session: 1 or 2     Binge frequency: Never     Comment: "occasional"    Drug use: Yes     Types: Marijuana    Sexual activity: Yes     Partners: Male     Birth control/protection: None   Lifestyle    Physical activity     Days per week: Not on file     Minutes per session: Not on file    Stress: Not on file   Relationships    Social connections     Talks on phone: Not on file     Gets together: Not on file     Attends Christian service: Not on file     Active member of club or organization: Not on file     Attends meetings of clubs or organizations: Not on file     Relationship status: Not on file    Intimate partner violence     Fear of current or ex partner: Not on file     Emotionally abused: Not on file     Physically abused: Not on file     Forced sexual activity: Not on file   Other Topics Concern    Not on file   Social History Narrative    Not on file        Review of Systems   Constitutional: Negative for chills, diaphoresis and fever     Eyes: Negative for photophobia, pain, discharge, redness, itching and visual disturbance  Respiratory: Negative for cough, chest tightness, shortness of breath and wheezing  Cardiovascular: Negative for chest pain and palpitations  Gastrointestinal: Positive for abdominal pain  Negative for constipation, diarrhea, nausea and vomiting  Musculoskeletal: Positive for back pain and myalgias  Skin: Negative for rash and wound  Neurological: Negative for dizziness, syncope, light-headedness and headaches  Psychiatric/Behavioral: Positive for decreased concentration and dysphoric mood  Negative for self-injury and suicidal ideas  The patient is nervous/anxious  Objective:      /84   Pulse (!) 111   Temp (!) 96 5 °F (35 8 °C)   Resp 16   Wt 57 5 kg (126 lb 12 8 oz)   SpO2 98%   BMI 22 46 kg/m²          Physical Exam  Vitals signs and nursing note reviewed  Constitutional:       General: She is not in acute distress  Appearance: Normal appearance  HENT:      Head: Normocephalic and atraumatic  Right Ear: Tympanic membrane, ear canal and external ear normal       Left Ear: Tympanic membrane, ear canal and external ear normal    Cardiovascular:      Rate and Rhythm: Normal rate and regular rhythm  Heart sounds: Normal heart sounds  No murmur  Pulmonary:      Effort: Pulmonary effort is normal  No respiratory distress  Breath sounds: Normal breath sounds  No wheezing  Musculoskeletal:      Right lower leg: No edema  Left lower leg: No edema  Skin:     General: Skin is warm and dry  Neurological:      General: No focal deficit present  Mental Status: She is alert and oriented to person, place, and time  Cranial Nerves: No cranial nerve deficit  Motor: No weakness     Psychiatric:         Mood and Affect: Mood normal          Behavior: Behavior normal

## 2021-06-06 ENCOUNTER — HOSPITAL ENCOUNTER (EMERGENCY)
Facility: HOSPITAL | Age: 26
Discharge: HOME | End: 2021-06-07
Attending: EMERGENCY MEDICINE
Payer: COMMERCIAL

## 2021-06-06 DIAGNOSIS — K08.89 PAIN, DENTAL: Primary | ICD-10-CM

## 2021-06-06 PROCEDURE — 99283 EMERGENCY DEPT VISIT LOW MDM: CPT

## 2021-06-07 VITALS
RESPIRATION RATE: 18 BRPM | TEMPERATURE: 98.1 F | HEART RATE: 100 BPM | DIASTOLIC BLOOD PRESSURE: 88 MMHG | OXYGEN SATURATION: 97 % | SYSTOLIC BLOOD PRESSURE: 122 MMHG

## 2021-06-07 PROCEDURE — 99284 EMERGENCY DEPT VISIT MOD MDM: CPT | Performed by: EMERGENCY MEDICINE

## 2021-06-07 RX ORDER — AMOXICILLIN AND CLAVULANATE POTASSIUM 875; 125 MG/1; MG/1
1 TABLET, FILM COATED ORAL 2 TIMES DAILY
Qty: 14 TABLET | Refills: 0 | Status: SHIPPED | OUTPATIENT
Start: 2021-06-07 | End: 2021-06-14

## 2021-06-07 RX ORDER — AMOXICILLIN AND CLAVULANATE POTASSIUM 875; 125 MG/1; MG/1
1 TABLET, FILM COATED ORAL ONCE
Status: COMPLETED | OUTPATIENT
Start: 2021-06-07 | End: 2021-06-07

## 2021-06-07 RX ADMIN — AMOXICILLIN AND CLAVULANATE POTASSIUM 1 TABLET: 875; 125 TABLET, FILM COATED ORAL at 00:45

## 2021-06-07 NOTE — ED PROVIDER NOTES
History  Chief Complaint   Patient presents with    Oral Swelling     Pt states she had her wisdom teeth removed over a week ago, and got a piece of popcorn stuck in socket  Now c/o swelling and pain for the past two days  Did not call surgeon  64-year-old female who approximately week ago underwent right-sided extraction of both of her wisdom teeth in the maxillary as well as the mandibular region; she did well postoperatively she did check in with her local dentist at mid week to the fact that she ate some popcorn and piece of popcorn got stuck in the mandibular fossa after that was cleaned out she felt better but now she is having return of pain to the area it feels swollen she denies any fever chills but has felt hot she has had no difficulty swallowing no facial redness or swelling she is unsure if there is an abscess forming  He otherwise had no cough or upper respiratory complaints no nausea vomiting she has been able to tolerate a diet  Prior to Admission Medications   Prescriptions Last Dose Informant Patient Reported? Taking?    Biotin 10 MG CAPS   Yes No   Sig: Take 1 capsule by mouth daily For acne/skin   DULoxetine (CYMBALTA) 20 mg capsule   No No   Sig: Take 1 capsule (20 mg total) by mouth daily   Diclofenac Sodium (VOLTAREN) 1 %   No No   Sig: Apply 2 g topically 4 (four) times a day   Elastic Bandages & Supports (Wrist Splint/Elastic Left Med) MISC   No No   Sig: Use daily as needed (wrist pain)   Patient not taking: Reported on 5/24/2021   albuterol (PROVENTIL HFA,VENTOLIN HFA) 90 mcg/act inhaler   No No   Sig: Inhale 2 puffs every 6 (six) hours as needed for wheezing or shortness of breath   carbamide peroxide (DEBROX) 6 5 % otic solution   No No   Sig: Administer 5 drops into both ears 2 (two) times a day   Patient not taking: Reported on 4/23/2021   fexofenadine (ALLEGRA) 180 MG tablet   No No   Sig: Take 1 tablet (180 mg total) by mouth daily   Patient taking differently: Take 180 mg by mouth daily as needed    lidocaine (LIDODERM) 5 %   No No   Sig: Apply 1 patch topically daily Remove & Discard patch within 12 hours or as directed by MD   metroNIDAZOLE (METROGEL) 0 75 % vaginal gel   Yes No   Sig: INSERT 1 APPLICATORFUL VAGINALLY NIGHTLY FOR 5 DAYS   miconazole (MONISTAT 7) 100 mg vaginal suppository   No No   Sig: Insert 1 suppository (100 mg total) into the vagina daily at bedtime   Patient not taking: Reported on 5/24/2021   nicotine (NICODERM CQ) 14 mg/24hr TD 24 hr patch   No No   Sig: Place 1 patch on the skin every 24 hours   Patient not taking: Reported on 5/24/2021   oxyCODONE (ROXICODONE) 5 mg immediate release tablet   No No   Sig: Take 1 tablet (5 mg total) by mouth every 6 (six) hours as needed for severe painMax Daily Amount: 20 mg   pantoprazole (PROTONIX) 40 mg tablet   No No   Sig: Take 1 tablet (40 mg total) by mouth daily   polyethylene glycol (GLYCOLAX) 17 GM/SCOOP powder   No No   Sig: Take 17 g by mouth daily for 10 days   sucralfate (CARAFATE) 1 g/10 mL suspension   No No   Sig: Take 10 mL (1,000 mg total) by mouth every 6 (six) hours      Facility-Administered Medications: None       Past Medical History:   Diagnosis Date    Allergic     Ectopic pregnancy     GERD (gastroesophageal reflux disease)     History of unilateral fallopian tube excision     RIGHT removed    Ovarian cyst     Wears glasses        Past Surgical History:   Procedure Laterality Date    DILATION AND CURETTAGE OF UTERUS      ECTOPIC PREGNANCY SURGERY      LAPAROSCOPY      [place] LAP,DIAGNOSTIC ABDOMEN N/A 9/16/2017    Procedure: LAPAROSCOPY DIAGNOSTIC, left salpingectomy;  Surgeon: Yosef Mena MD;  Location:  MAIN OR;  Service: Gynecology    WISDOM TOOTH EXTRACTION         Family History   Problem Relation Age of Onset    No Known Problems Mother     No Known Problems Father      I have reviewed and agree with the history as documented      E-Cigarette/Vaping    E-Cigarette Use Never User      E-Cigarette/Vaping Substances    Nicotine No     THC No     CBD No     Flavoring No     Other No     Unknown No      Social History     Tobacco Use    Smoking status: Current Every Day Smoker     Packs/day: 1 50     Years: 6 00     Pack years: 9 00     Types: Cigarettes    Smokeless tobacco: Never Used   Substance Use Topics    Alcohol use: Yes     Frequency: 2-4 times a month     Drinks per session: 1 or 2     Binge frequency: Never     Comment: "occasional"    Drug use: Yes     Types: Marijuana       Review of Systems   Constitutional: Negative for activity change, appetite change, chills and fever  HENT: Positive for dental problem  Negative for congestion, ear pain, facial swelling, mouth sores, sinus pressure, sinus pain, sore throat, tinnitus, trouble swallowing and voice change  Respiratory: Negative for cough and shortness of breath  Cardiovascular: Negative for chest pain  Gastrointestinal: Negative for nausea and vomiting  Neurological: Negative for dizziness, numbness and headaches  Psychiatric/Behavioral: Negative for confusion  All other systems reviewed and are negative  Physical Exam  Physical Exam  Vitals signs and nursing note reviewed  Constitutional:       General: She is not in acute distress  Appearance: She is not ill-appearing, toxic-appearing or diaphoretic  HENT:      Head: Normocephalic and atraumatic  Right Ear: Tympanic membrane and external ear normal       Left Ear: Tympanic membrane and external ear normal       Nose: Nose normal  No congestion or rhinorrhea  Mouth/Throat:      Mouth: Mucous membranes are moist       Comments: There is no facial edema or erythema no tenderness or induration in the submandibular region no clinical evidence of Joselo's angina intraorally there is no tongue elevation posterior pharynx is normal patient has no pointing lesion there is no evidence of foreign body in the fossa    Eyes: General:         Right eye: No discharge  Left eye: No discharge  Extraocular Movements: Extraocular movements intact  Conjunctiva/sclera: Conjunctivae normal       Pupils: Pupils are equal, round, and reactive to light  Neck:      Musculoskeletal: Normal range of motion and neck supple  No neck rigidity or muscular tenderness  Cardiovascular:      Rate and Rhythm: Normal rate and regular rhythm  Pulses: Normal pulses  Pulmonary:      Effort: Pulmonary effort is normal  No respiratory distress  Breath sounds: No stridor  No wheezing, rhonchi or rales  Abdominal:      General: Abdomen is flat  Bowel sounds are normal  There is no distension  Tenderness: There is no abdominal tenderness  There is no right CVA tenderness, left CVA tenderness or guarding  Musculoskeletal: Normal range of motion  General: No swelling  Right lower leg: No edema  Left lower leg: No edema  Lymphadenopathy:      Cervical: No cervical adenopathy  Skin:     General: Skin is warm and dry  Capillary Refill: Capillary refill takes less than 2 seconds  Findings: No rash  Neurological:      General: No focal deficit present  Mental Status: She is alert  Mental status is at baseline  Cranial Nerves: No cranial nerve deficit  Sensory: No sensory deficit  Motor: No weakness        Coordination: Coordination normal       Gait: Gait normal    Psychiatric:         Mood and Affect: Mood normal          Vital Signs  ED Triage Vitals [06/06/21 2316]   Temperature Pulse Respirations Blood Pressure SpO2   98 1 °F (36 7 °C) (!) 114 18 130/76 98 %      Temp Source Heart Rate Source Patient Position - Orthostatic VS BP Location FiO2 (%)   Temporal Monitor Sitting Left arm --      Pain Score       7           Vitals:    06/06/21 2316 06/06/21 2330 06/07/21 0048   BP: 130/76 122/83 122/88   Pulse: (!) 114 (!) 111 100   Patient Position - Orthostatic VS: Sitting Sitting Lying         Visual Acuity      ED Medications  Medications   amoxicillin-clavulanate (AUGMENTIN) 875-125 mg per tablet 1 tablet (1 tablet Oral Given 6/7/21 0045)       Diagnostic Studies  Results Reviewed     None                 No orders to display              Procedures  Procedures         ED Course                                           MDM  Number of Diagnoses or Management Options  Pain, dental:   Diagnosis management comments: Mdm:  No evidence of Joselo's angina or evolving dental abscess patient may have a low-grade dental infection will initiate Augmentin patient will follow-up with oral surgeon 1st available appointment is comfortable with plan  Notified of problem with e-scribe of augmentin called in rx to [facility] (augmentin 875mg/125mg 1 po bid #14 no refills)      Disposition  Final diagnoses:   Pain, dental - tooth #32 fossa     Time reflects when diagnosis was documented in both MDM as applicable and the Disposition within this note     Time User Action Codes Description Comment    6/7/2021 12:39 AM Chantell Pal Add [K08 89] Pain, dental     6/7/2021 12:39 AM Chantell Pal Modify [K08 89] Pain, dental tooth #32 fossa      ED Disposition     ED Disposition Condition Date/Time Comment    Discharge Stable Mon Jun 7, 2021 12:42 AM Eyad Hollins discharge to home/self care              Follow-up Information     Follow up With Specialties Details Why [address] [place] [address] for Oral and Maxillofacial Surgery [place]  Go to or your oral surgeon first available [id number]          Discharge Medication List as of 6/7/2021 12:47 AM      START taking these medications    Details   amoxicillin-clavulanate (AUGMENTIN) 875-125 mg per tablet Take 1 tablet by mouth 2 (two) times a day for 7 days, Starting Mon 6/7/2021, Until Mon 6/14/2021, Normal         CONTINUE these medications which have NOT CHANGED Details   albuterol (PROVENTIL HFA,VENTOLIN HFA) 90 mcg/act inhaler Inhale 2 puffs every 6 (six) hours as needed for wheezing or shortness of breath, Starting Fri 4/23/2021, Normal      Biotin 10 MG CAPS Take 1 capsule by mouth daily For acne/skin, Historical Med      carbamide peroxide (DEBROX) 6 5 % otic solution Administer 5 drops into both ears 2 (two) times a day, Starting Wed 3/24/2021, Normal      Diclofenac Sodium (VOLTAREN) 1 % Apply 2 g topically 4 (four) times a day, Starting Mon 5/24/2021, Normal      DULoxetine (CYMBALTA) 20 mg capsule Take 1 capsule (20 mg total) by mouth daily, Starting Mon 5/24/2021, Normal      Elastic Bandages & Supports (Wrist Splint/Elastic Left Med) MISC Use daily as needed (wrist pain), Starting Sat 1/2/2021, Normal      fexofenadine (ALLEGRA) 180 MG tablet Take 1 tablet (180 mg total) by mouth daily, Starting Wed 12/30/2020, Normal      lidocaine (LIDODERM) 5 % Apply 1 patch topically daily Remove & Discard patch within 12 hours or as directed by MD, Starting Fri 4/23/2021, Normal      metroNIDAZOLE (METROGEL) 0 75 % vaginal gel INSERT 1 APPLICATORFUL VAGINALLY NIGHTLY FOR 5 DAYS, Historical Med      miconazole (MONISTAT 7) 100 mg vaginal suppository Insert 1 suppository (100 mg total) into the vagina daily at bedtime, Starting Wed 1/27/2021, Normal      nicotine (NICODERM CQ) 14 mg/24hr TD 24 hr patch Place 1 patch on the skin every 24 hours, Starting Wed 12/30/2020, Normal      oxyCODONE (ROXICODONE) 5 mg immediate release tablet Take 1 tablet (5 mg total) by mouth every 6 (six) hours as needed for severe painMax Daily Amount: 20 mg, Starting Mon 5/24/2021, Normal      pantoprazole (PROTONIX) 40 mg tablet Take 1 tablet (40 mg total) by mouth daily, Starting Fri 4/23/2021, Normal      polyethylene glycol (GLYCOLAX) 17 GM/SCOOP powder Take 17 g by mouth daily for 10 days, Starting Wed 3/24/2021, Until Mon 5/24/2021, Normal      sucralfate (CARAFATE) 1 g/10 mL suspension Take 10 mL (1,000 mg total) by mouth every 6 (six) hours, Starting Fri 4/23/2021, Normal           No discharge procedures on file      PDMP Review       Value Time User    PDMP Reviewed  Yes 5/24/2021 11:38 AM Damon Hewitt PA-C          ED Provider  Electronically Signed by           Aretha Phelps MD  06/07/21 0111       Aretha Phelps MD  06/07/21 5903

## 2021-06-07 NOTE — DISCHARGE INSTRUCTIONS
Salt water rinses  Continue augmentin - antibiotic  Eat yogurt, take pro-biotic over the counter, or acidophilus tablet (in vitamin aisle) to prevent diarrhea     Return with fever, facial swelling or redness difficulty swallowing

## 2021-06-09 ENCOUNTER — PATIENT OUTREACH (OUTPATIENT)
Dept: CASE MANAGEMENT | Facility: HOSPITAL | Age: 26
End: 2021-06-09

## 2021-06-09 NOTE — PROGRESS NOTES
Outpatient Care Management Note:  Patient was identified via report as having a recent non urgent and non life threatening ED visit at [address] [place] [address]  Chart reviewed  Patient was in the ED on 06/07/21 for evaluation of dental pain  Telephone outreach made to introduce self and role of care management, follow up on general health, and outreach for any possible medical or psychosocial services needed  Spoke with patient  ED Follow-up Assessment and SDOH Questionnaire completed    1  Do you have an appointment with your primary care provider?  Name of provider: Ervin Kolb PA-C   Date and Time of Appointment: 06/22/21  2  Do you have a way to get to your appointments? yes    Health Status:    1  Did you get an after visit summary? yes  2  Did you (or caregiver) read the summary? yes  3  Did you understand it? yes (Teachback Tool)  4  Do you have any questions about your plan of care? no  5  Do you have any new or worsening symptoms? Still experiencing "dry sockets"  Have followed up with my oral surgeon twice  Sockets are packed, but packing keeps falling out   Do you have a lingering or new fever? no   Are you eating and drinking ok? Food keeps getting stucked in the dry sockets  7  Do you have any questions about your medicines? no   Do you have a list of all the medicines that were prescribed? amoxicillin   Are all the medicines you were prescribed in your home? yes   Are you taking all the prescribed medicines? If not, why? yes   Do you know what your medicine does? antiobiotic  8  Do you have any questions or concerns about your general health about how you should take care of yourself? no    Interventions: (Add to care plan)  Patient did not identify any social needs  No interventions needed at this time  ED followup closed

## 2021-06-22 ENCOUNTER — OFFICE VISIT (OUTPATIENT)
Dept: FAMILY MEDICINE CLINIC | Facility: CLINIC | Age: 26
End: 2021-06-22
Payer: COMMERCIAL

## 2021-06-22 VITALS
SYSTOLIC BLOOD PRESSURE: 124 MMHG | BODY MASS INDEX: 24.52 KG/M2 | OXYGEN SATURATION: 99 % | TEMPERATURE: 98.6 F | DIASTOLIC BLOOD PRESSURE: 88 MMHG | HEART RATE: 95 BPM | HEIGHT: 63 IN | WEIGHT: 138.4 LBS | RESPIRATION RATE: 18 BRPM

## 2021-06-22 DIAGNOSIS — M54.42 CHRONIC BILATERAL LOW BACK PAIN WITH BILATERAL SCIATICA: ICD-10-CM

## 2021-06-22 DIAGNOSIS — K21.9 GASTROESOPHAGEAL REFLUX DISEASE, UNSPECIFIED WHETHER ESOPHAGITIS PRESENT: ICD-10-CM
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
## 2021-06-22 DIAGNOSIS — Z00.00 ANNUAL PHYSICAL EXAM: Primary | ICD-10-CM

## 2021-06-22 DIAGNOSIS — Z12.4 SCREENING FOR CERVICAL CANCER: ICD-10-CM

## 2021-06-22 DIAGNOSIS — G89.29 CHRONIC BILATERAL LOW BACK PAIN WITH BILATERAL SCIATICA: ICD-10-CM

## 2021-06-22 DIAGNOSIS — M54.41 CHRONIC BILATERAL LOW BACK PAIN WITH BILATERAL SCIATICA: ICD-10-CM

## 2021-06-22 PROBLEM — Z76.89 ENCOUNTER FOR SUPPORT AND COORDINATION OF TRANSITION OF CARE: Status: RESOLVED | Noted: 2020-09-15 | Resolved: 2021-06-22

## 2021-06-22 PROBLEM — N17.9 AKI (ACUTE KIDNEY INJURY) (HCC): Status: RESOLVED | Noted: 2020-08-12 | Resolved: 2021-06-22

## 2021-06-22 PROBLEM — M54.50 ACUTE MIDLINE LOW BACK PAIN WITHOUT SCIATICA: Status: RESOLVED | Noted: 2021-03-24 | Resolved: 2021-06-22

## 2021-06-22 PROCEDURE — T1015 CLINIC SERVICE: HCPCS | Performed by: FAMILY MEDICINE

## 2021-06-22 RX ORDER — SUCRALFATE ORAL 1 G/10ML
1000 SUSPENSION ORAL EVERY 6 HOURS SCHEDULED
Qty: 420 ML | Refills: 5 | Status: SHIPPED | OUTPATIENT
Start: 2021-06-22 | End: 2022-02-09 | Stop reason: SDUPTHER

## 2021-06-22 RX ORDER — TRIAMCINOLONE ACETONIDE 1 MG/G
CREAM TOPICAL 2 TIMES DAILY
Qty: 30 G | Refills: 0 | Status: SHIPPED | OUTPATIENT
Start: 2021-06-22 | End: 2021-07-22 | Stop reason: SDUPTHER

## 2021-06-22 RX ORDER — FLUCONAZOLE 150 MG/1
TABLET ORAL
COMMUNITY
Start: 2021-06-08 | End: 2021-06-22 | Stop reason: ALTCHOICE

## 2021-06-22 RX ORDER — OXYCODONE HYDROCHLORIDE 5 MG/1
5 TABLET ORAL EVERY 6 HOURS PRN
Qty: 120 TABLET | Refills: 0 | Status: SHIPPED | OUTPATIENT
Start: 2021-06-22 | End: 2021-07-22 | Stop reason: SDUPTHER

## 2021-06-22 NOTE — PROGRESS NOTES
ADULT ANNUAL PHYSICAL  [address]    NAME: Chip Charles  AGE: 27 y o  SEX: female  : 1995     DATE: 2021     Assessment and Plan:     Problem List Items Addressed This Visit        Digestive    GERD (gastroesophageal reflux disease)    Relevant Medications    sucralfate (CARAFATE) 1 g/10 mL suspension       Nervous and Auditory    Chronic bilateral low back pain with bilateral sciatica    Relevant Medications    oxyCODONE (ROXICODONE) 5 mg immediate release tablet      Other Visit Diagnoses     Annual physical exam    -  Primary    Screening for cervical cancer        Relevant Orders    Ambulatory referral to Gynecology    Bug bite, initial encounter        Relevant Medications    triamcinolone (KENALOG) 0 1 % cream        - Continue current medications  PDMP, refill sent  Patient was advised to start Cymbalta as previously prescribed  - Follow up with GI for colonoscopy as scheduled  - Triamcinolone cream PRN    Immunizations and preventive care screenings were discussed with patient today  Appropriate education was printed on patient's after visit summary  Counseling:  Alcohol/drug use: discussed moderation in alcohol intake, the recommendations for healthy alcohol use, and avoidance of illicit drug use  Dental Health: discussed importance of regular tooth brushing, flossing, and dental visits  Injury prevention: discussed safety/seat belts, safety helmets, smoke detectors, carbon dioxide detectors, and smoking near bedding or upholstery  Sexual health: discussed sexually transmitted diseases, partner selection, use of condoms, avoidance of unintended pregnancy, and contraceptive alternatives  · Exercise: the importance of regular exercise/physical activity was discussed  Recommend exercise 3-5 times per week for at least 30 minutes            Return in about 1 month (around 2021) for Next scheduled follow up      Chief Complaint:     Chief Complaint   Patient presents with    Follow-up      History of Present Illness:     Adult Annual Physical   Patient here for a comprehensive physical exam  The patient reports problems - bug bites  Patient endorses multiple pruritic bug bites scattered on her arms and legs  She has been using calamine lotion without relief  Diet and Physical Activity  · Diet/Nutrition: poor diet, frequent junk food and limited fruits/vegetables  · Exercise: no formal exercise  Depression Screening  PHQ-9 Depression Screening    PHQ-9:   Frequency of the following problems over the past two weeks:           General Health  · Sleep: sleeps poorly and gets 4-6 hours of sleep on average  · Hearing: normal - bilateral   · Vision: vision problems: blurred vision, most recent eye exam <1 year ago and wears contacts  Follows with [facility] specialists  · Dental: regular dental visits, brushes teeth once daily and flosses teeth occasionally  /GYN Health  · Last menstrual period: 6/21/21  · Contraceptive method: none  · History of STDs?: no      Review of Systems:     Review of Systems   Constitutional: Negative for chills, diaphoresis and fever  Eyes: Negative for photophobia, pain, discharge, redness, itching and visual disturbance  Respiratory: Negative for cough, chest tightness, shortness of breath and wheezing  Cardiovascular: Negative for chest pain and palpitations  Gastrointestinal: Positive for abdominal pain  Negative for constipation, diarrhea, nausea and vomiting  Musculoskeletal: Positive for back pain and myalgias  Skin: Negative for rash and wound  Neurological: Negative for dizziness, syncope, light-headedness and headaches  Psychiatric/Behavioral: Negative for decreased concentration, dysphoric mood, self-injury and suicidal ideas  The patient is not nervous/anxious         Past Medical History:     Past Medical History:   Diagnosis Date    Allergic     Ectopic pregnancy     GERD (gastroesophageal reflux disease)     History of unilateral fallopian tube excision     RIGHT removed    Ovarian cyst     Wears glasses       Past Surgical History:     Past Surgical History:   Procedure Laterality Date    DILATION AND CURETTAGE OF UTERUS      ECTOPIC PREGNANCY SURGERY      LAPAROSCOPY      [place] LAP,DIAGNOSTIC ABDOMEN N/A 9/16/2017    Procedure: LAPAROSCOPY DIAGNOSTIC, left salpingectomy;  Surgeon: Danna Martínez MD;  Location:  MAIN OR;  Service: Gynecology    WISDOM TOOTH EXTRACTION      WISDOM TOOTH EXTRACTION Bilateral       Social History:     Social History     Socioeconomic History    Marital status: Single     Spouse name: None    Number of children: None    Years of education: None    Highest education level: None   Occupational History    None   Tobacco Use    Smoking status: Current Every Day Smoker     Packs/day: 1 50     Years: 6 00     Pack years: 9 00     Types: Cigarettes    Smokeless tobacco: Never Used   Vaping Use    Vaping Use: Never used   Substance and Sexual Activity    Alcohol use: Yes     Comment: "occasional"    Drug use: Yes     Types: Marijuana    Sexual activity: Yes     Partners: Male     Birth control/protection: None   Other Topics Concern    None   Social History Narrative    None     Social Determinants of Health     Financial Resource Strain: Low Risk     Difficulty of Paying Living Expenses: Not hard at all   Food Insecurity: No Food Insecurity    Worried About Running Out of Food in the Last Year: Never true    Enrique of Food in the Last Year: Never true   Transportation Needs: No Transportation Needs    Lack of Transportation (Medical): No    Lack of Transportation (Non-Medical): No   Physical Activity: Unknown    Days of Exercise per Week: 3 days    Minutes of Exercise per Session: Not on file   Stress: No Stress Concern Present    Feeling of Stress :  Only a little   Social Connections: Unknown    Frequency of Communication with Friends and Family: More than three times a week    Frequency of Social Gatherings with Friends and Family: More than three times a week    Attends Mandaen Services: Never    Active Member of Clubs or Organizations: No    Attends Club or Organization Meetings: Never    Marital Status: Not on file   Intimate Partner Violence: Not At Risk    Fear of Current or Ex-Partner: No    Emotionally Abused: No    Physically Abused: No    Sexually Abused: No      Family History:     Family History   Problem Relation Age of Onset    No Known Problems Mother     No Known Problems Father       Current Medications:     Current Outpatient Medications   Medication Sig Dispense Refill    albuterol (PROVENTIL HFA,VENTOLIN HFA) 90 mcg/act inhaler Inhale 2 puffs every 6 (six) hours as needed for wheezing or shortness of breath 1 Inhaler 5    Biotin 10 MG CAPS Take 1 capsule by mouth daily For acne/skin      Diclofenac Sodium (VOLTAREN) 1 % Apply 2 g topically 4 (four) times a day 200 g 5    fexofenadine (ALLEGRA) 180 MG tablet Take 1 tablet (180 mg total) by mouth daily (Patient taking differently: Take 180 mg by mouth daily as needed ) 30 tablet 2    lidocaine (LIDODERM) 5 % Apply 1 patch topically daily Remove & Discard patch within 12 hours or as directed by MD 30 patch 2    metroNIDAZOLE (METROGEL) 0 75 % vaginal gel INSERT 1 APPLICATORFUL VAGINALLY NIGHTLY FOR 5 DAYS      oxyCODONE (ROXICODONE) 5 mg immediate release tablet Take 1 tablet (5 mg total) by mouth every 6 (six) hours as needed for severe painMax Daily Amount: 20 mg 120 tablet 0    pantoprazole (PROTONIX) 40 mg tablet Take 1 tablet (40 mg total) by mouth daily 30 tablet 3    polyethylene glycol (GLYCOLAX) 17 GM/SCOOP powder Take 17 g by mouth daily for 10 days 238 g 5    sucralfate (CARAFATE) 1 g/10 mL suspension Take 10 mL (1,000 mg total) by mouth every 6 (six) hours 420 mL 5    carbamide peroxide (DEBROX) 6 5 % otic solution Administer 5 drops into both ears 2 (two) times a day (Patient not taking: Reported on 4/23/2021) 15 mL 0    DULoxetine (CYMBALTA) 20 mg capsule Take 1 capsule (20 mg total) by mouth daily (Patient not taking: Reported on 6/22/2021) 30 capsule 2    Elastic Bandages & Supports (Wrist Splint/Elastic Left Med) MISC Use daily as needed (wrist pain) (Patient not taking: Reported on 5/24/2021) 1 each 0    nicotine (NICODERM CQ) 14 mg/24hr TD 24 hr patch Place 1 patch on the skin every 24 hours (Patient not taking: Reported on 5/24/2021) 28 patch 0    triamcinolone (KENALOG) 0 1 % cream Apply topically 2 (two) times a day 30 g 0     No current facility-administered medications for this visit  Facility-Administered Medications Ordered in Other Visits   Medication Dose Route Frequency Provider Last Rate Last Admin    acetaminophen (TYLENOL) tablet 650 mg  650 mg Oral Once Mague Lather        diphenhydrAMINE (BENADRYL) tablet 25 mg  25 mg Oral Once Mague Lather          Allergies: Allergies   Allergen Reactions    Nitrofurantoin Itching    Nsaids Other (See Comments)     Due to issues with kidneys per pt    Oxycodone-Acetaminophen Itching     Reaction Date: 28Apr2011;     Oxycodone Itching    Tramadol Itching     Reaction Date: 28Apr2011;       Physical Exam:     /88 (BP Location: Left arm, Patient Position: Sitting, Cuff Size: Adult)   Pulse 95   Temp 98 6 °F (37 °C) (Tympanic)   Resp 18   Ht 5' 3" (1 6 m)   Wt 62 8 kg (138 lb 6 4 oz)   LMP 05/23/2021   SpO2 99%   BMI 24 52 kg/m²     Physical Exam  Vitals and nursing note reviewed  Constitutional:       General: She is not in acute distress  Appearance: Normal appearance  HENT:      Head: Normocephalic and atraumatic        Right Ear: Tympanic membrane, ear canal and external ear normal       Left Ear: Tympanic membrane, ear canal and external ear normal       Nose: Nose normal  Mouth/Throat:      Mouth: Mucous membranes are moist       Pharynx: Oropharynx is clear  No oropharyngeal exudate  Eyes:      Extraocular Movements: Extraocular movements intact  Conjunctiva/sclera: Conjunctivae normal       Pupils: Pupils are equal, round, and reactive to light  Cardiovascular:      Rate and Rhythm: Normal rate and regular rhythm  Heart sounds: Normal heart sounds  No murmur heard  Pulmonary:      Effort: Pulmonary effort is normal  No respiratory distress  Breath sounds: Normal breath sounds  No wheezing  Musculoskeletal:      Cervical back: Normal range of motion and neck supple  Right lower leg: No edema  Left lower leg: No edema  Lymphadenopathy:      Cervical: No cervical adenopathy  Skin:     General: Skin is warm and dry  Comments: Multiple small erythematous papules scattered on arms and legs  No open wounds, bleeding or drainage  Neurological:      General: No focal deficit present  Mental Status: She is alert and oriented to person, place, and time  Cranial Nerves: No cranial nerve deficit  Motor: No weakness     Psychiatric:         Mood and Affect: Mood normal          Behavior: Behavior normal           Wendy Sánchez, [facility]

## 2021-06-22 NOTE — PATIENT INSTRUCTIONS

## 2021-07-22 ENCOUNTER — OFFICE VISIT (OUTPATIENT)
Dept: FAMILY MEDICINE CLINIC | Facility: CLINIC | Age: 26
End: 2021-07-22
Payer: COMMERCIAL

## 2021-07-22 VITALS
WEIGHT: 127 LBS | OXYGEN SATURATION: 98 % | HEART RATE: 103 BPM | SYSTOLIC BLOOD PRESSURE: 120 MMHG | RESPIRATION RATE: 18 BRPM | DIASTOLIC BLOOD PRESSURE: 72 MMHG | TEMPERATURE: 97 F | BODY MASS INDEX: 22.5 KG/M2

## 2021-07-22 DIAGNOSIS — M54.42 CHRONIC BILATERAL LOW BACK PAIN WITH BILATERAL SCIATICA: Primary | ICD-10-CM

## 2021-07-22 DIAGNOSIS — G89.29 CHRONIC BILATERAL LOW BACK PAIN WITH BILATERAL SCIATICA: Primary | ICD-10-CM

## 2021-07-22 DIAGNOSIS — R21 RASH: ICD-10-CM

## 2021-07-22 DIAGNOSIS — M54.41 CHRONIC BILATERAL LOW BACK PAIN WITH BILATERAL SCIATICA: Primary | ICD-10-CM

## 2021-07-22 DIAGNOSIS — Z91.09 ENVIRONMENTAL ALLERGIES: ICD-10-CM

## 2021-07-22 PROCEDURE — T1015 CLINIC SERVICE: HCPCS | Performed by: FAMILY MEDICINE

## 2021-07-22 RX ORDER — OXYCODONE HYDROCHLORIDE 5 MG/1
5 TABLET ORAL EVERY 6 HOURS PRN
Qty: 120 TABLET | Refills: 0 | Status: SHIPPED | OUTPATIENT
Start: 2021-07-22 | End: 2021-08-24 | Stop reason: SDUPTHER

## 2021-07-22 RX ORDER — TRIAMCINOLONE ACETONIDE 1 MG/G
CREAM TOPICAL 2 TIMES DAILY
Qty: 30 G | Refills: 5 | Status: SHIPPED | OUTPATIENT
Start: 2021-07-22 | End: 2021-11-23 | Stop reason: SDUPTHER

## 2021-07-22 RX ORDER — CYCLOSPORINE 0.5 MG/ML
2 EMULSION OPHTHALMIC 2 TIMES DAILY PRN
COMMUNITY
Start: 2021-07-22

## 2021-07-22 NOTE — PROGRESS NOTES
Assessment/Plan:     Diagnoses and all orders for this visit:    Chronic bilateral low back pain with bilateral sciatica  -     Diclofenac Sodium (VOLTAREN) 1 %; Apply 2 g topically 4 (four) times a day  -     Ambulatory referral to Pain Management; Future  -     oxyCODONE (ROXICODONE) 5 mg immediate release tablet; Take 1 tablet (5 mg total) by mouth every 6 (six) hours as needed for severe painMax Daily Amount: 20 mg    Rash  -     triamcinolone (KENALOG) 0 1 % cream; Apply topically 2 (two) times a day    Environmental allergies  -     Ambulatory Referral to Otolaryngology; Future    Other orders  -     Restasis 0 05 % ophthalmic emulsion; Administer 2 drops to both eyes 2 (two) times a day as needed        - PDMP reviewed, refill sent  I did have a conversation with the patient regarding her chronic use of oxycodone over the past year  I did advise her that she should start Cymbalta as previously prescribed and schedule with pain management as referred  We discussed that at her next visit we will begin tapering her oxycodone  - Referral provided to ENT for further allergy evaluation  Return in about 1 month (around 8/22/2021) for Next scheduled follow up  Subjective:        Patient ID: Tanya Jeans is a 27 y o  female  Chief Complaint   Patient presents with    Medication Refill     pt has several meds that need refilled       Hildane Lisandra is a 27year old female presenting for follow up and medication refill      Patient continues with abdominal pain and cramping, mildly controlled with oxycodone 5 mg, pantoprazole 40 mg BID and Carafate 1000mg q6 hours  She is using miralax prn for constipation  Denies N/V/D  Patient was scheduled for EGD and colonoscopy on 5/14 but cancelled this appointment  Now rescheduled to 7/27      She also continues with ongoing back pain worse with bending and lifting activities  Denies numbness, tingling, paresthesia  No bowel/bladder incontinence    She has been using lidocaine patches, diclofenac gel, and oxycodone 5 mg with some relief  Lumbar spine xray from 11/2020 with L4-L5 degenerative disc change  Patient was referred to pain management but has not scheduled  At last visit, patient was prescribed Cymbalta for additional pain relief, however, she has not started this medication  Today patient is requesting a referral to an allergist   She endorses chronic allergy symptoms which are worse when she is outside  She has tried multiple medications in the past without improvement        The following portions of the patient's history were reviewed and updated as appropriate: allergies, current medications, past family history, past medical history, past social history, past surgical history and problem list     Patient Active Problem List   Diagnosis    Ectopic pregnancy    Flank pain    Diarrhea    Tobacco abuse    Hypokalemia    Leukocytosis    Microscopic hematuria    Microalbuminuria    Bradycardia    Low HDL (under 40)    Hepatitis C antibody test positive    Gastroenteritis    Chronic tubulointerstitial nephritis    Abdominal pain    Dysmenorrhea    Nausea    Iron deficiency anemia    GERD (gastroesophageal reflux disease)    Ovarian cyst, left    Pelvic pain in female    Chronic bilateral low back pain with bilateral sciatica       Current Outpatient Medications   Medication Sig Dispense Refill    albuterol (PROVENTIL HFA,VENTOLIN HFA) 90 mcg/act inhaler Inhale 2 puffs every 6 (six) hours as needed for wheezing or shortness of breath 1 Inhaler 5    Biotin 10 MG CAPS Take 1 capsule by mouth daily For acne/skin      Diclofenac Sodium (VOLTAREN) 1 % Apply 2 g topically 4 (four) times a day 200 g 5    lidocaine (LIDODERM) 5 % Apply 1 patch topically daily Remove & Discard patch within 12 hours or as directed by MD 30 patch 2    oxyCODONE (ROXICODONE) 5 mg immediate release tablet Take 1 tablet (5 mg total) by mouth every 6 (six) hours as needed for severe painMax Daily Amount: 20 mg 120 tablet 0    pantoprazole (PROTONIX) 40 mg tablet Take 1 tablet (40 mg total) by mouth daily 30 tablet 3    polyethylene glycol (GLYCOLAX) 17 GM/SCOOP powder Take 17 g by mouth daily for 10 days 238 g 5    Restasis 0 05 % ophthalmic emulsion Administer 2 drops to both eyes 2 (two) times a day as needed      sucralfate (CARAFATE) 1 g/10 mL suspension Take 10 mL (1,000 mg total) by mouth every 6 (six) hours 420 mL 5    triamcinolone (KENALOG) 0 1 % cream Apply topically 2 (two) times a day 30 g 5    carbamide peroxide (DEBROX) 6 5 % otic solution Administer 5 drops into both ears 2 (two) times a day (Patient not taking: Reported on 7/22/2021) 15 mL 0    DULoxetine (CYMBALTA) 20 mg capsule Take 1 capsule (20 mg total) by mouth daily (Patient not taking: Reported on 7/22/2021) 30 capsule 2    Elastic Bandages & Supports (Wrist Splint/Elastic Left Med) MISC Use daily as needed (wrist pain) (Patient not taking: Reported on 5/24/2021) 1 each 0    fexofenadine (ALLEGRA) 180 MG tablet Take 1 tablet (180 mg total) by mouth daily (Patient not taking: Reported on 7/22/2021) 30 tablet 2    metroNIDAZOLE (METROGEL) 0 75 % vaginal gel INSERT 1 APPLICATORFUL VAGINALLY NIGHTLY FOR 5 DAYS (Patient not taking: Reported on 7/22/2021)      nicotine (NICODERM CQ) 14 mg/24hr TD 24 hr patch Place 1 patch on the skin every 24 hours (Patient not taking: Reported on 5/24/2021) 28 patch 0     No current facility-administered medications for this visit       Facility-Administered Medications Ordered in Other Visits   Medication Dose Route Frequency Provider Last Rate Last Admin    acetaminophen (TYLENOL) tablet 650 mg  650 mg Oral Once Nishant Junior        diphenhydrAMINE (BENADRYL) tablet 25 mg  25 mg Oral Once Nishant Junior            Past Medical History:   Diagnosis Date    Allergic     Ectopic pregnancy     GERD (gastroesophageal reflux disease)     History of unilateral fallopian tube excision     RIGHT removed    Ovarian cyst     Wears glasses         Past Surgical History:   Procedure Laterality Date    DILATION AND CURETTAGE OF UTERUS      ECTOPIC PREGNANCY SURGERY      LAPAROSCOPY      [place] LAP,DIAGNOSTIC ABDOMEN N/A 9/16/2017    Procedure: LAPAROSCOPY DIAGNOSTIC, left salpingectomy;  Surgeon: Nelly Villalba MD;  Location: [facility];  Service: Gynecology    WISDOM TOOTH EXTRACTION      WISDOM TOOTH EXTRACTION Bilateral         Social History     Socioeconomic History    Marital status: Single     Spouse name: Not on file    Number of children: Not on file    Years of education: Not on file    Highest education level: Not on file   Occupational History    Not on file   Tobacco Use    Smoking status: Current Every Day Smoker     Packs/day: 1 00     Years: 6 00     Pack years: 6 00     Types: Cigarettes    Smokeless tobacco: Never Used   Vaping Use    Vaping Use: Never used   Substance and Sexual Activity    Alcohol use: Not Currently     Comment: "occasional"    Drug use: Yes     Types: Marijuana    Sexual activity: Yes     Partners: Male     Birth control/protection: None   Other Topics Concern    Not on file   Social History Narrative    Not on file     Social Determinants of Health     Financial Resource Strain: Low Risk     Difficulty of Paying Living Expenses: Not hard at all   Food Insecurity: No Food Insecurity    Worried About Running Out of Food in the Last Year: Never true    Enrique of Food in the Last Year: Never true   Transportation Needs: No Transportation Needs    Lack of Transportation (Medical): No    Lack of Transportation (Non-Medical): No   Physical Activity: Unknown    Days of Exercise per Week: 3 days    Minutes of Exercise per Session: Not on file   Stress: No Stress Concern Present    Feeling of Stress :  Only a little   Social Connections: Unknown    Frequency of Communication with Friends and Family: More than three times a week    Frequency of Social Gatherings with Friends and Family: More than three times a week    Attends Yazidism Services: Never    Active Member of Clubs or Organizations: No    Attends Club or Organization Meetings: Never    Marital Status: Not on file   Intimate Partner Violence: Not At Risk    Fear of Current or Ex-Partner: No    Emotionally Abused: No    Physically Abused: No    Sexually Abused: No        Review of Systems   Constitutional: Negative for chills, diaphoresis and fever  Eyes: Negative for photophobia, pain, discharge, redness, itching and visual disturbance  Respiratory: Positive for cough  Negative for chest tightness, shortness of breath and wheezing  Cardiovascular: Negative for chest pain and palpitations  Gastrointestinal: Positive for abdominal pain  Negative for constipation, diarrhea, nausea and vomiting  Musculoskeletal: Positive for back pain and myalgias  Skin: Negative for rash and wound  Allergic/Immunologic: Positive for environmental allergies  Neurological: Negative for dizziness, syncope, light-headedness and headaches  Psychiatric/Behavioral: Negative for decreased concentration, dysphoric mood, self-injury and suicidal ideas  The patient is not nervous/anxious  Objective:      /72   Pulse 103   Temp (!) 97 °F (36 1 °C)   Resp 18   Wt 57 6 kg (127 lb)   SpO2 98%   BMI 22 50 kg/m²          Physical Exam  Vitals and nursing note reviewed  Constitutional:       General: She is not in acute distress  Appearance: Normal appearance  HENT:      Head: Normocephalic and atraumatic  Cardiovascular:      Rate and Rhythm: Normal rate and regular rhythm  Heart sounds: Normal heart sounds  No murmur heard  Pulmonary:      Effort: Pulmonary effort is normal  No respiratory distress  Breath sounds: Normal breath sounds  No wheezing  Musculoskeletal:      Right lower leg: No edema  Left lower leg: No edema  Skin:     General: Skin is warm and dry  Neurological:      General: No focal deficit present  Mental Status: She is alert and oriented to person, place, and time  Cranial Nerves: No cranial nerve deficit  Motor: No weakness     Psychiatric:         Mood and Affect: Mood normal          Behavior: Behavior normal

## 2021-07-26 ENCOUNTER — TELEPHONE (OUTPATIENT)
Dept: SURGERY | Facility: HOSPITAL | Age: 26
End: 2021-07-26

## 2021-07-27 ENCOUNTER — HOSPITAL ENCOUNTER (EMERGENCY)
Facility: HOSPITAL | Age: 26
Discharge: HOME | End: 2021-07-27
Attending: EMERGENCY MEDICINE | Admitting: EMERGENCY MEDICINE
Payer: COMMERCIAL

## 2021-07-27 VITALS
OXYGEN SATURATION: 98 % | DIASTOLIC BLOOD PRESSURE: 76 MMHG | HEART RATE: 102 BPM | BODY MASS INDEX: 22.5 KG/M2 | TEMPERATURE: 97.6 F | SYSTOLIC BLOOD PRESSURE: 133 MMHG | RESPIRATION RATE: 16 BRPM | HEIGHT: 63 IN | WEIGHT: 127 LBS

## 2021-07-27 DIAGNOSIS — R21 RASH AND NONSPECIFIC SKIN ERUPTION: Primary | ICD-10-CM

## 2021-07-27 PROCEDURE — 99282 EMERGENCY DEPT VISIT SF MDM: CPT

## 2021-07-27 PROCEDURE — 99282 EMERGENCY DEPT VISIT SF MDM: CPT | Performed by: EMERGENCY MEDICINE

## 2021-07-27 RX ORDER — PERMETHRIN 50 MG/G
CREAM TOPICAL
Qty: 60 G | Refills: 0 | Status: SHIPPED | OUTPATIENT
Start: 2021-07-27 | End: 2022-02-07 | Stop reason: ALTCHOICE

## 2021-07-27 RX ORDER — DIPHENHYDRAMINE HCL 25 MG
25 TABLET ORAL EVERY 6 HOURS
Qty: 20 TABLET | Refills: 0 | Status: SHIPPED | OUTPATIENT
Start: 2021-07-27 | End: 2022-02-07 | Stop reason: ALTCHOICE

## 2021-07-27 NOTE — ED PROVIDER NOTES
History  Chief Complaint   Patient presents with    Rash     patient reports that she has had a rash for two weeks  it began on her feet and now throughout  she has been using cream from pcp without relief  denies anything new that could cause a rash  painful when scratched open  HPI  23-year-old female with past medical history significant for history of ectopic pregnancy, tobacco use, who presents to the ER for evaluation of rash  Patient reports about 2 week history of rash  She has seen her PCP twice for this  She is trying steroid cream without relief  She reports gradual onset of generalized rash which involves all extremities as well as buttocks, face  She is on aware of any witnessed insect bite or sting  She reports no involvement of palms, soles, oral mucosa  Denies any sick contacts  She is up-to-date on vaccinations  Denies any recent travel or antibiotic use  Denies any recent illnesses, medication changes, changes in health, hospitalizations  She reports she does not know any similar symptoms in children and spouse  Spouse sleeps in same bed without symptoms  Patient reports and on chart review I see evidence that she has been seen by her PCP and treated with steroid cream   She reports the lesions are pruritic and painful when she touches them  She denies spontaneous drainage  She reports a history of bed bugs in the past reports the symptoms are not similar  No change in cosmetic products, skin care products  Denies significant allergy history  She does have outpatient dermatology referral in the future  Prior to Admission Medications   Prescriptions Last Dose Informant Patient Reported? Taking?    Biotin 10 MG CAPS Not Taking at Unknown time  Yes No   Sig: Take 1 capsule by mouth daily For acne/skin   Patient not taking: Reported on 7/27/2021   DULoxetine (CYMBALTA) 20 mg capsule Not Taking at Unknown time  No No   Sig: Take 1 capsule (20 mg total) by mouth daily Patient not taking: Reported on 7/22/2021   Diclofenac Sodium (VOLTAREN) 1 % 7/27/2021 at Unknown time  No Yes   Sig: Apply 2 g topically 4 (four) times a day   Elastic Bandages & Supports (Wrist Splint/Elastic Left Med) MISC Not Taking at Unknown time  No No   Sig: Use daily as needed (wrist pain)   Patient not taking: Reported on 5/24/2021   Restasis 0 05 % ophthalmic emulsion 7/27/2021 at Unknown time  Yes Yes   Sig: Administer 2 drops to both eyes 2 (two) times a day as needed   albuterol (PROVENTIL HFA,VENTOLIN HFA) 90 mcg/act inhaler   No Yes   Sig: Inhale 2 puffs every 6 (six) hours as needed for wheezing or shortness of breath   carbamide peroxide (DEBROX) 6 5 % otic solution Not Taking at Unknown time  No No   Sig: Administer 5 drops into both ears 2 (two) times a day   Patient not taking: Reported on 7/22/2021   fexofenadine (ALLEGRA) 180 MG tablet Not Taking at Unknown time  No No   Sig: Take 1 tablet (180 mg total) by mouth daily   Patient not taking: Reported on 7/22/2021   lidocaine (LIDODERM) 5 %   No Yes   Sig: Apply 1 patch topically daily Remove & Discard patch within 12 hours or as directed by MD   metroNIDAZOLE (METROGEL) 0 75 % vaginal gel Not Taking at Unknown time  Yes No   Sig: INSERT 1 APPLICATORFUL VAGINALLY NIGHTLY FOR 5 DAYS   Patient not taking: Reported on 7/22/2021   nicotine (NICODERM CQ) 14 mg/24hr TD 24 hr patch Not Taking at Unknown time  No No   Sig: Place 1 patch on the skin every 24 hours   Patient not taking: Reported on 5/24/2021   oxyCODONE (ROXICODONE) 5 mg immediate release tablet 7/27/2021 at Unknown time  No Yes   Sig: Take 1 tablet (5 mg total) by mouth every 6 (six) hours as needed for severe painMax Daily Amount: 20 mg   pantoprazole (PROTONIX) 40 mg tablet 7/27/2021 at Unknown time  No Yes   Sig: Take 1 tablet (40 mg total) by mouth daily   polyethylene glycol (GLYCOLAX) 17 GM/SCOOP powder   No No   Sig: Take 17 g by mouth daily for 10 days   sucralfate (CARAFATE) 1 g/10 mL suspension 7/27/2021 at Unknown time  No Yes   Sig: Take 10 mL (1,000 mg total) by mouth every 6 (six) hours   triamcinolone (KENALOG) 0 1 % cream 7/27/2021 at Unknown time  No Yes   Sig: Apply topically 2 (two) times a day      Facility-Administered Medications: None       Past Medical History:   Diagnosis Date    Allergic     Ectopic pregnancy     GERD (gastroesophageal reflux disease)     History of unilateral fallopian tube excision     RIGHT removed    Ovarian cyst     Wears glasses        Past Surgical History:   Procedure Laterality Date    DILATION AND CURETTAGE OF UTERUS      ECTOPIC PREGNANCY SURGERY      LAPAROSCOPY      DC LAP,DIAGNOSTIC ABDOMEN N/A 9/16/2017    Procedure: LAPAROSCOPY DIAGNOSTIC, left salpingectomy;  Surgeon: Beatriz Mortensen MD;  Location: [facility];  Service: Gynecology    WISDOM TOOTH EXTRACTION      WISDOM TOOTH EXTRACTION Bilateral        Family History   Problem Relation Age of Onset    No Known Problems Mother     No Known Problems Father      I have reviewed and agree with the history as documented  E-Cigarette/Vaping    E-Cigarette Use Never User      E-Cigarette/Vaping Substances    Nicotine No     THC No     CBD No     Flavoring No     Other No     Unknown No      Social History     Tobacco Use    Smoking status: Current Every Day Smoker     Packs/day: 1 00     Years: 6 00     Pack years: 6 00     Types: Cigarettes    Smokeless tobacco: Never Used   Vaping Use    Vaping Use: Never used   Substance Use Topics    Alcohol use: Not Currently     Comment: "occasional"    Drug use: Yes     Types: Marijuana       Review of Systems   Constitutional: Negative for chills and fever  HENT: Negative for ear pain and sore throat  Eyes: Negative for pain and visual disturbance  Respiratory: Negative for cough and shortness of breath  Cardiovascular: Negative for chest pain and palpitations     Gastrointestinal: Negative for abdominal pain and vomiting  Genitourinary: Negative for dysuria and hematuria  Musculoskeletal: Negative for arthralgias and back pain  Skin: Positive for rash  Negative for color change  Neurological: Negative for seizures and syncope  All other systems reviewed and are negative  Physical Exam  Physical Exam  Vitals and nursing note reviewed  Exam conducted with a chaperone present  Constitutional:       General: She is not in acute distress  Appearance: She is well-developed  HENT:      Head: Normocephalic and atraumatic  Eyes:      Conjunctiva/sclera: Conjunctivae normal    Cardiovascular:      Rate and Rhythm: Normal rate and regular rhythm  Heart sounds: No murmur heard  Pulmonary:      Effort: Pulmonary effort is normal  No respiratory distress  Breath sounds: Normal breath sounds  Abdominal:      Palpations: Abdomen is soft  Tenderness: There is no abdominal tenderness  Musculoskeletal:      Cervical back: Neck supple  Skin:     General: Skin is warm and dry  Comments: There are multiple scattered slightly raised erythematous lesions from 3-5 mm  They involve all extremities more commonly on the extensor surfaces  There is no sloughing  The central area there is a small pustule or punctate erythematous area  There is no bleeding  Some of the lesions are grouped within a small distribution while others are isolated  There is no classic urticaria  Negative Nikolsky sign   Neurological:      Mental Status: She is alert           Vital Signs  ED Triage Vitals [07/27/21 1442]   Temperature Pulse Respirations Blood Pressure SpO2   97 6 °F (36 4 °C) 102 16 133/76 98 %      Temp Source Heart Rate Source Patient Position - Orthostatic VS BP Location FiO2 (%)   Temporal Monitor Sitting Right arm --      Pain Score       --           Vitals:    07/27/21 1442   BP: 133/76   Pulse: 102   Patient Position - Orthostatic VS: Sitting         Visual Acuity      ED Medications  Medications - No data to display    Diagnostic Studies  Results Reviewed     None                 No orders to display              Procedures  Procedures         ED Course                             SBIRT 22yo+      Most Recent Value   SBIRT (22 yo +)   In order to provide better care to our patients, we are screening all of our patients for alcohol and drug use  Would it be okay to ask you these screening questions? Yes Filed at: 07/27/2021 1448   Initial Alcohol Screen: US AUDIT-C    1  How often do you have a drink containing alcohol?  0 Filed at: 07/27/2021 1448   2  How many drinks containing alcohol do you have on a typical day you are drinking? 0 Filed at: 07/27/2021 1448   3a  Male UNDER 65: How often do you have five or more drinks on one occasion? 0 Filed at: 07/27/2021 1448   3b  FEMALE Any Age, or MALE 65+: How often do you have 4 or more drinks on one occassion? 0 Filed at: 07/27/2021 1448   Audit-C Score  0 Filed at: 07/27/2021 1448   JEOVANY: How many times in the past year have you    Used an illegal drug or used a prescription medication for non-medical reasons? Never Filed at: 07/27/2021 1448                    MDM  Number of Diagnoses or Management Options     Amount and/or Complexity of Data Reviewed  Review and summarize past medical records: yes    Risk of Complications, Morbidity, and/or Mortality  Presenting problems: low  Diagnostic procedures: low  Management options: low    Patient Progress  Patient progress: stable    78-year-old female presenting with rash x2 weeks, not improved with outpatient steroid medication  Unclear etiology for rest   Based on the distribution and size and features I'm suspicious for some sort of insect bite or other external cause  No signs of joint effusion, history of gonorrhea  No fever  No signs of erythema multiform a/Albert Keegan syndrome/toxic epidermal necrolysis syndrome spectrum    Patient on the grouping, pruritus, worse at night her symptoms could be consistent with scabies  After discussion with patient at this time is agreed we will try a treatment with permethrin as well as continue the steroid cream and additionally add Benadryl and other oral antihistamine to her regimen  She does have dermatology referral and is advised to make a follow-up appointment  She is advised to return to ER should her symptoms worsen or she develops new or concerning features  She is agreeable to the plan this time prescription sent to the pharmacy  Will discharge  Disposition  Final diagnoses:   Rash and nonspecific skin eruption     Time reflects when diagnosis was documented in both MDM as applicable and the Disposition within this note     Time User Action Codes Description Comment    7/27/2021  2:59 PM Eliel Hans Add [R21] Rash and nonspecific skin eruption       ED Disposition     ED Disposition Condition Date/Time Comment    Discharge Stable Tue Jul 27, 2021  3:02 PM Lexine Million Follweiler discharge to home/self care  Follow-up Information     Follow up With Specialties Details Why Contact Info Additional Libia Montez, PABobyC Family Medicine Schedule an appointment as soon as possible for a visit  If symptoms worsen, dermatology referral [address] [place] [address] Emergency Department Emergency Medicine Go to  If symptoms worsen [facility] [id number]  [facility] Emergency Department, [address], [address]          Patient's Medications   Discharge Prescriptions    DIPHENHYDRAMINE (BENADRYL) 25 MG TABLET    Take 1 tablet (25 mg total) by mouth every 6 (six) hours       Start Date: 7/27/2021 End Date: --       Order Dose: 25 mg       Quantity: 20 tablet    Refills: 0    PERMETHRIN (ELIMITE) 5 % CREAM    Apply 30g to affected area once  Leave on for 8-14 hours then wash off  You may repeat with the other 30g in 1 week if symptoms continue  Start Date: 7/27/2021 End Date: --       Order Dose: --       Quantity: 60 g    Refills: 0     No discharge procedures on file      PDMP Review       Value Time User    PDMP Reviewed  Yes 7/22/2021 11:44 AM Martienz Rodriguez PA-C          ED Provider  Electronically Signed by           June Ohara DO  07/27/21 7624

## 2021-07-27 NOTE — DISCHARGE INSTRUCTIONS
Thank you for visiting the Emergency Department today  You are evaluated for a rash  I am not sure the cause of the rash  Based on the distribution and appearance there is suspicion that these be bites some type of insect or other organs  You had minimal relief with steroid cream   Considering the possibility of scabies or other insect we will proceed with 1 time treatment using a medication called permethrin  Please  prescription from her pharmacy  Apply half of the bottle or 30 g to skin including areas where the rash is present  Try to administer from head to toe  After application, allow the cream to remain on overnight  First thing in the morning you may shower and wash it off  This is the treatment for scabies but will also effectively treat other insect causes  You may use the remaining application 1 week later in the same manner if her symptoms persist     In the meantime, please continue the steroid cream provided previously  For itching and swelling also take Benadryl 25 mg by mouth every 6 hours  This may cause drowsiness  You may use another less drowsy antihistamine during the daytime if you prefer  If her symptoms fail to improve he must follow up with dermatologist as previously referred  Please make her appointment now as there can be a wait time  If her symptoms resolve with treatment you may need not refer to dermatology

## 2021-07-28 ENCOUNTER — TELEPHONE (OUTPATIENT)
Dept: PALLIATIVE MEDICINE | Facility: CLINIC | Age: 26
End: 2021-07-28

## 2021-07-28 NOTE — TELEPHONE ENCOUNTER
I called and left message for the patient to call the office back regarding a referral that was placed by her family doctor for her allergies

## 2021-07-30 ENCOUNTER — TELEPHONE (OUTPATIENT)
Dept: OTOLARYNGOLOGY | Facility: CLINIC | Age: 26
End: 2021-07-30

## 2021-07-30 NOTE — TELEPHONE ENCOUNTER
I called and left message for the patient to call the office back regarding an ENT consult for allergies

## 2021-08-24 ENCOUNTER — OFFICE VISIT (OUTPATIENT)
Dept: FAMILY MEDICINE CLINIC | Facility: CLINIC | Age: 26
End: 2021-08-24
Payer: COMMERCIAL

## 2021-08-24 VITALS
BODY MASS INDEX: 20.84 KG/M2 | HEART RATE: 106 BPM | WEIGHT: 117.6 LBS | SYSTOLIC BLOOD PRESSURE: 116 MMHG | HEIGHT: 63 IN | OXYGEN SATURATION: 97 % | DIASTOLIC BLOOD PRESSURE: 78 MMHG | TEMPERATURE: 97.1 F | RESPIRATION RATE: 18 BRPM

## 2021-08-24 DIAGNOSIS — M54.42 CHRONIC BILATERAL LOW BACK PAIN WITH BILATERAL SCIATICA: Primary | ICD-10-CM

## 2021-08-24 DIAGNOSIS — G89.29 CHRONIC BILATERAL LOW BACK PAIN WITH BILATERAL SCIATICA: Primary | ICD-10-CM

## 2021-08-24 DIAGNOSIS — M54.41 CHRONIC BILATERAL LOW BACK PAIN WITH BILATERAL SCIATICA: Primary | ICD-10-CM

## 2021-08-24 DIAGNOSIS — R49.0 HOARSENESS: ICD-10-CM

## 2021-08-24 PROCEDURE — T1015 CLINIC SERVICE: HCPCS | Performed by: FAMILY MEDICINE

## 2021-08-24 RX ORDER — SUCRALFATE ORAL 1 G/10ML
1000 SUSPENSION ORAL EVERY 6 HOURS SCHEDULED
Qty: 420 ML | Refills: 5 | Status: CANCELLED | OUTPATIENT
Start: 2021-08-24

## 2021-08-24 RX ORDER — OXYCODONE HYDROCHLORIDE 5 MG/1
TABLET ORAL
Qty: 42 TABLET | Refills: 0 | Status: SHIPPED | OUTPATIENT
Start: 2021-08-24 | End: 2021-09-14

## 2021-08-24 NOTE — PROGRESS NOTES
Assessment/Plan:     Diagnoses and all orders for this visit:    Chronic bilateral low back pain with bilateral sciatica  -     oxyCODONE (ROXICODONE) 5 mg immediate release tablet; Take 1 tablet (5 mg total) by mouth 3 (three) times a day for 7 days, THEN 1 tablet (5 mg total) 2 (two) times a day for 7 days, THEN 1 tablet (5 mg total) daily for 7 days  Max Daily Amount: 15 mg  Hoarseness    Other orders  -     Cancel: Diclofenac Sodium (VOLTAREN) 1 %; Apply 2 g topically 4 (four) times a day  -     Cancel: sucralfate (CARAFATE) 1 g/10 mL suspension; Take 10 mL (1,000 mg total) by mouth every 6 (six) hours  -     Cancel: Ambulatory referral to Gynecology; Future        Controlled Substance Review    [place] or [place]  reviewed: No red flags were identified; safe to proceed with prescription  [place] PDMP Review       Value Time User    PDMP Reviewed  Yes 8/24/2021  2:29 PM Johnnie Doll PA-C          - At this point we will wean off oxycodone; TID x 1 week, BID x 1 week, once daily x 1 week, then stop  Patient will follow up with pain management for further evaluation   - Advised to schedule with ENT as previously referred for allergies/hoarsness    Return if symptoms worsen or fail to improve  Subjective:        Patient ID: Jamshid Santiago is a 27 y o  female  Chief Complaint   Patient presents with    Medication Refill   Анна Steve is a 27year old female with history of chronic back pain and chronic abdominal pain, presenting for follow up and medication refill      Patient continues with abdominal pain and cramping, mildly controlled with oxycodone 5 mg, pantoprazole 40 mg BID and Carafate 1000mg q6 hours  She is using miralax prn for constipation  Denies N/V/D  Patient was scheduled for EGD and colonoscopy multiple times over the past year and has cancelled or no-showed these appointments    She has not rescheduled      She also continues with ongoing back pain worse with bending and lifting activities  Denies numbness, tingling, paresthesia  No bowel/bladder incontinence  She has been using lidocaine patches, diclofenac gel, and oxycodone 5 mg with some relief  Lumbar spine xray from 11/2020 with L4-L5 degenerative disc change  Patient was referred to pain management and is scheduled for 9/23/21  Patient has been prescribed Cymbalta for additional pain relief but has not started this  At last visit we did discuss weaning oxycodone due to lack of efficacy and that this medication is not meant to be used long term  Patient has been referred to ENT for further evaluation of allergies and hoarseness  She has not scheduled  States she is not taking any medications for these symptoms as she reports everything the has tried has made her symptoms worse, including allegra and benadryl        The following portions of the patient's history were reviewed and updated as appropriate: allergies, current medications, past family history, past medical history, past social history, past surgical history and problem list     Patient Active Problem List   Diagnosis    Ectopic pregnancy    Flank pain    Diarrhea    Tobacco abuse    Hypokalemia    Leukocytosis    Microscopic hematuria    Microalbuminuria    Bradycardia    Low HDL (under 40)    Hepatitis C antibody test positive    Gastroenteritis    Chronic tubulointerstitial nephritis    Abdominal pain    Dysmenorrhea    Nausea    Iron deficiency anemia    GERD (gastroesophageal reflux disease)    Ovarian cyst, left    Pelvic pain in female    Chronic bilateral low back pain with bilateral sciatica    Hoarseness       Current Outpatient Medications   Medication Sig Dispense Refill    albuterol (PROVENTIL HFA,VENTOLIN HFA) 90 mcg/act inhaler Inhale 2 puffs every 6 (six) hours as needed for wheezing or shortness of breath 1 Inhaler 5    Biotin 10 MG CAPS Take 1 capsule by mouth daily For acne/skin       carbamide peroxide (DEBROX) 6 5 % otic solution Administer 5 drops into both ears 2 (two) times a day 15 mL 0    Diclofenac Sodium (VOLTAREN) 1 % Apply 2 g topically 4 (four) times a day 200 g 5    Elastic Bandages & Supports (Wrist Splint/Elastic Left Med) MISC Use daily as needed (wrist pain) 1 each 0    lidocaine (LIDODERM) 5 % Apply 1 patch topically daily Remove & Discard patch within 12 hours or as directed by MD 30 patch 2    metroNIDAZOLE (METROGEL) 0 75 % vaginal gel INSERT 1 APPLICATORFUL VAGINALLY NIGHTLY FOR 5 DAYS      oxyCODONE (ROXICODONE) 5 mg immediate release tablet Take 1 tablet (5 mg total) by mouth 3 (three) times a day for 7 days, THEN 1 tablet (5 mg total) 2 (two) times a day for 7 days, THEN 1 tablet (5 mg total) daily for 7 days  Max Daily Amount: 15 mg  42 tablet 0    pantoprazole (PROTONIX) 40 mg tablet Take 1 tablet (40 mg total) by mouth daily 30 tablet 3    permethrin (ELIMITE) 5 % cream Apply 30g to affected area once  Leave on for 8-14 hours then wash off  You may repeat with the other 30g in 1 week if symptoms continue  60 g 0    polyethylene glycol (GLYCOLAX) 17 GM/SCOOP powder Take 17 g by mouth daily for 10 days 238 g 5    Restasis 0 05 % ophthalmic emulsion Administer 2 drops to both eyes 2 (two) times a day as needed      sucralfate (CARAFATE) 1 g/10 mL suspension Take 10 mL (1,000 mg total) by mouth every 6 (six) hours 420 mL 5    triamcinolone (KENALOG) 0 1 % cream Apply topically 2 (two) times a day 30 g 5    diphenhydrAMINE (BENADRYL) 25 mg tablet Take 1 tablet (25 mg total) by mouth every 6 (six) hours 20 tablet 0    DULoxetine (CYMBALTA) 20 mg capsule Take 1 capsule (20 mg total) by mouth daily 30 capsule 2    fexofenadine (ALLEGRA) 180 MG tablet Take 1 tablet (180 mg total) by mouth daily 30 tablet 2    nicotine (NICODERM CQ) 14 mg/24hr TD 24 hr patch Place 1 patch on the skin every 24 hours 28 patch 0     No current facility-administered medications for this visit          Past Medical History:   Diagnosis Date    Allergic     Ectopic pregnancy     GERD (gastroesophageal reflux disease)     History of unilateral fallopian tube excision     RIGHT removed    Ovarian cyst     Wears glasses         Past Surgical History:   Procedure Laterality Date    DILATION AND CURETTAGE OF UTERUS      ECTOPIC PREGNANCY SURGERY      LAPAROSCOPY      [place] LAP,DIAGNOSTIC ABDOMEN N/A 9/16/2017    Procedure: LAPAROSCOPY DIAGNOSTIC, left salpingectomy;  Surgeon: Cecily Valles MD;  Location:  MAIN OR;  Service: Gynecology    WISDOM TOOTH EXTRACTION      WISDOM TOOTH EXTRACTION Bilateral         Social History     Socioeconomic History    Marital status: Single     Spouse name: Not on file    Number of children: Not on file    Years of education: Not on file    Highest education level: Not on file   Occupational History    Not on file   Tobacco Use    Smoking status: Current Every Day Smoker     Packs/day: 1 00     Years: 6 00     Pack years: 6 00     Types: Cigarettes    Smokeless tobacco: Never Used   Vaping Use    Vaping Use: Never used   Substance and Sexual Activity    Alcohol use: Not Currently     Comment: "occasional"    Drug use: Yes     Types: Marijuana    Sexual activity: Yes     Partners: Male     Birth control/protection: None   Other Topics Concern    Not on file   Social History Narrative    Not on file     Social Determinants of Health     Financial Resource Strain: Low Risk     Difficulty of Paying Living Expenses: Not hard at all   Food Insecurity: No Food Insecurity    Worried About Running Out of Food in the Last Year: Never true    Enrique of Food in the Last Year: Never true   Transportation Needs: No Transportation Needs    Lack of Transportation (Medical): No    Lack of Transportation (Non-Medical):  No   Physical Activity: Unknown    Days of Exercise per Week: 3 days    Minutes of Exercise per Session: Not on file   Stress: No Stress Concern Present  Feeling of Stress : Only a little   Social Connections: Unknown    Frequency of Communication with Friends and Family: More than three times a week    Frequency of Social Gatherings with Friends and Family: More than three times a week    Attends Mu-ism Services: Never    Active Member of Clubs or Organizations: No    Attends Club or Organization Meetings: Never    Marital Status: Not on file   Intimate Partner Violence: Not At Risk    Fear of Current or Ex-Partner: No    Emotionally Abused: No    Physically Abused: No    Sexually Abused: No        Review of Systems   Constitutional: Negative for chills, diaphoresis and fever  HENT: Positive for voice change  Eyes: Negative for photophobia, pain, discharge, redness, itching and visual disturbance  Respiratory: Positive for cough  Negative for chest tightness, shortness of breath and wheezing  Cardiovascular: Negative for chest pain and palpitations  Gastrointestinal: Positive for abdominal pain  Negative for constipation, diarrhea, nausea and vomiting  Musculoskeletal: Positive for back pain and myalgias  Skin: Negative for rash and wound  Allergic/Immunologic: Positive for environmental allergies  Neurological: Negative for dizziness, syncope, light-headedness and headaches  Psychiatric/Behavioral: Negative for decreased concentration, dysphoric mood, self-injury and suicidal ideas  The patient is not nervous/anxious  Objective:      /78 (BP Location: Left arm, Patient Position: Sitting, Cuff Size: Adult)   Pulse (!) 106   Temp (!) 97 1 °F (36 2 °C) (Tympanic)   Resp 18   Ht 5' 3" (1 6 m)   Wt 53 3 kg (117 lb 9 6 oz)   LMP  (LMP Unknown)   SpO2 97%   BMI 20 83 kg/m²          Physical Exam  Vitals and nursing note reviewed  Constitutional:       General: She is not in acute distress  Appearance: Normal appearance  HENT:      Head: Normocephalic and atraumatic        Right Ear: Tympanic membrane, ear canal and external ear normal       Left Ear: Tympanic membrane, ear canal and external ear normal       Nose: Nose normal       Mouth/Throat:      Mouth: Mucous membranes are moist       Pharynx: Oropharynx is clear  No oropharyngeal exudate  Eyes:      Extraocular Movements: Extraocular movements intact  Conjunctiva/sclera: Conjunctivae normal       Pupils: Pupils are equal, round, and reactive to light  Cardiovascular:      Rate and Rhythm: Normal rate and regular rhythm  Heart sounds: Normal heart sounds  No murmur heard  Pulmonary:      Effort: Pulmonary effort is normal  No respiratory distress  Breath sounds: Normal breath sounds  No wheezing  Musculoskeletal:      Right lower leg: No edema  Left lower leg: No edema  Skin:     General: Skin is warm and dry  Neurological:      General: No focal deficit present  Mental Status: She is alert and oriented to person, place, and time  Cranial Nerves: No cranial nerve deficit  Motor: No weakness     Psychiatric:         Mood and Affect: Mood normal          Behavior: Behavior normal

## 2021-08-25 ENCOUNTER — TELEPHONE (OUTPATIENT)
Dept: FAMILY MEDICINE CLINIC | Facility: CLINIC | Age: 26
End: 2021-08-25

## 2021-11-23 ENCOUNTER — OFFICE VISIT (OUTPATIENT)
Dept: FAMILY MEDICINE CLINIC | Facility: CLINIC | Age: 26
End: 2021-11-23
Payer: COMMERCIAL

## 2021-11-23 VITALS
HEART RATE: 111 BPM | RESPIRATION RATE: 18 BRPM | OXYGEN SATURATION: 98 % | WEIGHT: 144.8 LBS | BODY MASS INDEX: 25.66 KG/M2 | TEMPERATURE: 99.6 F | HEIGHT: 63 IN | SYSTOLIC BLOOD PRESSURE: 138 MMHG | DIASTOLIC BLOOD PRESSURE: 90 MMHG

## 2021-11-23 DIAGNOSIS — R20.0 BILATERAL HAND NUMBNESS: Primary | ICD-10-CM

## 2021-11-23 DIAGNOSIS — G89.29 CHRONIC BILATERAL LOW BACK PAIN WITH BILATERAL SCIATICA: ICD-10-CM

## 2021-11-23 DIAGNOSIS — R21 RASH: ICD-10-CM

## 2021-11-23 DIAGNOSIS — M54.42 CHRONIC BILATERAL LOW BACK PAIN WITH BILATERAL SCIATICA: ICD-10-CM

## 2021-11-23 DIAGNOSIS — M79.89 BILATERAL HAND SWELLING: ICD-10-CM

## 2021-11-23 DIAGNOSIS — M54.41 CHRONIC BILATERAL LOW BACK PAIN WITH BILATERAL SCIATICA: ICD-10-CM

## 2021-11-23 DIAGNOSIS — Z12.4 SCREENING FOR CERVICAL CANCER: ICD-10-CM

## 2021-11-23 PROCEDURE — T1015 CLINIC SERVICE: HCPCS | Performed by: FAMILY MEDICINE

## 2021-11-23 RX ORDER — SODIUM FLUORIDE 6 MG/ML
PASTE, DENTIFRICE DENTAL
COMMUNITY
Start: 2021-11-17

## 2021-11-23 RX ORDER — METRONIDAZOLE 7.5 MG/G
GEL VAGINAL
Qty: 70 G | Status: CANCELLED | OUTPATIENT
Start: 2021-11-23

## 2021-11-23 RX ORDER — CYCLOSPORINE 0.5 MG/ML
2 EMULSION OPHTHALMIC 2 TIMES DAILY PRN
Qty: 0.4 ML | Refills: 5 | Status: CANCELLED | OUTPATIENT
Start: 2021-11-23

## 2021-11-23 RX ORDER — TRIAMCINOLONE ACETONIDE 1 MG/G
CREAM TOPICAL 2 TIMES DAILY
Qty: 30 G | Refills: 5 | Status: SHIPPED | OUTPATIENT
Start: 2021-11-23

## 2022-01-11 ENCOUNTER — TELEPHONE (OUTPATIENT)
Dept: FAMILY MEDICINE CLINIC | Facility: CLINIC | Age: 27
End: 2022-01-11

## 2022-01-11 NOTE — TELEPHONE ENCOUNTER
The last time I see any muscle relaxer prescribed was 8/2020  She will need an appointment either with us or pain management as previously referred if she feels she needs a refill at this time

## 2022-01-14 ENCOUNTER — OFFICE VISIT (OUTPATIENT)
Dept: FAMILY MEDICINE CLINIC | Facility: CLINIC | Age: 27
End: 2022-01-14
Payer: COMMERCIAL

## 2022-01-14 VITALS
BODY MASS INDEX: 26.19 KG/M2 | SYSTOLIC BLOOD PRESSURE: 126 MMHG | RESPIRATION RATE: 18 BRPM | TEMPERATURE: 98.5 F | HEIGHT: 63 IN | WEIGHT: 147.8 LBS | DIASTOLIC BLOOD PRESSURE: 86 MMHG | HEART RATE: 121 BPM | OXYGEN SATURATION: 97 %

## 2022-01-14 DIAGNOSIS — M54.42 ACUTE BILATERAL LOW BACK PAIN WITH LEFT-SIDED SCIATICA: Primary | ICD-10-CM

## 2022-01-14 DIAGNOSIS — M54.41 CHRONIC BILATERAL LOW BACK PAIN WITH BILATERAL SCIATICA: ICD-10-CM

## 2022-01-14 DIAGNOSIS — M54.42 CHRONIC BILATERAL LOW BACK PAIN WITH BILATERAL SCIATICA: ICD-10-CM

## 2022-01-14 DIAGNOSIS — G89.29 CHRONIC BILATERAL LOW BACK PAIN WITH BILATERAL SCIATICA: ICD-10-CM

## 2022-01-14 PROCEDURE — T1015 CLINIC SERVICE: HCPCS | Performed by: FAMILY MEDICINE

## 2022-01-14 RX ORDER — TIZANIDINE 4 MG/1
4 TABLET ORAL EVERY 8 HOURS PRN
Qty: 30 TABLET | Refills: 2 | Status: SHIPPED | OUTPATIENT
Start: 2022-01-14 | End: 2022-03-01 | Stop reason: HOSPADM

## 2022-01-14 NOTE — PROGRESS NOTES
Assessment/Plan:     Diagnoses and all orders for this visit:    Acute bilateral low back pain with left-sided sciatica  -     tiZANidine (ZANAFLEX) 4 mg tablet; Take 1 tablet (4 mg total) by mouth every 8 (eight) hours as needed for muscle spasms    Chronic bilateral low back pain with bilateral sciatica  -     Diclofenac Sodium (VOLTAREN) 1 %; Apply 2 g topically 4 (four) times a day        - Tizanidine prn spasms  May continue diclofenac gel prn  Follow up with pain management as referred  Return if symptoms worsen or fail to improve  Subjective:        Patient ID: Jordana Burgos is a 27 y o  female  Chief Complaint   Patient presents with    Back Pain     lumbar    Numbness     bilateral hands and arms/ intermittent       Brian Guzman is a 27year old female with history of chronic back pain, presenting with concern for back pain as well as bilateral hand numbness  Patient has had chronic lower back pain for >1 year  She has attended PT with transient relief  Was previously on oxycodone which was weaned off  Is currently using lidocaine patches and diclofenac gel prn  Has not followed up with pain management despite multiple referrals  She presents today with acute worsening of her low back pain and states that she believes she "pinched a nerve"  Endorses bilateral lower back pain with radiation down her left leg x 1 week  Denies bowel or bladder incontinence  Denies falls or injury  Has been wearing a back brace  She is requesting a refill for a muscle relaxer  Patient continues to endorse bilateral hand numbness and swelling  She notices this most when she wakes up in the morning, but symptoms do persist throughout the day  She feels weak and states she has been dropping things due to this  Denies rash or erythema  She has not tried anything for these symptoms  Patient is right hand dominant    Has not obtained xrays or labs which were ordered at her last visit for this complaint        The following portions of the patient's history were reviewed and updated as appropriate: allergies, current medications, past family history, past medical history, past social history, past surgical history and problem list     Patient Active Problem List   Diagnosis    Ectopic pregnancy    Flank pain    Diarrhea    Tobacco abuse    Hypokalemia    Leukocytosis    Microscopic hematuria    Microalbuminuria    Bradycardia    Low HDL (under 40)    Hepatitis C antibody test positive    Gastroenteritis    Chronic tubulointerstitial nephritis    Abdominal pain    Dysmenorrhea    Nausea    Iron deficiency anemia    GERD (gastroesophageal reflux disease)    Ovarian cyst, left    Pelvic pain in female    Chronic bilateral low back pain with bilateral sciatica    Hoarseness       Current Outpatient Medications   Medication Sig Dispense Refill    albuterol (PROVENTIL HFA,VENTOLIN HFA) 90 mcg/act inhaler Inhale 2 puffs every 6 (six) hours as needed for wheezing or shortness of breath 1 Inhaler 5    Diclofenac Sodium (VOLTAREN) 1 % Apply 2 g topically 4 (four) times a day 200 g 5    diphenhydrAMINE (BENADRYL) 25 mg tablet Take 1 tablet (25 mg total) by mouth every 6 (six) hours 20 tablet 0    DULoxetine (CYMBALTA) 20 mg capsule Take 1 capsule (20 mg total) by mouth daily 30 capsule 2    fexofenadine (ALLEGRA) 180 MG tablet Take 1 tablet (180 mg total) by mouth daily 30 tablet 2    lidocaine (LIDODERM) 5 % Apply 1 patch topically daily Remove & Discard patch within 12 hours or as directed by MD 30 patch 2    metroNIDAZOLE (METROGEL) 0 75 % vaginal gel INSERT 1 APPLICATORFUL VAGINALLY NIGHTLY FOR 5 DAYS      pantoprazole (PROTONIX) 40 mg tablet Take 1 tablet (40 mg total) by mouth daily 30 tablet 3    polyethylene glycol (GLYCOLAX) 17 GM/SCOOP powder Take 17 g by mouth daily for 10 days 238 g 5    PreviDent 5000 Booster Plus 1 1 % PSTE       Restasis 0 05 % ophthalmic emulsion Administer 2 drops to both eyes 2 (two) times a day as needed      sucralfate (CARAFATE) 1 g/10 mL suspension Take 10 mL (1,000 mg total) by mouth every 6 (six) hours 420 mL 5    triamcinolone (KENALOG) 0 1 % cream Apply topically 2 (two) times a day 30 g 5    Biotin 10 MG CAPS Take 1 capsule by mouth daily For acne/skin  (Patient not taking: Reported on 11/23/2021 )      carbamide peroxide (DEBROX) 6 5 % otic solution Administer 5 drops into both ears 2 (two) times a day (Patient not taking: Reported on 11/23/2021 ) 15 mL 0    Elastic Bandages & Supports (Wrist Brace/Left Medium) MISC Use daily (Patient not taking: Reported on 1/14/2022 ) 1 each 0    Elastic Bandages & Supports (Wrist Brace/Right Medium) MISC Use daily (Patient not taking: Reported on 1/14/2022 ) 1 each 0    nicotine (NICODERM CQ) 14 mg/24hr TD 24 hr patch Place 1 patch on the skin every 24 hours (Patient not taking: Reported on 11/23/2021 ) 28 patch 0    permethrin (ELIMITE) 5 % cream Apply 30g to affected area once  Leave on for 8-14 hours then wash off  You may repeat with the other 30g in 1 week if symptoms continue  (Patient not taking: Reported on 11/23/2021 ) 60 g 0    tiZANidine (ZANAFLEX) 4 mg tablet Take 1 tablet (4 mg total) by mouth every 8 (eight) hours as needed for muscle spasms 30 tablet 2     No current facility-administered medications for this visit          Past Medical History:   Diagnosis Date    Allergic     Ectopic pregnancy     GERD (gastroesophageal reflux disease)     History of unilateral fallopian tube excision     RIGHT removed    Ovarian cyst     Wears glasses         Past Surgical History:   Procedure Laterality Date    DILATION AND CURETTAGE OF UTERUS      ECTOPIC PREGNANCY SURGERY      LAPAROSCOPY      [place] LAP,DIAGNOSTIC ABDOMEN N/A 9/16/2017    Procedure: LAPAROSCOPY DIAGNOSTIC, left salpingectomy;  Surgeon: Lexx Antoine MD;  Location: [facility];  Service: Gynecology    [address] [place] [address] EXTRACTION      WISDOM TOOTH EXTRACTION Bilateral         Social History     Socioeconomic History    Marital status: Single     Spouse name: Not on file    Number of children: Not on file    Years of education: Not on file    Highest education level: Not on file   Occupational History    Not on file   Tobacco Use    Smoking status: Current Every Day Smoker     Packs/day: 1 00     Years: 6 00     Pack years: 6 00     Types: Cigarettes    Smokeless tobacco: Never Used   Vaping Use    Vaping Use: Never used   Substance and Sexual Activity    Alcohol use: Not Currently     Comment: "occasional"    Drug use: Not Currently     Types: Marijuana    Sexual activity: Yes     Partners: Male     Birth control/protection: None   Other Topics Concern    Not on file   Social History Narrative    Not on file     Social Determinants of Health     Financial Resource Strain: Low Risk     Difficulty of Paying Living Expenses: Not hard at all   Food Insecurity: No Food Insecurity    Worried About Running Out of Food in the Last Year: Never true    Enrique of Food in the Last Year: Never true   Transportation Needs: No Transportation Needs    Lack of Transportation (Medical): No    Lack of Transportation (Non-Medical): No   Physical Activity: Unknown    Days of Exercise per Week: 3 days    Minutes of Exercise per Session: Not on file   Stress: No Stress Concern Present    Feeling of Stress :  Only a little   Social Connections: Unknown    Frequency of Communication with Friends and Family: More than three times a week    Frequency of Social Gatherings with Friends and Family: More than three times a week    Attends Yarsani Services: Never    Active Member of Clubs or Organizations: No    Attends Club or Organization Meetings: Never    Marital Status: Not on file   Intimate Partner Violence: Not At Risk    Fear of Current or Ex-Partner: No    Emotionally Abused: No    Physically Abused: No    Sexually Abused: No   Housing Stability: Low Risk     Unable to Pay for Housing in the Last Year: No    Number of Places Lived in the Last Year: 1    Unstable Housing in the Last Year: No        Review of Systems   Constitutional: Negative for chills, diaphoresis and fever  Respiratory: Negative for cough, chest tightness, shortness of breath and wheezing  Cardiovascular: Negative for chest pain, palpitations and leg swelling  Gastrointestinal: Negative for abdominal pain, blood in stool, constipation, diarrhea, nausea and vomiting  Genitourinary: Negative for difficulty urinating, dysuria, frequency, hematuria and urgency  Musculoskeletal: Positive for back pain and myalgias  Negative for neck pain and neck stiffness  Skin: Negative for rash and wound  Neurological: Positive for numbness  Negative for dizziness, syncope, weakness, light-headedness and headaches  Objective:      /86 (BP Location: Left arm, Patient Position: Sitting, Cuff Size: Adult)   Pulse (!) 121   Temp 98 5 °F (36 9 °C) (Temporal)   Resp 18   Ht 5' 3" (1 6 m)   Wt 67 kg (147 lb 12 8 oz)   SpO2 97%   BMI 26 18 kg/m²          Physical Exam  Vitals and nursing note reviewed  Constitutional:       General: She is not in acute distress  Appearance: Normal appearance  HENT:      Head: Normocephalic and atraumatic  Eyes:      Extraocular Movements: Extraocular movements intact  Conjunctiva/sclera: Conjunctivae normal       Pupils: Pupils are equal, round, and reactive to light  Cardiovascular:      Rate and Rhythm: Regular rhythm  Tachycardia present  Heart sounds: Normal heart sounds  No murmur heard  Pulmonary:      Effort: Pulmonary effort is normal  No respiratory distress  Breath sounds: Normal breath sounds  No wheezing  Musculoskeletal:         General: No swelling, tenderness or deformity  Normal range of motion  Cervical back: Normal range of motion and neck supple  Right lower leg: No edema  Left lower leg: No edema  Lymphadenopathy:      Cervical: No cervical adenopathy  Skin:     General: Skin is warm and dry  Neurological:      General: No focal deficit present  Mental Status: She is alert and oriented to person, place, and time  Cranial Nerves: No cranial nerve deficit  Sensory: No sensory deficit  Motor: No weakness     Psychiatric:         Mood and Affect: Mood normal          Behavior: Behavior normal

## 2022-02-05 ENCOUNTER — APPOINTMENT (EMERGENCY)
Dept: CT IMAGING | Facility: HOSPITAL | Age: 27
End: 2022-02-05
Payer: COMMERCIAL

## 2022-02-05 ENCOUNTER — HOSPITAL ENCOUNTER (EMERGENCY)
Facility: HOSPITAL | Age: 27
Discharge: HOME | End: 2022-02-05
Attending: EMERGENCY MEDICINE
Payer: COMMERCIAL

## 2022-02-05 VITALS
SYSTOLIC BLOOD PRESSURE: 102 MMHG | WEIGHT: 152.56 LBS | BODY MASS INDEX: 27.03 KG/M2 | DIASTOLIC BLOOD PRESSURE: 66 MMHG | OXYGEN SATURATION: 99 % | HEART RATE: 74 BPM | HEIGHT: 63 IN | RESPIRATION RATE: 18 BRPM | TEMPERATURE: 96.8 F

## 2022-02-05 DIAGNOSIS — N20.0 NEPHROLITHIASIS: ICD-10-CM

## 2022-02-05 DIAGNOSIS — M54.50 LOW BACK PAIN: Primary | ICD-10-CM

## 2022-02-05 DIAGNOSIS — S39.012A STRAIN OF LUMBAR REGION, INITIAL ENCOUNTER: ICD-10-CM

## 2022-02-05 LAB
ALBUMIN SERPL BCP-MCNC: 4 G/DL (ref 3.5–5)
ALP SERPL-CCNC: 60 U/L (ref 46–116)
ALT SERPL W P-5'-P-CCNC: 22 U/L (ref 12–78)
ANION GAP SERPL CALCULATED.3IONS-SCNC: 9 MMOL/L (ref 4–13)
AST SERPL W P-5'-P-CCNC: 8 U/L (ref 5–45)
BACTERIA UR QL AUTO: ABNORMAL /HPF
BASOPHILS # BLD AUTO: 0.08 THOUSANDS/ΜL (ref 0–0.1)
BASOPHILS NFR BLD AUTO: 1 % (ref 0–1)
BILIRUB SERPL-MCNC: 0.14 MG/DL (ref 0.2–1)
BILIRUB UR QL STRIP: NEGATIVE
BUN SERPL-MCNC: 10 MG/DL (ref 5–25)
CALCIUM SERPL-MCNC: 8.6 MG/DL (ref 8.3–10.1)
CHLORIDE SERPL-SCNC: 102 MMOL/L (ref 100–108)
CLARITY UR: ABNORMAL
CO2 SERPL-SCNC: 28 MMOL/L (ref 21–32)
COLOR UR: ABNORMAL
CREAT SERPL-MCNC: 0.76 MG/DL (ref 0.6–1.3)
EOSINOPHIL # BLD AUTO: 0.27 THOUSAND/ΜL (ref 0–0.61)
EOSINOPHIL NFR BLD AUTO: 3 % (ref 0–6)
ERYTHROCYTE [DISTWIDTH] IN BLOOD BY AUTOMATED COUNT: 11.6 % (ref 11.6–15.1)
EXT PREG TEST URINE: NEGATIVE
EXT. CONTROL ED NAV: NORMAL
GFR SERPL CREATININE-BSD FRML MDRD: 107 ML/MIN/1.73SQ M
GLUCOSE SERPL-MCNC: 124 MG/DL (ref 65–140)
GLUCOSE UR STRIP-MCNC: NEGATIVE MG/DL
HCT VFR BLD AUTO: 44.9 % (ref 34.8–46.1)
HGB BLD-MCNC: 14.9 G/DL (ref 11.5–15.4)
HGB UR QL STRIP.AUTO: NEGATIVE
IMM GRANULOCYTES # BLD AUTO: 0.04 THOUSAND/UL (ref 0–0.2)
IMM GRANULOCYTES NFR BLD AUTO: 0 % (ref 0–2)
KETONES UR STRIP-MCNC: NEGATIVE MG/DL
LEUKOCYTE ESTERASE UR QL STRIP: ABNORMAL
LIPASE SERPL-CCNC: 122 U/L (ref 73–393)
LYMPHOCYTES # BLD AUTO: 4.03 THOUSANDS/ΜL (ref 0.6–4.47)
LYMPHOCYTES NFR BLD AUTO: 39 % (ref 14–44)
MCH RBC QN AUTO: 30.8 PG (ref 26.8–34.3)
MCHC RBC AUTO-ENTMCNC: 33.2 G/DL (ref 31.4–37.4)
MCV RBC AUTO: 93 FL (ref 82–98)
MONOCYTES # BLD AUTO: 0.65 THOUSAND/ΜL (ref 0.17–1.22)
MONOCYTES NFR BLD AUTO: 6 % (ref 4–12)
NEUTROPHILS # BLD AUTO: 5.27 THOUSANDS/ΜL (ref 1.85–7.62)
NEUTS SEG NFR BLD AUTO: 51 % (ref 43–75)
NITRITE UR QL STRIP: NEGATIVE
NON-SQ EPI CELLS URNS QL MICRO: ABNORMAL /HPF
NRBC BLD AUTO-RTO: 0 /100 WBCS
PH UR STRIP.AUTO: 6 [PH]
PLATELET # BLD AUTO: 406 THOUSANDS/UL (ref 149–390)
PMV BLD AUTO: 9.3 FL (ref 8.9–12.7)
POTASSIUM SERPL-SCNC: 4 MMOL/L (ref 3.5–5.3)
PROT SERPL-MCNC: 7.1 G/DL (ref 6.4–8.2)
PROT UR STRIP-MCNC: NEGATIVE MG/DL
RBC # BLD AUTO: 4.84 MILLION/UL (ref 3.81–5.12)
RBC #/AREA URNS AUTO: ABNORMAL /HPF
SODIUM SERPL-SCNC: 139 MMOL/L (ref 136–145)
SP GR UR STRIP.AUTO: 1.01 (ref 1–1.03)
UROBILINOGEN UR QL STRIP.AUTO: 0.2 E.U./DL
WBC # BLD AUTO: 10.34 THOUSAND/UL (ref 4.31–10.16)
WBC #/AREA URNS AUTO: ABNORMAL /HPF

## 2022-02-05 PROCEDURE — 36415 COLL VENOUS BLD VENIPUNCTURE: CPT | Performed by: EMERGENCY MEDICINE

## 2022-02-05 PROCEDURE — 99284 EMERGENCY DEPT VISIT MOD MDM: CPT

## 2022-02-05 PROCEDURE — 80053 COMPREHEN METABOLIC PANEL: CPT | Performed by: EMERGENCY MEDICINE

## 2022-02-05 PROCEDURE — 96366 THER/PROPH/DIAG IV INF ADDON: CPT

## 2022-02-05 PROCEDURE — 74176 CT ABD & PELVIS W/O CONTRAST: CPT

## 2022-02-05 PROCEDURE — 96375 TX/PRO/DX INJ NEW DRUG ADDON: CPT

## 2022-02-05 PROCEDURE — 96365 THER/PROPH/DIAG IV INF INIT: CPT

## 2022-02-05 PROCEDURE — 81001 URINALYSIS AUTO W/SCOPE: CPT | Performed by: EMERGENCY MEDICINE

## 2022-02-05 PROCEDURE — 99284 EMERGENCY DEPT VISIT MOD MDM: CPT | Performed by: EMERGENCY MEDICINE

## 2022-02-05 PROCEDURE — G1004 CDSM NDSC: HCPCS

## 2022-02-05 PROCEDURE — 83690 ASSAY OF LIPASE: CPT | Performed by: EMERGENCY MEDICINE

## 2022-02-05 PROCEDURE — 81025 URINE PREGNANCY TEST: CPT | Performed by: EMERGENCY MEDICINE

## 2022-02-05 PROCEDURE — 85025 COMPLETE CBC W/AUTO DIFF WBC: CPT | Performed by: EMERGENCY MEDICINE

## 2022-02-05 PROCEDURE — 96376 TX/PRO/DX INJ SAME DRUG ADON: CPT

## 2022-02-05 RX ORDER — FENTANYL CITRATE 50 UG/ML
50 INJECTION, SOLUTION INTRAMUSCULAR; INTRAVENOUS ONCE
Status: COMPLETED | OUTPATIENT
Start: 2022-02-05 | End: 2022-02-05

## 2022-02-05 RX ORDER — ONDANSETRON 2 MG/ML
4 INJECTION INTRAMUSCULAR; INTRAVENOUS ONCE
Status: COMPLETED | OUTPATIENT
Start: 2022-02-05 | End: 2022-02-05

## 2022-02-05 RX ADMIN — ONDANSETRON 4 MG: 2 INJECTION INTRAMUSCULAR; INTRAVENOUS at 11:24

## 2022-02-05 RX ADMIN — FENTANYL CITRATE 50 MCG: 50 INJECTION, SOLUTION INTRAMUSCULAR; INTRAVENOUS at 10:44

## 2022-02-05 RX ADMIN — FENTANYL CITRATE 50 MCG: 50 INJECTION, SOLUTION INTRAMUSCULAR; INTRAVENOUS at 08:56

## 2022-02-05 RX ADMIN — SODIUM CHLORIDE, SODIUM LACTATE, POTASSIUM CHLORIDE, AND CALCIUM CHLORIDE 1000 ML: .6; .31; .03; .02 INJECTION, SOLUTION INTRAVENOUS at 08:56

## 2022-02-05 RX ADMIN — SODIUM CHLORIDE, SODIUM LACTATE, POTASSIUM CHLORIDE, AND CALCIUM CHLORIDE 1000 ML: .6; .31; .03; .02 INJECTION, SOLUTION INTRAVENOUS at 11:24

## 2022-02-05 NOTE — ED PROVIDER NOTES
History  Chief Complaint   Patient presents with    Back Pain     having left sided back pain "in kidney area for 1 week and my hands are swelling"     Patient is a 29-year-old female complaining of left-sided back pain and flank pain  Symptoms began approximately 1 week ago  She had noted other symptoms such as pains in her joints in her hands appeared swollen  No recent travel injury or recent illness  Patient does smoke cigarettes smokes marijuana  Denies any hematuria or dysuria  No bowel or bladder incontinence or retention  No perineal anesthesia  Pain is worse with movement and bending and twisting  Relieved by remaining still  No history of kidney stones  Denies the fevers or chills  Prior to Admission Medications   Prescriptions Last Dose Informant Patient Reported? Taking?    Biotin 10 MG CAPS Not Taking at Unknown time  Yes No   Sig: Take 1 capsule by mouth daily For acne/skin    Patient not taking: Reported on 11/23/2021    DULoxetine (CYMBALTA) 20 mg capsule Not Taking at Unknown time  No No   Sig: Take 1 capsule (20 mg total) by mouth daily   Patient not taking: Reported on 2/5/2022    Diclofenac Sodium (VOLTAREN) 1 %   No Yes   Sig: Apply 2 g topically 4 (four) times a day   Elastic Bandages & Supports (Wrist Brace/Left Medium) MISC Not Taking at Unknown time  No No   Sig: Use daily   Patient not taking: Reported on 1/14/2022    Elastic Bandages & Supports (Wrist Brace/Right Medium) MISC Not Taking at Unknown time  No No   Sig: Use daily   Patient not taking: Reported on 1/14/2022    PreviDent 5000 Booster Plus 1 1 % PSTE   Yes No   Restasis 0 05 % ophthalmic emulsion   Yes No   Sig: Administer 2 drops to both eyes 2 (two) times a day as needed   albuterol (PROVENTIL HFA,VENTOLIN HFA) 90 mcg/act inhaler Not Taking at Unknown time  No No   Sig: Inhale 2 puffs every 6 (six) hours as needed for wheezing or shortness of breath   Patient not taking: Reported on 2/5/2022    carbamide peroxide (DEBROX) 6 5 % otic solution   No No   Sig: Administer 5 drops into both ears 2 (two) times a day   Patient not taking: Reported on 11/23/2021    diphenhydrAMINE (BENADRYL) 25 mg tablet Not Taking at Unknown time  No No   Sig: Take 1 tablet (25 mg total) by mouth every 6 (six) hours   Patient not taking: Reported on 2/5/2022    fexofenadine (ALLEGRA) 180 MG tablet   No No   Sig: Take 1 tablet (180 mg total) by mouth daily   lidocaine (LIDODERM) 5 %   No No   Sig: Apply 1 patch topically daily Remove & Discard patch within 12 hours or as directed by MD   metroNIDAZOLE (METROGEL) 0 75 % vaginal gel   Yes No   Sig: INSERT 1 APPLICATORFUL VAGINALLY NIGHTLY FOR 5 DAYS   nicotine (NICODERM CQ) 14 mg/24hr TD 24 hr patch   No No   Sig: Place 1 patch on the skin every 24 hours   Patient not taking: Reported on 11/23/2021    pantoprazole (PROTONIX) 40 mg tablet   No No   Sig: Take 1 tablet (40 mg total) by mouth daily   permethrin (ELIMITE) 5 % cream   No No   Sig: Apply 30g to affected area once  Leave on for 8-14 hours then wash off  You may repeat with the other 30g in 1 week if symptoms continue     Patient not taking: Reported on 11/23/2021    polyethylene glycol (GLYCOLAX) 17 GM/SCOOP powder   No No   Sig: Take 17 g by mouth daily for 10 days   sucralfate (CARAFATE) 1 g/10 mL suspension   No No   Sig: Take 10 mL (1,000 mg total) by mouth every 6 (six) hours   tiZANidine (ZANAFLEX) 4 mg tablet   No No   Sig: Take 1 tablet (4 mg total) by mouth every 8 (eight) hours as needed for muscle spasms   triamcinolone (KENALOG) 0 1 % cream   No No   Sig: Apply topically 2 (two) times a day      Facility-Administered Medications: None       Past Medical History:   Diagnosis Date    Allergic     Ectopic pregnancy     GERD (gastroesophageal reflux disease)     History of unilateral fallopian tube excision     RIGHT removed    Ovarian cyst     Wears glasses        Past Surgical History:   Procedure Laterality Date    DILATION AND CURETTAGE OF UTERUS      ECTOPIC PREGNANCY SURGERY      LAPAROSCOPY      SD LAP,DIAGNOSTIC ABDOMEN N/A 9/16/2017    Procedure: LAPAROSCOPY DIAGNOSTIC, left salpingectomy;  Surgeon: Peterson Rojas MD;  Location: [facility];  Service: Gynecology    WISDOM TOOTH EXTRACTION      WISDOM TOOTH EXTRACTION Bilateral        Family History   Problem Relation Age of Onset    No Known Problems Mother     No Known Problems Father      I have reviewed and agree with the history as documented  E-Cigarette/Vaping    E-Cigarette Use Never User      E-Cigarette/Vaping Substances    Nicotine No     THC No     CBD No     Flavoring No     Other No     Unknown No      Social History     Tobacco Use    Smoking status: Current Every Day Smoker     Packs/day: 1 00     Years: 6 00     Pack years: 6 00     Types: Cigarettes    Smokeless tobacco: Never Used   Vaping Use    Vaping Use: Never used   Substance Use Topics    Alcohol use: Not Currently     Comment: "occasional"    Drug use: Yes     Types: Marijuana       Review of Systems   Constitutional: Negative for appetite change, chills, fatigue, fever and unexpected weight change  HENT: Negative for congestion, ear pain, rhinorrhea and sore throat  Eyes: Negative for pain and visual disturbance  Respiratory: Negative for cough, chest tightness, shortness of breath and wheezing  Cardiovascular: Negative for chest pain, palpitations and leg swelling  Gastrointestinal: Positive for nausea  Negative for abdominal pain, constipation, diarrhea and vomiting  Genitourinary: Negative for difficulty urinating, dysuria, frequency, hematuria, menstrual problem, pelvic pain, vaginal bleeding and vaginal discharge  Musculoskeletal: Positive for back pain  Negative for arthralgias and neck pain  Skin: Negative for color change and rash  Neurological: Negative for dizziness, seizures, syncope, light-headedness and headaches  Psychiatric/Behavioral: Negative for confusion and sleep disturbance  All other systems reviewed and are negative  Physical Exam  Physical Exam  Vitals and nursing note reviewed  Constitutional:       General: She is not in acute distress  Appearance: Normal appearance  She is well-developed and normal weight  She is not ill-appearing, toxic-appearing or diaphoretic  HENT:      Head: Normocephalic and atraumatic  Nose: Nose normal       Mouth/Throat:      Mouth: Mucous membranes are moist       Pharynx: Oropharynx is clear  Eyes:      General: No scleral icterus  Extraocular Movements: Extraocular movements intact  Conjunctiva/sclera: Conjunctivae normal    Cardiovascular:      Rate and Rhythm: Normal rate and regular rhythm  Pulses: Normal pulses  Heart sounds: Normal heart sounds  No murmur heard  Pulmonary:      Effort: Pulmonary effort is normal  No respiratory distress  Breath sounds: Normal breath sounds  No wheezing or rales  Chest:      Chest wall: No tenderness  Abdominal:      General: Bowel sounds are normal  There is no distension  Palpations: Abdomen is soft  Tenderness: There is no abdominal tenderness  There is left CVA tenderness  There is no right CVA tenderness, guarding or rebound  Musculoskeletal:         General: No tenderness, deformity or signs of injury  Normal range of motion  Cervical back: Normal range of motion and neck supple  Right lower leg: No edema  Left lower leg: No edema  Skin:     General: Skin is warm and dry  Capillary Refill: Capillary refill takes less than 2 seconds  Findings: No bruising, erythema, lesion or rash  Comments: No zoster rash   Neurological:      General: No focal deficit present  Mental Status: She is alert and oriented to person, place, and time     Psychiatric:         Mood and Affect: Mood normal          Behavior: Behavior normal          Vital Signs  ED Triage Vitals   Temperature Pulse Respirations Blood Pressure SpO2   02/05/22 0826 02/05/22 0827 02/05/22 0826 02/05/22 0827 02/05/22 0827   (!) 96 8 °F (36 °C) 90 18 125/78 100 %      Temp Source Heart Rate Source Patient Position - Orthostatic VS BP Location FiO2 (%)   02/05/22 0826 02/05/22 0827 02/05/22 0827 02/05/22 0827 --   Temporal Monitor Sitting Left arm       Pain Score       02/05/22 0826       8           Vitals:    02/05/22 0930 02/05/22 1000 02/05/22 1045 02/05/22 1100   BP: 119/76 112/76 113/66 109/69   Pulse: 81 74 73 69   Patient Position - Orthostatic VS: Sitting Sitting Sitting Lying         Visual Acuity      ED Medications  Medications   lactated ringers bolus 1,000 mL (has no administration in time range)   ondansetron (ZOFRAN) injection 4 mg (has no administration in time range)   lactated ringers bolus 1,000 mL (0 mL Intravenous Stopped 2/5/22 0956)   fentanyl citrate (PF) 100 MCG/2ML 50 mcg (50 mcg Intravenous Given 2/5/22 0856)   fentanyl citrate (PF) 100 MCG/2ML 50 mcg (50 mcg Intravenous Given 2/5/22 1044)       Diagnostic Studies  Results Reviewed     Procedure Component Value Units Date/Time    Comprehensive metabolic panel [[id number]]  (Abnormal) Collected: 02/05/22 0855    Lab Status: Final result Specimen: Blood from Arm, Right Updated: 02/05/22 0920     Sodium 139 mmol/L      Potassium 4 0 mmol/L      Chloride 102 mmol/L      CO2 28 mmol/L      ANION GAP 9 mmol/L      BUN 10 mg/dL      Creatinine 0 76 mg/dL      Glucose 124 mg/dL      Calcium 8 6 mg/dL      AST 8 U/L      ALT 22 U/L      Alkaline Phosphatase 60 U/L      Total Protein 7 1 g/dL      Albumin 4 0 g/dL      Total Bilirubin 0 14 mg/dL      eGFR 107 ml/min/1 73sq m     Narrative:      [place] guidelines for Chronic Kidney Disease (CKD):     Stage 1 with normal or high GFR (GFR > 90 mL/min/1 73 square meters)    Stage 2 Mild CKD (GFR = 60-89 mL/min/1 73 square meters)    Stage 3A Moderate CKD (GFR = 45-59 mL/min/1 73 square meters)    Stage 3B Moderate CKD (GFR = 30-44 mL/min/1 73 square meters)    Stage 4 Severe CKD (GFR = 15-29 mL/min/1 73 square meters)    Stage 5 End Stage CKD (GFR <15 mL/min/1 73 square meters)  Note: GFR calculation is accurate only with a steady state creatinine    Lipase [[id number]]  (Normal) Collected: 02/05/22 0855    Lab Status: Final result Specimen: Blood from Arm, Right Updated: 02/05/22 0920     Lipase 122 u/L     CBC and differential [[id number]]  (Abnormal) Collected: 02/05/22 0855    Lab Status: Final result Specimen: Blood from Arm, Right Updated: 02/05/22 0901     WBC 10 34 Thousand/uL      RBC 4 84 Million/uL      Hemoglobin 14 9 g/dL      Hematocrit 44 9 %      MCV 93 fL      MCH 30 8 pg      MCHC 33 2 g/dL      RDW 11 6 %      MPV 9 3 fL      Platelets 079 Thousands/uL      nRBC 0 /100 WBCs      Neutrophils Relative 51 %      Immat GRANS % 0 %      Lymphocytes Relative 39 %      Monocytes Relative 6 %      Eosinophils Relative 3 %      Basophils Relative 1 %      Neutrophils Absolute 5 27 Thousands/µL      Immature Grans Absolute 0 04 Thousand/uL      Lymphocytes Absolute 4 03 Thousands/µL      Monocytes Absolute 0 65 Thousand/µL      Eosinophils Absolute 0 27 Thousand/µL      Basophils Absolute 0 08 Thousands/µL     Urine Microscopic [[id number]]  (Abnormal) Collected: 02/05/22 0842    Lab Status: Final result Specimen: Urine, Clean Catch Updated: 02/05/22 0851     RBC, UA 1-2 /hpf      WBC, UA 4-10 /hpf      Epithelial Cells Moderate /hpf      Bacteria, UA Occasional /hpf     UA w Reflex to Microscopic w Reflex to Culture [[id number]]  (Abnormal) Collected: 02/05/22 0842    Lab Status: Final result Specimen: Urine, Clean Catch Updated: 02/05/22 0848     Color, UA Light Yellow     Clarity, UA Cloudy     Specific Gravity, UA 1 015     pH, UA 6 0     Leukocytes, UA Trace     Nitrite, UA Negative     Protein, UA Negative mg/dl      Glucose, UA Negative mg/dl      Ketones, UA Negative mg/dl      Urobilinogen, UA 0 2 E U /dl      Bilirubin, UA Negative     Blood, UA Negative    POCT pregnancy, urine [[id number]]  (Normal) Resulted: 02/05/22 0845    Lab Status: Final result Updated: 02/05/22 0845     EXT PREG TEST UR (Ref: Negative) negative     Control valid                 CT renal stone study abdomen pelvis without contrast   Final Result by Gloria Lunsford MD (02/05 1045)         1  Tiny nonobstructing calculus in the right kidney  No additional calculi  No collecting system dilatation  2   Diverticulosis  No evidence of acute diverticulitis  Workstation performed: [id number]                    Procedures  Procedures         ED Course                               SBIRT 22yo+      Most Recent Value   SBIRT (24 yo +)    In order to provide better care to our patients, we are screening all of our patients for alcohol and drug use  Would it be okay to ask you these screening questions? Yes Filed at: 02/05/2022 2531   Initial Alcohol Screen: US AUDIT-C     1  How often do you have a drink containing alcohol? 0 Filed at: 02/05/2022 0829   2  How many drinks containing alcohol do you have on a typical day you are drinking? 0 Filed at: 02/05/2022 0829   3a  Male UNDER 65: How often do you have five or more drinks on one occasion? 0 Filed at: 02/05/2022 0829   3b  FEMALE Any Age, or MALE 65+: How often do you have 4 or more drinks on one occassion? 0 Filed at: 02/05/2022 5943   Audit-C Score 0 Filed at: 02/05/2022 4377   JEOVANY: How many times in the past year have you    Used an illegal drug or used a prescription medication for non-medical reasons?  Never Filed at: 02/05/2022 6516                    MDM    Disposition  Final diagnoses:   Low back pain   Strain of lumbar region, initial encounter   Nephrolithiasis     Time reflects when diagnosis was documented in both MDM as applicable and the Disposition within this note     Time User Action Codes Description Comment    2/5/2022 11:00 AM Manju Grumbling T Add [M54 50] Low back pain     2/5/2022 11:00 AM Manju Grumbling T Add [S39 012A] Strain of lumbar region, initial encounter     2/5/2022 11:00 AM Manju Grumbling T Add [N20 0] Nephrolithiasis       ED Disposition     ED Disposition Condition Date/Time Comment    Discharge Stable Sat Feb 5, 2022 11:00 AM Davie Dumont discharge to home/self care  Follow-up Information     Follow up With Specialties Details Why Contact Info Additional Libia 46, PA-C Family Medicine Schedule an appointment as soon as possible for a visit   [address] [place] [address]  [phone]       [facility] Schedule an appointment as soon as possible for a visit   [address] [id number]  [phone]  [facility] [place], [address] [place] [address]          Current Discharge Medication List      CONTINUE these medications which have NOT CHANGED    Details   Diclofenac Sodium (VOLTAREN) 1 % Apply 2 g topically 4 (four) times a day  Qty: 200 g, Refills: 5    Associated Diagnoses: Chronic bilateral low back pain with bilateral sciatica      albuterol (PROVENTIL HFA,VENTOLIN HFA) 90 mcg/act inhaler Inhale 2 puffs every 6 (six) hours as needed for wheezing or shortness of breath  Qty: 1 Inhaler, Refills: 5    Comments: Substitution to a formulary equivalent within the same pharmaceutical class is authorized    Associated Diagnoses: Asthma due to seasonal allergies      Biotin 10 MG CAPS Take 1 capsule by mouth daily For acne/skin       carbamide peroxide (DEBROX) 6 5 % otic solution Administer 5 drops into both ears 2 (two) times a day  Qty: 15 mL, Refills: 0    Associated Diagnoses: Impacted cerumen of left ear      diphenhydrAMINE (BENADRYL) 25 mg tablet Take 1 tablet (25 mg total) by mouth every 6 (six) hours  Qty: 20 tablet, Refills: 0    Associated Diagnoses: Rash and nonspecific skin eruption      DULoxetine (CYMBALTA) 20 mg capsule Take 1 capsule (20 mg total) by mouth daily  Qty: 30 capsule, Refills: 2    Associated Diagnoses: Chronic bilateral low back pain with bilateral sciatica; Anxiety      !! Elastic Bandages & Supports (Wrist Brace/Left Medium) MISC Use daily  Qty: 1 each, Refills: 0    Associated Diagnoses: Bilateral hand numbness; Bilateral hand swelling      !! Elastic Bandages & Supports (Wrist Brace/Right Medium) MISC Use daily  Qty: 1 each, Refills: 0    Associated Diagnoses: Bilateral hand numbness; Bilateral hand swelling      fexofenadine (ALLEGRA) 180 MG tablet Take 1 tablet (180 mg total) by mouth daily  Qty: 30 tablet, Refills: 2    Associated Diagnoses: PND (post-nasal drip)      lidocaine (LIDODERM) 5 % Apply 1 patch topically daily Remove & Discard patch within 12 hours or as directed by MD  Qty: 30 patch, Refills: 2    Associated Diagnoses: Chronic bilateral low back pain with bilateral sciatica      metroNIDAZOLE (METROGEL) 0 75 % vaginal gel INSERT 1 APPLICATORFUL VAGINALLY NIGHTLY FOR 5 DAYS      nicotine (NICODERM CQ) 14 mg/24hr TD 24 hr patch Place 1 patch on the skin every 24 hours  Qty: 28 patch, Refills: 0    Associated Diagnoses: Tobacco use      pantoprazole (PROTONIX) 40 mg tablet Take 1 tablet (40 mg total) by mouth daily  Qty: 30 tablet, Refills: 3    Associated Diagnoses: Gastroesophageal reflux disease, unspecified whether esophagitis present      permethrin (ELIMITE) 5 % cream Apply 30g to affected area once  Leave on for 8-14 hours then wash off  You may repeat with the other 30g in 1 week if symptoms continue    Qty: 60 g, Refills: 0    Associated Diagnoses: Rash and nonspecific skin eruption      polyethylene glycol (GLYCOLAX) 17 GM/SCOOP powder Take 17 g by mouth daily for 10 days  Qty: 238 g, Refills: 5    Associated Diagnoses: Generalized abdominal pain      PreviDent 5000 Booster Plus 1 1 % PSTE       Restasis 0 05 % ophthalmic emulsion Administer 2 drops to both eyes 2 (two) times a day as needed      sucralfate (CARAFATE) 1 g/10 mL suspension Take 10 mL (1,000 mg total) by mouth every 6 (six) hours  Qty: 420 mL, Refills: 5    Associated Diagnoses: Gastroesophageal reflux disease, unspecified whether esophagitis present      tiZANidine (ZANAFLEX) 4 mg tablet Take 1 tablet (4 mg total) by mouth every 8 (eight) hours as needed for muscle spasms  Qty: 30 tablet, Refills: 2    Associated Diagnoses: Acute bilateral low back pain with left-sided sciatica      triamcinolone (KENALOG) 0 1 % cream Apply topically 2 (two) times a day  Qty: 30 g, Refills: 5    Associated Diagnoses: Rash       !! - Potential duplicate medications found  Please discuss with provider  No discharge procedures on file      PDMP Review       Value Time User    PDMP Reviewed  Yes 8/25/2021  2:42 PM Valora Snellen, MD          ED Provider  Electronically Signed by           Preet Bee DO  02/05/22 [address],   02/05/22 9868

## 2022-02-05 NOTE — ED NOTES
Patient ambulating to bathroom at this time       Katie Couch [facility]  02/05/22 4511

## 2022-02-05 NOTE — ED NOTES
Patient ambulating to bathroom at this time     Mando Armstrong [facility]  02/05/22 2356

## 2022-02-07 ENCOUNTER — OFFICE VISIT (OUTPATIENT)
Dept: FAMILY MEDICINE CLINIC | Facility: CLINIC | Age: 27
End: 2022-02-07
Payer: COMMERCIAL

## 2022-02-07 VITALS
SYSTOLIC BLOOD PRESSURE: 128 MMHG | HEIGHT: 63 IN | HEART RATE: 116 BPM | OXYGEN SATURATION: 99 % | RESPIRATION RATE: 18 BRPM | DIASTOLIC BLOOD PRESSURE: 90 MMHG | BODY MASS INDEX: 27 KG/M2 | WEIGHT: 152.4 LBS | TEMPERATURE: 98.8 F

## 2022-02-07 DIAGNOSIS — N20.0 RIGHT NEPHROLITHIASIS: ICD-10-CM

## 2022-02-07 DIAGNOSIS — M79.89 BILATERAL HAND SWELLING: ICD-10-CM

## 2022-02-07 DIAGNOSIS — G89.29 ACUTE EXACERBATION OF CHRONIC LOW BACK PAIN: Primary | ICD-10-CM

## 2022-02-07 DIAGNOSIS — M54.50 ACUTE EXACERBATION OF CHRONIC LOW BACK PAIN: Primary | ICD-10-CM

## 2022-02-07 PROCEDURE — T1015 CLINIC SERVICE: HCPCS | Performed by: FAMILY MEDICINE

## 2022-02-07 RX ORDER — ACETAMINOPHEN 500 MG
500 TABLET ORAL EVERY 6 HOURS PRN
Qty: 60 TABLET | Refills: 0 | Status: SHIPPED | OUTPATIENT
Start: 2022-02-07

## 2022-02-07 NOTE — PROGRESS NOTES
Assessment/Plan:     Diagnoses and all orders for this visit:    Acute exacerbation of chronic low back pain  -     acetaminophen (TYLENOL) 500 mg tablet; Take 1 tablet (500 mg total) by mouth every 6 (six) hours as needed for mild pain  -     XR spine lumbar minimum 4 views non injury; Future    Right nephrolithiasis  -     Ambulatory Referral to Urology; Future    Bilateral hand swelling        - It is unlikely that her current symptoms are 2/2 her right sided kidney stone as this was estimated to be 1 mm in size and her pain is left sided  She would like to follow up with urology regarding this, referral provided  - I advised that she continue lidocaine patches, diclofenac gel, and tizanidine PRN for her back pain  I have also prescribed tylenol PRN  Will repeat lumbar xray as her last imaging was 11/2020  Advised to follow up with pain management, MA scheduled her for 2/22/22   - Patient was encouraged to obtain rheum labs and hand xrays as previously ordered for further evaluation of her hand swelling  These test orders were re-printed for her today and she was advised to obtain these either at [facility] or the hospital lab  Return if symptoms worsen or fail to improve  Subjective:        Patient ID: Gil Motta is a 27 y o  female  Chief Complaint   Patient presents with    Follow-up    Back Pain    Hand Pain     bilateral       Treasure Sen is a 27year old female with history of chronic back pain, presenting for ED follow up  Patient was evaluated in the ED 2/5/22 for left-sided back and flank pain  CT revealed a right sided small non-obstructing kidney stone and UA was positive for leukocytes  She was discharged with recommendation to follow up with urology  Today patient endorses ongoing left-sided pain  She reports the pain feels like it is in her kidney  She endorses some intermittent chills, sweats, and nausea, but denies fever    Furthermore denies dysuria, hematuria, frequency, or urgency, as well as vomiting or diarrhea  Patient has chronic low back pain which is currently managed with lidocaine patches, diclofenac gel, and tizanidine PRN  She has previously attended PT without much improvement  Was prescribed oxycodone in the past which was discontinued  She has been referred to pain management but has not scheduled  Patient also continues to endorse bilateral hand numbness and swelling  She notices this most when she wakes up in the morning, but symptoms do persist throughout the day  She feels weak and states she has been dropping things due to this  Denies rash or erythema  She has not tried anything for these symptoms  Rheumatologic labs and xrays have been ordered previously which she has not obtained        The following portions of the patient's history were reviewed and updated as appropriate: allergies, current medications, past family history, past medical history, past social history, past surgical history and problem list     Patient Active Problem List   Diagnosis    Ectopic pregnancy    Flank pain    Diarrhea    Tobacco abuse    Hypokalemia    Leukocytosis    Microscopic hematuria    Microalbuminuria    Bradycardia    Low HDL (under 40)    Hepatitis C antibody test positive    Gastroenteritis    Chronic tubulointerstitial nephritis    Abdominal pain    Dysmenorrhea    Nausea    Iron deficiency anemia    GERD (gastroesophageal reflux disease)    Ovarian cyst, left    Pelvic pain in female    Chronic bilateral low back pain with bilateral sciatica    Hoarseness       Current Outpatient Medications   Medication Sig Dispense Refill    Diclofenac Sodium (VOLTAREN) 1 % Apply 2 g topically 4 (four) times a day 200 g 5    lidocaine (LIDODERM) 5 % Apply 1 patch topically daily Remove & Discard patch within 12 hours or as directed by MD 30 patch 2    metroNIDAZOLE (METROGEL) 0 75 % vaginal gel INSERT 1 APPLICATORFUL VAGINALLY NIGHTLY FOR 5 DAYS  pantoprazole (PROTONIX) 40 mg tablet Take 1 tablet (40 mg total) by mouth daily 30 tablet 3    polyethylene glycol (GLYCOLAX) 17 GM/SCOOP powder Take 17 g by mouth daily for 10 days 238 g 5    PreviDent 5000 Booster Plus 1 1 % PSTE       Restasis 0 05 % ophthalmic emulsion Administer 2 drops to both eyes 2 (two) times a day as needed      sucralfate (CARAFATE) 1 g/10 mL suspension Take 10 mL (1,000 mg total) by mouth every 6 (six) hours 420 mL 5    tiZANidine (ZANAFLEX) 4 mg tablet Take 1 tablet (4 mg total) by mouth every 8 (eight) hours as needed for muscle spasms 30 tablet 2    triamcinolone (KENALOG) 0 1 % cream Apply topically 2 (two) times a day 30 g 5    acetaminophen (TYLENOL) 500 mg tablet Take 1 tablet (500 mg total) by mouth every 6 (six) hours as needed for mild pain 60 tablet 0    albuterol (PROVENTIL HFA,VENTOLIN HFA) 90 mcg/act inhaler Inhale 2 puffs every 6 (six) hours as needed for wheezing or shortness of breath (Patient not taking: Reported on 2/5/2022 ) 1 Inhaler 5    Biotin 10 MG CAPS Take 1 capsule by mouth daily For acne/skin  (Patient not taking: Reported on 11/23/2021 )      Elastic Bandages & Supports (Wrist Brace/Left Medium) MISC Use daily (Patient not taking: Reported on 1/14/2022 ) 1 each 0    Elastic Bandages & Supports (Wrist Brace/Right Medium) MISC Use daily (Patient not taking: Reported on 1/14/2022 ) 1 each 0     No current facility-administered medications for this visit          Past Medical History:   Diagnosis Date    Allergic     Ectopic pregnancy     GERD (gastroesophageal reflux disease)     History of unilateral fallopian tube excision     RIGHT removed    Ovarian cyst     Wears glasses         Past Surgical History:   Procedure Laterality Date    DILATION AND CURETTAGE OF UTERUS      ECTOPIC PREGNANCY SURGERY      LAPAROSCOPY      OR LAP,DIAGNOSTIC ABDOMEN N/A 9/16/2017    Procedure: LAPAROSCOPY DIAGNOSTIC, left salpingectomy;  Surgeon: Crescencio Johnson Willie Yadav MD;  Location:  MAIN OR;  Service: Gynecology    WISDOM TOOTH EXTRACTION      WISDOM TOOTH EXTRACTION Bilateral         Social History     Socioeconomic History    Marital status: Single     Spouse name: Not on file    Number of children: Not on file    Years of education: Not on file    Highest education level: Not on file   Occupational History    Not on file   Tobacco Use    Smoking status: Current Every Day Smoker     Packs/day: 1 00     Years: 6 00     Pack years: 6 00     Types: Cigarettes    Smokeless tobacco: Never Used   Vaping Use    Vaping Use: Never used   Substance and Sexual Activity    Alcohol use: Not Currently     Comment: "occasional"    Drug use: Yes     Types: Marijuana    Sexual activity: Yes     Partners: Male     Birth control/protection: None   Other Topics Concern    Not on file   Social History Narrative    Not on file     Social Determinants of Health     Financial Resource Strain: Low Risk     Difficulty of Paying Living Expenses: Not hard at all   Food Insecurity: No Food Insecurity    Worried About Running Out of Food in the Last Year: Never true    Enrique of Food in the Last Year: Never true   Transportation Needs: No Transportation Needs    Lack of Transportation (Medical): No    Lack of Transportation (Non-Medical): No   Physical Activity: Unknown    Days of Exercise per Week: 3 days    Minutes of Exercise per Session: Not on file   Stress: No Stress Concern Present    Feeling of Stress :  Only a little   Social Connections: Unknown    Frequency of Communication with Friends and Family: More than three times a week    Frequency of Social Gatherings with Friends and Family: More than three times a week    Attends Church Services: Never    Active Member of Clubs or Organizations: No    Attends Club or Organization Meetings: Never    Marital Status: Not on file   Intimate Partner Violence: Not At Risk    Fear of Current or Ex-Partner: No    Emotionally Abused: No    Physically Abused: No    Sexually Abused: No   Housing Stability: Low Risk     Unable to Pay for Housing in the Last Year: No    Number of Places Lived in the Last Year: 1    Unstable Housing in the Last Year: No        Review of Systems   Constitutional: Positive for chills and diaphoresis  Negative for fever  Respiratory: Negative for cough, chest tightness, shortness of breath and wheezing  Cardiovascular: Negative for chest pain, palpitations and leg swelling  Gastrointestinal: Positive for nausea  Negative for abdominal pain, blood in stool, constipation, diarrhea and vomiting  Genitourinary: Negative for difficulty urinating, dysuria, frequency, hematuria and urgency  Musculoskeletal: Positive for back pain  Skin: Negative for color change, rash and wound  Neurological: Positive for weakness  Negative for dizziness, syncope, light-headedness and headaches  Objective:      /90 (BP Location: Left arm, Patient Position: Sitting, Cuff Size: Adult)   Pulse (!) 116   Temp 98 8 °F (37 1 °C) (Temporal)   Resp 18   Ht 5' 3" (1 6 m)   Wt 69 1 kg (152 lb 6 4 oz)   LMP 01/28/2022   SpO2 99%   BMI 27 00 kg/m²          Physical Exam  Vitals and nursing note reviewed  Constitutional:       General: She is not in acute distress  Appearance: Normal appearance  HENT:      Head: Normocephalic and atraumatic  Eyes:      Extraocular Movements: Extraocular movements intact  Conjunctiva/sclera: Conjunctivae normal       Pupils: Pupils are equal, round, and reactive to light  Cardiovascular:      Rate and Rhythm: Normal rate and regular rhythm  Heart sounds: Normal heart sounds  No murmur heard  Pulmonary:      Effort: Pulmonary effort is normal  No respiratory distress  Breath sounds: Normal breath sounds  No wheezing  Musculoskeletal:      Cervical back: Normal range of motion and neck supple  Lumbar back: Tenderness present  Right lower leg: No edema  Left lower leg: No edema  Comments: TTP lumbar spine and left paraspinal muscles  No overlying rash or erythema   Lymphadenopathy:      Cervical: No cervical adenopathy  Skin:     General: Skin is warm and dry  Neurological:      General: No focal deficit present  Mental Status: She is alert and oriented to person, place, and time  Cranial Nerves: No cranial nerve deficit     Psychiatric:         Mood and Affect: Mood normal          Behavior: Behavior normal

## 2022-02-08 ENCOUNTER — APPOINTMENT (OUTPATIENT)
Dept: RADIOLOGY | Facility: MEDICAL CENTER | Age: 27
End: 2022-02-08
Payer: COMMERCIAL

## 2022-02-08 ENCOUNTER — APPOINTMENT (OUTPATIENT)
Dept: LAB | Facility: MEDICAL CENTER | Age: 27
End: 2022-02-08
Payer: COMMERCIAL

## 2022-02-08 ENCOUNTER — TELEPHONE (OUTPATIENT)
Dept: UROLOGY | Facility: MEDICAL CENTER | Age: 27
End: 2022-02-08

## 2022-02-08 DIAGNOSIS — M79.89 BILATERAL HAND SWELLING: ICD-10-CM

## 2022-02-08 DIAGNOSIS — R20.0 BILATERAL HAND NUMBNESS: ICD-10-CM

## 2022-02-08 DIAGNOSIS — M54.50 ACUTE EXACERBATION OF CHRONIC LOW BACK PAIN: ICD-10-CM

## 2022-02-08 DIAGNOSIS — G89.29 ACUTE EXACERBATION OF CHRONIC LOW BACK PAIN: ICD-10-CM

## 2022-02-08 LAB
ALBUMIN SERPL BCP-MCNC: 3.9 G/DL (ref 3.5–5)
ALP SERPL-CCNC: 54 U/L (ref 46–116)
ALT SERPL W P-5'-P-CCNC: 23 U/L (ref 12–78)
ANION GAP SERPL CALCULATED.3IONS-SCNC: 5 MMOL/L (ref 4–13)
AST SERPL W P-5'-P-CCNC: 11 U/L (ref 5–45)
BASOPHILS # BLD AUTO: 0.09 THOUSANDS/ΜL (ref 0–0.1)
BASOPHILS NFR BLD AUTO: 1 % (ref 0–1)
BILIRUB SERPL-MCNC: 0.31 MG/DL (ref 0.2–1)
BUN SERPL-MCNC: 10 MG/DL (ref 5–25)
CALCIUM SERPL-MCNC: 9.5 MG/DL (ref 8.3–10.1)
CHLORIDE SERPL-SCNC: 107 MMOL/L (ref 100–108)
CO2 SERPL-SCNC: 27 MMOL/L (ref 21–32)
CREAT SERPL-MCNC: 0.71 MG/DL (ref 0.6–1.3)
CRP SERPL QL: <3 MG/L
EOSINOPHIL # BLD AUTO: 0.26 THOUSAND/ΜL (ref 0–0.61)
EOSINOPHIL NFR BLD AUTO: 3 % (ref 0–6)
ERYTHROCYTE [DISTWIDTH] IN BLOOD BY AUTOMATED COUNT: 11.7 % (ref 11.6–15.1)
GFR SERPL CREATININE-BSD FRML MDRD: 116 ML/MIN/1.73SQ M
GLUCOSE P FAST SERPL-MCNC: 84 MG/DL (ref 65–99)
HCT VFR BLD AUTO: 44.5 % (ref 34.8–46.1)
HGB BLD-MCNC: 14.3 G/DL (ref 11.5–15.4)
IMM GRANULOCYTES # BLD AUTO: 0.04 THOUSAND/UL (ref 0–0.2)
IMM GRANULOCYTES NFR BLD AUTO: 0 % (ref 0–2)
LYMPHOCYTES # BLD AUTO: 2.98 THOUSANDS/ΜL (ref 0.6–4.47)
LYMPHOCYTES NFR BLD AUTO: 32 % (ref 14–44)
MCH RBC QN AUTO: 30.2 PG (ref 26.8–34.3)
MCHC RBC AUTO-ENTMCNC: 32.1 G/DL (ref 31.4–37.4)
MCV RBC AUTO: 94 FL (ref 82–98)
MONOCYTES # BLD AUTO: 0.53 THOUSAND/ΜL (ref 0.17–1.22)
MONOCYTES NFR BLD AUTO: 6 % (ref 4–12)
NEUTROPHILS # BLD AUTO: 5.51 THOUSANDS/ΜL (ref 1.85–7.62)
NEUTS SEG NFR BLD AUTO: 58 % (ref 43–75)
NRBC BLD AUTO-RTO: 0 /100 WBCS
PLATELET # BLD AUTO: 364 THOUSANDS/UL (ref 149–390)
PMV BLD AUTO: 9.9 FL (ref 8.9–12.7)
POTASSIUM SERPL-SCNC: 4.1 MMOL/L (ref 3.5–5.3)
PROT SERPL-MCNC: 7.3 G/DL (ref 6.4–8.2)
RBC # BLD AUTO: 4.74 MILLION/UL (ref 3.81–5.12)
SODIUM SERPL-SCNC: 139 MMOL/L (ref 136–145)
VIT B12 SERPL-MCNC: 371 PG/ML (ref 100–900)
WBC # BLD AUTO: 9.41 THOUSAND/UL (ref 4.31–10.16)

## 2022-02-08 PROCEDURE — 36415 COLL VENOUS BLD VENIPUNCTURE: CPT

## 2022-02-08 PROCEDURE — 86038 ANTINUCLEAR ANTIBODIES: CPT

## 2022-02-08 PROCEDURE — 86430 RHEUMATOID FACTOR TEST QUAL: CPT

## 2022-02-08 PROCEDURE — 85025 COMPLETE CBC W/AUTO DIFF WBC: CPT

## 2022-02-08 PROCEDURE — 82607 VITAMIN B-12: CPT

## 2022-02-08 PROCEDURE — 73130 X-RAY EXAM OF HAND: CPT

## 2022-02-08 PROCEDURE — 80053 COMPREHEN METABOLIC PANEL: CPT

## 2022-02-08 PROCEDURE — 86140 C-REACTIVE PROTEIN: CPT

## 2022-02-08 PROCEDURE — 72110 X-RAY EXAM L-2 SPINE 4/>VWS: CPT

## 2022-02-08 NOTE — TELEPHONE ENCOUNTER
Called and left voicemail for patient to return call to schedule appointment  Please schedule patient next available new patient appointment with AP in [facility] ([facility]) (4/1) upon return call

## 2022-02-09 DIAGNOSIS — M54.42 CHRONIC BILATERAL LOW BACK PAIN WITH BILATERAL SCIATICA: ICD-10-CM

## 2022-02-09 DIAGNOSIS — J45.909 ASTHMA DUE TO SEASONAL ALLERGIES: ICD-10-CM

## 2022-02-09 DIAGNOSIS — G89.29 CHRONIC BILATERAL LOW BACK PAIN WITH BILATERAL SCIATICA: ICD-10-CM

## 2022-02-09 DIAGNOSIS — M54.41 CHRONIC BILATERAL LOW BACK PAIN WITH BILATERAL SCIATICA: ICD-10-CM

## 2022-02-09 DIAGNOSIS — K21.9 GASTROESOPHAGEAL REFLUX DISEASE, UNSPECIFIED WHETHER ESOPHAGITIS PRESENT: ICD-10-CM

## 2022-02-09 LAB
RHEUMATOID FACT SER QL LA: NEGATIVE
RYE IGE QN: NEGATIVE

## 2022-02-09 RX ORDER — SUCRALFATE ORAL 1 G/10ML
1000 SUSPENSION ORAL EVERY 6 HOURS SCHEDULED
Qty: 420 ML | Refills: 0 | Status: SHIPPED | OUTPATIENT
Start: 2022-02-09

## 2022-02-09 RX ORDER — ALBUTEROL SULFATE 90 UG/1
2 AEROSOL, METERED RESPIRATORY (INHALATION) EVERY 6 HOURS PRN
Qty: 18 G | Refills: 5 | Status: SHIPPED | OUTPATIENT
Start: 2022-02-09

## 2022-02-09 RX ORDER — LIDOCAINE 50 MG/G
1 PATCH TOPICAL DAILY
Qty: 30 PATCH | Refills: 0 | Status: SHIPPED | OUTPATIENT
Start: 2022-02-09

## 2022-02-09 NOTE — TELEPHONE ENCOUNTER
Called and scheduled patient for a NP appointment in the [place] office on 4/1 @ 2:00 with Kimberly Lares  She is aware of office location

## 2022-02-22 ENCOUNTER — APPOINTMENT (OUTPATIENT)
Dept: RADIOLOGY | Facility: MEDICAL CENTER | Age: 27
End: 2022-02-22
Payer: COMMERCIAL

## 2022-02-22 ENCOUNTER — OFFICE VISIT (OUTPATIENT)
Dept: PAIN MEDICINE | Facility: CLINIC | Age: 27
End: 2022-02-22
Payer: COMMERCIAL

## 2022-02-22 VITALS
BODY MASS INDEX: 26.51 KG/M2 | WEIGHT: 149.6 LBS | HEIGHT: 63 IN | SYSTOLIC BLOOD PRESSURE: 138 MMHG | DIASTOLIC BLOOD PRESSURE: 84 MMHG

## 2022-02-22 DIAGNOSIS — G89.29 CHRONIC BILATERAL LOW BACK PAIN WITH BILATERAL SCIATICA: ICD-10-CM

## 2022-02-22 DIAGNOSIS — M54.41 CHRONIC BILATERAL LOW BACK PAIN WITH BILATERAL SCIATICA: ICD-10-CM

## 2022-02-22 DIAGNOSIS — M54.2 NECK PAIN: ICD-10-CM

## 2022-02-22 DIAGNOSIS — M51.16 LUMBAR DISC DISEASE WITH RADICULOPATHY: Primary | ICD-10-CM

## 2022-02-22 DIAGNOSIS — M54.12 CERVICAL RADICULOPATHY: ICD-10-CM

## 2022-02-22 DIAGNOSIS — M54.42 CHRONIC BILATERAL LOW BACK PAIN WITH BILATERAL SCIATICA: ICD-10-CM

## 2022-02-22 PROCEDURE — 72050 X-RAY EXAM NECK SPINE 4/5VWS: CPT

## 2022-02-22 PROCEDURE — 3008F BODY MASS INDEX DOCD: CPT | Performed by: ANESTHESIOLOGY

## 2022-02-22 PROCEDURE — 99244 OFF/OP CNSLTJ NEW/EST MOD 40: CPT | Performed by: ANESTHESIOLOGY

## 2022-02-22 PROCEDURE — 4004F PT TOBACCO SCREEN RCVD TLK: CPT | Performed by: ANESTHESIOLOGY

## 2022-02-22 RX ORDER — DULOXETIN HYDROCHLORIDE 30 MG/1
30 CAPSULE, DELAYED RELEASE ORAL DAILY
Qty: 30 CAPSULE | Refills: 1 | Status: SHIPPED | OUTPATIENT
Start: 2022-02-22 | End: 2022-03-22 | Stop reason: SDUPTHER

## 2022-02-22 RX ORDER — METHYLPREDNISOLONE 4 MG/1
TABLET ORAL
Qty: 21 TABLET | Refills: 0 | Status: SHIPPED | OUTPATIENT
Start: 2022-02-22 | End: 2022-03-01 | Stop reason: HOSPADM

## 2022-02-22 NOTE — PATIENT INSTRUCTIONS
Duloxetine (By mouth)   Duloxetine ([id number]-LOX-[id number])  Treats depression, anxiety, diabetic peripheral neuropathy, fibromyalgia, and chronic muscle or bone pain  This medicine is an SSNRI  Brand Name(s): Cymbalta, Drizalma Sprinkle, Irenka   There may be other brand names for this medicine  When This Medicine Should Not Be Used: This medicine is not right for everyone  Do not use it if you had an allergic reaction to duloxetine  How to Use This Medicine:   Capsule, Delayed Release Capsule  · Take your medicine as directed  Your dose may need to be changed several times to find what works best for you  · Delayed-release capsule: Swallow the capsule whole  Do not crush, chew, break, or open it  Do not open the Cymbalta® delayed-release capsule and sprinkle the contents on food or in liquids  · If you have trouble swallowing the Grainger Pickle delayed release capsule:   ? You may open the capsule and sprinkle the contents over one tablespoon (15 mL) of applesauce  Swallow the mixture right away and do not save any of the mixture to use later  ? You may open the capsule and pour the contents to an all plastic catheter tip syringe and add 50 mL of water  Do not use other liquids  Gently shake it for 10 seconds, and then use it through a nasogastric tube  Rinse with additional water (about 15 mL) if needed  · This medicine should come with a Medication Guide  Ask your pharmacist for a copy if you do not have one  · Missed dose: Take a dose as soon as you remember  If it is almost time for your next dose, wait until then and take a regular dose  Do not take extra medicine to make up for a missed dose  · Store the medicine in a closed container at room temperature, away from heat, moisture, and direct light  Drugs and Foods to Avoid:   Ask your doctor or pharmacist before using any other medicine, including over-the-counter medicines, vitamins, and herbal products    · Do not take duloxetine if you have used an MAO inhibitor (MAOI) within the past 14 days  Do not start taking an MAO inhibitor within 5 days of stopping duloxetine  Ask your doctor if you are not sure if you take an MAOI, including linezolid or methylene blue injection  · Some medicines can affect how duloxetine works  Tell your doctor if you are using any of the following:  ? Buspirone, cimetidine, ciprofloxacin, enoxacin, fentanyl, fluvoxamine, lithium, Chauncey's wort, theophylline, tramadol, tryptophan, warfarin  ? Amphetamines  ? Blood pressure medicine  ? Diuretic (water pill)  ? Medicine for heart rhythm problems (including flecainide, propafenone, quinidine)  ? Medicine to treat migraine headaches (including triptans)  ? NSAID pain or arthritis medicine (including aspirin, celecoxib, diclofenac, ibuprofen, naproxen)  ? Other medicine to treat depression or mood disorders (including amitriptyline, desipramine, fluoxetine, imipramine, nortriptyline, paroxetine)  ? Phenothiazine medicine (including thioridazine)  ? Stomach medicine (including famotidine, antacids containing aluminum or magnesium, PPIs)  · Tell your doctor if you use anything else that makes you sleepy  Some examples are allergy medicine, narcotic pain medicine, and alcohol  · Do not drink alcohol while you are using this medicine  Warnings While Using This Medicine:   · Tell your doctor if you are pregnant or breastfeeding, or if you have kidney disease, liver disease, bleeding problems, diabetes, digestion problems, glaucoma, heart disease, high or low blood pressure, or problems with urination  Tell your doctor if you smoke or you have a history of seizures, mental health problems (including bipolar disorder, luisa), or drug or alcohol addiction  · This medicine may cause the following problems:   ? Serious liver problems  ? Serotonin syndrome, when used with certain medicines  ? Increased risk of bleeding problems  ?  Serious skin reactions, including erythema multiforme, Briceño-Keegan syndrome  ? Low sodium levels in the blood  ? Sexual problems  · This medicine can increase thoughts of suicide  Tell your doctor right away if you start to feel depressed and have thoughts about hurting yourself  · This medicine may cause blurred vision, dizziness, drowsiness, trouble with thinking, or trouble with controlling body movements, which may lead to falls, fractures, or other injuries  Do not drive or do anything that could be dangerous until you know how this medicine affects you  Stand up slowly to avoid falls  · Do not stop using this medicine suddenly  Your doctor will need to slowly decrease your dose before you stop it completely  · Your doctor will check your progress and the effects of this medicine at regular visits  Keep all appointments  · Keep all medicine out of the reach of children  Never share your medicine with anyone    Possible Side Effects While Using This Medicine:   Call your doctor right away if you notice any of these side effects:  · Allergic reaction: Itching or hives, swelling in your face or hands, swelling or tingling in your mouth or throat, chest tightness, trouble breathing  · Anxiety, restlessness, fever, fast heartbeat, sweating, muscle spasms, diarrhea, seeing or hearing things that are not there  · Blistering, peeling, red skin rash  · Confusion, weakness, muscle twitching  · Dark urine or pale stools, nausea, vomiting, loss of appetite, stomach pain, yellow skin or eyes  · Decrease in how much or how often you urinate  · Decrease in sexual ability, desire, drive, or performance  · Eye pain, vision changes, seeing halos around lights  · Feeling more energetic than usual  · Lightheadedness, dizziness, fainting  · Unusual moods or behaviors, worsening depression, thoughts about hurting yourself, trouble sleeping  · Unusual bleeding or bruising  If you notice these less serious side effects, talk with your doctor:   · Decrease in appetite or weight  · Dry mouth, constipation, mild nausea  · Headache  · Unusual drowsiness, sleepiness, or tiredness  If you notice other side effects that you think are caused by this medicine, tell your doctor  Call your doctor for medical advice about side effects  You may report side effects to FDA at 3-435-FDA-6159    © Copyright [address] 2021 Information is for End User's use only and may not be sold, redistributed or otherwise used for commercial purposes  The above information is an  only  It is not intended as medical advice for individual conditions or treatments  Talk to your doctor, nurse or pharmacist before following any medical regimen to see if it is safe and effective for you

## 2022-02-22 NOTE — PROGRESS NOTES
Assessment:  1  Lumbar disc disease with radiculopathy    2  Chronic bilateral low back pain with bilateral sciatica        Plan:  Patient is a 24-year-old female complaints of low back pain and bilateral leg pain left worse than right presents office for initial consultation  X-ray lumbar spine shows degenerative disc changes at L5-S1 with bilateral facet arthropathy  Patient describes left-sided radiating pain which he reports sharp stabbing shooting pain going from her back into her left leg and from her neck into her left arm  Patient denies being able to perform ADLs  Patient reports decrease quality of sleep secondary to sharp shooting pains  Unable to perform physical exam secondary to patient's level of excruciating pain  Patient is unable to stay still secondary to pain with tears when crying  CT renal stone study abdomen pelvis was reviewed which does show progression of degenerative disc changes at L5-S1 in addition to a new onset disc herniation at L4-5  Will need better imaging to better assess the pathology behind patient's low back, neck and radicular symptoms  At this time we do not feel patient will be able to participate in physical therapy and we require diagnostic imaging  1  We will order an x-ray of the cervical spine assess for any degenerative changes that correlate patient's left upper extremity pain and neck pain  2  We will trial diclofenac 50 mg p o  b i d  for facetogenic low back pain  3  We will trial Cymbalta 30 mg p o  q h s  for lumbar radicular symptoms  Number  4  We will order an MRI of the cervical spine in the lumbar spine to assess for the discogenic pathology behind patient's left upper and lower extremity digger symptoms  5  We will trial Medrol Dosepak for exacerbation of radicular symptoms  6  We will follow up in in 1 month to review diagnostic images response to medications    Once we were able to control patient's pain will then initiate physical therapy    History of Present Illness: The patient is a 27 y o  female who presents for consultation in regards to Shoulder Pain, Back Pain, and Ankle Pain  Symptoms have been present for 3 months  Symptoms began without any precipitating injury or trauma  Pain is reported to be 10 on the numeric rating scale  Symptoms are felt nearly constantly and worst in the no typical pattern  Symptoms are characterized as shooting and sharp  Symptoms are associated with bilateral arm weakness and bilateral leg weakness  Aggravating factors include standing, bending, leaning forward, leaning bckward, sitting, walking, exercise, coughing/sneezing and bowel movements  Relieving factors include nothing  No change in symptoms with kneeling, lying down and relaxation  Treatments that have been helpful include heat/ice  TENS unit have provided no relief  Medications to relieve symptoms include marijuana  Patient has tried and failed gabapentin, tizanidine, naproxen, ibuprofen  In the past patient has tried fentanyl which provided however she failed any relief from Percocet    Review of Systems:    Review of Systems   Constitutional: Positive for chills, fever and unexpected weight change  HENT: Positive for sore throat  Negative for trouble swallowing  Eyes: Positive for pain  Negative for visual disturbance  Respiratory: Positive for cough and shortness of breath  Negative for wheezing  Cardiovascular: Positive for chest pain  Negative for palpitations  Gastrointestinal: Positive for abdominal pain, nausea and vomiting  Negative for constipation and diarrhea  Endocrine: Negative for cold intolerance, heat intolerance and polydipsia  Genitourinary: Negative for difficulty urinating and frequency  Musculoskeletal: Positive for arthralgias, joint swelling and myalgias  Negative for gait problem  Skin: Positive for rash  Neurological: Positive for dizziness, numbness and headaches   Negative for seizures, syncope and weakness  Hematological: Does not bruise/bleed easily  Psychiatric/Behavioral: Positive for decreased concentration  Negative for dysphoric mood  The patient is nervous/anxious  Depression     All other systems reviewed and are negative          Past Medical History:   Diagnosis Date    Allergic     Ectopic pregnancy     GERD (gastroesophageal reflux disease)     History of unilateral fallopian tube excision     RIGHT removed    Ovarian cyst     Wears glasses        Past Surgical History:   Procedure Laterality Date    DILATION AND CURETTAGE OF UTERUS      ECTOPIC PREGNANCY SURGERY      LAPAROSCOPY      [place] LAP,DIAGNOSTIC ABDOMEN N/A 9/16/2017    Procedure: LAPAROSCOPY DIAGNOSTIC, left salpingectomy;  Surgeon: Katja Mayo MD;  Location:  MAIN OR;  Service: Gynecology    WISDOM TOOTH EXTRACTION      WISDOM TOOTH EXTRACTION Bilateral        Family History   Problem Relation Age of Onset    No Known Problems Mother     No Known Problems Father        Social History     Occupational History    Not on file   Tobacco Use    Smoking status: Current Every Day Smoker     Packs/day: 1 00     Years: 6 00     Pack years: 6 00     Types: Cigarettes    Smokeless tobacco: Never Used   Vaping Use    Vaping Use: Never used   Substance and Sexual Activity    Alcohol use: Not Currently     Comment: "occasional"    Drug use: Yes     Types: Marijuana    Sexual activity: Yes     Partners: Male     Birth control/protection: None         Current Outpatient Medications:     acetaminophen (TYLENOL) 500 mg tablet, Take 1 tablet (500 mg total) by mouth every 6 (six) hours as needed for mild pain, Disp: 60 tablet, Rfl: 0    albuterol (PROVENTIL HFA,VENTOLIN HFA) 90 mcg/act inhaler, Inhale 2 puffs every 6 (six) hours as needed for wheezing or shortness of breath, Disp: 18 g, Rfl: 5    Biotin 10 MG CAPS, Take 1 capsule by mouth daily For acne/skin  (Patient not taking: Reported on 11/23/2021 ), Disp: , Rfl:     Diclofenac Sodium (VOLTAREN) 1 %, Apply 2 g topically 4 (four) times a day, Disp: 200 g, Rfl: 5    Elastic Bandages & Supports (Wrist Brace/Left Medium) MISC, Use daily (Patient not taking: Reported on 1/14/2022 ), Disp: 1 each, Rfl: 0    Elastic Bandages & Supports (Wrist Brace/Right Medium) MISC, Use daily (Patient not taking: Reported on 1/14/2022 ), Disp: 1 each, Rfl: 0    lidocaine (LIDODERM) 5 %, Apply 1 patch topically daily Remove & Discard patch within 12 hours or as directed by MD, Disp: 30 patch, Rfl: 0    metroNIDAZOLE (METROGEL) 0 75 % vaginal gel, INSERT 1 APPLICATORFUL VAGINALLY NIGHTLY FOR 5 DAYS, Disp: , Rfl:     ondansetron (ZOFRAN) 8 mg tablet, Take 1 tablet (8 mg total) by mouth every 8 (eight) hours as needed for nausea or vomiting, Disp: 20 tablet, Rfl: 0    pantoprazole (PROTONIX) 40 mg tablet, Take 1 tablet (40 mg total) by mouth daily, Disp: 30 tablet, Rfl: 3    polyethylene glycol (GLYCOLAX) 17 GM/SCOOP powder, Take 17 g by mouth daily for 10 days, Disp: 238 g, Rfl: 5    PreviDent 5000 Booster Plus 1 1 % PSTE, , Disp: , Rfl:     Restasis 0 05 % ophthalmic emulsion, Administer 2 drops to both eyes 2 (two) times a day as needed, Disp: , Rfl:     sucralfate (CARAFATE) 1 g/10 mL suspension, Take 10 mL (1,000 mg total) by mouth every 6 (six) hours, Disp: 420 mL, Rfl: 0    tiZANidine (ZANAFLEX) 4 mg tablet, Take 1 tablet (4 mg total) by mouth every 8 (eight) hours as needed for muscle spasms, Disp: 30 tablet, Rfl: 2    triamcinolone (KENALOG) 0 1 % cream, Apply topically 2 (two) times a day, Disp: 30 g, Rfl: 5    Allergies   Allergen Reactions    Nitrofurantoin Itching    Nsaids Other (See Comments)     Due to issues with kidneys per pt    Oxycodone-Acetaminophen Itching     Reaction Date: [id number];     Oxycodone Itching    Tramadol Itching     Reaction Date: 28Apr2011;        Physical Exam:    /84 (BP Location: Left arm, Patient Position: Sitting, Cuff Size: Standard)   Ht 5' 3" (1 6 m)   Wt 67 9 kg (149 lb 9 6 oz)   LMP 01/28/2022 (Exact Date)   BMI 26 50 kg/m²     Constitutional: normal, well developed, well nourished, alert, in no distress and non-toxic and no overt pain behavior  Eyes: anicteric  HEENT: grossly intact  Neck: supple, symmetric, trachea midline and no masses   Pulmonary:even and unlabored  Cardiovascular:No edema or pitting edema present  Skin:Normal without rashes or lesions and well hydrated  Psychiatric:Mood and affect appropriate  Neurologic:Cranial Nerves II-XII grossly intact  Musculoskeletal:antalgic     Cervical Spine examination demonstrates  Decreased ROM secondary to pain with lateral rotation to the left/right and bending to the left/right, in addition to neck flexion  3/5 upper extremity strength in all muscle groups bilaterally  Negative Spurling's maneuver to the b/l Ue, sensitivity to light touch intact b/l Ue  Lumbar/Sacral Spine examination demonstrates  Decreased range of motion lumbar spine with pain upon: flexion, lateral rotation to the left/right, and bending to the left/right  Bilateral lumbar paraspinals tender to palpation  Muscle spasms noted in the lumbar area bilaterally  3/5 left lower extremity strength in all muscle groups bilaterally  Positive seated straight leg raise for bilateral lower extremities  Unable to finish physical exam secondary to acute exacerbation at this office visit  Imaging  LUMBAR SPINE     INDICATION:   M54 50: Low back pain, unspecified  G89 29: Other chronic pain      COMPARISON:  Lumbar spine x-ray dated November 5, 2020      VIEWS:  XR SPINE LUMBAR MINIMUM 4 VIEWS NON INJURY        FINDINGS:     There are 5 non rib bearing lumbar vertebral bodies       There is no evidence of acute fracture or destructive osseous lesion      Alignment is unremarkable       There is mild intervertebral disc space narrowing with endplate degenerative changes at L5/S1    There is mild bilateral facet arthropathy at L5/S1      The pedicles appear intact      Soft tissues are unremarkable      IMPRESSION:     No acute osseous abnormality        Degenerative changes as described  LEFT HAND     INDICATION:   R20 0: Anesthesia of skin  M79 89: Other specified soft tissue disorders      COMPARISON:  None     VIEWS:  XR HAND 3+ VW LEFT         For the purposes of institution wide universal language the following terms will apply: (thumb=1st digit/finger, index finger=2nd digit/finger, long finger=3rd digit/finger, ring=4th digit/finger and small finger=5th digit/finger)     FINDINGS:     There is no acute fracture or dislocation      No significant degenerative changes      No lytic or blastic osseous lesion      Soft tissues are unremarkable      IMPRESSION:     No acute osseous abnormality  RIGHT HAND     INDICATION:   R20 0: Anesthesia of skin  M79 89: Other specified soft tissue disorders      COMPARISON:  None     VIEWS:  XR HAND 3+ VW RIGHT         For the purposes of institution wide universal language the following terms will apply: (thumb=1st digit/finger, index finger=2nd digit/finger, long finger=3rd digit/finger, ring=4th digit/finger and small finger=5th digit/finger)     FINDINGS:     There is no acute fracture or dislocation      No significant degenerative changes      No lytic or blastic osseous lesion      Soft tissues are unremarkable      IMPRESSION:     No acute osseous abnormality

## 2022-02-22 NOTE — LETTER
February 23, 2022     Alexandr Marion PA-C  [address],  [address] [address]    Patient: Vicky Vick   YOB: 1995   Date of Visit: 2/22/2022       Dear Dr Missy August: Thank you for referring Xiomy Somers to me for evaluation  Below are my notes for this consultation  If you have questions, please do not hesitate to call me  I look forward to following your patient along with you  Sincerely,        Marianela Crespo MD        CC: No Recipients  Marianela Crespo MD  2/22/2022  2:26 PM  Signed  Assessment:  1  Lumbar disc disease with radiculopathy    2  Chronic bilateral low back pain with bilateral sciatica        Plan:  Patient is a 78-year-old female complaints of low back pain and bilateral leg pain left worse than right presents office for initial consultation  X-ray lumbar spine shows degenerative disc changes at L5-S1 with bilateral facet arthropathy  Patient describes left-sided radiating pain which he reports sharp stabbing shooting pain going from her back into her left leg and from her neck into her left arm  Patient denies being able to perform ADLs  Patient reports decrease quality of sleep secondary to sharp shooting pains  Unable to perform physical exam secondary to patient's level of excruciating pain  Patient is unable to stay still secondary to pain with tears when crying  CT renal stone study abdomen pelvis was reviewed which does show progression of degenerative disc changes at L5-S1 in addition to a new onset disc herniation at L4-5  Will need better imaging to better assess the pathology behind patient's low back, neck and radicular symptoms  At this time we do not feel patient will be able to participate in physical therapy and we require diagnostic imaging  1  We will order an x-ray of the cervical spine assess for any degenerative changes that correlate patient's left upper extremity pain and neck pain  2   We will trial diclofenac 50 mg p o  b i d  for facetogenic low back pain  3  We will trial Cymbalta 30 mg p o  q h s  for lumbar radicular symptoms  Number  4  We will order an MRI of the cervical spine in the lumbar spine to assess for the discogenic pathology behind patient's left upper and lower extremity digger symptoms  5  We will trial Medrol Dosepak for exacerbation of radicular symptoms  6  We will follow up in in 1 month to review diagnostic images response to medications  Once we were able to control patient's pain will then initiate physical therapy    History of Present Illness: The patient is a 27 y o  female who presents for consultation in regards to Shoulder Pain, Back Pain, and Ankle Pain  Symptoms have been present for 3 months  Symptoms began without any precipitating injury or trauma  Pain is reported to be 10 on the numeric rating scale  Symptoms are felt nearly constantly and worst in the no typical pattern  Symptoms are characterized as shooting and sharp  Symptoms are associated with bilateral arm weakness and bilateral leg weakness  Aggravating factors include standing, bending, leaning forward, leaning bckward, sitting, walking, exercise, coughing/sneezing and bowel movements  Relieving factors include nothing  No change in symptoms with kneeling, lying down and relaxation  Treatments that have been helpful include heat/ice  TENS unit have provided no relief  Medications to relieve symptoms include marijuana  Patient has tried and failed gabapentin, tizanidine, naproxen, ibuprofen  In the past patient has tried fentanyl which provided however she failed any relief from Percocet    Review of Systems:    Review of Systems   Constitutional: Positive for chills, fever and unexpected weight change  HENT: Positive for sore throat  Negative for trouble swallowing  Eyes: Positive for pain  Negative for visual disturbance  Respiratory: Positive for cough and shortness of breath  Negative for wheezing  Cardiovascular: Positive for chest pain  Negative for palpitations  Gastrointestinal: Positive for abdominal pain, nausea and vomiting  Negative for constipation and diarrhea  Endocrine: Negative for cold intolerance, heat intolerance and polydipsia  Genitourinary: Negative for difficulty urinating and frequency  Musculoskeletal: Positive for arthralgias, joint swelling and myalgias  Negative for gait problem  Skin: Positive for rash  Neurological: Positive for dizziness, numbness and headaches  Negative for seizures, syncope and weakness  Hematological: Does not bruise/bleed easily  Psychiatric/Behavioral: Positive for decreased concentration  Negative for dysphoric mood  The patient is nervous/anxious  Depression     All other systems reviewed and are negative          Past Medical History:   Diagnosis Date    Allergic     Ectopic pregnancy     GERD (gastroesophageal reflux disease)     History of unilateral fallopian tube excision     RIGHT removed    Ovarian cyst     Wears glasses        Past Surgical History:   Procedure Laterality Date    DILATION AND CURETTAGE OF UTERUS      ECTOPIC PREGNANCY SURGERY      LAPAROSCOPY      [place] LAP,DIAGNOSTIC ABDOMEN N/A 9/16/2017    Procedure: LAPAROSCOPY DIAGNOSTIC, left salpingectomy;  Surgeon: Yosef Mena MD;  Location: [facility];  Service: Gynecology    WISDOM TOOTH EXTRACTION      WISDOM TOOTH EXTRACTION Bilateral        Family History   Problem Relation Age of Onset    No Known Problems Mother     No Known Problems Father        Social History     Occupational History    Not on file   Tobacco Use    Smoking status: Current Every Day Smoker     Packs/day: 1 00     Years: 6 00     Pack years: 6 00     Types: Cigarettes    Smokeless tobacco: Never Used   Vaping Use    Vaping Use: Never used   Substance and Sexual Activity    Alcohol use: Not Currently     Comment: "occasional"    Drug use: Yes     Types: Marijuana    Sexual activity: Yes     Partners: Male     Birth control/protection: None         Current Outpatient Medications:     acetaminophen (TYLENOL) 500 mg tablet, Take 1 tablet (500 mg total) by mouth every 6 (six) hours as needed for mild pain, Disp: 60 tablet, Rfl: 0    albuterol (PROVENTIL HFA,VENTOLIN HFA) 90 mcg/act inhaler, Inhale 2 puffs every 6 (six) hours as needed for wheezing or shortness of breath, Disp: 18 g, Rfl: 5    Biotin 10 MG CAPS, Take 1 capsule by mouth daily For acne/skin  (Patient not taking: Reported on 11/23/2021 ), Disp: , Rfl:     Diclofenac Sodium (VOLTAREN) 1 %, Apply 2 g topically 4 (four) times a day, Disp: 200 g, Rfl: 5    Elastic Bandages & Supports (Wrist Brace/Left Medium) MISC, Use daily (Patient not taking: Reported on 1/14/2022 ), Disp: 1 each, Rfl: 0    Elastic Bandages & Supports (Wrist Brace/Right Medium) MISC, Use daily (Patient not taking: Reported on 1/14/2022 ), Disp: 1 each, Rfl: 0    lidocaine (LIDODERM) 5 %, Apply 1 patch topically daily Remove & Discard patch within 12 hours or as directed by MD, Disp: 30 patch, Rfl: 0    metroNIDAZOLE (METROGEL) 0 75 % vaginal gel, INSERT 1 APPLICATORFUL VAGINALLY NIGHTLY FOR 5 DAYS, Disp: , Rfl:     ondansetron (ZOFRAN) 8 mg tablet, Take 1 tablet (8 mg total) by mouth every 8 (eight) hours as needed for nausea or vomiting, Disp: 20 tablet, Rfl: 0    pantoprazole (PROTONIX) 40 mg tablet, Take 1 tablet (40 mg total) by mouth daily, Disp: 30 tablet, Rfl: 3    polyethylene glycol (GLYCOLAX) 17 GM/SCOOP powder, Take 17 g by mouth daily for 10 days, Disp: 238 g, Rfl: 5    PreviDent 5000 Booster Plus 1 1 % PSTE, , Disp: , Rfl:     Restasis 0 05 % ophthalmic emulsion, Administer 2 drops to both eyes 2 (two) times a day as needed, Disp: , Rfl:     sucralfate (CARAFATE) 1 g/10 mL suspension, Take 10 mL (1,000 mg total) by mouth every 6 (six) hours, Disp: 420 mL, Rfl: 0    tiZANidine (ZANAFLEX) 4 mg tablet, Take 1 tablet (4 mg total) by mouth every 8 (eight) hours as needed for muscle spasms, Disp: 30 tablet, Rfl: 2    triamcinolone (KENALOG) 0 1 % cream, Apply topically 2 (two) times a day, Disp: 30 g, Rfl: 5    Allergies   Allergen Reactions    Nitrofurantoin Itching    Nsaids Other (See Comments)     Due to issues with kidneys per pt    Oxycodone-Acetaminophen Itching     Reaction Date: 28Apr2011;     Oxycodone Itching    Tramadol Itching     Reaction Date: 28Apr2011;        Physical Exam:    /84 (BP Location: Left arm, Patient Position: Sitting, Cuff Size: Standard)   Ht 5' 3" (1 6 m)   Wt 67 9 kg (149 lb 9 6 oz)   LMP 01/28/2022 (Exact Date)   BMI 26 50 kg/m²     Constitutional: normal, well developed, well nourished, alert, in no distress and non-toxic and no overt pain behavior  Eyes: anicteric  HEENT: grossly intact  Neck: supple, symmetric, trachea midline and no masses   Pulmonary:even and unlabored  Cardiovascular:No edema or pitting edema present  Skin:Normal without rashes or lesions and well hydrated  Psychiatric:Mood and affect appropriate  Neurologic:Cranial Nerves II-XII grossly intact  Musculoskeletal:antalgic     Cervical Spine examination demonstrates  Decreased ROM secondary to pain with lateral rotation to the left/right and bending to the left/right, in addition to neck flexion  3/5 upper extremity strength in all muscle groups bilaterally  Negative Spurling's maneuver to the b/l Ue, sensitivity to light touch intact b/l Ue  Lumbar/Sacral Spine examination demonstrates  Decreased range of motion lumbar spine with pain upon: flexion, lateral rotation to the left/right, and bending to the left/right  Bilateral lumbar paraspinals tender to palpation  Muscle spasms noted in the lumbar area bilaterally  3/5 left lower extremity strength in all muscle groups bilaterally  Positive seated straight leg raise for bilateral lower extremities    Unable to finish physical exam secondary to acute exacerbation at this office visit  Imaging  LUMBAR SPINE     INDICATION:   M54 50: Low back pain, unspecified  G89 29: Other chronic pain      COMPARISON:  Lumbar spine x-ray dated November 5, 2020      VIEWS:  XR SPINE LUMBAR MINIMUM 4 VIEWS NON INJURY        FINDINGS:     There are 5 non rib bearing lumbar vertebral bodies       There is no evidence of acute fracture or destructive osseous lesion      Alignment is unremarkable       There is mild intervertebral disc space narrowing with endplate degenerative changes at L5/S1  There is mild bilateral facet arthropathy at L5/S1      The pedicles appear intact      Soft tissues are unremarkable      IMPRESSION:     No acute osseous abnormality        Degenerative changes as described  LEFT HAND     INDICATION:   R20 0: Anesthesia of skin  M79 89: Other specified soft tissue disorders      COMPARISON:  None     VIEWS:  XR HAND 3+ VW LEFT         For the purposes of institution wide universal language the following terms will apply: (thumb=1st digit/finger, index finger=2nd digit/finger, long finger=3rd digit/finger, ring=4th digit/finger and small finger=5th digit/finger)     FINDINGS:     There is no acute fracture or dislocation      No significant degenerative changes      No lytic or blastic osseous lesion      Soft tissues are unremarkable      IMPRESSION:     No acute osseous abnormality  RIGHT HAND     INDICATION:   R20 0: Anesthesia of skin  M79 89:  Other specified soft tissue disorders      COMPARISON:  None     VIEWS:  XR HAND 3+ VW RIGHT         For the purposes of institution wide universal language the following terms will apply: (thumb=1st digit/finger, index finger=2nd digit/finger, long finger=3rd digit/finger, ring=4th digit/finger and small finger=5th digit/finger)     FINDINGS:     There is no acute fracture or dislocation      No significant degenerative changes      No lytic or blastic osseous lesion      Soft tissues are unremarkable      IMPRESSION:     No acute osseous abnormality

## 2022-02-28 ENCOUNTER — APPOINTMENT (OUTPATIENT)
Dept: MRI IMAGING | Facility: HOSPITAL | Age: 27
End: 2022-02-28
Payer: COMMERCIAL

## 2022-02-28 ENCOUNTER — HOSPITAL ENCOUNTER (OUTPATIENT)
Facility: HOSPITAL | Age: 27
Setting detail: OBSERVATION
Discharge: HOME | End: 2022-03-01
Attending: EMERGENCY MEDICINE | Admitting: INTERNAL MEDICINE
Payer: COMMERCIAL

## 2022-02-28 ENCOUNTER — APPOINTMENT (EMERGENCY)
Dept: CT IMAGING | Facility: HOSPITAL | Age: 27
End: 2022-02-28
Payer: COMMERCIAL

## 2022-02-28 DIAGNOSIS — M54.9 INTRACTABLE BACK PAIN: Primary | ICD-10-CM

## 2022-02-28 DIAGNOSIS — M54.42 ACUTE BILATERAL LOW BACK PAIN WITH LEFT-SIDED SCIATICA: ICD-10-CM

## 2022-02-28 DIAGNOSIS — N11.9 CHRONIC TUBULOINTERSTITIAL NEPHRITIS: ICD-10-CM

## 2022-02-28 PROBLEM — R10.84 GENERALIZED ABDOMINAL PAIN: Status: ACTIVE | Noted: 2020-09-09

## 2022-02-28 LAB
ALBUMIN SERPL BCP-MCNC: 4.5 G/DL (ref 3.5–5)
ALP SERPL-CCNC: 61 U/L (ref 46–116)
ALT SERPL W P-5'-P-CCNC: 22 U/L (ref 12–78)
ANION GAP SERPL CALCULATED.3IONS-SCNC: 12 MMOL/L (ref 4–13)
APAP SERPL-MCNC: <2 UG/ML (ref 10–20)
APTT PPP: 26 SECONDS (ref 23–37)
AST SERPL W P-5'-P-CCNC: 11 U/L (ref 5–45)
B-HCG SERPL-ACNC: <2 MIU/ML
BASOPHILS # BLD AUTO: 0.07 THOUSANDS/ΜL (ref 0–0.1)
BASOPHILS NFR BLD AUTO: 1 % (ref 0–1)
BILIRUB DIRECT SERPL-MCNC: 0.12 MG/DL (ref 0–0.2)
BILIRUB SERPL-MCNC: 0.52 MG/DL (ref 0.2–1)
BILIRUB UR QL STRIP: NEGATIVE
BUN SERPL-MCNC: 16 MG/DL (ref 5–25)
CALCIUM SERPL-MCNC: 9.6 MG/DL (ref 8.3–10.1)
CARDIAC TROPONIN I PNL SERPL HS: <2 NG/L (ref 8–18)
CHLORIDE SERPL-SCNC: 100 MMOL/L (ref 100–108)
CK MB SERPL-MCNC: 1.8 % (ref 0–2.5)
CK MB SERPL-MCNC: 2.9 NG/ML (ref 0–5)
CK SERPL-CCNC: 163 U/L (ref 26–192)
CLARITY UR: ABNORMAL
CO2 SERPL-SCNC: 24 MMOL/L (ref 21–32)
COLOR UR: YELLOW
CREAT SERPL-MCNC: 0.74 MG/DL (ref 0.6–1.3)
D DIMER PPP FEU-MCNC: <0.27 UG/ML FEU
EOSINOPHIL # BLD AUTO: 0.07 THOUSAND/ΜL (ref 0–0.61)
EOSINOPHIL NFR BLD AUTO: 1 % (ref 0–6)
ERYTHROCYTE [DISTWIDTH] IN BLOOD BY AUTOMATED COUNT: 11.9 % (ref 11.6–15.1)
ETHANOL SERPL-MCNC: <3 MG/DL (ref 0–3)
FLUAV RNA RESP QL NAA+PROBE: NEGATIVE
FLUBV RNA RESP QL NAA+PROBE: NEGATIVE
GFR SERPL CREATININE-BSD FRML MDRD: 110 ML/MIN/1.73SQ M
GLUCOSE SERPL-MCNC: 103 MG/DL (ref 65–140)
GLUCOSE UR STRIP-MCNC: NEGATIVE MG/DL
HCT VFR BLD AUTO: 42.6 % (ref 34.8–46.1)
HGB BLD-MCNC: 14.6 G/DL (ref 11.5–15.4)
HGB UR QL STRIP.AUTO: NEGATIVE
IMM GRANULOCYTES # BLD AUTO: 0.06 THOUSAND/UL (ref 0–0.2)
IMM GRANULOCYTES NFR BLD AUTO: 0 % (ref 0–2)
INR PPP: 0.91 (ref 0.84–1.19)
KETONES UR STRIP-MCNC: NEGATIVE MG/DL
LACTATE SERPL-SCNC: 1.8 MMOL/L (ref 0.5–2)
LEUKOCYTE ESTERASE UR QL STRIP: NEGATIVE
LIPASE SERPL-CCNC: 86 U/L (ref 73–393)
LYMPHOCYTES # BLD AUTO: 3.3 THOUSANDS/ΜL (ref 0.6–4.47)
LYMPHOCYTES NFR BLD AUTO: 24 % (ref 14–44)
MAGNESIUM SERPL-MCNC: 2 MG/DL (ref 1.6–2.6)
MCH RBC QN AUTO: 29.9 PG (ref 26.8–34.3)
MCHC RBC AUTO-ENTMCNC: 34.3 G/DL (ref 31.4–37.4)
MCV RBC AUTO: 87 FL (ref 82–98)
MONOCYTES # BLD AUTO: 1.08 THOUSAND/ΜL (ref 0.17–1.22)
MONOCYTES NFR BLD AUTO: 8 % (ref 4–12)
NEUTROPHILS # BLD AUTO: 9.43 THOUSANDS/ΜL (ref 1.85–7.62)
NEUTS SEG NFR BLD AUTO: 66 % (ref 43–75)
NITRITE UR QL STRIP: NEGATIVE
NRBC BLD AUTO-RTO: 0 /100 WBCS
PH UR STRIP.AUTO: 8.5 [PH]
PLATELET # BLD AUTO: 450 THOUSANDS/UL (ref 149–390)
PMV BLD AUTO: 9.5 FL (ref 8.9–12.7)
POTASSIUM SERPL-SCNC: 3.5 MMOL/L (ref 3.5–5.3)
PROT SERPL-MCNC: 7.4 G/DL (ref 6.4–8.2)
PROT UR STRIP-MCNC: NEGATIVE MG/DL
PROTHROMBIN TIME: 11.8 SECONDS (ref 11.6–14.5)
RBC # BLD AUTO: 4.89 MILLION/UL (ref 3.81–5.12)
RSV RNA RESP QL NAA+PROBE: NEGATIVE
SALICYLATES SERPL-MCNC: 3 MG/DL (ref 3–20)
SARS-COV-2 RNA RESP QL NAA+PROBE: NEGATIVE
SODIUM SERPL-SCNC: 136 MMOL/L (ref 136–145)
SP GR UR STRIP.AUTO: <=1.005 (ref 1–1.03)
TSH SERPL DL<=0.05 MIU/L-ACNC: 1.27 UIU/ML (ref 0.36–3.74)
UROBILINOGEN UR QL STRIP.AUTO: 0.2 E.U./DL
WBC # BLD AUTO: 14.01 THOUSAND/UL (ref 4.31–10.16)

## 2022-02-28 PROCEDURE — 83605 ASSAY OF LACTIC ACID: CPT | Performed by: EMERGENCY MEDICINE

## 2022-02-28 PROCEDURE — 83735 ASSAY OF MAGNESIUM: CPT | Performed by: EMERGENCY MEDICINE

## 2022-02-28 PROCEDURE — 72148 MRI LUMBAR SPINE W/O DYE: CPT

## 2022-02-28 PROCEDURE — 84702 CHORIONIC GONADOTROPIN TEST: CPT | Performed by: EMERGENCY MEDICINE

## 2022-02-28 PROCEDURE — 97167 OT EVAL HIGH COMPLEX 60 MIN: CPT

## 2022-02-28 PROCEDURE — 97163 PT EVAL HIGH COMPLEX 45 MIN: CPT

## 2022-02-28 PROCEDURE — 80076 HEPATIC FUNCTION PANEL: CPT | Performed by: EMERGENCY MEDICINE

## 2022-02-28 PROCEDURE — 80179 DRUG ASSAY SALICYLATE: CPT | Performed by: EMERGENCY MEDICINE

## 2022-02-28 PROCEDURE — 80048 BASIC METABOLIC PNL TOTAL CA: CPT | Performed by: EMERGENCY MEDICINE

## 2022-02-28 PROCEDURE — 85610 PROTHROMBIN TIME: CPT | Performed by: EMERGENCY MEDICINE

## 2022-02-28 PROCEDURE — 71275 CT ANGIOGRAPHY CHEST: CPT

## 2022-02-28 PROCEDURE — 85379 FIBRIN DEGRADATION QUANT: CPT | Performed by: EMERGENCY MEDICINE

## 2022-02-28 PROCEDURE — 74177 CT ABD & PELVIS W/CONTRAST: CPT

## 2022-02-28 PROCEDURE — 99220 PR INITIAL OBSERVATION CARE/DAY 70 MINUTES: CPT | Performed by: INTERNAL MEDICINE

## 2022-02-28 PROCEDURE — 82553 CREATINE MB FRACTION: CPT | Performed by: EMERGENCY MEDICINE

## 2022-02-28 PROCEDURE — G1004 CDSM NDSC: HCPCS

## 2022-02-28 PROCEDURE — 85025 COMPLETE CBC W/AUTO DIFF WBC: CPT | Performed by: EMERGENCY MEDICINE

## 2022-02-28 PROCEDURE — 85730 THROMBOPLASTIN TIME PARTIAL: CPT | Performed by: EMERGENCY MEDICINE

## 2022-02-28 PROCEDURE — 84484 ASSAY OF TROPONIN QUANT: CPT | Performed by: EMERGENCY MEDICINE

## 2022-02-28 PROCEDURE — 99285 EMERGENCY DEPT VISIT HI MDM: CPT | Performed by: EMERGENCY MEDICINE

## 2022-02-28 PROCEDURE — 99285 EMERGENCY DEPT VISIT HI MDM: CPT

## 2022-02-28 PROCEDURE — 80143 DRUG ASSAY ACETAMINOPHEN: CPT | Performed by: EMERGENCY MEDICINE

## 2022-02-28 PROCEDURE — 84443 ASSAY THYROID STIM HORMONE: CPT | Performed by: EMERGENCY MEDICINE

## 2022-02-28 PROCEDURE — 36415 COLL VENOUS BLD VENIPUNCTURE: CPT | Performed by: EMERGENCY MEDICINE

## 2022-02-28 PROCEDURE — 82550 ASSAY OF CK (CPK): CPT | Performed by: EMERGENCY MEDICINE

## 2022-02-28 PROCEDURE — 87040 BLOOD CULTURE FOR BACTERIA: CPT | Performed by: EMERGENCY MEDICINE

## 2022-02-28 PROCEDURE — 82077 ASSAY SPEC XCP UR&BREATH IA: CPT | Performed by: EMERGENCY MEDICINE

## 2022-02-28 PROCEDURE — 81003 URINALYSIS AUTO W/O SCOPE: CPT | Performed by: EMERGENCY MEDICINE

## 2022-02-28 PROCEDURE — 0241U HB NFCT DS VIR RESP RNA 4 TRGT: CPT | Performed by: EMERGENCY MEDICINE

## 2022-02-28 PROCEDURE — 70450 CT HEAD/BRAIN W/O DYE: CPT

## 2022-02-28 PROCEDURE — 96372 THER/PROPH/DIAG INJ SC/IM: CPT

## 2022-02-28 PROCEDURE — 83690 ASSAY OF LIPASE: CPT | Performed by: EMERGENCY MEDICINE

## 2022-02-28 RX ORDER — LORAZEPAM 2 MG/ML
1 INJECTION INTRAMUSCULAR ONCE
Status: COMPLETED | OUTPATIENT
Start: 2022-02-28 | End: 2022-02-28

## 2022-02-28 RX ORDER — SUCRALFATE 1 G/1
1 TABLET ORAL EVERY 6 HOURS SCHEDULED
Status: DISCONTINUED | OUTPATIENT
Start: 2022-02-28 | End: 2022-03-01 | Stop reason: HOSPADM

## 2022-02-28 RX ORDER — HYDROMORPHONE HCL/PF 1 MG/ML
0.5 SYRINGE (ML) INJECTION
Status: DISCONTINUED | OUTPATIENT
Start: 2022-02-28 | End: 2022-02-28

## 2022-02-28 RX ORDER — LIDOCAINE 50 MG/G
1 PATCH TOPICAL DAILY
Status: DISCONTINUED | OUTPATIENT
Start: 2022-02-28 | End: 2022-03-01 | Stop reason: HOSPADM

## 2022-02-28 RX ORDER — OLANZAPINE 10 MG/1
10 INJECTION, POWDER, LYOPHILIZED, FOR SOLUTION INTRAMUSCULAR ONCE
Status: COMPLETED | OUTPATIENT
Start: 2022-02-28 | End: 2022-02-28

## 2022-02-28 RX ORDER — ONDANSETRON 2 MG/ML
4 INJECTION INTRAMUSCULAR; INTRAVENOUS EVERY 6 HOURS PRN
Status: DISCONTINUED | OUTPATIENT
Start: 2022-02-28 | End: 2022-03-01 | Stop reason: HOSPADM

## 2022-02-28 RX ORDER — NICOTINE 21 MG/24HR
1 PATCH, TRANSDERMAL 24 HOURS TRANSDERMAL DAILY
Status: DISCONTINUED | OUTPATIENT
Start: 2022-02-28 | End: 2022-03-01 | Stop reason: HOSPADM

## 2022-02-28 RX ORDER — SODIUM CHLORIDE 9 MG/ML
75 INJECTION, SOLUTION INTRAVENOUS CONTINUOUS
Status: DISCONTINUED | OUTPATIENT
Start: 2022-02-28 | End: 2022-03-01 | Stop reason: HOSPADM

## 2022-02-28 RX ORDER — FENTANYL CITRATE 50 UG/ML
25 INJECTION, SOLUTION INTRAMUSCULAR; INTRAVENOUS
Status: DISCONTINUED | OUTPATIENT
Start: 2022-02-28 | End: 2022-03-01

## 2022-02-28 RX ORDER — POTASSIUM CHLORIDE 20 MEQ/1
40 TABLET, EXTENDED RELEASE ORAL ONCE
Status: COMPLETED | OUTPATIENT
Start: 2022-02-28 | End: 2022-02-28

## 2022-02-28 RX ORDER — DULOXETIN HYDROCHLORIDE 30 MG/1
30 CAPSULE, DELAYED RELEASE ORAL DAILY
Status: DISCONTINUED | OUTPATIENT
Start: 2022-02-28 | End: 2022-03-01 | Stop reason: HOSPADM

## 2022-02-28 RX ORDER — ACETAMINOPHEN 325 MG/1
650 TABLET ORAL EVERY 6 HOURS PRN
Status: DISCONTINUED | OUTPATIENT
Start: 2022-02-28 | End: 2022-03-01 | Stop reason: HOSPADM

## 2022-02-28 RX ORDER — METHYLPREDNISOLONE SODIUM SUCCINATE 40 MG/ML
40 INJECTION, POWDER, LYOPHILIZED, FOR SOLUTION INTRAMUSCULAR; INTRAVENOUS DAILY
Status: DISCONTINUED | OUTPATIENT
Start: 2022-02-28 | End: 2022-03-01 | Stop reason: HOSPADM

## 2022-02-28 RX ORDER — ALBUTEROL SULFATE 90 UG/1
2 AEROSOL, METERED RESPIRATORY (INHALATION) EVERY 6 HOURS PRN
Status: DISCONTINUED | OUTPATIENT
Start: 2022-02-28 | End: 2022-03-01 | Stop reason: HOSPADM

## 2022-02-28 RX ORDER — CARISOPRODOL 350 MG/1
350 TABLET ORAL
Status: DISCONTINUED | OUTPATIENT
Start: 2022-02-28 | End: 2022-03-01 | Stop reason: HOSPADM

## 2022-02-28 RX ORDER — DOCUSATE SODIUM 100 MG/1
100 CAPSULE, LIQUID FILLED ORAL 2 TIMES DAILY
Status: DISCONTINUED | OUTPATIENT
Start: 2022-02-28 | End: 2022-03-01 | Stop reason: HOSPADM

## 2022-02-28 RX ORDER — PANTOPRAZOLE SODIUM 40 MG/1
40 TABLET, DELAYED RELEASE ORAL
Status: DISCONTINUED | OUTPATIENT
Start: 2022-02-28 | End: 2022-03-01 | Stop reason: HOSPADM

## 2022-02-28 RX ORDER — POLYETHYLENE GLYCOL 3350 17 G/17G
17 POWDER, FOR SOLUTION ORAL DAILY PRN
Status: DISCONTINUED | OUTPATIENT
Start: 2022-02-28 | End: 2022-03-01 | Stop reason: HOSPADM

## 2022-02-28 RX ADMIN — SUCRALFATE 1 G: 1 TABLET ORAL at 11:37

## 2022-02-28 RX ADMIN — DOCUSATE SODIUM 100 MG: 100 CAPSULE ORAL at 11:37

## 2022-02-28 RX ADMIN — ENOXAPARIN SODIUM 40 MG: 40 INJECTION SUBCUTANEOUS at 11:37

## 2022-02-28 RX ADMIN — DOCUSATE SODIUM 100 MG: 100 CAPSULE ORAL at 17:26

## 2022-02-28 RX ADMIN — DICLOFENAC SODIUM TOPICAL GEL, 1%, 2 G: 10 GEL TOPICAL at 13:13

## 2022-02-28 RX ADMIN — CARISOPRODOL 350 MG: 350 TABLET ORAL at 11:37

## 2022-02-28 RX ADMIN — PANTOPRAZOLE SODIUM 40 MG: 40 TABLET, DELAYED RELEASE ORAL at 11:37

## 2022-02-28 RX ADMIN — FENTANYL CITRATE 25 MCG: 0.05 INJECTION, SOLUTION INTRAMUSCULAR; INTRAVENOUS at 22:15

## 2022-02-28 RX ADMIN — SUCRALFATE 1 G: 1 TABLET ORAL at 17:25

## 2022-02-28 RX ADMIN — IOHEXOL 100 ML: 350 INJECTION, SOLUTION INTRAVENOUS at 07:55

## 2022-02-28 RX ADMIN — OLANZAPINE 10 MG: 10 INJECTION, POWDER, FOR SOLUTION INTRAMUSCULAR at 06:31

## 2022-02-28 RX ADMIN — DICLOFENAC SODIUM TOPICAL GEL, 1%, 2 G: 10 GEL TOPICAL at 17:26

## 2022-02-28 RX ADMIN — WATER 10 ML: 1 INJECTION INTRAMUSCULAR; INTRAVENOUS; SUBCUTANEOUS at 06:32

## 2022-02-28 RX ADMIN — SODIUM CHLORIDE 75 ML/HR: 9 INJECTION, SOLUTION INTRAVENOUS at 11:42

## 2022-02-28 RX ADMIN — CARISOPRODOL 350 MG: 350 TABLET ORAL at 17:25

## 2022-02-28 RX ADMIN — METHYLPREDNISOLONE SODIUM SUCCINATE 40 MG: 40 INJECTION, POWDER, FOR SOLUTION INTRAMUSCULAR; INTRAVENOUS at 11:36

## 2022-02-28 RX ADMIN — POTASSIUM CHLORIDE 40 MEQ: 1500 TABLET, EXTENDED RELEASE ORAL at 11:37

## 2022-02-28 RX ADMIN — LIDOCAINE 5% 1 PATCH: 700 PATCH TOPICAL at 11:37

## 2022-02-28 RX ADMIN — DICLOFENAC SODIUM TOPICAL GEL, 1%, 2 G: 10 GEL TOPICAL at 22:16

## 2022-02-28 RX ADMIN — DULOXETINE HYDROCHLORIDE 30 MG: 30 CAPSULE, DELAYED RELEASE ORAL at 11:36

## 2022-02-28 RX ADMIN — SUCRALFATE 1 G: 1 TABLET ORAL at 23:23

## 2022-02-28 RX ADMIN — HYDROMORPHONE HYDROCHLORIDE 0.5 MG: 1 INJECTION, SOLUTION INTRAMUSCULAR; INTRAVENOUS; SUBCUTANEOUS at 09:50

## 2022-02-28 RX ADMIN — CARISOPRODOL 350 MG: 350 TABLET ORAL at 22:16

## 2022-02-28 RX ADMIN — LORAZEPAM 1 MG: 2 INJECTION INTRAMUSCULAR; INTRAVENOUS at 11:00

## 2022-02-28 RX ADMIN — FENTANYL CITRATE 25 MCG: 0.05 INJECTION, SOLUTION INTRAMUSCULAR; INTRAVENOUS at 23:23

## 2022-02-28 NOTE — ED PROVIDER NOTES
History  Chief Complaint   Patient presents with    Back Pain     chronic back pain stated its just getting worse  Had motrin, zanaflex, and Cymbalta with no relief  pain is going down left leg  42-year-old female presents by EMS with complaints of pain all over but worsening pain in her back  This seemed to get worse and proximally 0200 this morning  She denies any history of trauma or falls she was trying to get into bed when the pain worsened suddenly  Patient is being followed by her family doctor as well as an evaluation by Dr Isabel Sanchez at [facility] 02/22/22  for back pain with left sciatica; patient has been taking diclofenac Cymbalta and states she has taken the Medrol Dosepak as well  Patient has been having difficulty walking  And ever since the pain worsened she has been unable to ambulate she denies any fever or chills  She feels diffusely swollen  She is very tearful and saying that just everything hurts  She denies any headache there is no new neck pain she does complain of pain and numbness involving the left leg  She denies any nausea vomiting she has had no cough or upper respiratory complaints  She denies bowel or bladder incontinence  Patient states she has had a tubal ligation in the past          Prior to Admission Medications   Prescriptions Last Dose Informant Patient Reported? Taking?    Biotin 10 MG CAPS   Yes No   Sig: Take 1 capsule by mouth daily For acne/skin    Patient not taking: Reported on 11/23/2021    DULoxetine (CYMBALTA) 30 mg delayed release capsule 2/27/2022 at Unknown time  No Yes   Sig: Take 1 capsule (30 mg total) by mouth daily   Diclofenac Sodium (VOLTAREN) 1 % 2/27/2022 at Unknown time  No Yes   Sig: Apply 2 g topically 4 (four) times a day   Elastic Bandages & Supports (Wrist Brace/Left Medium) MISC   No No   Sig: Use daily   Patient not taking: Reported on 1/14/2022    Elastic Bandages & Supports (Wrist Brace/Right Medium) MISC   No No   Sig: Use daily   Patient not taking: Reported on 1/14/2022    PreviDent 5000 Booster Plus 1 1 % PSTE   Yes No   Restasis 0 05 % ophthalmic emulsion   Yes No   Sig: Administer 2 drops to both eyes 2 (two) times a day as needed   acetaminophen (TYLENOL) 500 mg tablet   No No   Sig: Take 1 tablet (500 mg total) by mouth every 6 (six) hours as needed for mild pain   albuterol (PROVENTIL HFA,VENTOLIN HFA) 90 mcg/act inhaler   No No   Sig: Inhale 2 puffs every 6 (six) hours as needed for wheezing or shortness of breath   lidocaine (LIDODERM) 5 %   No No   Sig: Apply 1 patch topically daily Remove & Discard patch within 12 hours or as directed by MD   methylPREDNISolone 4 MG tablet therapy pack   No No   Sig: Use as directed on package   metroNIDAZOLE (METROGEL) 0 75 % vaginal gel   Yes No   Sig: INSERT 1 APPLICATORFUL VAGINALLY NIGHTLY FOR 5 DAYS   ondansetron (ZOFRAN) 8 mg tablet   No No   Sig: Take 1 tablet (8 mg total) by mouth every 8 (eight) hours as needed for nausea or vomiting   pantoprazole (PROTONIX) 40 mg tablet   No No   Sig: Take 1 tablet (40 mg total) by mouth daily   polyethylene glycol (GLYCOLAX) 17 GM/SCOOP powder   No No   Sig: Take 17 g by mouth daily for 10 days   sucralfate (CARAFATE) 1 g/10 mL suspension   No No   Sig: Take 10 mL (1,000 mg total) by mouth every 6 (six) hours   tiZANidine (ZANAFLEX) 4 mg tablet 2/27/2022 at Unknown time  No Yes   Sig: Take 1 tablet (4 mg total) by mouth every 8 (eight) hours as needed for muscle spasms   triamcinolone (KENALOG) 0 1 % cream   No No   Sig: Apply topically 2 (two) times a day      Facility-Administered Medications: None       Past Medical History:   Diagnosis Date    Allergic     Ectopic pregnancy     GERD (gastroesophageal reflux disease)     History of unilateral fallopian tube excision     RIGHT removed    Ovarian cyst     Wears glasses        Past Surgical History:   Procedure Laterality Date    DILATION AND CURETTAGE OF UTERUS      ECTOPIC PREGNANCY SURGERY      LAPAROSCOPY      [place] LAP,DIAGNOSTIC ABDOMEN N/A 9/16/2017    Procedure: LAPAROSCOPY DIAGNOSTIC, left salpingectomy;  Surgeon: Ricki Saba MD;  Location: [facility];  Service: Gynecology    WISDOM TOOTH EXTRACTION      WISDOM TOOTH EXTRACTION Bilateral        Family History   Problem Relation Age of Onset    No Known Problems Mother     No Known Problems Father      I have reviewed and agree with the history as documented  E-Cigarette/Vaping    E-Cigarette Use Never User      E-Cigarette/Vaping Substances    Nicotine No     THC No     CBD No     Flavoring No     Other No     Unknown No      Social History     Tobacco Use    Smoking status: Current Every Day Smoker     Packs/day: 2 00     Years: 6 00     Pack years: 12 00     Types: Cigarettes    Smokeless tobacco: Never Used   Vaping Use    Vaping Use: Never used   Substance Use Topics    Alcohol use: Not Currently     Comment: "occasional"    Drug use: Yes     Types: Marijuana       Review of Systems   Constitutional: Positive for activity change and appetite change  Negative for chills, diaphoresis and fever  HENT: Negative for congestion, ear pain, rhinorrhea, sneezing and sore throat  Eyes: Negative for discharge  Respiratory: Positive for shortness of breath  Negative for cough  Cardiovascular: Positive for chest pain  Negative for leg swelling  Gastrointestinal: Positive for abdominal pain  Negative for blood in stool, diarrhea, nausea and vomiting  Endocrine: Negative for polyuria  Genitourinary: Negative for difficulty urinating, dysuria, frequency, urgency, vaginal bleeding and vaginal discharge  Musculoskeletal: Positive for back pain, gait problem, myalgias (diffuse) and neck pain  Negative for neck stiffness  Skin: Negative for rash  Neurological: Positive for weakness and numbness  Negative for dizziness, speech difficulty, light-headedness and headaches     Hematological: Negative for adenopathy  Psychiatric/Behavioral: Negative for confusion  The patient is nervous/anxious  All other systems reviewed and are negative  Physical Exam  Physical Exam  Vitals and nursing note reviewed  Exam conducted with a chaperone present  Constitutional:       General: She is in acute distress  Appearance: She is not ill-appearing or diaphoretic  Comments: Tearful   HENT:      Head: Normocephalic and atraumatic  Right Ear: Tympanic membrane normal       Left Ear: Tympanic membrane normal       Nose: Nose normal  No congestion or rhinorrhea  Mouth/Throat:      Mouth: Mucous membranes are moist       Pharynx: No oropharyngeal exudate or posterior oropharyngeal erythema  Eyes:      General:         Right eye: No discharge  Left eye: No discharge  Extraocular Movements: Extraocular movements intact  Conjunctiva/sclera: Conjunctivae normal       Pupils: Pupils are equal, round, and reactive to light  Neck:      Comments: No reproducible midline tenderness  Cardiovascular:      Rate and Rhythm: Regular rhythm  Tachycardia present  Pulses: Normal pulses  Pulmonary:      Breath sounds: No stridor  No wheezing, rhonchi or rales  Comments: tachypnea  Chest:      Chest wall: No tenderness  Abdominal:      General: There is distension  Tenderness: There is abdominal tenderness  There is guarding and rebound  There is no right CVA tenderness or left CVA tenderness  Comments: Hypoactive BS; no midline T or L-spine tenderness no CVA tenderness no evidence of any ecchymosis or bruising to the back   Genitourinary:     Rectum: Guaiac result negative  Comments: Rectal exam:  Chaperoned by Uzma Raltiff patient has normal rectal tone with perianal sensation intact  No stool on the glove heme-negative controls intact  Musculoskeletal:         General: Normal range of motion  Cervical back: Normal range of motion and neck supple   No rigidity or tenderness  Right lower leg: No edema  Left lower leg: No edema  Lymphadenopathy:      Cervical: No cervical adenopathy  Skin:     General: Skin is warm and dry  Capillary Refill: Capillary refill takes less than 2 seconds  Neurological:      Mental Status: She is alert and oriented to person, place, and time  Cranial Nerves: No cranial nerve deficit  Sensory: No sensory deficit  Motor: Weakness (hip flexion of lower extremities) present  Coordination: Coordination normal       Deep Tendon Reflexes: Reflexes normal       Comments: Unable to test gait  Patient has equal hand  there is no pronator drift  Radial ulnar and median nerves isolated found be intact in myotome dermatome to the bilateral extremities  Patient is able to flex and extend at the knees  Patient unable flex either lower ext at hip  Great toe dorsiflexion as well as plantar dorsiflexion are intact  She has 2+ patellar reflexes and 1+ ankle jerk reflexes toes are downgoing bilaterally    Light touch is intact throughout including the L4-S1 dermatomes of the feet;    Psychiatric:      Comments: dispondent         Vital Signs  ED Triage Vitals [02/28/22 0529]   Temperature Pulse Respirations Blood Pressure SpO2   98 1 °F (36 7 °C) (!) 118 18 142/93 99 %      Temp Source Heart Rate Source Patient Position - Orthostatic VS BP Location FiO2 (%)   Tympanic Monitor Lying Left arm --      Pain Score       10 - Worst Possible Pain           Vitals:    02/28/22 0730 02/28/22 0800 02/28/22 0830 02/28/22 0918   BP: 124/84 129/87 112/63 (!) 138/106   Pulse: 97 87 90    Patient Position - Orthostatic VS: Lying Lying Lying          Visual Acuity      ED Medications  Medications   OLANZapine (ZyPREXA) IM injection 10 mg (10 mg Intramuscular Given 2/28/22 0631)   sterile water injection **ADS Override Pull** (10 mL  Given 2/28/22 0632)   iohexol (OMNIPAQUE) 350 MG/ML injection (SINGLE-DOSE) 100 mL (100 mL Intravenous Given 2/28/22 0755)       Diagnostic Studies  Results Reviewed     Procedure Component Value Units Date/Time    Lactic acid [[id number]]  (Normal) Collected: 02/28/22 0641    Lab Status: Final result Specimen: Blood from Arm, Left Updated: 02/28/22 0816     LACTIC ACID 1 8 mmol/L     Narrative:      Result may be elevated if tourniquet was used during collection  COVID/FLU/RSV - 2 hour TAT [[id number]]  (Normal) Collected: 02/28/22 0703    Lab Status: Final result Specimen: Nares from Nose Updated: 02/28/22 0814     SARS-CoV-2 Negative     INFLUENZA A PCR Negative     INFLUENZA B PCR Negative     RSV PCR Negative    Narrative:      FOR PEDIATRIC PATIENTS - copy/paste COVID Guidelines URL to browser: [email or web address]    SARS-CoV-2 assay is a Nucleic Acid Amplification assay intended for the  qualitative detection of nucleic acid from SARS-CoV-2 in nasopharyngeal  swabs  Results are for the presumptive identification of SARS-CoV-2 RNA  Positive results are indicative of infection with SARS-CoV-2, the virus  causing COVID-19, but do not rule out bacterial infection or co-infection  with other viruses  Laboratories within the [place] and its  territories are required to report all positive results to the appropriate  public health authorities  Negative results do not preclude SARS-CoV-2  infection and should not be used as the sole basis for treatment or other  patient management decisions  Negative results must be combined with  clinical observations, patient history, and epidemiological information  This test has not been FDA cleared or approved  This test has been authorized by FDA under an Emergency Use Authorization  (EUA)   This test is only authorized for the duration of time the  declaration that circumstances exist justifying the authorization of the  emergency use of an in vitro diagnostic tests for detection of SARS-CoV-2  virus and/or diagnosis of COVID-19 infection under section 564(b)(1) of  the Act, 21 U  S C  017EGT-5(S)(6), unless the authorization is terminated  or revoked sooner  The test has been validated but independent review by FDA  and CLIA is pending  Test performed using [employer] GeneXpert: This RT-PCR assay targets N2,  a region unique to SARS-CoV-2  A conserved region in the E-gene was chosen  for pan-Sarbecovirus detection which includes SARS-CoV-2  Hepatic function panel [[id number]]  (Normal) Collected: 02/28/22 0641    Lab Status: Final result Specimen: Blood from Arm, Left Updated: 02/28/22 0754     Total Bilirubin 0 52 mg/dL      Bilirubin, Direct 0 12 mg/dL      Alkaline Phosphatase 61 U/L      AST 11 U/L      ALT 22 U/L      Total Protein 7 4 g/dL      Albumin 4 5 g/dL     Lipase [[id number]]  (Normal) Collected: 02/28/22 0641    Lab Status: Final result Specimen: Blood from Arm, Left Updated: 02/28/22 0754     Lipase 86 u/L     TSH, 3rd generation with Free T4 reflex [[id number]]  (Normal) Collected: 02/28/22 0641    Lab Status: Final result Specimen: Blood from Arm, Left Updated: 02/28/22 0754     TSH 3RD GENERATON 1 274 uIU/mL     Narrative:      Patients undergoing fluorescein dye angiography may retain small amounts of fluorescein in the body for 48-72 hours post procedure  Samples containing fluorescein can produce falsely depressed TSH values  If the patient had this procedure,a specimen should be resubmitted post fluorescein clearance  Salicylate level [[id number]]  (Normal) Collected: 02/28/22 0641    Lab Status: Final result Specimen: Blood from Arm, Left Updated: 76/49/31 9461     Salicylate Lvl 3 mg/dL     Acetaminophen level-If concentration is detectable, please discuss with medical  on call   [[id number]]  (Abnormal) Collected: 02/28/22 0641    Lab Status: Final result Specimen: Blood from Arm, Left Updated: 02/28/22 0754     Acetaminophen Level <2 ug/mL     CKMB [[id number]]  (Normal) Collected: 02/28/22 0641    Lab Status: Final result Specimen: Blood from Arm, Left Updated: 02/28/22 0754     CK-MB Index 1 8 %      CK-MB 2 9 ng/mL     Magnesium [[id number]]  (Normal) Collected: 02/28/22 0641    Lab Status: Final result Specimen: Blood from Arm, Left Updated: 02/28/22 0754     Magnesium 2 0 mg/dL     CBC and differential [[id number]]  (Abnormal) Collected: 02/28/22 0641    Lab Status: Final result Specimen: Blood from Arm, Left Updated: 02/28/22 0742     WBC 14 01 Thousand/uL      RBC 4 89 Million/uL      Hemoglobin 14 6 g/dL      Hematocrit 42 6 %      MCV 87 fL      MCH 29 9 pg      MCHC 34 3 g/dL      RDW 11 9 %      MPV 9 5 fL      Platelets 986 Thousands/uL      nRBC 0 /100 WBCs      Neutrophils Relative 66 %      Immat GRANS % 0 %      Lymphocytes Relative 24 %      Monocytes Relative 8 %      Eosinophils Relative 1 %      Basophils Relative 1 %      Neutrophils Absolute 9 43 Thousands/µL      Immature Grans Absolute 0 06 Thousand/uL      Lymphocytes Absolute 3 30 Thousands/µL      Monocytes Absolute 1 08 Thousand/µL      Eosinophils Absolute 0 07 Thousand/µL      Basophils Absolute 0 07 Thousands/µL     CK Total with Reflex CKMB [[id number]]  (Normal) Collected: 02/28/22 0641    Lab Status: Final result Specimen: Blood from Arm, Left Updated: 02/28/22 0731     Total  U/L     D-Dimer [[id number]]  (Normal) Collected: 02/28/22 0641    Lab Status: Final result Specimen: Blood from Arm, Left Updated: 02/28/22 0723     D-Dimer, Quant <0 27 ug/ml FEU     High Sensitivity Troponin I Random [[id number]]  (Abnormal) Collected: 02/28/22 0641    Lab Status: Final result Specimen: Blood from Arm, Left Updated: 02/28/22 0716     HS TnI random <2 ng/L     hCG, quantitative [[id number]]  (Normal) Collected: 02/28/22 0641    Lab Status: Final result Specimen: Blood from Arm, Left Updated: 02/28/22 0716     HCG, Quant <2 mIU/mL     Narrative:       Expected Ranges:     Approximate               Approximate HCG  Gestation age          Concentration ( mIU/mL)  _____________          ______________________   Manuel Cash                      HCG values  0 2-1                       5-50  1-2                           2-3                         100-5000  3-4                         500-83254  4-5                         1000-92461  5-6                         [id number]  6-8                         [id number]  8-12                        [id number]      Protime-INR [[id number]]  (Normal) Collected: 02/28/22 0641    Lab Status: Final result Specimen: Blood from Arm, Left Updated: 02/28/22 0709     Protime 11 8 seconds      INR 0 91    APTT [[id number]]  (Normal) Collected: 02/28/22 0641    Lab Status: Final result Specimen: Blood from Arm, Left Updated: 02/28/22 0709     PTT 26 seconds     Basic metabolic panel [[id number]] Collected: 02/28/22 0641    Lab Status: Final result Specimen: Blood from Arm, Left Updated: 02/28/22 0709     Sodium 136 mmol/L      Potassium 3 5 mmol/L      Chloride 100 mmol/L      CO2 24 mmol/L      ANION GAP 12 mmol/L      BUN 16 mg/dL      Creatinine 0 74 mg/dL      Glucose 103 mg/dL      Calcium 9 6 mg/dL      eGFR 110 ml/min/1 73sq m     Narrative:      [place] guidelines for Chronic Kidney Disease (CKD):     Stage 1 with normal or high GFR (GFR > 90 mL/min/1 73 square meters)    Stage 2 Mild CKD (GFR = 60-89 mL/min/1 73 square meters)    Stage 3A Moderate CKD (GFR = 45-59 mL/min/1 73 square meters)    Stage 3B Moderate CKD (GFR = 30-44 mL/min/1 73 square meters)    Stage 4 Severe CKD (GFR = 15-29 mL/min/1 73 square meters)    Stage 5 End Stage CKD (GFR <15 mL/min/1 73 square meters)  Note: GFR calculation is accurate only with a steady state creatinine    Ethanol [[id number]]  (Normal) Collected: 02/28/22 0641    Lab Status: Final result Specimen: Blood from Arm, Left Updated: 02/28/22 0704     Ethanol Lvl <3 mg/dL     Blood culture #2 [[id number]] Collected: 02/28/22 0644    Lab Status: In process Specimen: Blood from Arm, Left Updated: 02/28/22 0649    Blood culture #1 [[id number]] Collected: 02/28/22 0641    Lab Status: In process Specimen: Blood from Arm, Left Updated: 02/28/22 0649    UA w Reflex to Microscopic w Reflex to Culture [[id number]]     Lab Status: No result Specimen: Urine                  PE Study with CT Abdomen and Pelvis with contrast   Final Result by Marce Cuello MD (02/28 7346)      No CT evidence for pulmonary embolism or acute thoracic pathology  No acute CT findings in the abdomen or pelvis  No free air noted  Segmentation anomaly with a partially sacralized L5 segment  Degenerative disc disease at L4-L5 with possible central and left paracentral disc herniation  Workstation performed: [id number]         CT head without contrast   Final Result by Marce Cuello MD (02/28 0809)      No acute intracranial abnormality                    Workstation performed: [id number]         MRI ED order    (Results Pending)              Procedures  ECG 12 Lead Documentation Only    Date/Time: 2/28/2022 8:47 AM  Performed by: Modesto Causey MD  Authorized by: Modesto Causey MD     Indications / Diagnosis:  Cp abm pain  ECG reviewed by me, the ED Provider: yes    Patient location:  ED  Previous ECG:     Previous ECG:  Compared to current    Comparison ECG info:  9/12/2020 0304    Similarity:  Changes noted  Rate:     ECG rate:  80    ECG rate assessment: normal    Rhythm:     Rhythm: sinus rhythm    QRS:     QRS axis:  Right  Comments:      Incomplete RBBB nonspecfic ST chagnes             ED Course  ED Course as of 02/28/22 0943   [facility] Feb 28, 2022   0708 Bladder scan 15-30ml             HEART Risk Score      Most Recent Value   Heart Score Risk Calculator    History 0 Filed at: 02/28/2022 9454   ECG 1 Filed at: 02/28/2022 6647   Age 0 Filed at: 02/28/2022 9546   Risk Factors 2 Filed at: 02/28/2022 4249   Troponin 0 Filed at: 02/28/2022 6969   HEART Score 3 Filed at: 02/28/2022 8869                        SBIRT 22yo+      Most Recent Value   SBIRT (23 yo +)    In order to provide better care to our patients, we are screening all of our patients for alcohol and drug use  Would it be okay to ask you these screening questions? Yes Filed at: 02/28/2022 0534   Initial Alcohol Screen: US AUDIT-C     1  How often do you have a drink containing alcohol? 0 Filed at: 02/28/2022 0534   2  How many drinks containing alcohol do you have on a typical day you are drinking? 0 Filed at: 02/28/2022 0534   3a  Male UNDER 65: How often do you have five or more drinks on one occasion? 0 Filed at: 02/28/2022 0534   3b  FEMALE Any Age, or MALE 65+: How often do you have 4 or more drinks on one occassion? 0 Filed at: 02/28/2022 0534   Audit-C Score 0 Filed at: 02/28/2022 2544   JEOVANY: How many times in the past year have you    Used an illegal drug or used a prescription medication for non-medical reasons?  Never Filed at: 02/28/2022 7971          Wells' Criteria for PE      Most Recent Value   Wells' Criteria for PE    Clinical signs and symptoms of DVT 0 Filed at: 02/28/2022 8787   PE is primary diagnosis or equally likely 3 Filed at: 02/28/2022 0642   HR >100 1 5 Filed at: 02/28/2022 8812   Immobilization at least 3 days or Surgery in the previous 4 weeks 0 Filed at: 02/28/2022 2464   Previous, objectively diagnosed PE or DVT 0 Filed at: 02/28/2022 2034   Hemoptysis 0 Filed at: 02/28/2022 7583   Malignancy with treatment within 6 months or palliative 0 Filed at: 02/28/2022 2099   Wells' Criteria Total 4 5 Filed at: 02/28/2022 2846                MDM  Number of Diagnoses or Management Options  Intractable back pain  Diagnosis management comments: Mdm:  Patient presents with intractable back pain but on physical exam is demonstrating diffuse tenderness and firmness on abdominal exam due to other complaints of chest pain and difficulty breathing with history of immobilization due to pain will proceed with CTA of the chest abdomen pelvis as on examination patient's abdomen appears to be acute  Will obtain a bladder scan to assess for possibility of urinary retention on physical exam there is no evidence of a cauda equina syndrome as she has intact rectal tone and perianal sensation  Zyprexa was obtained to facilitate exam and it did help with the patient's discomfort she was much calmer she did desat therefore was placed on 2 L nasal cannula  Dr Medina Easley assumes care at [phone] with pending labs and CT studies  Disposition  Final diagnoses:   Intractable back pain     Time reflects when diagnosis was documented in both MDM as applicable and the Disposition within this note     Time User Action Codes Description Comment    2/28/2022  8:39 AM Tracee Tito Add [M54 9] Intractable back pain       ED Disposition     ED Disposition Condition Date/Time Comment    Admit Stable Mon Feb 28, 2022  8:39 AM Case was discussed with DOE and the patient's admission status was agreed to be Admission Status: observation status to the service of Dr Dontae Walsh   Follow-up Information    None         Current Discharge Medication List      CONTINUE these medications which have NOT CHANGED    Details   Diclofenac Sodium (VOLTAREN) 1 % Apply 2 g topically 4 (four) times a day  Qty: 200 g, Refills: 5    Associated Diagnoses: Chronic bilateral low back pain with bilateral sciatica      DULoxetine (CYMBALTA) 30 mg delayed release capsule Take 1 capsule (30 mg total) by mouth daily  Qty: 30 capsule, Refills: 1    Associated Diagnoses: Chronic bilateral low back pain with bilateral sciatica;  Lumbar disc disease with radiculopathy; Cervical radiculopathy      tiZANidine (ZANAFLEX) 4 mg tablet Take 1 tablet (4 mg total) by mouth every 8 (eight) hours as needed for muscle spasms  Qty: 30 tablet, Refills: 2    Associated Diagnoses: Acute bilateral low back pain with left-sided sciatica      acetaminophen (TYLENOL) 500 mg tablet Take 1 tablet (500 mg total) by mouth every 6 (six) hours as needed for mild pain  Qty: 60 tablet, Refills: 0    Associated Diagnoses: Acute exacerbation of chronic low back pain      albuterol (PROVENTIL HFA,VENTOLIN HFA) 90 mcg/act inhaler Inhale 2 puffs every 6 (six) hours as needed for wheezing or shortness of breath  Qty: 18 g, Refills: 5    Comments: Substitution to a formulary equivalent within the same pharmaceutical class is authorized  Associated Diagnoses: Asthma due to seasonal allergies      Biotin 10 MG CAPS Take 1 capsule by mouth daily For acne/skin       !! Elastic Bandages & Supports (Wrist Brace/Left Medium) MISC Use daily  Qty: 1 each, Refills: 0    Associated Diagnoses: Bilateral hand numbness; Bilateral hand swelling      !! Elastic Bandages & Supports (Wrist Brace/Right Medium) MISC Use daily  Qty: 1 each, Refills: 0    Associated Diagnoses: Bilateral hand numbness; Bilateral hand swelling      lidocaine (LIDODERM) 5 % Apply 1 patch topically daily Remove & Discard patch within 12 hours or as directed by MD  Qty: 30 patch, Refills: 0    Associated Diagnoses: Chronic bilateral low back pain with bilateral sciatica      methylPREDNISolone 4 MG tablet therapy pack Use as directed on package  Qty: 21 tablet, Refills: 0    Associated Diagnoses: Chronic bilateral low back pain with bilateral sciatica;  Cervical radiculopathy      metroNIDAZOLE (METROGEL) 0 75 % vaginal gel INSERT 1 APPLICATORFUL VAGINALLY NIGHTLY FOR 5 DAYS      ondansetron (ZOFRAN) 8 mg tablet Take 1 tablet (8 mg total) by mouth every 8 (eight) hours as needed for nausea or vomiting  Qty: 20 tablet, Refills: 0    Associated Diagnoses: Nausea      pantoprazole (PROTONIX) 40 mg tablet Take 1 tablet (40 mg total) by mouth daily  Qty: 30 tablet, Refills: 3    Associated Diagnoses: Gastroesophageal reflux disease, unspecified whether esophagitis present      polyethylene glycol (GLYCOLAX) 17 GM/SCOOP powder Take 17 g by mouth daily for 10 days  Qty: 238 g, Refills: 5    Associated Diagnoses: Generalized abdominal pain      PreviDent 5000 Booster Plus 1 1 % PSTE       Restasis 0 05 % ophthalmic emulsion Administer 2 drops to both eyes 2 (two) times a day as needed      sucralfate (CARAFATE) 1 g/10 mL suspension Take 10 mL (1,000 mg total) by mouth every 6 (six) hours  Qty: 420 mL, Refills: 0    Associated Diagnoses: Gastroesophageal reflux disease, unspecified whether esophagitis present      triamcinolone (KENALOG) 0 1 % cream Apply topically 2 (two) times a day  Qty: 30 g, Refills: 5    Associated Diagnoses: Rash       !! - Potential duplicate medications found  Please discuss with provider  No discharge procedures on file      PDMP Review       Value Time User    PDMP Reviewed  Yes 8/25/2021  2:42 PM Jag Torres MD          ED Provider  Electronically Signed by           Wilfredo Acevedo MD  02/28/22 6681

## 2022-02-28 NOTE — QUICK NOTE
Pt was given Dilaudid at 0950 2/28  After dilaudid was given pt broke out into a generalized red rash  Attending Physician was notified

## 2022-02-28 NOTE — PHYSICAL THERAPY NOTE
Physical Therapy Evaluation    Patient Name: Claudine Burnham    UZJYL'N Date: 2/28/2022     Problem List  Principal Problem:    Acute bilateral low back pain with left-sided sciatica  Active Problems:    Tobacco abuse    Leukocytosis    Generalized abdominal pain    GERD (gastroesophageal reflux disease)       Past Medical History  Past Medical History:   Diagnosis Date    Allergic     Ectopic pregnancy     GERD (gastroesophageal reflux disease)     History of unilateral fallopian tube excision     RIGHT removed    Ovarian cyst     Wears glasses         Past Surgical History  Past Surgical History:   Procedure Laterality Date    DILATION AND CURETTAGE OF UTERUS      ECTOPIC PREGNANCY SURGERY      LAPAROSCOPY      GA LAP,DIAGNOSTIC ABDOMEN N/A 9/16/2017    Procedure: LAPAROSCOPY DIAGNOSTIC, left salpingectomy;  Surgeon: Linda Stanton MD;  [facility]: [facility];  Service: Gynecology    WISDOM TOOTH EXTRACTION      WISDOM TOOTH EXTRACTION Bilateral            02/28/22 1300   PT Last Visit   PT Visit Date 02/28/22   Note Type   Note type Evaluation   Pain Assessment   Pain Assessment Tool 0-10   Pain Score 10 - Worst Possible Pain   Pain Location/Orientation Location: Generalized; Location: Back   Restrictions/Precautions   Weight Bearing Precautions Per Order No   Other Precautions Pain; Fall Risk   Home Living   Type of [address] [place] [address] Two level;1/2 bath on main level;Bed/bath upstairs;Stairs to enter with rails  (2 vs FF c HR)   Bathroom Shower/Tub Tub/shower unit   Bathroom Toilet Standard   Additional Comments pt denies use of DME at baseline   Prior Function   Level of Christian Independent with ADLs and functional mobility; Needs assistance with IADLs   Lives With Significant other   Receives Help From Family   ADL Assistance Independent   IADLs Independent   Falls in the last 6 months 1 to 4  (1 fall on ice ~1-2 months ago) Vocational Unemployed   Comments (+) driving   General   Family/Caregiver Present Yes   Cognition   Overall Cognitive Status WFL   Arousal/Participation Lethargic   Orientation Level Oriented X4   Following Commands Follows one step commands without difficulty   RLE Assessment   RLE Assessment WFL  (assessed functionally)   LLE Assessment   LLE Assessment WFL  (assessed functionally)   Bed Mobility   Supine to Sit 5  Supervision   Additional items Increased time required;Verbal cues   Sit to Supine 5  Supervision   Additional items Increased time required;Verbal cues   Transfers   Sit to Stand 4  Minimal assistance   Additional items Assist x 1; Increased time required;Verbal cues   Stand to Sit 4  Minimal assistance   Additional items Assist x 1; Increased time required;Verbal cues   Additional Comments no device used   Ambulation/Elevation   Gait pattern Excessively slow; Short stride;Decreased foot clearance; Improper Weight shift   Gait Assistance 3  Moderate assist   Additional items Assist x 1;Verbal cues   Assistive Device None   Distance 20'   Balance   Static Sitting Good   Dynamic Sitting Good   Static Standing Fair   Dynamic Standing Fair   Ambulatory Poor +   Endurance Deficit   Endurance Deficit Yes   Endurance Deficit Description pt appears lethargic at baseline; easily fatigued with minimal exertion   Activity Tolerance   Activity Tolerance Patient limited by fatigue;Patient limited by pain   Assessment   Prognosis Fair   Problem List Decreased strength;Decreased endurance; Impaired balance;Decreased mobility;Pain   Assessment Patient is a 27 y o  female evaluated by Physical Therapy s/p admit to [facility] on 2/28/2022 with admitting diagnosis of: Back pain, Intractable back pain, and principal problem of: Acute bilateral low back pain with left-sided sciatica  PT was consulted to assess patient's functional mobility and discharge needs   Ordered are PT Evaluation and treatment with activity level of: ambulate patient  Comorbidities affecting patient's physical performance at time of assessment include: GERD  Personal factors affecting the patient at time of IE include: lives in 2 story home, step(s) to enter home, inability to navigate community distances, history of fall(s), inability/difficulty performing IADLs and inability/difficulty performing ADLs  Please locate objective findings from PT assessment regarding body systems outlined above  Upon evaluation, pt requiring SUP-modA x 1, increased time, and frequent verbal cuing to perform all functional mobility  Pt given dilaudid prior to evaluation, so pt extremely lethargic; participating in conversation minimally with one word answer  Pt does grimace with change in position and mobility  ModA required to prevent major LOB with ambulation, likely d/t side effects of pain medication  Pt's S/O present during evaluation and provided all PLOF information  S/O appears very supportive and verbalizing understanding and agreement with POC of outpatient PT upon d/c  HR and SpO2 remained WFL on RA throughout  The patient's AM-PAC Basic Mobility Inpatient Short Form Raw Score is 17  A Raw score of greater than 16 suggests the patient may benefit from discharge to home  Please also refer to the recommendation of the Physical Therapist for safe discharge planning  Co treatment with OT secondary to complex medical condition of pt, possible A of 2 required to achieve and maintain transitional movements, requiring the need of skilled therapeutic intervention of 2 therapists to achieve delivery of services  Pt will benefit from continued PT intervention during LOS to address current deficits, increase LOF, and facilitate safe d/c to next level of care when medically appropriate  D/c recommendation at this time is home with outpatient rehabilitation services     Goals   Patient Goals to have less pain   Short Term Goal #1 Pt will participate in BLE strengthening exercises to facilitate improved functional activity tolerance  Short Term Goal #2 Pt will perform all functional transfers and bed mobility (I) with good safety awareness  LTG Expiration Date 03/14/22   Long Term Goal #1 Pt will ambulate 200' (I) while maintaining good functional dynamic balance  Long Term Goal #2 Pt will ascend/descend a FFOS with HR and SUP to reflect the ability to safely navigate her home  Plan   Treatment/Interventions Functional transfer training;LE strengthening/ROM; Elevations; Therapeutic exercise; Endurance training;Bed mobility;Gait training   PT Frequency 3-5x/wk   Recommendation   PT Discharge Recommendation Home with outpatient rehabilitation   AM-PAC Basic Mobility Inpatient   Turning in Bed Without Bedrails 4   Lying on Back to Sitting on Edge of Flat Bed 3   Moving Bed to Chair 3   Standing Up From Chair 3   Walk in Room 2   Climb 3-5 Stairs 2   Basic Mobility Inpatient Raw Score 17   Basic Mobility Standardized Score 39 67   Highest Level Of Mobility   JH-HLM Goal 5: Stand one or more mins   JH-HLM Highest Level of Mobility 6: Walk 10 steps or more   JH-HLM Goal Achieved Yes   End of Consult   Patient Position at End of Consult Supine; All needs within reach

## 2022-02-28 NOTE — ASSESSMENT & PLAN NOTE
Potentially reactive  Patient is afebrile  Urinalysis is pending  CT of the chest, abdomen, and pelvis unremarkable for any acute infectious pathology

## 2022-02-28 NOTE — ED NOTES
Patient transported to [address] [place] [address] [facility], [address] [place] [address]  02/28/22 [id number]

## 2022-02-28 NOTE — PLAN OF CARE
Problem: PAIN - ADULT  Goal: Verbalizes/displays adequate comfort level or baseline comfort level  Description: Interventions:  - Encourage patient to monitor pain and request assistance  - Assess pain using appropriate pain scale  - Administer analgesics based on type and severity of pain and evaluate response  - Implement non-pharmacological measures as appropriate and evaluate response  - Consider cultural and social influences on pain and pain management  - Notify physician/advanced practitioner if interventions unsuccessful or patient reports new pain  Outcome: Progressing     Problem: INFECTION - ADULT  Goal: Absence or prevention of progression during hospitalization  Description: INTERVENTIONS:  - Assess and monitor for signs and symptoms of infection  - Monitor lab/diagnostic results  - Monitor all insertion sites, i e  indwelling lines, tubes, and drains  - Monitor endotracheal if appropriate and nasal secretions for changes in amount and color  - [place] appropriate cooling/warming therapies per order  - Administer medications as ordered  - Instruct and encourage patient and family to use good hand hygiene technique  - Identify and instruct in appropriate isolation precautions for identified infection/condition  Outcome: Progressing     Problem: SAFETY ADULT  Goal: Patient will remain free of falls  Description: INTERVENTIONS:  - Educate patient/family on patient safety including physical limitations  - Instruct patient to call for assistance with activity   - Consult OT/PT to assist with strengthening/mobility   - Keep Call bell within reach  - Keep bed low and locked with side rails adjusted as appropriate  - Keep care items and personal belongings within reach  - Initiate and maintain comfort rounds  - Make Fall Risk Sign visible to staff  - Apply yellow socks and bracelet for high fall risk patients  - Consider moving patient to room near nurses station  Outcome: Progressing  Goal: Maintain or return to baseline ADL function  Description: INTERVENTIONS:  -  Assess patient's ability to carry out ADLs; assess patient's baseline for ADL function and identify physical deficits which impact ability to perform ADLs (bathing, care of mouth/teeth, toileting, grooming, dressing, etc )  - Assess/evaluate cause of self-care deficits   - Assess range of motion  - Assess patient's mobility; develop plan if impaired  - Assess patient's need for assistive devices and provide as appropriate  - Encourage maximum independence but intervene and supervise when necessary  - Involve family in performance of ADLs  - Assess for home care needs following discharge   - Consider OT consult to assist with ADL evaluation and planning for discharge  - Provide patient education as appropriate  Outcome: Progressing  Goal: Maintains/Returns to pre admission functional level  Description: INTERVENTIONS:  - Perform BMAT or MOVE assessment daily    - Set and communicate daily mobility goal to care team and patient/family/caregiver     - Collaborate with rehabilitation services on mobility goals if consulted  - Record patient progress and toleration of activity level   Outcome: Progressing     Problem: DISCHARGE PLANNING  Goal: Discharge to home or other facility with appropriate resources  Description: INTERVENTIONS:  - Identify barriers to discharge w/patient and caregiver  - Arrange for needed discharge resources and transportation as appropriate  - Identify discharge learning needs (meds, wound care, etc )  - Arrange for interpretive services to assist at discharge as needed  - Refer to Case Management Department for coordinating discharge planning if the patient needs post-hospital services based on physician/advanced practitioner order or complex needs related to functional status, cognitive ability, or social support system  Outcome: Progressing     Problem: Knowledge Deficit  Goal: Patient/family/caregiver demonstrates understanding of disease process, treatment plan, medications, and discharge instructions  Description: Complete learning assessment and assess knowledge base    Interventions:  - Provide teaching at level of understanding  - Provide teaching via preferred learning methods  Outcome: Progressing     Problem: GASTROINTESTINAL - ADULT  Goal: Minimal or absence of nausea and/or vomiting  Description: INTERVENTIONS:  - Administer IV fluids if ordered to ensure adequate hydration  - Maintain NPO status until nausea and vomiting are resolved  - Nasogastric tube if ordered  - Administer ordered antiemetic medications as needed  - Provide nonpharmacologic comfort measures as appropriate  - Advance diet as tolerated, if ordered  - Consider nutrition services referral to assist patient with adequate nutrition and appropriate food choices  Outcome: Progressing  Goal: Maintains or returns to baseline bowel function  Description: INTERVENTIONS:  - Assess bowel function  - Encourage oral fluids to ensure adequate hydration  - Administer IV fluids if ordered to ensure adequate hydration  - Administer ordered medications as needed  - Encourage mobilization and activity  - Consider nutritional services referral to assist patient with adequate nutrition and appropriate food choices  Outcome: Progressing  Goal: Maintains adequate nutritional intake  Description: INTERVENTIONS:  - Monitor percentage of each meal consumed  - Identify factors contributing to decreased intake, treat as appropriate  - Assist with meals as needed  - Monitor I&O, weight, and lab values if indicated  - Obtain nutrition services referral as needed  Outcome: Progressing  Goal: Establish and maintain optimal ostomy function  Description: INTERVENTIONS:  - Assess bowel function  - Encourage oral fluids to ensure adequate hydration  - Administer IV fluids if ordered to ensure adequate hydration   - Administer ordered medications as needed  - Encourage mobilization and activity  - Nutrition services referral to assist patient with appropriate food choice  Outcome: Progressing  Goal: Oral mucous membranes remain intact  Description: INTERVENTIONS  - Assess oral mucosa and hygiene practices  - Implement preventative oral hygiene regimen  - Implement oral medicated treatments as ordered  - Initiate Nutrition services referral as needed  Outcome: Progressing     Problem: MUSCULOSKELETAL - ADULT  Goal: Maintain or return mobility to safest level of function  Description: INTERVENTIONS:  - Assess patient's ability to carry out ADLs; assess patient's baseline for ADL function and identify physical deficits which impact ability to perform ADLs (bathing, care of mouth/teeth, toileting, grooming, dressing, etc )  - Assess/evaluate cause of self-care deficits   - Assess range of motion  - Assess patient's mobility  - Assess patient's need for assistive devices and provide as appropriate  - Encourage maximum independence but intervene and supervise when necessary  - Involve family in performance of ADLs  - Assess for home care needs following discharge   - Consider OT consult to assist with ADL evaluation and planning for discharge  - Provide patient education as appropriate  Outcome: Progressing  Goal: Maintain proper alignment of affected body part  Description: INTERVENTIONS:  - Support, maintain and protect limb and body alignment  - Provide patient/ family with appropriate education  Outcome: Progressing

## 2022-02-28 NOTE — PLAN OF CARE
Problem: PHYSICAL THERAPY ADULT  Goal: Performs mobility at highest level of function for planned discharge setting  See evaluation for individualized goals  Description: Treatment/Interventions: Functional transfer training,LE strengthening/ROM,Elevations,Therapeutic exercise,Endurance training,Bed mobility,Gait training          See flowsheet documentation for full assessment, interventions and recommendations  Note: Prognosis: Fair  Problem List: Decreased strength,Decreased endurance,Impaired balance,Decreased mobility,Pain  Assessment: Patient is a 27 y o  female evaluated by Physical Therapy s/p admit to [facility] on 2/28/2022 with admitting diagnosis of: Back pain, Intractable back pain, and principal problem of: Acute bilateral low back pain with left-sided sciatica  PT was consulted to assess patient's functional mobility and discharge needs  Ordered are PT Evaluation and treatment with activity level of: ambulate patient  Comorbidities affecting patient's physical performance at time of assessment include: GERD  Personal factors affecting the patient at time of IE include: lives in 2 story home, step(s) to enter home, inability to navigate community distances, history of fall(s), inability/difficulty performing IADLs and inability/difficulty performing ADLs  Please locate objective findings from PT assessment regarding body systems outlined above  Upon evaluation, pt requiring SUP-modA x 1, increased time, and frequent verbal cuing to perform all functional mobility  Pt given dilaudid prior to evaluation, so pt extremely lethargic; participating in conversation minimally with one word answer  Pt does grimace with change in position and mobility  ModA required to prevent major LOB with ambulation, likely d/t side effects of pain medication  Pt's S/O present during evaluation and provided all PLOF information   S/O appears very supportive and verbalizing understanding and agreement with POC of outpatient PT upon d/c  HR and SpO2 remained WFL on RA throughout  The patient's AM-PAC Basic Mobility Inpatient Short Form Raw Score is 17  A Raw score of greater than 16 suggests the patient may benefit from discharge to home  Please also refer to the recommendation of the Physical Therapist for safe discharge planning  Co treatment with OT secondary to complex medical condition of pt, possible A of 2 required to achieve and maintain transitional movements, requiring the need of skilled therapeutic intervention of 2 therapists to achieve delivery of services  Pt will benefit from continued PT intervention during LOS to address current deficits, increase LOF, and facilitate safe d/c to next level of care when medically appropriate  D/c recommendation at this time is home with outpatient rehabilitation services  PT Discharge Recommendation: Home with outpatient rehabilitation          See flowsheet documentation for full assessment

## 2022-02-28 NOTE — ASSESSMENT & PLAN NOTE
Significant generalized abdominal pain, limited exam due to discomfort  · History of ectopic pregnancy - hCG negative  · Urinalysis ordered and pending  · No mention of constipation by CT read, patient reports bowel movement yesterday  · CT of the abdomen and pelvis without any acute findings  · Consider possible etiology as radiation from back pain  Monitor closely

## 2022-02-28 NOTE — H&P
[address] 14 1995, 27 y o  female MRN: [id number]  Unit/Bed#: 406-01 Encounter: [id number]  Primary Care Provider: Paty Foster PA-C   Date and time admitted to hospital: 2/28/2022  5:27 AM    * Acute bilateral low back pain with left-sided sciatica  Assessment & Plan  Onset of lower back pain 02/01/2022, with radicular findings  Patient with reported sciatica type symptoms previously down bilateral lower extremities, now complaining of LLE pain only  Also with reported cervical radicular symptoms as outpatient  No alarm symptoms, with imaging showing degenerative changes, possible central and left paracentral disc herniation  · Check MRI of the back  · Standing muscle relaxer, low-dose Dilaudid prn  Acetaminophen and Lidoderm patches  · PT/OT consult  Generalized abdominal pain  Assessment & Plan  Significant generalized abdominal pain, limited exam due to discomfort  · History of ectopic pregnancy - hCG negative  · Urinalysis ordered and pending  · No mention of constipation by CT read, patient reports bowel movement yesterday  · CT of the abdomen and pelvis without any acute findings  · Consider possible etiology as radiation from back pain  Monitor closely  Leukocytosis  Assessment & Plan  Potentially reactive  Patient is afebrile  Urinalysis is pending  CT of the chest, abdomen, and pelvis unremarkable for any acute infectious pathology  GERD (gastroesophageal reflux disease)  Assessment & Plan  NRT inform of patch while hospitalized  VTE Pharmacologic Prophylaxis: VTE Score: 2 Moderate Risk (Score 3-4) - Pharmacological DVT Prophylaxis Ordered: enoxaparin (Lovenox)  Code Status: Level 1 - Full Code   Discussion with family: Updated  (friend) at bedside      Anticipated Length of Stay: Patient will be admitted on an observation basis with an anticipated length of stay of less than 2 midnights secondary to Further evaluation and treatment of lower back pain with lower extremity radiation       Total Time for Visit, including Counseling / Coordination of Care: 60 minutes Greater than 50% of this total time spent on direct patient counseling and coordination of care  Chief Complaint:  Intractable back pain    History of Present Illness:  Davie Dumont is a 27 y o  female with a PMH of lower back pain ongoing for 1 month who presents with complaints of worsening intractable lower back pain with sciatica type symptoms  Patient has a prior medical history significant for GERD, tobacco abuse, and previously noted ectopic pregnancy  The patient reports onset of lower back pain on the 1st of this month, ongoing and worsening, with pain radiating to the lower extremities as well  She was evaluated as an outpatient by the pain clinic, with imaging showing evidence of degenerative changes, and scheduled for an outpatient MRI that has not yet occurred  She was also prescribed topical diclofenac, Cymbalta, Medrol pack, and scheduled for follow-up within 1 month  The patient reports some improvement with steroids, but with pain worsening again towards the end of the treatment  She also reports essentially being bed-bound due to the pain  She denied any bladder or bowel incontinence, saddle anesthesia, or weakness that was not due to pain  Due to her ongoing and worsening pain she was referred for admission for further evaluation and treatment  Review of Systems:  Review of Systems   Constitutional: Negative for chills and fever  Respiratory: Negative for shortness of breath  Cardiovascular: Negative for chest pain  Gastrointestinal: Positive for abdominal distention and abdominal pain  Negative for constipation  Musculoskeletal: Positive for back pain  All other systems reviewed and are negative        Past Medical and Surgical History:   Past Medical History:   Diagnosis Date    Allergic     Ectopic pregnancy     GERD (gastroesophageal reflux disease)     History of unilateral fallopian tube excision     RIGHT removed    Ovarian cyst     Wears glasses        Past Surgical History:   Procedure Laterality Date    DILATION AND CURETTAGE OF UTERUS      ECTOPIC PREGNANCY SURGERY      LAPAROSCOPY      [place] LAP,DIAGNOSTIC ABDOMEN N/A 9/16/2017    Procedure: LAPAROSCOPY DIAGNOSTIC, left salpingectomy;  Surgeon: Jeremy Leary MD;  [facility];  Service: Gynecology    WISDOM TOOTH EXTRACTION      WISDOM TOOTH EXTRACTION Bilateral        Meds/Allergies:  Prior to Admission medications    Medication Sig Start Date End Date Taking?  Authorizing Provider   Diclofenac Sodium (VOLTAREN) 1 % Apply 2 g topically 4 (four) times a day 1/14/22  Yes Eduardo Santamaria PA-C   DULoxetine (CYMBALTA) 30 mg delayed release capsule Take 1 capsule (30 mg total) by mouth daily 2/22/22 3/24/22 Yes Cele Escudero MD   tiZANidine (ZANAFLEX) 4 mg tablet Take 1 tablet (4 mg total) by mouth every 8 (eight) hours as needed for muscle spasms 1/14/22  Yes Eduardo Santamaria PA-C   acetaminophen (TYLENOL) 500 mg tablet Take 1 tablet (500 mg total) by mouth every 6 (six) hours as needed for mild pain 2/7/22   Eduardo Santamaria PA-C   albuterol (PROVENTIL HFA,VENTOLIN HFA) 90 mcg/act inhaler Inhale 2 puffs every 6 (six) hours as needed for wheezing or shortness of breath 2/9/22   Karina Byrd PA-C   Elastic Bandages & Supports (Wrist Brace/Left Medium) MISC Use daily  Patient not taking: Reported on 1/14/2022 11/23/21   Eduardo Santamaria PA-C   Elastic Bandages & Supports (Wrist Brace/Right Medium) MISC Use daily  Patient not taking: Reported on 1/14/2022 11/23/21   Eduardo Santamaria PA-C   lidocaine (LIDODERM) 5 % Apply 1 patch topically daily Remove & Discard patch within 12 hours or as directed by MD 2/9/22   Karina Byrd PA-C   methylPREDNISolone 4 MG tablet therapy pack Use as directed on package 2/22/22 Cele Escudero MD   metroNIDAZOLE (METROGEL) 0 75 % vaginal gel INSERT 1 APPLICATORFUL VAGINALLY NIGHTLY FOR 5 DAYS 4/21/21   Historical Provider, MD   ondansetron (ZOFRAN) 8 mg tablet Take 1 tablet (8 mg total) by mouth every 8 (eight) hours as needed for nausea or vomiting 2/9/22   Wendy Sánchez PA-C   pantoprazole (PROTONIX) 40 mg tablet Take 1 tablet (40 mg total) by mouth daily 4/23/21   Chen Platt PA-C   polyethylene glycol [facility]) 17 GM/SCOOP powder Take 17 g by mouth daily for 10 days 3/24/21 2/22/22  Wendy Sánchez PA-C   PreviDent 5000 Booster Plus 1 1 % PSTE  11/17/21   Historical Provider, MD   Restasis 0 05 % ophthalmic emulsion Administer 2 drops to both eyes 2 (two) times a day as needed 7/22/21   Historical Provider, MD   sucralfate (CARAFATE) 1 g/10 mL suspension Take 10 mL (1,000 mg total) by mouth every 6 (six) hours 2/9/22   Wendy Sánchez PA-C   triamcinolone (KENALOG) 0 1 % cream Apply topically 2 (two) times a day 11/23/21   Wendy Sánchez PA-C   Biotin 10 MG CAPS Take 1 capsule by mouth daily For acne/skin   Patient not taking: Reported on 11/23/2021 2/28/22  Historical Provider, MD     I have reviewed home medications using recent Epic encounter  Allergies:    Allergies   Allergen Reactions    Nitrofurantoin Itching    Nsaids Other (See Comments)     Due to issues with kidneys per pt    Oxycodone-Acetaminophen Itching     Reaction Date: 28Apr2011;     Oxycodone Itching    Tramadol Itching     Reaction Date: 28Apr2011;        Social History:  Marital Status: Single   Occupation:  None  Patient Pre-hospital Living Situation: Home  Patient Pre-hospital Level of Mobility: walks  Patient Pre-hospital Diet Restrictions:  None  Substance Use History:   Social History     Substance and Sexual Activity   Alcohol Use Not Currently    Comment: "occasional"     Social History     Tobacco Use   Smoking Status Current Every Day Smoker    Packs/day: 2 00    Years: 6 00    Pack years: 12 00    Types: Cigarettes   Smokeless Tobacco Never Used     Social History     Substance and Sexual Activity   Drug Use Yes    Types: Marijuana       Family History:  Family History   Problem Relation Age of Onset    No Known Problems Mother     No Known Problems Father        Physical Exam:     Vitals:   Blood Pressure: (!) 138/106 (02/28/22 0918)  Pulse: 90 (02/28/22 0830)  Temperature: 98 1 °F (36 7 °C) (02/28/22 0529)  Temp Source: Tympanic (02/28/22 0529)  Respirations: 18 (02/28/22 0918)  Weight - Scale: 65 3 kg (143 lb 15 4 oz) (02/28/22 0529)  SpO2: 96 % (02/28/22 0830)    Physical Exam  Constitutional:       General: She is not in acute distress  Appearance: Normal appearance  She is normal weight  She is not ill-appearing or toxic-appearing  HENT:      Mouth/Throat:      Mouth: Mucous membranes are moist    Cardiovascular:      Rate and Rhythm: Regular rhythm  Tachycardia present  Heart sounds: No murmur heard  No gallop  Pulmonary:      Effort: Pulmonary effort is normal  No respiratory distress  Breath sounds: Normal breath sounds  No wheezing, rhonchi or rales  Abdominal:      General: Abdomen is flat  Bowel sounds are normal  There is distension  Palpations: Abdomen is soft  Tenderness: There is abdominal tenderness  Comments: Generalized abdominal pain with significantly limited exam due to patient cooperation  Musculoskeletal:         General: No swelling or tenderness  Cervical back: Neck supple  Skin:     General: Skin is warm and dry  Neurological:      General: No focal deficit present  Mental Status: She is alert and oriented to person, place, and time  Psychiatric:         Mood and Affect: Mood is anxious  Affect is tearful           Behavior: Behavior normal        Additional Data:     Lab Results:  Results from last 7 days   Lab Units 02/28/22  0641   WBC Thousand/uL 14 01*   HEMOGLOBIN g/dL 14 6   HEMATOCRIT % 42 6 PLATELETS Thousands/uL 450*   NEUTROS PCT % 66   LYMPHS PCT % 24   MONOS PCT % 8   EOS PCT % 1     Results from last 7 days   Lab Units 02/28/22  0641   SODIUM mmol/L 136   POTASSIUM mmol/L 3 5   CHLORIDE mmol/L 100   CO2 mmol/L 24   BUN mg/dL 16   CREATININE mg/dL 0 74   ANION GAP mmol/L 12   CALCIUM mg/dL 9 6   ALBUMIN g/dL 4 5   TOTAL BILIRUBIN mg/dL 0 52   ALK PHOS U/L 61   ALT U/L 22   AST U/L 11   GLUCOSE RANDOM mg/dL 103     Results from last 7 days   Lab Units 02/28/22  0641   INR  0 91             Results from last 7 days   Lab Units 02/28/22  0641   LACTIC ACID mmol/L 1 8       Imaging: Reviewed radiology reports from this admission including: chest CT scan, abdominal/pelvic CT and CT head  PE Study with CT Abdomen and Pelvis with contrast   Final Result by Elva Courtney MD (02/28 2452)      No CT evidence for pulmonary embolism or acute thoracic pathology  No acute CT findings in the abdomen or pelvis  No free air noted  Segmentation anomaly with a partially sacralized L5 segment  Degenerative disc disease at L4-L5 with possible central and left paracentral disc herniation  Workstation performed: [id number]         CT head without contrast   Final Result by Elva Courtney MD (02/28 6349)      No acute intracranial abnormality  Workstation performed: [id number]         MRI lumbar spine wo contrast    (Results Pending)       ** Please Note: This note has been constructed using a voice recognition system   **

## 2022-02-28 NOTE — OCCUPATIONAL THERAPY NOTE
Occupational Therapy Evaluation     Patient Name: Beulah MARIE Date: 2/28/2022  Problem List  Principal Problem:    Acute bilateral low back pain with left-sided sciatica  Active Problems:    Tobacco abuse    Leukocytosis    Generalized abdominal pain    GERD (gastroesophageal reflux disease)    Past Medical History  Past Medical History:   Diagnosis Date    Allergic     Ectopic pregnancy     GERD (gastroesophageal reflux disease)     History of unilateral fallopian tube excision     RIGHT removed    Ovarian cyst     Wears glasses      Past Surgical History  Past Surgical History:   Procedure Laterality Date    DILATION AND CURETTAGE OF UTERUS      ECTOPIC PREGNANCY SURGERY      LAPAROSCOPY      [place] LAP,DIAGNOSTIC ABDOMEN N/A 9/16/2017    Procedure: LAPAROSCOPY DIAGNOSTIC, left salpingectomy;  Surgeon: Angus Denise MD;  [facility];  Service: Gynecology    WISDOM TOOTH EXTRACTION      WISDOM TOOTH EXTRACTION Bilateral            02/28/22 1411   OT Last Visit   OT Visit Date 02/28/22   Note Type   Note type Evaluation   Restrictions/Precautions   Weight Bearing Precautions Per Order No   Other Precautions Fall Risk;Pain   Pain Assessment   Pain Score 10 - Worst Possible Pain   Pain Location/Orientation Orientation: Lower; Location: Back   Home Living   Type of [address] [place] [address] Two level;1/2 bath on main level;Bed/bath upstairs;Stairs to enter with rails   Bathroom Shower/Tub Tub/shower unit   Bathroom Toilet Standard   Bathroom Accessibility Accessible   Additional Comments Pt has FFOS to second floor, has 1/2 bath on main level  2 DANIELA   Denies having or use of DME PTA   Prior Function   Level of [place] Independent with ADLs and functional mobility   Lives With Significant other;Family   Receives Help From Family;Friend(s)   ADL Assistance Independent   IADLs Independent   Falls in the last 6 months 1 to 4   Vocational Unemployed   Comments Pt has supportive s/o who assists as needed during the week, also has 2 children - reports a friends stays over on the weekened to assist as needed as s/o works away on weekends  +    Psychosocial   Psychosocial (WDL) X   Patient Behaviors/Mood Crying;Flat affect   Subjective   Subjective "I remembered I did have a fall not too long ago on the ice"   ADL   Where Assessed Edge of bed   LB Dressing Assistance 4  Minimal Assistance   LB Dressing Deficit Don/doff R shoe;Don/doff L shoe   Additional Comments pt unable to bend and c/o inreased pain to attempt alternative technique for LB dressing at this time    Bed Mobility   Supine to Sit 5  Supervision   Additional items Increased time required   Sit to Supine 5  Supervision   Additional items Increased time required   Transfers   Sit to Stand 4  Minimal assistance   Additional items Assist x 1   Stand to Sit 4  Minimal assistance   Additional items Assist x 1   Stand pivot 4  Minimal assistance   Additional items Assist x 1   Additional Comments no device, + LOB    Functional Mobility   Functional Mobility 3  Moderate assistance   Additional Comments Ax1 ~20 ft in room with multiple + LOB and assist to correct    Balance   Static Sitting Good   Dynamic Sitting Good   Static Standing Fair   Dynamic Standing Fair   Ambulatory Poor +   Activity Tolerance   Activity Tolerance Patient limited by fatigue;Patient limited by pain   RUE Assessment   RUE Assessment WFL   LUE Assessment   LUE Assessment WFL   Hand Function   Gross Motor Coordination Functional   Fine Motor Coordination Functional   Sensation   Light Touch No apparent deficits   Sharp/Dull No apparent deficits   Cognition   Overall Cognitive Status WFL   Arousal/Participation Lethargic   Attention Attends with cues to redirect   Orientation Level Oriented X4   Memory Within functional limits   Following Commands Follows one step commands without difficulty   Comments Pt is tearful throughout session with c/o increased pain, s/o is present and assists with answering PLOF and home set up questions as pt is very lethargic after recieving pain medication  Assessment   Limitation Decreased ADL status; Decreased UE strength;Decreased Safe judgement during ADL;Decreased endurance;Decreased self-care trans;Decreased high-level ADLs   Assessment Pt is a 27 y o  female seen for OT evaluation s/p admit to [facility] on 2/28/2022 w/ Acute bilateral low back pain with left-sided sciatica  Comorbidities affecting pt's functional performance at time of assessment include: ovarian cyst, ectopic pregnancy, GERD  Personal factors affecting pt at time of IE include:steps to enter environment, difficulty performing ADLS, difficulty performing IADLS , flat affect, decreased initiation and engagement  and health management   Prior to admission, pt was I with ADLS and IADLS  Upon evaluation: Pt presents supine, Pt requires S- mod A, 2* the following deficits impacting occupational performance: weakness, decreased strength, decreased balance, decreased tolerance, decreased safety awareness and increased pain  Pt resting in supine at end of session with all needs in reach  Pt to benefit from continued skilled OT tx while in the hospital to address deficits as defined above and maximize level of functional independence w ADL's and functional mobility  Occupational Performance areas to address include: grooming, bathing/shower, toilet hygiene, dressing, medication management, health maintenance, functional mobility, community mobility and clothing management  The patient's raw score on the AM-PAC Daily Activity inpatient short form is 22, standardized score is 47 1, greater than 39 4  Patients at this level are likely to benefit from discharge to home  Anticipate pt can return home with no OT needs pending pain management  Please refer to the recommendation of the Occupational Therapist for safe discharge planning      Goals   Patient Goals to have less pain   Short Term Goal  Pt will complete UB TE to increase strength in order to improve overall functional abilities    Long Term Goal #1 Pt will complete UB/LB self cares including toileting at MOD I level with AE PRN  Long Term Goal #2 Pt will complete transfers/functional mobility at MOD I level with DME PRN and good safety awareness  Long Term Goal Pt will increase activity tolerance to G for 30 min txment sessions   Plan   Treatment Interventions ADL retraining;Functional transfer training;UE strengthening/ROM; Endurance training;Patient/family training; Compensatory technique education; Energy conservation; Activityengagement   Goal Expiration Date 03/14/22   OT Frequency 3-5x/wk   Recommendation   OT Discharge Recommendation No rehabilitation needs  (anticipate no OT needs pending pain managment )   AM-PAC Daily Activity Inpatient   Lower Body Dressing 3   Bathing 3   Toileting 4   Upper Body Dressing 4   Grooming 4   Eating 4   Daily Activity Raw Score 22   Daily Activity Standardized Score (Calc for Raw Score >=11) 47  1   AM-PAC Applied Cognition Inpatient   Following a Speech/Presentation 4   Understanding Ordinary Conversation 4   Taking Medications 4   Remembering Where Things Are Placed or Put Away 4   Remembering List of 4-5 Errands 4   Taking Care of Complicated Tasks 4   Applied Cognition Raw Score 24   Applied Cognition Standardized Score 62 21 Tissue Cultured Epidermal Autograft Text: The defect edges were debeveled with a #15 scalpel blade.  Given the location of the defect, shape of the defect and the proximity to free margins a tissue cultured epidermal autograft was deemed most appropriate.  The graft was then trimmed to fit the size of the defect.  The graft was then placed in the primary defect and oriented appropriately.

## 2022-02-28 NOTE — PLAN OF CARE
Problem: OCCUPATIONAL THERAPY ADULT  Goal: Performs self-care activities at highest level of function for planned discharge setting  See evaluation for individualized goals  Description: Treatment Interventions: ADL retraining,Functional transfer training,UE strengthening/ROM,Endurance training,Patient/family training,Compensatory technique education,Energy conservation,Activityengagement          See flowsheet documentation for full assessment, interventions and recommendations  Note: Limitation: Decreased ADL status,Decreased UE strength,Decreased Safe judgement during ADL,Decreased endurance,Decreased self-care trans,Decreased high-level ADLs     Assessment: Pt is a 27 y o  female seen for OT evaluation s/p admit to [facility] on 2/28/2022 w/ Acute bilateral low back pain with left-sided sciatica  Comorbidities affecting pt's functional performance at time of assessment include: ovarian cyst, ectopic pregnancy, GERD  Personal factors affecting pt at time of IE include:steps to enter environment, difficulty performing ADLS, difficulty performing IADLS , flat affect, decreased initiation and engagement  and health management   Prior to admission, pt was I with ADLS and IADLS  Upon evaluation: Pt presents supine, Pt requires S- mod A, 2* the following deficits impacting occupational performance: weakness, decreased strength, decreased balance, decreased tolerance, decreased safety awareness and increased pain  Pt resting in supine at end of session with all needs in reach  Pt to benefit from continued skilled OT tx while in the hospital to address deficits as defined above and maximize level of functional independence w ADL's and functional mobility  Occupational Performance areas to address include: grooming, bathing/shower, toilet hygiene, dressing, medication management, health maintenance, functional mobility, community mobility and clothing management   The patient's raw score on the AM-PAC Daily Activity inpatient short form is 22, standardized score is 47 1, greater than 39 4  Patients at this level are likely to benefit from discharge to home  Anticipate pt can return home with no OT needs pending pain management  Please refer to the recommendation of the Occupational Therapist for safe discharge planning        OT Discharge Recommendation: No rehabilitation needs (anticipate no OT needs pending pain managment )

## 2022-02-28 NOTE — ASSESSMENT & PLAN NOTE
Onset of lower back pain 02/01/2022, with radicular findings  Patient with reported sciatica type symptoms previously down bilateral lower extremities, now complaining of LLE pain only  Also with reported cervical radicular symptoms as outpatient  No alarm symptoms, with imaging showing degenerative changes, possible central and left paracentral disc herniation  · Check MRI of the back  · Standing muscle relaxer, low-dose Dilaudid prn  Acetaminophen and Lidoderm patches  · PT/OT consult

## 2022-03-01 VITALS
SYSTOLIC BLOOD PRESSURE: 105 MMHG | TEMPERATURE: 97 F | HEIGHT: 63 IN | HEART RATE: 85 BPM | RESPIRATION RATE: 16 BRPM | OXYGEN SATURATION: 96 % | BODY MASS INDEX: 25.51 KG/M2 | WEIGHT: 143.96 LBS | DIASTOLIC BLOOD PRESSURE: 59 MMHG

## 2022-03-01 PROCEDURE — 99217 PR OBSERVATION CARE DISCHARGE MANAGEMENT: CPT | Performed by: INTERNAL MEDICINE

## 2022-03-01 RX ORDER — CARISOPRODOL 350 MG/1
350 TABLET ORAL 4 TIMES DAILY
Qty: 30 TABLET | Refills: 0 | Status: SHIPPED | OUTPATIENT
Start: 2022-03-01 | End: 2022-05-09 | Stop reason: SDUPTHER

## 2022-03-01 RX ORDER — FENTANYL CITRATE 50 UG/ML
50 INJECTION, SOLUTION INTRAMUSCULAR; INTRAVENOUS EVERY 2 HOUR PRN
Status: DISCONTINUED | OUTPATIENT
Start: 2022-03-01 | End: 2022-03-01 | Stop reason: HOSPADM

## 2022-03-01 RX ORDER — IBUPROFEN 400 MG/1
400 TABLET ORAL EVERY 8 HOURS SCHEDULED
Qty: 15 TABLET | Refills: 0 | Status: SHIPPED | OUTPATIENT
Start: 2022-03-01 | End: 2022-03-22 | Stop reason: SDUPTHER

## 2022-03-01 RX ORDER — PREDNISONE 20 MG/1
TABLET ORAL
Qty: 15 TABLET | Refills: 0 | Status: SHIPPED | OUTPATIENT
Start: 2022-03-01 | End: 2022-03-13

## 2022-03-01 RX ADMIN — CARISOPRODOL 350 MG: 350 TABLET ORAL at 06:39

## 2022-03-01 RX ADMIN — FENTANYL CITRATE 25 MCG: 0.05 INJECTION, SOLUTION INTRAMUSCULAR; INTRAVENOUS at 00:35

## 2022-03-01 RX ADMIN — DOCUSATE SODIUM 100 MG: 100 CAPSULE ORAL at 11:36

## 2022-03-01 RX ADMIN — METHYLPREDNISOLONE SODIUM SUCCINATE 40 MG: 40 INJECTION, POWDER, FOR SOLUTION INTRAMUSCULAR; INTRAVENOUS at 09:31

## 2022-03-01 RX ADMIN — LIDOCAINE 5% 1 PATCH: 700 PATCH TOPICAL at 11:36

## 2022-03-01 RX ADMIN — FENTANYL CITRATE 25 MCG: 0.05 INJECTION, SOLUTION INTRAMUSCULAR; INTRAVENOUS at 02:19

## 2022-03-01 RX ADMIN — SUCRALFATE 1 G: 1 TABLET ORAL at 06:39

## 2022-03-01 RX ADMIN — DULOXETINE HYDROCHLORIDE 30 MG: 30 CAPSULE, DELAYED RELEASE ORAL at 11:36

## 2022-03-01 RX ADMIN — FENTANYL CITRATE 50 MCG: 0.05 INJECTION, SOLUTION INTRAMUSCULAR; INTRAVENOUS at 06:45

## 2022-03-01 RX ADMIN — SUCRALFATE 1 G: 1 TABLET ORAL at 11:36

## 2022-03-01 RX ADMIN — ENOXAPARIN SODIUM 40 MG: 40 INJECTION SUBCUTANEOUS at 09:30

## 2022-03-01 RX ADMIN — SODIUM CHLORIDE 75 ML/HR: 9 INJECTION, SOLUTION INTRAVENOUS at 06:46

## 2022-03-01 RX ADMIN — PANTOPRAZOLE SODIUM 40 MG: 40 TABLET, DELAYED RELEASE ORAL at 06:39

## 2022-03-01 NOTE — PLAN OF CARE
Problem: PAIN - ADULT  Goal: Verbalizes/displays adequate comfort level or baseline comfort level  Description: Interventions:  - Encourage patient to monitor pain and request assistance  - Assess pain using appropriate pain scale (0-10 pain scale)  - Administer analgesics based on type and severity of pain and evaluate response  - Implement non-pharmacological measures as appropriate and evaluate response  - Consider cultural and social influences on pain and pain management  - Notify physician/advanced practitioner if interventions unsuccessful or patient reports new pain  Outcome: Progressing     Problem: INFECTION - ADULT  Goal: Absence or prevention of progression during hospitalization  Description: INTERVENTIONS:  - Assess and monitor for signs and symptoms of infection  - Monitor lab/diagnostic results  - Monitor all insertion sites, i e  indwelling lines  - Administer medications as ordered  - Instruct and encourage patient and family to use good hand hygiene technique  Outcome: Progressing     Problem: SAFETY ADULT  Goal: Patient will remain free of falls  Description: INTERVENTIONS:  - Educate patient/family on patient safety including physical limitations  - Instruct patient to call for assistance with activity   - Consult OT/PT to assist with strengthening/mobility   - Keep Call bell within reach  - Keep bed low and locked with side rails adjusted as appropriate  - Keep care items and personal belongings within reach  - Initiate and maintain comfort rounds  - Make Fall Risk Sign visible to staff (moderate fall risk)  Outcome: Progressing  Goal: Maintain or return to baseline ADL function  Description: INTERVENTIONS:  -  Assess patient's ability to carry out ADLs; (min to mod assist with adls)  - Assess/evaluate cause of self-care deficits (pain, limited movement)  - Assess range of motion  - Assess patient's mobility; (min to mod assist)  - Assess patient's need for assistive devices and provide as appropriate  - Encourage maximum independence but intervene and supervise when necessary  - Involve family in performance of ADLs  - Assess for home care needs following discharge   - Consider OT consult to assist with ADL evaluation and planning for discharge  - Provide patient education as appropriate  Outcome: Progressing  Goal: Maintains/Returns to pre admission functional level  Description: INTERVENTIONS:  - Perform BMAT or MOVE assessment daily    - Set and communicate daily mobility goal to care team and patient/family/caregiver  - Collaborate with rehabilitation services on mobility goals if consulted  - Record patient progress and toleration of activity level   Outcome: Progressing     Problem: DISCHARGE PLANNING  Goal: Discharge to home or other facility with appropriate resources  Description: INTERVENTIONS:  - Identify barriers to discharge w/patient and caregiver  - Arrange for needed discharge resources and transportation as appropriate  - Identify discharge learning needs (meds, wound care, etc )  - Refer to Case Management Department for coordinating discharge planning if the patient needs post-hospital services based on physician/advanced practitioner order or complex needs related to functional status, cognitive ability, or social support system  Outcome: Progressing     Problem: Knowledge Deficit  Goal: Patient/family/caregiver demonstrates understanding of disease process, treatment plan, medications, and discharge instructions  Description: Complete learning assessment and assess knowledge base    Interventions:  - Provide teaching at level of understanding  - Provide teaching via preferred learning methods  Outcome: Progressing     Problem: MUSCULOSKELETAL - ADULT  Goal: Maintain or return mobility to safest level of function  Description: INTERVENTIONS:  - Assess patient's ability to carry out ADLs; (min to mod assist with adls)  - Assess/evaluate cause of self-care deficits (pain, limited movement)  - Assess range of motion  - Assess patient's mobility  - Assess patient's need for assistive devices and provide as appropriate  - Encourage maximum independence but intervene and supervise when necessary  - Involve family in performance of ADLs  - Assess for home care needs following discharge   - Consider OT consult to assist with ADL evaluation and planning for discharge  - Provide patient education as appropriate  Outcome: Progressing

## 2022-03-01 NOTE — QUICK NOTE
Alerted by RN that patient was due for dilaudid prn pain  When administered dilaudid earlier, patient experienced red generalized rash  When speaking to the patient, she reports that she has had this reaction in the pat when given oxycodone  Due to concerns for allergic reaction, I have held dilaudid and started Fentanyl citrate 25mcg prn breakthrough pain

## 2022-03-01 NOTE — CASE MANAGEMENT
Case Management Assessment & Discharge Planning Note    Patient name Nati Handy  Location [facility] [id number] MRN [id number]  : 1995 Date 3/1/2022       Current Admission Date: 2022  Current Admission Diagnosis:Acute bilateral low back pain with left-sided sciatica   Patient Active Problem List    Diagnosis Date Noted    Acute bilateral low back pain with left-sided sciatica 2022    Lumbar disc disease with radiculopathy 2022    Hoarseness 2021    Chronic bilateral low back pain with bilateral sciatica 2021    Iron deficiency anemia 2020    Nausea 2020    Generalized abdominal pain 2020    Pelvic pain in female 2020    Chronic tubulointerstitial nephritis 2020    Low HDL (under 40) 2020    Hepatitis C antibody test positive 2020    Gastroenteritis 2020    Microalbuminuria 2020    Bradycardia 2020    Flank pain 2020    Diarrhea 2020    Hypokalemia 2020    Leukocytosis 2020    Microscopic hematuria 2020    Dysmenorrhea 2020    Tobacco abuse 2020    GERD (gastroesophageal reflux disease) 2020    Ovarian cyst, left 2020    Ectopic pregnancy 2017      LOS (days): 0  Geometric Mean LOS (GMLOS) (days):   Days to GMLOS:     OBJECTIVE:       Current admission status: Observation     Preferred Pharmacy:   [facility] [place] - [address] [facility], [address] [place], [address] [place] [address] [place] [address]  Phone: [phone] Fax: [phone]    Primary Care Provider: Charlie Ratliff PA-C    Primary Insurance: [facility]  Secondary Insurance:     ASSESSMENT:  [employer]    There are no active Health Care Agents on file         Advance Directives  Does patient have a [address]?: No  Was patient offered paperwork?: Yes (pt declines)  Does patient currently have a Health Care decision maker?: No  Does patient have Advance Directives?: No  Was patient offered paperwork?: Yes (pt declines)      Readmission Root Cause  30 Day Readmission: No    Patient Information  Admitted from[de-identified] Home  Mental Status: Alert  During Assessment patient was accompanied by: Not accompanied during assessment  Assessment information provided by[de-identified] Patient  Primary Caregiver: Self  Support Systems: Spouse/significant other (boyfriend)  [place] of Residence: [facility] do you live in?: [address] entry access options   Select all that apply : Stairs  Number of steps to enter home : 4  Type of Current Residence: 2 story home  Upon entering residence, is there a bedroom on the main floor (no further steps)?: No  A bedroom is located on the following floor levels of residence (select all that apply):: 2nd Floor  Upon entering residence, is there a bathroom on the main floor (no further steps)?: No  Indicate which floors of current residence have a bathroom (select all the apply):: 2nd Floor  Number of steps to 2nd floor from main floor: One [address]  In the last 12 months, was there a time when you were not able to pay the mortgage or rent on time?: No  In the last 12 months, was there a time when you did not have a steady place to sleep or slept in a shelter (including now)?: No  Homeless/housing insecurity resource given?: N/A  Living Arrangements: Lives w/ Spouse/significant other (with her boyfriend)  Is patient a ?: No    Activities of Daily Living Prior to Admission  Functional Status: Independent  Completes ADLs independently?: Yes  Ambulates independently?: Yes  Does patient use assisted devices?: No  Does patient currently own DME?: No  Does patient have a history of Outpatient Therapy (PT/OT)?: No  Does the patient have a history of Short-Term Rehab?: No  Does patient have a history of HHC?: No  Does patient currently have [address]?: No         Patient Information Continued  Does patient have prescription coverage?: Yes  Within the past 12 months, you worried that your food would run out before you got the money to buy more : Never true  Within the past 12 months, the food you bought just didnt last and you didnt have money to get more : Never true  Food insecurity resource given?: N/A  Does patient receive dialysis treatments?: No  Does patient have a history of substance abuse?: No  Does patient have a history of Mental Health Diagnosis?: No    PHQ 2/9 Screening   Reviewed PHQ 2/9 Depression Screening Score?: No    Means of Transportation  Means of Transport to Appts[de-identified] Drives Self (pt and boyfriend both drive)  In the past 12 months, has lack of transportation kept you from medical appointments or from getting medications?: No  In the past 12 months, has lack of transportation kept you from meetings, work, or from getting things needed for daily living?: No  Was application for public transport provided?: N/A        DISCHARGE DETAILS:    Discharge planning discussed with[de-identified] patient        CM contacted family/caregiver?: No- see comments (pt declines)     Discharge Destination Plan[de-identified] Home  Transport at Discharge : Other (Comment) (boyfriend)   Pt plans home on dc with OP follow up  CM will follow and assist in dc planning

## 2022-03-01 NOTE — ASSESSMENT & PLAN NOTE
Significant initial generalized abdominal pain, limited exam due to discomfort  · History of ectopic pregnancy - hCG negative  · Urinalysis negative for infection  · No mention of constipation by CT read, patient reports bowel movement previously  · CT of the abdomen and pelvis without any acute findings  · Consider possible etiology as radiation from back pain  Pain reportedly improved

## 2022-03-01 NOTE — DISCHARGE SUMMARY
[facility] [address]  Discharge- Nati Handy 1995, 27 y o  female MRN: [id number]  Unit/Bed#: 406-01 Encounter: [id number]  Primary Care Provider: Charlie Ratliff PA-C   Date and time admitted to hospital: 2/28/2022  5:27 AM    * Acute bilateral low back pain with left-sided sciatica  Assessment & Plan  Onset of lower back pain 02/01/2022, with radicular findings  Patient with reported sciatica type symptoms previously down bilateral lower extremities, complaining of LLE pain only at time of admission  Also with reported cervical radicular symptoms as outpatient  No alarm symptoms, with CT showing degenerative changes, possible central and left paracentral disc herniation  Subsequent MRI limited due to motion, with following findings:    - Transitional lumbosacral anatomy with sacralized L5 vertebral body  - L4-L5: Disc extrusion contributing to at least small canal stenosis given motion degraded sequences   Probable mild-to-moderate bilateral foraminal narrowing given motion degraded sequences    -Type I Modic endplate change at L9-C1    -Intramuscular edematous signal in erector spinae muscles of lower lumbar spine, likely muscular strain injury  · Continue standing doses of muscle relaxer, topical lidocaine  · Recommend Acetaminophen/NSAIDs for short-term pain control   - Opiates with development of rash as inpatient as well as chronically per patient  - Prior tubulointerstitial nephritis with chronic NSAID use - creatinine 1 6-1 8 for 3 days in 2020   - Creatinine has been normal and stable since, with a value of 0 7 today  - Plan on moderate dose Ibuprofen for 5-7 days only, with close monitoring of renal function   - Discussed with nephrology who agrees with plan  · Continue steroids -   - Outpatient medrol pack ineffective  - Will prescribe prednisone at slightly higher doses with slower wean  · PT/OT with recommendations for outpatient physical therapy    · Refer to pain management, patient already established  · Recommended against strict bed rest   Also recommended against heavy lifting or repetitive movement  Did advise patient to follow-up with physical therapy, ambulate, and perform activities as tolerated  Generalized abdominal pain  Assessment & Plan  Significant initial generalized abdominal pain, limited exam due to discomfort  · History of ectopic pregnancy - hCG negative  · Urinalysis negative for infection  · No mention of constipation by CT read, patient reports bowel movement previously  · CT of the abdomen and pelvis without any acute findings  · Consider possible etiology as radiation from back pain  Pain reportedly improved  Leukocytosis  Assessment & Plan  Potentially reactive  Patient is afebrile  Urinalysis negative  CT of the chest, abdomen, and pelvis unremarkable for any acute infectious pathology  GERD (gastroesophageal reflux disease)  Assessment & Plan  Continue PPI therapy  Tobacco abuse  Assessment & Plan  NRT in form of patch while patient was hospitalized  Medical Problems             Resolved Problems  Date Reviewed: 3/1/2022    None              Discharging Physician / Practitioner: Felicitas Mccarthy MD  PCP: Maranda Hollis PA-C  Admission Date:   Admission Orders (From admission, onward)     Ordered        02/28/22 0839  Place in Observation  Once                      Discharge Date: 03/01/22    Consultations During Hospital Stay:  · None    Procedures Performed:   · None    Significant Findings / Test Results:   CT head without contrast    Result Date: 2/28/2022  Impression: No acute intracranial abnormality  Workstation performed: [id number]     MRI lumbar spine wo contrast    Result Date: 2/28/2022  Impression: Suboptimal exam due to moderate-to-severe motion artifact with repeated sequences  Consider repeat examination when patient's pain is better controlled   - Transitional lumbosacral anatomy with sacralized L5 vertebral body  - L4-L5: Disc extrusion contributing to at least small canal stenosis given motion degraded sequences  Probable mild-to-moderate bilateral foraminal narrowing given motion degraded sequences  -Type I Modic endplate change at K9-F4  -Intramuscular edematous signal in erector spinae muscles of lower lumbar spine, likely muscular strain injury  The study was marked in [facility] for immediate notification  Workstation performed: [id number]     PE Study with CT Abdomen and Pelvis with contrast    Result Date: 2/28/2022  Impression: No CT evidence for pulmonary embolism or acute thoracic pathology  No acute CT findings in the abdomen or pelvis  No free air noted  Segmentation anomaly with a partially sacralized L5 segment  Degenerative disc disease at L4-L5 with possible central and left paracentral disc herniation  Workstation performed: [id number]     Incidental Findings:   · As above  Test Results Pending at Discharge (will require follow up): · None     Outpatient Tests Requested:  · BMP    Complications:  None    Reason for Admission:  Intractable back pain    HPI from original H&P dated 02/28/2022:     Marshall Rodríguez is a 27 y o  female with a PMH of lower back pain ongoing for 1 month who presents with complaints of worsening intractable lower back pain with sciatica type symptoms  Patient has a prior medical history significant for GERD, tobacco abuse, and previously noted ectopic pregnancy  The patient reports onset of lower back pain on the 1st of this month, ongoing and worsening, with pain radiating to the lower extremities as well  She was evaluated as an outpatient by the pain clinic, with imaging showing evidence of degenerative changes, and scheduled for an outpatient MRI that has not yet occurred  She was also prescribed topical diclofenac, Cymbalta, Medrol pack, and scheduled for follow-up within 1 month    The patient reports some improvement with steroids, but with pain worsening again towards the end of the treatment  She also reports essentially being bed-bound due to the pain  She denied any bladder or bowel incontinence, saddle anesthesia, or weakness that was not due to pain  Due to her ongoing and worsening pain she was referred for admission for further evaluation and treatment  Please see above list of diagnoses and related plan for additional information  Condition at Discharge: stable    Discharge Day Visit / Exam:     Vitals: Blood Pressure: 105/59 (03/01/22 0931)  Pulse: 85 (03/01/22 0931)  Temperature: (!) 97 °F (36 1 °C) (03/01/22 0811)  Temp Source: Temporal (03/01/22 0811)  Respirations: 16 (03/01/22 0811)  Height: 5' 3" (160 cm) (02/28/22 1328)  Weight - Scale: 65 3 kg (143 lb 15 4 oz) (02/28/22 1328)  SpO2: 96 % (03/01/22 0931)    Discussion with Family: Updated  (friend) at bedside  Discharge instructions/Information to patient and family:   See after visit summary for information provided to patient and family  Provisions for Follow-Up Care:  See after visit summary for information related to follow-up care and any pertinent home health orders  Disposition:   Home    Planned Readmission:  No     Discharge Statement:  I spent 35 minutes discharging the patient  This time was spent on the day of discharge  I had direct contact with the patient on the day of discharge  Greater than 50% of the total time was spent examining patient, answering all patient questions, arranging and discussing plan of care with patient as well as directly providing post-discharge instructions  Additional time then spent on discharge activities  Discharge Medications:  See after visit summary for reconciled discharge medications provided to patient and/or family        **Please Note: This note may have been constructed using a voice recognition system**

## 2022-03-01 NOTE — UTILIZATION REVIEW
Initial Clinical Review    Admission: Date/Time/Statement:   Admission Orders (From admission, onward)     Ordered        02/28/22 0839  Place in Observation  Once                      Orders Placed This Encounter   Procedures    Place in Observation     Standing Status:   Standing     Number of Occurrences:   1     Order Specific Question:   Level of Care     Answer:   Med Surg [16]     ED Arrival Information     Expected Arrival Acuity    - 2/28/2022 05:27 Urgent         Means of arrival Escorted by Service Admission type    [facility]  [facility]) Hospitalist Urgent         Arrival complaint            Chief Complaint   Patient presents with    Back Pain     chronic back pain stated its just getting worse  Had motrin, zanaflex, and Cymbalta with no relief  pain is going down left leg  Initial Presentation: 27year old female, presented to the ED @ [address] from home via EMS  Admitted as Observation due to Acute Bilateral Low Back Pain with Left-Sided Sciatica  PMH:  Ectopic Pregnancy, GERD, Right fallopian tube removed, Ovarian cyst  Date: 02/28/2022  Presents with 1 month ongoing lower back pain  Worsening intractable lower back pain with sciatica type symptoms  She was evaluated as an outpatient by the pain clinic, with imaging showing evidence of degenerative changes, and scheduled for an outpatient MRI that has not yet occurred  She was also prescribed topical diclofenac, Cymbalta, Medrol pack, and scheduled for follow-up within 1 month  She also reports essentially being bed-bound due to the pain  She denied any bladder or bowel incontinence, saddle anesthesia, or weakness that was not due to pain  Due to her ongoing and worsening pain she was referred for admission for further evaluation and treatment  In ED, Pt was given Dilaudid at 0950 2/28  After dilaudid was given pt broke out into a generalized red rash      VTE Pharmacologic Prophylaxis: VTE Score: 2 Moderate Risk (Score 3-4) - Pharmacological DVT Prophylaxis Ordered: enoxaparin (Lovenox)  ED Triage Vitals [02/28/22 0529]   Temperature Pulse Respirations Blood Pressure SpO2   98 1 °F (36 7 °C) (!) 118 18 142/93 99 %      Temp Source Heart Rate Source Patient Position - Orthostatic VS BP Location FiO2 (%)   Tympanic Monitor Lying Left arm --      Pain Score       10 - Worst Possible Pain          Wt Readings from Last 1 Encounters:   02/28/22 65 3 kg (143 lb 15 4 oz)     Additional Vital Signs:   Date/Time Temp Pulse Resp BP MAP (mmHg) SpO2 Calculated FIO2 (%) - Nasal Cannula Nasal Cannula O2 Flow Rate (L/min) O2 Device Patient Position - Orthostatic VS   03/01/22 08:11:10 97 °F (36 1 °C) Abnormal  -- 16 89/53 Abnormal  65 -- -- -- None (Room air) Lying   02/28/22 23:28:12 98 2 °F (36 8 °C) -- -- 123/79 94 -- -- -- -- --   02/28/22 2220 -- -- -- -- -- -- -- -- None (Room air) --   02/28/22 1500 98 8 °F (37 1 °C) -- 20 123/87 -- 98 % -- -- -- --   02/28/22 0950 -- -- -- -- -- -- -- -- None (Room air) --   02/28/22 09:18:24 -- -- 18 138/106 Abnormal  117 -- -- -- -- --   02/28/22 0830 -- 90 18 112/63 81 96 % 32 3 L/min Nasal cannula Lying   02/28/22 0800 -- 87 18 129/87 104 99 % 32 3 L/min Nasal cannula Lying   02/28/22 0730 -- 97 20 124/84 100 99 % 32 3 L/min Nasal cannula Lying   02/28/22 0645 -- 83 -- -- -- 89 % Abnormal   -- -- -- --     Pertinent Labs/Diagnostic Test Results:   MRI lumbar spine wo contrast   Final Result by Marcella Biggs MD (02/28 1206)   Suboptimal exam due to moderate-to-severe motion artifact with repeated sequences  Consider repeat examination when patient's pain is better controlled  - Transitional lumbosacral anatomy with sacralized L5 vertebral body  - L4-L5: Disc extrusion contributing to at least small canal stenosis given motion degraded sequences    Probable mild-to-moderate bilateral foraminal narrowing given motion degraded sequences    -Type I Modic endplate change at L4-L5    -Intramuscular edematous signal in erector spinae muscles of lower lumbar spine, likely muscular strain injury  PE Study with CT Abdomen and Pelvis with contrast   Final Result by Elva Courtney MD (02/28 0060)   No CT evidence for pulmonary embolism or acute thoracic pathology  No acute CT findings in the abdomen or pelvis  No free air noted  Segmentation anomaly with a partially sacralized L5 segment  Degenerative disc disease at L4-L5 with possible central and left paracentral disc herniation  CT head without contrast   Final Result by Elva Courtney MD (02/28 0809)   No acute intracranial abnormality          Results from last 7 days   Lab Units 02/28/22  0703   SARS-COV-2  Negative     Results from last 7 days   Lab Units 02/28/22  0641   WBC Thousand/uL 14 01*   HEMOGLOBIN g/dL 14 6   HEMATOCRIT % 42 6   PLATELETS Thousands/uL 450*   NEUTROS ABS Thousands/µL 9 43*     Results from last 7 days   Lab Units 02/28/22  0641   SODIUM mmol/L 136   POTASSIUM mmol/L 3 5   CHLORIDE mmol/L 100   CO2 mmol/L 24   ANION GAP mmol/L 12   BUN mg/dL 16   CREATININE mg/dL 0 74   EGFR ml/min/1 73sq m 110   CALCIUM mg/dL 9 6   MAGNESIUM mg/dL 2 0     Results from last 7 days   Lab Units 02/28/22  0641   AST U/L 11   ALT U/L 22   ALK PHOS U/L 61   TOTAL PROTEIN g/dL 7 4   ALBUMIN g/dL 4 5   TOTAL BILIRUBIN mg/dL 0 52   BILIRUBIN DIRECT mg/dL 0 12     Results from last 7 days   Lab Units 02/28/22  0641   GLUCOSE RANDOM mg/dL 103     Results from last 7 days   Lab Units 02/28/22  0641   CK TOTAL U/L 163   CK MB INDEX % 1 8   CK MB ng/mL 2 9     Results from last 7 days   Lab Units 02/28/22  0641   D-DIMER QUANTITATIVE ug/ml FEU <0 27     Results from last 7 days   Lab Units 02/28/22  0641   PROTIME seconds 11 8   INR  0 91   PTT seconds 26     Results from last 7 days   Lab Units 02/28/22  0641   TSH 3RD GENERATON uIU/mL 1 274     Results from last 7 days   Lab Units 02/28/22  0641   LACTIC ACID mmol/L 1 8     Results from last 7 days   Lab Units 02/28/22  0641   LIPASE u/L 86     Results from last 7 days   Lab Units 02/28/22  0943   CLARITY UA  Slightly Cloudy   COLOR UA  Yellow   SPEC GRAV UA  <=1 005   PH UA  8 5*   GLUCOSE UA mg/dl Negative   KETONES UA mg/dl Negative   BLOOD UA  Negative   PROTEIN UA mg/dl Negative   NITRITE UA  Negative   BILIRUBIN UA  Negative   UROBILINOGEN UA E U /dl 0 2   LEUKOCYTES UA  Negative     Results from last 7 days   Lab Units 02/28/22  0703   INFLUENZA A PCR  Negative   INFLUENZA B PCR  Negative   RSV PCR  Negative     Results from last 7 days   Lab Units 02/28/22  0641   ETHANOL LVL mg/dL <3   ACETAMINOPHEN LVL ug/mL <2*   SALICYLATE LVL mg/dL 3     Results from last 7 days   Lab Units 02/28/22  0644 02/28/22  0641   BLOOD CULTURE  Received in Microbiology Lab  Culture in Progress  Received in Microbiology Lab  Culture in Progress       ED Treatment:   Medication Administration from 02/28/2022 0527 to 02/28/2022 0913       Date/Time Order Dose Route Action     02/28/2022 0631 OLANZapine (ZyPREXA) IM injection 10 mg 10 mg Intramuscular Given     02/28/2022 8526 sterile water injection **ADS Override Pull** 10 mL  Given     02/28/2022 0755 iohexol (OMNIPAQUE) 350 MG/ML injection (SINGLE-DOSE) 100 mL 100 mL Intravenous Given        Past Medical History:   Diagnosis Date    Allergic     Ectopic pregnancy     GERD (gastroesophageal reflux disease)     History of unilateral fallopian tube excision     RIGHT removed    Ovarian cyst     Wears glasses      Present on Admission:   Tobacco abuse   GERD (gastroesophageal reflux disease)   Leukocytosis      Admitting Diagnosis: Back pain [M54 9]  Intractable back pain [M54 9]  Age/Sex: 27 y o  female  Admission Orders:  Regular diet  Up with assistance  Ambulate QID  Consult PT/OT    Scheduled Medications:  carisoprodol, 350 mg, Oral, 4x Daily (AC & HS)  Diclofenac Sodium, 2 g, Topical, 4x Daily  docusate sodium, 100 mg, Oral, BID  DULoxetine, 30 mg, Oral, Daily  enoxaparin, 40 mg, Subcutaneous, Q24H JESSIE  lidocaine, 1 patch, Topical, Daily  methylPREDNISolone sodium succinate, 40 mg, Intravenous, Daily  nicotine, 1 patch, Transdermal, Daily  pantoprazole, 40 mg, Oral, Daily Before Breakfast  sucralfate, 1 g, Oral, Q6H [address]      Continuous IV Infusions:  sodium chloride, 75 mL/hr, Intravenous, Continuous      PRN Meds:  acetaminophen, 650 mg, Oral, Q6H PRN  albuterol, 2 puff, Inhalation, Q6H PRN  fentanyl citrate (PF), 50 mcg, Intravenous, Q2H PRN  X 2 doses 2/28;  X 2 doses 3/1  ondansetron, 4 mg, Intravenous, Q6H PRN  polyethylene glycol, 17 g, Oral, Daily PRN  fentanyl citrate (PF) 100 MCG/2ML 50 mcg  Dose: 50 mcg    X 1 dose 3/1  Freq: Every 2 hour PRN Route: IV  PRN Reason: breakthrough pain  Start: 03/01/22 0300  HYDROmorphone (DILAUDID) injection 0 5 mg  Dose: 0 5 mg   X 1 dose 2/28  Then DCed  Freq: Every 3 hours PRN Route: IV  PRN Reason: severe pain  Start: 02/28/22 0944 End: 02/28/22 2138        Network Utilization Review Department  ATTENTION: Please call with any questions or concerns to [phone] and carefully listen to the prompts so that you are directed to the right person  All voicemails are confidential   Radha Doing all requests for admission clinical reviews, approved or denied determinations and any other requests to dedicated fax number below belonging to the campus where the patient is receiving treatment   List of dedicated fax numbers for the Facilities:  [address] DENIALS (Administrative/Medical Necessity) [phone]   [address] (Maternity/NICU/Pediatrics) 261 [facility],7Th Floor [place] [address] [place] [address] [facility] [place] Elle Andrew Torres 1408 [address] [place] [address] M[place] Emre Beyer 1481 [address] [facility] [phone]

## 2022-03-01 NOTE — PLAN OF CARE
Problem: PAIN - ADULT  Goal: Verbalizes/displays adequate comfort level or baseline comfort level  Description: Interventions:  - Encourage patient to monitor pain and request assistance  - Assess pain using appropriate pain scale  - Administer analgesics based on type and severity of pain and evaluate response  - Implement non-pharmacological measures as appropriate and evaluate response  - Consider cultural and social influences on pain and pain management  - Notify physician/advanced practitioner if interventions unsuccessful or patient reports new pain  Outcome: Progressing     Problem: INFECTION - ADULT  Goal: Absence or prevention of progression during hospitalization  Description: INTERVENTIONS:  - Assess and monitor for signs and symptoms of infection  - Monitor lab/diagnostic results  - Monitor all insertion sites, i e  indwelling lines, tubes, and drains  - Monitor endotracheal if appropriate and nasal secretions for changes in amount and color  - Hewitt appropriate cooling/warming therapies per order  - Administer medications as ordered  - Instruct and encourage patient and family to use good hand hygiene technique  - Identify and instruct in appropriate isolation precautions for identified infection/condition  Outcome: Progressing     Problem: SAFETY ADULT  Goal: Patient will remain free of falls  Description: INTERVENTIONS:  - Educate patient/family on patient safety including physical limitations  - Instruct patient to call for assistance with activity   - Consult OT/PT to assist with strengthening/mobility   - Keep Call bell within reach  - Keep bed low and locked with side rails adjusted as appropriate  - Keep care items and personal belongings within reach  - Initiate and maintain comfort rounds  - Make Fall Risk Sign visible to staff  - Apply yellow socks and bracelet for high fall risk patients  - Consider moving patient to room near nurses station  Outcome: Progressing  Goal: Maintain or return to baseline ADL function  Description: INTERVENTIONS:  -  Assess patient's ability to carry out ADLs; assess patient's baseline for ADL function and identify physical deficits which impact ability to perform ADLs (bathing, care of mouth/teeth, toileting, grooming, dressing, etc )  - Assess/evaluate cause of self-care deficits   - Assess range of motion  - Assess patient's mobility; develop plan if impaired  - Assess patient's need for assistive devices and provide as appropriate  - Encourage maximum independence but intervene and supervise when necessary  - Involve family in performance of ADLs  - Assess for home care needs following discharge   - Consider OT consult to assist with ADL evaluation and planning for discharge  - Provide patient education as appropriate  Outcome: Progressing  Goal: Maintains/Returns to pre admission functional level  Description: INTERVENTIONS:  - Perform BMAT or MOVE assessment daily    - Set and communicate daily mobility goal to care team and patient/family/caregiver     - Collaborate with rehabilitation services on mobility goals if consulted  - Record patient progress and toleration of activity level   Outcome: Progressing     Problem: DISCHARGE PLANNING  Goal: Discharge to home or other facility with appropriate resources  Description: INTERVENTIONS:  - Identify barriers to discharge w/patient and caregiver  - Arrange for needed discharge resources and transportation as appropriate  - Identify discharge learning needs (meds, wound care, etc )  - Arrange for interpretive services to assist at discharge as needed  - Refer to Case Management Department for coordinating discharge planning if the patient needs post-hospital services based on physician/advanced practitioner order or complex needs related to functional status, cognitive ability, or social support system  Outcome: Progressing     Problem: Knowledge Deficit  Goal: Patient/family/caregiver demonstrates understanding of disease process, treatment plan, medications, and discharge instructions  Description: Complete learning assessment and assess knowledge base    Interventions:  - Provide teaching at level of understanding  - Provide teaching via preferred learning methods  Outcome: Progressing     Problem: GASTROINTESTINAL - ADULT  Goal: Minimal or absence of nausea and/or vomiting  Description: INTERVENTIONS:  - Administer IV fluids if ordered to ensure adequate hydration  - Maintain NPO status until nausea and vomiting are resolved  - Nasogastric tube if ordered  - Administer ordered antiemetic medications as needed  - Provide nonpharmacologic comfort measures as appropriate  - Advance diet as tolerated, if ordered  - Consider nutrition services referral to assist patient with adequate nutrition and appropriate food choices  Outcome: Progressing  Goal: Maintains or returns to baseline bowel function  Description: INTERVENTIONS:  - Assess bowel function  - Encourage oral fluids to ensure adequate hydration  - Administer IV fluids if ordered to ensure adequate hydration  - Administer ordered medications as needed  - Encourage mobilization and activity  - Consider nutritional services referral to assist patient with adequate nutrition and appropriate food choices  Outcome: Progressing  Goal: Maintains adequate nutritional intake  Description: INTERVENTIONS:  - Monitor percentage of each meal consumed  - Identify factors contributing to decreased intake, treat as appropriate  - Assist with meals as needed  - Monitor I&O, weight, and lab values if indicated  - Obtain nutrition services referral as needed  Outcome: Progressing  Goal: Establish and maintain optimal ostomy function  Description: INTERVENTIONS:  - Assess bowel function  - Encourage oral fluids to ensure adequate hydration  - Administer IV fluids if ordered to ensure adequate hydration   - Administer ordered medications as needed  - Encourage mobilization and activity  - Nutrition services referral to assist patient with appropriate food choice  Outcome: Progressing  Goal: Oral mucous membranes remain intact  Description: INTERVENTIONS  - Assess oral mucosa and hygiene practices  - Implement preventative oral hygiene regimen  - Implement oral medicated treatments as ordered  - Initiate Nutrition services referral as needed  Outcome: Progressing     Problem: MUSCULOSKELETAL - ADULT  Goal: Maintain or return mobility to safest level of function  Description: INTERVENTIONS:  - Assess patient's ability to carry out ADLs; assess patient's baseline for ADL function and identify physical deficits which impact ability to perform ADLs (bathing, care of mouth/teeth, toileting, grooming, dressing, etc )  - Assess/evaluate cause of self-care deficits   - Assess range of motion  - Assess patient's mobility  - Assess patient's need for assistive devices and provide as appropriate  - Encourage maximum independence but intervene and supervise when necessary  - Involve family in performance of ADLs  - Assess for home care needs following discharge   - Consider OT consult to assist with ADL evaluation and planning for discharge  - Provide patient education as appropriate  Outcome: Progressing  Goal: Maintain proper alignment of affected body part  Description: INTERVENTIONS:  - Support, maintain and protect limb and body alignment  - Provide patient/ family with appropriate education  Outcome: Progressing

## 2022-03-01 NOTE — ASSESSMENT & PLAN NOTE
Potentially reactive  Patient is afebrile  Urinalysis negative  CT of the chest, abdomen, and pelvis unremarkable for any acute infectious pathology

## 2022-03-01 NOTE — QUICK NOTE
Messaged Dr Back about getting something else for pain medication for pt  Pt seemed to have a reaction to dilaudid (broke out into red rash)  Dr Gamez gave okay to give dilaudid again and keep an eye on pt  Did not feel comfortable giving dilaudid

## 2022-03-01 NOTE — ASSESSMENT & PLAN NOTE
Onset of lower back pain 02/01/2022, with radicular findings  Patient with reported sciatica type symptoms previously down bilateral lower extremities, complaining of LLE pain only at time of admission  Also with reported cervical radicular symptoms as outpatient  No alarm symptoms, with CT showing degenerative changes, possible central and left paracentral disc herniation  Subsequent MRI limited due to motion, with following findings:    - Transitional lumbosacral anatomy with sacralized L5 vertebral body  - L4-L5: Disc extrusion contributing to at least small canal stenosis given motion degraded sequences   Probable mild-to-moderate bilateral foraminal narrowing given motion degraded sequences    -Type I Modic endplate change at V3-J7    -Intramuscular edematous signal in erector spinae muscles of lower lumbar spine, likely muscular strain injury  · Continue standing doses of muscle relaxer, topical lidocaine  · Recommend Acetaminophen/NSAIDs for short-term pain control   - Opiates with development of rash as inpatient as well as chronically per patient  - Prior tubulointerstitial nephritis with chronic NSAID use - creatinine 1 6-1 8 for 3 days in 2020   - Creatinine has been normal and stable since, with a value of 0 7 today  - Plan on moderate dose Ibuprofen for 5-7 days only, with close monitoring of renal function   - Discussed with nephrology who agrees with plan  · Continue steroids -   - Outpatient medrol pack ineffective  - Will prescribe prednisone at slightly higher doses with slower wean  · PT/OT with recommendations for outpatient physical therapy  · Refer to pain management, patient already established  · Recommended against strict bed rest   Also recommended against heavy lifting or repetitive movement  Did advise patient to follow-up with physical therapy, ambulate, and perform activities as tolerated

## 2022-03-02 ENCOUNTER — HOSPITAL ENCOUNTER (OUTPATIENT)
Dept: MRI IMAGING | Facility: HOSPITAL | Age: 27
End: 2022-03-02
Attending: ANESTHESIOLOGY
Payer: COMMERCIAL

## 2022-03-02 ENCOUNTER — HOSPITAL ENCOUNTER (OUTPATIENT)
Dept: MRI IMAGING | Facility: HOSPITAL | Age: 27
Discharge: HOME | End: 2022-03-02
Attending: ANESTHESIOLOGY
Payer: COMMERCIAL

## 2022-03-02 DIAGNOSIS — M54.2 NECK PAIN: ICD-10-CM

## 2022-03-02 DIAGNOSIS — M54.12 CERVICAL RADICULOPATHY: ICD-10-CM

## 2022-03-02 PROCEDURE — 72141 MRI NECK SPINE W/O DYE: CPT

## 2022-03-02 PROCEDURE — G1004 CDSM NDSC: HCPCS

## 2022-03-02 NOTE — UTILIZATION REVIEW
Notification of Discharge   This is a Notification of Discharge from our facility [address]  Please be advised that this patient has been discharge from our facility  Below you will find the admission and discharge date and time including the patients disposition  UTILIZATION REVIEW CONTACT:  Claudell Ice Brendlinger  Utilization   Network Utilization Review Department  Phone: [phone] x carefully listen to the prompts  All voicemails are confidential   Email: [email or web address]     PHYSICIAN ADVISORY SERVICES:  FOR QHRA-[id number]-FULZ REVIEW - MEDICAL NECESSITY DENIAL  Phone: [phone]  Fax: [phone]  Email: [email or web address]     PRESENTATION DATE: 2/28/2022  5:27 AM  OBERVATION ADMISSION DATE: 02/28/2022  INPATIENT ADMISSION DATE: N/A N/A   DISCHARGE DATE: 3/1/2022  1:34 PM  DISPOSITION: Home/Self Care Home/Self Care      IMPORTANT INFORMATION:  Send all requests for admission clinical reviews, approved or denied determinations and any other requests to dedicated fax number below belonging to the campus where the patient is receiving treatment   List of dedicated fax numbers:  [address] [place] [address] DENIALS (Administrative/Medical Necessity) [phone]   [address]  (Maternity/NICU/Pediatrics) [phone]   [facility] [phone]   130 [facility] [phone]   [address] [place] [address] [phone]   [address] [place] [address] [address] [place] [address],[address] [phone]   [facility]  [phone]   2205 [facility]  24[address] [facility] 1000 [address] [facility] [phone]

## 2022-03-03 NOTE — UTILIZATION REVIEW
Notification of Discharge   This is a Notification of Discharge from our facility [address]  Please be advised that this patient has been discharge from our facility  Below you will find the admission and discharge date and time including the patients disposition  UTILIZATION REVIEW CONTACT:  Zeb Goddard  Utilization   Network Utilization Review Department  Phone: [phone] x carefully listen to the prompts  All voicemails are confidential   Email: [email or web address]     PHYSICIAN ADVISORY SERVICES:  FOR BXMB-OR-HMJA REVIEW - MEDICAL NECESSITY DENIAL  Phone: [phone]  Fax: [phone]  Email: [email or web address]     PRESENTATION DATE: 2/28/2022  5:27 AM  OBERVATION ADMISSION DATE: 02/28/2022  INPATIENT ADMISSION DATE: N/A N/A   DISCHARGE DATE: 3/1/2022  1:34 PM  DISPOSITION: Home/Self Care Home/Self Care      IMPORTANT INFORMATION:  Send all requests for admission clinical reviews, approved or denied determinations and any other requests to dedicated fax number below belonging to the campus where the patient is receiving treatment   List of dedicated fax numbers:  [address] DENIALS (Administrative/Medical Necessity) [phone]   [address] [place] [address] (Maternity/NICU/Pediatrics) [phone]   Luis Rutledge [phone]   130 [facility] [phone]   [address] [place] [address] [phone]   2000 [facility] [address] [place] [address],[address] [phone]   [facility]  [phone]   [address] [place], [place] [address] [facility] [address] [facility] [phone]

## 2022-03-05 LAB
BACTERIA BLD CULT: NORMAL
BACTERIA BLD CULT: NORMAL

## 2022-03-22 ENCOUNTER — OFFICE VISIT (OUTPATIENT)
Dept: PAIN MEDICINE | Facility: CLINIC | Age: 27
End: 2022-03-22
Payer: COMMERCIAL

## 2022-03-22 VITALS
HEART RATE: 92 BPM | DIASTOLIC BLOOD PRESSURE: 84 MMHG | BODY MASS INDEX: 24.77 KG/M2 | SYSTOLIC BLOOD PRESSURE: 132 MMHG | WEIGHT: 139.8 LBS | HEIGHT: 63 IN

## 2022-03-22 DIAGNOSIS — M51.16 LUMBAR DISC DISEASE WITH RADICULOPATHY: Primary | ICD-10-CM

## 2022-03-22 DIAGNOSIS — M54.41 CHRONIC BILATERAL LOW BACK PAIN WITH BILATERAL SCIATICA: ICD-10-CM

## 2022-03-22 DIAGNOSIS — G89.29 CHRONIC BILATERAL LOW BACK PAIN WITH BILATERAL SCIATICA: ICD-10-CM

## 2022-03-22 DIAGNOSIS — M54.9 INTRACTABLE BACK PAIN: ICD-10-CM

## 2022-03-22 DIAGNOSIS — M54.12 CERVICAL RADICULOPATHY: ICD-10-CM

## 2022-03-22 DIAGNOSIS — M54.42 CHRONIC BILATERAL LOW BACK PAIN WITH BILATERAL SCIATICA: ICD-10-CM

## 2022-03-22 PROCEDURE — 3008F BODY MASS INDEX DOCD: CPT | Performed by: ANESTHESIOLOGY

## 2022-03-22 PROCEDURE — 99214 OFFICE O/P EST MOD 30 MIN: CPT | Performed by: ANESTHESIOLOGY

## 2022-03-22 PROCEDURE — 1111F DSCHRG MED/CURRENT MED MERGE: CPT | Performed by: ANESTHESIOLOGY

## 2022-03-22 PROCEDURE — 4004F PT TOBACCO SCREEN RCVD TLK: CPT | Performed by: ANESTHESIOLOGY

## 2022-03-22 RX ORDER — DULOXETIN HYDROCHLORIDE 30 MG/1
30 CAPSULE, DELAYED RELEASE ORAL DAILY
Qty: 30 CAPSULE | Refills: 1 | Status: SHIPPED | OUTPATIENT
Start: 2022-03-22 | End: 2022-05-09

## 2022-03-22 RX ORDER — IBUPROFEN 400 MG/1
400 TABLET ORAL EVERY 8 HOURS SCHEDULED
Qty: 15 TABLET | Refills: 0 | Status: SHIPPED | OUTPATIENT
Start: 2022-03-22 | End: 2022-05-09 | Stop reason: SDUPTHER

## 2022-03-22 NOTE — H&P (VIEW-ONLY)
Assessment:  1  Intractable back pain        Plan:  Patient is a 24-year-old female complaints of neck pain, low back pain presents office for follow-up visit  MRI cervical spine shows disc bulges at C4-C5 and C5-C6 with a central disc herniation protrusion type resulted in moderate canal stenosis and mild left foraminal stenosis at C6-C7  MRI of lumbar spine shows L4-5 disc extrusion resulting in mild-to-moderate bilateral foraminal narrowing  Patient does report significant pain in the low back specially when sitting or standing or doing any activity that requires on her to put pressure on the upper extremities  She does have a lot of neck pain however at this time her low back pain is most significant  1  We will schedule patient for a bilateral L4-5 transforaminal epidural steroid injection  2  We will follow-up 1 month after injection plan at next interventional management  3  Patient was informed that she does get some relief from this injection she will have to start doing core strengthening exercise such as Pilates stress alleviate this pain more prominently  Complete risks and benefits including bleeding, infection, tissue reaction, nerve injury and allergic reaction were discussed  The approach was demonstrated using models and literature was provided  Verbal and written consent was obtained  [place] Prescription Drug Monitoring Program report was reviewed and was appropriate       History of Present Illness: The patient is a 27 y o  female who presents for a follow up office visit in regards to Back Pain  The patients current symptoms include 8/10 constant sharp, shooting pain without any particular time pattern  Current pain medications includes:  Soma 350 mg p o  q i d , Cymbalta 30 mg p o  daily   The patient reports that this regimen is providing 10% pain relief  The patient is reporting no side effects from this pain medication regimen      I have personally reviewed and/or updated the patient's past medical history, past surgical history, family history, social history, current medications, allergies, and vital signs today  Review of Systems  Review of Systems   Respiratory: Negative for shortness of breath  Cardiovascular: Negative for chest pain  Gastrointestinal: Negative for constipation, diarrhea, nausea and vomiting  Musculoskeletal: Negative for arthralgias, gait problem, joint swelling and myalgias  Decreased range of motion  Pain in extremity    Neurological: Negative for dizziness, seizures and weakness  All other systems reviewed and are negative          Past Medical History:   Diagnosis Date    Allergic     Ectopic pregnancy     GERD (gastroesophageal reflux disease)     History of unilateral fallopian tube excision     RIGHT removed    Ovarian cyst     Wears glasses        Past Surgical History:   Procedure Laterality Date    DILATION AND CURETTAGE OF UTERUS      ECTOPIC PREGNANCY SURGERY      LAPAROSCOPY      OH LAP,DIAGNOSTIC ABDOMEN N/A 9/16/2017    Procedure: LAPAROSCOPY DIAGNOSTIC, left salpingectomy;  Surgeon: Solitario Mcgill MD;  Location:  MAIN OR;  Service: Gynecology    WISDOM TOOTH EXTRACTION      WISDOM TOOTH EXTRACTION Bilateral        Family History   Problem Relation Age of Onset    No Known Problems Mother     No Known Problems Father        Social History     Occupational History    Not on file   Tobacco Use    Smoking status: Current Every Day Smoker     Packs/day: 2 00     Years: 6 00     Pack years: 12 00     Types: Cigarettes    Smokeless tobacco: Never Used   Vaping Use    Vaping Use: Never used   Substance and Sexual Activity    Alcohol use: Not Currently     Comment: "occasional"    Drug use: Yes     Types: Marijuana    Sexual activity: Yes     Partners: Male     Birth control/protection: None         Current Outpatient Medications:     acetaminophen (TYLENOL) 500 mg tablet, Take 1 tablet (500 mg total) by mouth every 6 (six) hours as needed for mild pain, Disp: 60 tablet, Rfl: 0    albuterol (PROVENTIL HFA,VENTOLIN HFA) 90 mcg/act inhaler, Inhale 2 puffs every 6 (six) hours as needed for wheezing or shortness of breath, Disp: 18 g, Rfl: 5    carisoprodol (SOMA) 350 mg tablet, Take 1 tablet (350 mg total) by mouth 4 (four) times a day for 10 days, Disp: 30 tablet, Rfl: 0    Diclofenac Sodium (VOLTAREN) 1 %, Apply 2 g topically 4 (four) times a day, Disp: 200 g, Rfl: 5    DULoxetine (CYMBALTA) 30 mg delayed release capsule, Take 1 capsule (30 mg total) by mouth daily, Disp: 30 capsule, Rfl: 1    Elastic Bandages & Supports (Wrist Brace/Left Medium) MISC, Use daily (Patient not taking: Reported on 1/14/2022 ), Disp: 1 each, Rfl: 0    Elastic Bandages & Supports (Wrist Brace/Right Medium) MISC, Use daily (Patient not taking: Reported on 1/14/2022 ), Disp: 1 each, Rfl: 0    ibuprofen (MOTRIN) 400 mg tablet, Take 1 tablet (400 mg total) by mouth every 8 (eight) hours for 5 days, Disp: 15 tablet, Rfl: 0    lidocaine (LIDODERM) 5 %, Apply 1 patch topically daily Remove & Discard patch within 12 hours or as directed by MD, Disp: 30 patch, Rfl: 0    metroNIDAZOLE (METROGEL) 0 75 % vaginal gel, INSERT 1 APPLICATORFUL VAGINALLY NIGHTLY FOR 5 DAYS, Disp: , Rfl:     ondansetron (ZOFRAN) 8 mg tablet, Take 1 tablet (8 mg total) by mouth every 8 (eight) hours as needed for nausea or vomiting, Disp: 20 tablet, Rfl: 0    pantoprazole (PROTONIX) 40 mg tablet, Take 1 tablet (40 mg total) by mouth daily, Disp: 30 tablet, Rfl: 3    polyethylene glycol (GLYCOLAX) 17 GM/SCOOP powder, Take 17 g by mouth daily for 10 days, Disp: 238 g, Rfl: 5    PreviDent 5000 Booster Plus 1 1 % PSTE, , Disp: , Rfl:     Restasis 0 05 % ophthalmic emulsion, Administer 2 drops to both eyes 2 (two) times a day as needed, Disp: , Rfl:     sucralfate (CARAFATE) 1 g/10 mL suspension, Take 10 mL (1,000 mg total) by mouth every 6 (six) hours, Disp: 420 mL, Rfl: 0    triamcinolone (KENALOG) 0 1 % cream, Apply topically 2 (two) times a day, Disp: 30 g, Rfl: 5    Allergies   Allergen Reactions    Nitrofurantoin Itching    Nsaids Other (See Comments)     Due to issues with kidneys per pt    Oxycodone-Acetaminophen Itching     Reaction Date: [id number];     Oxycodone Itching    Tramadol Itching     Reaction Date: [id number];        Physical Exam:    There were no vitals taken for this visit  Constitutional:normal, well developed, well nourished, alert, in no distress and non-toxic and no overt pain behavior  Eyes:anicteric  HEENT:grossly intact  Neck:supple, symmetric, trachea midline and no masses   Pulmonary:even and unlabored  Cardiovascular:No edema or pitting edema present  Skin:Normal without rashes or lesions and well hydrated  Psychiatric:Mood and affect appropriate  Neurologic:Cranial Nerves II-XII grossly intact  Musculoskeletal:antalgic     Lumbar/Sacral Spine examination demonstrates  Decreased range of motion lumbar spine with pain upon: flexion, lateral rotation to the left/right, and bending to the left/right  Bilateral lumbar paraspinals tender to palpation  Muscle spasms noted in the lumbar area bilaterally  4/5 lower extremity strength in all muscle groups bilaterally  Positive seated straight leg raise for bilateral lower extremities  Sensitivity to light touch intact bilateral lower extremities  2+ reflexes in the patella and Achilles  No ankle clonus     Imaging  MRI CERVICAL SPINE WITHOUT CONTRAST     INDICATION: M54 2: Cervicalgia  M54 12: Radiculopathy, cervical region      COMPARISON:  2/22/2022     TECHNIQUE:  Sagittal T1, sagittal T2, sagittal inversion recovery, axial T2, axial  2D merge     IMAGE QUALITY:  Motion artifact slightly limits evaluation  Sagittal STIR imaging is motion degraded    Other pulse sequences are of relatively diagnostic quality      FINDINGS:     ALIGNMENT:  There is nonspecific straightening of the cervical lordosis without subluxation      MARROW SIGNAL:  Scattered degenerative endplate changes  No focally suspicious marrow lesions  No bone marrow edema or compression abnormality      CERVICAL AND VISUALIZED THORACIC CORD:  Normal signal within the visualized cord      PREVERTEBRAL AND PARASPINAL SOFT TISSUES: 6 mm left-sided thyroid nodule  Incidental discovery of one or more thyroid nodule(s) measuring less than 1 cm and without suspicious features is noted in this patient who is younger than 28years old; according   to guidelines published in the February 2015 white paper on incidental thyroid nodules in the Journal of the [employer] [facility]), no further evaluation is recommended       VISUALIZED POSTERIOR FOSSA:  The visualized posterior fossa demonstrates no abnormal signal      CERVICAL DISC SPACES:     C2-C3:  Normal      C3-C4:  There is a small right paracentral disc protrusion  Mild right neural foraminal narrowing  Central canal and left neural foramen patent      C4-C5:  There is a diffuse disk bulge  No significant central canal or neural foraminal narrowing      C5-C6:  There is a diffuse disc bulge  Mild central canal left neural foraminal narrowing  Right neural foramen patent      C6-C7:  There is a central disc herniation, protrusion type  Moderate central canal narrowing  Mild left neural foraminal narrowing  Right neural foramen patent      C7-T1:  Normal      UPPER THORACIC DISC SPACES:  Normal      IMPRESSION:        1  Cervical spondylosis most pronounced at C6-C7 where there is a disc herniation  No cord compression or cord signal abnormality  2  There is nonspecific straightening of the cervical lordosis without subluxation        MRI LUMBAR SPINE WITHOUT CONTRAST     INDICATION: intractable back pain      COMPARISON:  MRI lumbar spine without contrast December 21, 2010  CTA chest CT abdomen pelvis with contrast February 20, 2022    MRI lumbar spine without contrast December 21, 2010      TECHNIQUE:  Sagittal T1, sagittal T2, sagittal inversion recovery, axial T1 and axial T2, coronal T2           IMAGE QUALITY:  Suboptimal due to moderate-to-severe motion artifact      FINDINGS:     VERTEBRAL BODIES:  There is a transitional lumbosacral junction -sacralized L5 vertebral body     Normal alignment of the lumbar spine  No listhesis given motion degraded sequences  No scoliosis  No compression fracture          Type I Modic endplate change at M5-A1  Otherwise, normal bone marrow signal given given motion artifact      SACRUM:  Normal signal within the sacrum  No evidence of insufficiency or stress fracture      DISTAL CORD AND CONUS:  Normal size and signal within the distal cord and conus      PARASPINAL SOFT TISSUES:  Intramuscular edematous signal in erector spinae muscles of lower lumbar spine, likely muscular strain injury  Bilateral lower lumbar spine rectus PA musculature     LOWER THORACIC DISC SPACES:  Normal disc height and signal   No disc herniation, canal stenosis or foraminal narrowing      LUMBAR DISC SPACES:       L1-L2:  Normal given motion degraded sequences      L2-L3:  Normal given motion degraded sequences      L3-L4:  Normal given motion degraded sequences      L4-L5:  Disc extrusion contributing to at least mild canal stenosis  Probable mild to moderate bilateral foraminal narrowing given motion degraded sequences      L5-S1:  Sacralized L5 vertebral body  No significant canal stenosis or foraminal narrowing given motion degraded sequences      IMPRESSION:     Suboptimal exam due to moderate-to-severe motion artifact with repeated sequences  Consider repeat examination when patient's pain is better controlled  - Transitional lumbosacral anatomy with sacralized L5 vertebral body  - L4-L5: Disc extrusion contributing to at least small canal stenosis given motion degraded sequences    Probable mild-to-moderate bilateral foraminal narrowing given motion degraded sequences   -Type I Modic endplate change at L7-P2   -Intramuscular edematous signal in erector spinae muscles of lower lumbar spine, likely muscular strain injury      The study was marked in EPIC for immediate notification       CERVICAL SPINE     INDICATION:   M54 2: Cervicalgia  M54 12: Radiculopathy, cervical region      COMPARISON:  None     VIEWS:  XR SPINE CERVICAL COMPLETE 4 OR 5 VW NON INJURY         FINDINGS:     No fracture       Straightening of the cervical lordosis may be related to patient positioning or muscle spasm      The intervertebral disc spaces are preserved       There is moderate left C3-4 and C4-5 with mild C5-6 foraminal stenosis secondary to uncovertebral and facet hypertrophy  On the right there is mild C3-4 and C4-5 foraminal stenosis secondary to facet arthrosis      The prevertebral soft tissues are within normal limits        The lung apices are clear      IMPRESSION:  Mild to moderate degenerative change with foraminal encroachment as above

## 2022-03-22 NOTE — PROGRESS NOTES
Assessment:  1  Intractable back pain        Plan:  Patient is a 13-year-old female complaints of neck pain, low back pain presents office for follow-up visit  MRI cervical spine shows disc bulges at C4-C5 and C5-C6 with a central disc herniation protrusion type resulted in moderate canal stenosis and mild left foraminal stenosis at C6-C7  MRI of lumbar spine shows L4-5 disc extrusion resulting in mild-to-moderate bilateral foraminal narrowing  Patient does report significant pain in the low back specially when sitting or standing or doing any activity that requires on her to put pressure on the upper extremities  She does have a lot of neck pain however at this time her low back pain is most significant  1  We will schedule patient for a bilateral L4-5 transforaminal epidural steroid injection  2  We will follow-up 1 month after injection plan at next interventional management  3  Patient was informed that she does get some relief from this injection she will have to start doing core strengthening exercise such as Pilates stress alleviate this pain more prominently  Complete risks and benefits including bleeding, infection, tissue reaction, nerve injury and allergic reaction were discussed  The approach was demonstrated using models and literature was provided  Verbal and written consent was obtained  [place] Prescription Drug Monitoring Program report was reviewed and was appropriate       History of Present Illness: The patient is a 27 y o  female who presents for a follow up office visit in regards to Back Pain  The patients current symptoms include 8/10 constant sharp, shooting pain without any particular time pattern  Current pain medications includes:  Soma 350 mg p o  q i d , Cymbalta 30 mg p o  daily   The patient reports that this regimen is providing 10% pain relief  The patient is reporting no side effects from this pain medication regimen      I have personally reviewed and/or updated the patient's past medical history, past surgical history, family history, social history, current medications, allergies, and vital signs today  Review of Systems  Review of Systems   Respiratory: Negative for shortness of breath  Cardiovascular: Negative for chest pain  Gastrointestinal: Negative for constipation, diarrhea, nausea and vomiting  Musculoskeletal: Negative for arthralgias, gait problem, joint swelling and myalgias  Decreased range of motion  Pain in extremity    Neurological: Negative for dizziness, seizures and weakness  All other systems reviewed and are negative          Past Medical History:   Diagnosis Date    Allergic     Ectopic pregnancy     GERD (gastroesophageal reflux disease)     History of unilateral fallopian tube excision     RIGHT removed    Ovarian cyst     Wears glasses        Past Surgical History:   Procedure Laterality Date    DILATION AND CURETTAGE OF UTERUS      ECTOPIC PREGNANCY SURGERY      LAPAROSCOPY      [place] LAP,DIAGNOSTIC ABDOMEN N/A 9/16/2017    Procedure: LAPAROSCOPY DIAGNOSTIC, left salpingectomy;  Surgeon: Monisha Andres MD;  Location: [facility];  Service: Gynecology    WISDOM TOOTH EXTRACTION      WISDOM TOOTH EXTRACTION Bilateral        Family History   Problem Relation Age of Onset    No Known Problems Mother     No Known Problems Father        Social History     Occupational History    Not on file   Tobacco Use    Smoking status: Current Every Day Smoker     Packs/day: 2 00     Years: 6 00     Pack years: 12 00     Types: Cigarettes    Smokeless tobacco: Never Used   Vaping Use    Vaping Use: Never used   Substance and Sexual Activity    Alcohol use: Not Currently     Comment: "occasional"    Drug use: Yes     Types: Marijuana    Sexual activity: Yes     Partners: Male     Birth control/protection: None         Current Outpatient Medications:     acetaminophen (TYLENOL) 500 mg tablet, Take 1 tablet (500 mg total) by mouth every 6 (six) hours as needed for mild pain, Disp: 60 tablet, Rfl: 0    albuterol (PROVENTIL HFA,VENTOLIN HFA) 90 mcg/act inhaler, Inhale 2 puffs every 6 (six) hours as needed for wheezing or shortness of breath, Disp: 18 g, Rfl: 5    carisoprodol (SOMA) 350 mg tablet, Take 1 tablet (350 mg total) by mouth 4 (four) times a day for 10 days, Disp: 30 tablet, Rfl: 0    Diclofenac Sodium (VOLTAREN) 1 %, Apply 2 g topically 4 (four) times a day, Disp: 200 g, Rfl: 5    DULoxetine (CYMBALTA) 30 mg delayed release capsule, Take 1 capsule (30 mg total) by mouth daily, Disp: 30 capsule, Rfl: 1    Elastic Bandages & Supports (Wrist Brace/Left Medium) MISC, Use daily (Patient not taking: Reported on 1/14/2022 ), Disp: 1 each, Rfl: 0    Elastic Bandages & Supports (Wrist Brace/Right Medium) MISC, Use daily (Patient not taking: Reported on 1/14/2022 ), Disp: 1 each, Rfl: 0    ibuprofen (MOTRIN) 400 mg tablet, Take 1 tablet (400 mg total) by mouth every 8 (eight) hours for 5 days, Disp: 15 tablet, Rfl: 0    lidocaine (LIDODERM) 5 %, Apply 1 patch topically daily Remove & Discard patch within 12 hours or as directed by MD, Disp: 30 patch, Rfl: 0    metroNIDAZOLE (METROGEL) 0 75 % vaginal gel, INSERT 1 APPLICATORFUL VAGINALLY NIGHTLY FOR 5 DAYS, Disp: , Rfl:     ondansetron (ZOFRAN) 8 mg tablet, Take 1 tablet (8 mg total) by mouth every 8 (eight) hours as needed for nausea or vomiting, Disp: 20 tablet, Rfl: 0    pantoprazole (PROTONIX) 40 mg tablet, Take 1 tablet (40 mg total) by mouth daily, Disp: 30 tablet, Rfl: 3    polyethylene glycol (GLYCOLAX) 17 GM/SCOOP powder, Take 17 g by mouth daily for 10 days, Disp: 238 g, Rfl: 5    PreviDent 5000 Booster Plus 1 1 % PSTE, , Disp: , Rfl:     Restasis 0 05 % ophthalmic emulsion, Administer 2 drops to both eyes 2 (two) times a day as needed, Disp: , Rfl:     sucralfate (CARAFATE) 1 g/10 mL suspension, Take 10 mL (1,000 mg total) by mouth every 6 (six) hours, Disp: 420 mL, Rfl: 0    triamcinolone (KENALOG) 0 1 % cream, Apply topically 2 (two) times a day, Disp: 30 g, Rfl: 5    Allergies   Allergen Reactions    Nitrofurantoin Itching    Nsaids Other (See Comments)     Due to issues with kidneys per pt    Oxycodone-Acetaminophen Itching     Reaction Date: [id number];     Oxycodone Itching    Tramadol Itching     Reaction Date: 28Apr2011;        Physical Exam:    There were no vitals taken for this visit  Constitutional:normal, well developed, well nourished, alert, in no distress and non-toxic and no overt pain behavior  Eyes:anicteric  HEENT:grossly intact  Neck:supple, symmetric, trachea midline and no masses   Pulmonary:even and unlabored  Cardiovascular:No edema or pitting edema present  Skin:Normal without rashes or lesions and well hydrated  Psychiatric:Mood and affect appropriate  Neurologic:Cranial Nerves II-XII grossly intact  Musculoskeletal:antalgic     Lumbar/Sacral Spine examination demonstrates  Decreased range of motion lumbar spine with pain upon: flexion, lateral rotation to the left/right, and bending to the left/right  Bilateral lumbar paraspinals tender to palpation  Muscle spasms noted in the lumbar area bilaterally  4/5 lower extremity strength in all muscle groups bilaterally  Positive seated straight leg raise for bilateral lower extremities  Sensitivity to light touch intact bilateral lower extremities  2+ reflexes in the patella and Achilles  No ankle clonus     Imaging  MRI CERVICAL SPINE WITHOUT CONTRAST     INDICATION: M54 2: Cervicalgia  M54 12: Radiculopathy, cervical region      COMPARISON:  2/22/2022     TECHNIQUE:  Sagittal T1, sagittal T2, sagittal inversion recovery, axial T2, axial  2D merge     IMAGE QUALITY:  Motion artifact slightly limits evaluation  Sagittal STIR imaging is motion degraded    Other pulse sequences are of relatively diagnostic quality      FINDINGS:     ALIGNMENT:  There is nonspecific straightening of the cervical lordosis without subluxation      MARROW SIGNAL:  Scattered degenerative endplate changes  No focally suspicious marrow lesions  No bone marrow edema or compression abnormality      CERVICAL AND VISUALIZED THORACIC CORD:  Normal signal within the visualized cord      PREVERTEBRAL AND PARASPINAL SOFT TISSUES: 6 mm left-sided thyroid nodule  Incidental discovery of one or more thyroid nodule(s) measuring less than 1 cm and without suspicious features is noted in this patient who is younger than 28years old; according   to guidelines published in the February 2015 white paper on incidental thyroid nodules in the Journal of the [employer] of Radiology [facility]), no further evaluation is recommended       VISUALIZED POSTERIOR FOSSA:  The visualized posterior fossa demonstrates no abnormal signal      CERVICAL DISC SPACES:     C2-C3:  Normal      C3-C4:  There is a small right paracentral disc protrusion  Mild right neural foraminal narrowing  Central canal and left neural foramen patent      C4-C5:  There is a diffuse disk bulge  No significant central canal or neural foraminal narrowing      C5-C6:  There is a diffuse disc bulge  Mild central canal left neural foraminal narrowing  Right neural foramen patent      C6-C7:  There is a central disc herniation, protrusion type  Moderate central canal narrowing  Mild left neural foraminal narrowing  Right neural foramen patent      C7-T1:  Normal      UPPER THORACIC DISC SPACES:  Normal      IMPRESSION:        1  Cervical spondylosis most pronounced at C6-C7 where there is a disc herniation  No cord compression or cord signal abnormality  2  There is nonspecific straightening of the cervical lordosis without subluxation        MRI LUMBAR SPINE WITHOUT CONTRAST     INDICATION: intractable back pain      COMPARISON:  MRI lumbar spine without contrast December 21, 2010  CTA chest CT abdomen pelvis with contrast February 20, 2022    MRI lumbar spine without contrast December 21, 2010      TECHNIQUE:  Sagittal T1, sagittal T2, sagittal inversion recovery, axial T1 and axial T2, coronal T2           IMAGE QUALITY:  Suboptimal due to moderate-to-severe motion artifact      FINDINGS:     VERTEBRAL BODIES:  There is a transitional lumbosacral junction -sacralized L5 vertebral body     Normal alignment of the lumbar spine  No listhesis given motion degraded sequences  No scoliosis  No compression fracture          Type I Modic endplate change at E2-V2  Otherwise, normal bone marrow signal given given motion artifact      SACRUM:  Normal signal within the sacrum  No evidence of insufficiency or stress fracture      DISTAL CORD AND CONUS:  Normal size and signal within the distal cord and conus      PARASPINAL SOFT TISSUES:  Intramuscular edematous signal in erector spinae muscles of lower lumbar spine, likely muscular strain injury  Bilateral lower lumbar spine rectus PA musculature     LOWER THORACIC DISC SPACES:  Normal disc height and signal   No disc herniation, canal stenosis or foraminal narrowing      LUMBAR DISC SPACES:       L1-L2:  Normal given motion degraded sequences      L2-L3:  Normal given motion degraded sequences      L3-L4:  Normal given motion degraded sequences      L4-L5:  Disc extrusion contributing to at least mild canal stenosis  Probable mild to moderate bilateral foraminal narrowing given motion degraded sequences      L5-S1:  Sacralized L5 vertebral body  No significant canal stenosis or foraminal narrowing given motion degraded sequences      IMPRESSION:     Suboptimal exam due to moderate-to-severe motion artifact with repeated sequences  Consider repeat examination when patient's pain is better controlled  - Transitional lumbosacral anatomy with sacralized L5 vertebral body  - L4-L5: Disc extrusion contributing to at least small canal stenosis given motion degraded sequences    Probable mild-to-moderate bilateral foraminal narrowing given motion degraded sequences   -Type I Modic endplate change at M1-J6   -Intramuscular edematous signal in erector spinae muscles of lower lumbar spine, likely muscular strain injury      The study was marked in EPIC for immediate notification       CERVICAL SPINE     INDICATION:   M54 2: Cervicalgia  M54 12: Radiculopathy, cervical region      COMPARISON:  None     VIEWS:  XR SPINE CERVICAL COMPLETE 4 OR 5 VW NON INJURY         FINDINGS:     No fracture       Straightening of the cervical lordosis may be related to patient positioning or muscle spasm      The intervertebral disc spaces are preserved       There is moderate left C3-4 and C4-5 with mild C5-6 foraminal stenosis secondary to uncovertebral and facet hypertrophy  On the right there is mild C3-4 and C4-5 foraminal stenosis secondary to facet arthrosis      The prevertebral soft tissues are within normal limits        The lung apices are clear      IMPRESSION:  Mild to moderate degenerative change with foraminal encroachment as above

## 2022-04-07 ENCOUNTER — HOSPITAL ENCOUNTER (OUTPATIENT)
Facility: HOSPITAL | Age: 27
Setting detail: OUTPATIENT SURGERY
Discharge: HOME | End: 2022-04-07
Attending: ANESTHESIOLOGY | Admitting: ANESTHESIOLOGY
Payer: COMMERCIAL

## 2022-04-07 ENCOUNTER — APPOINTMENT (OUTPATIENT)
Dept: RADIOLOGY | Facility: HOSPITAL | Age: 27
End: 2022-04-07
Payer: COMMERCIAL

## 2022-04-07 VITALS
OXYGEN SATURATION: 99 % | TEMPERATURE: 97.1 F | HEART RATE: 90 BPM | DIASTOLIC BLOOD PRESSURE: 66 MMHG | RESPIRATION RATE: 18 BRPM | SYSTOLIC BLOOD PRESSURE: 115 MMHG

## 2022-04-07 DIAGNOSIS — M51.16 LUMBAR DISC DISEASE WITH RADICULOPATHY: ICD-10-CM

## 2022-04-07 PROCEDURE — 77003 FLUOROGUIDE FOR SPINE INJECT: CPT

## 2022-04-07 PROCEDURE — 64483 NJX AA&/STRD TFRM EPI L/S 1: CPT | Performed by: ANESTHESIOLOGY

## 2022-04-07 RX ORDER — DEXAMETHASONE SODIUM PHOSPHATE 10 MG/ML
INJECTION, SOLUTION INTRAMUSCULAR; INTRAVENOUS AS NEEDED
Status: DISCONTINUED | OUTPATIENT
Start: 2022-04-07 | End: 2022-04-07 | Stop reason: HOSPADM

## 2022-04-07 RX ORDER — LIDOCAINE HYDROCHLORIDE 10 MG/ML
INJECTION, SOLUTION EPIDURAL; INFILTRATION; INTRACAUDAL; PERINEURAL AS NEEDED
Status: DISCONTINUED | OUTPATIENT
Start: 2022-04-07 | End: 2022-04-07 | Stop reason: HOSPADM

## 2022-04-07 NOTE — OP NOTE
OPERATIVE REPORT  PATIENT NAME: Helga Rai    :  1995  MRN: [id number]  Pt Location: [facility] ROOM 01    SURGERY DATE: 2022    Surgeon(s) and Role:     * Leida Galeano MD - Primary    Preop Diagnosis:  Lumbar disc disease with radiculopathy [M51 16]    Post-Op Diagnosis Codes:     * Lumbar disc disease with radiculopathy [M51 16]    Procedure(s) (LRB):  Bilateral L4-5 transforaminal epidural steroid injection (Bilateral)    Specimen(s):  * No specimens in log *    Estimated Blood Loss:   Minimal    Drains:  * No LDAs found *    Anesthesia Type:   Local    Operative Indications:  Lumbar disc disease with radiculopathy [M51 16]      Operative Findings:  same    Complications:   None    Procedure and Technique:  Fluoroscopically-guided bilateral L4-5 transforaminal epidural steroid injection under fluoroscopy      After discussing the risks, benefits, and alternatives to the procedure, the patient expressed understanding and wished to proceed  The patient was brought to the fluoroscopy suite and placed in the prone position  A procedural pause was conducted to verify:  correct patient identity, procedure to be performed and as applicable, correct side and site, correct patient position, and availability of implants, special equipment and special requirements  After identifying the left L4 pedicle fluoroscopically with an oblique view, the skin was sterilely prepped and draped in the usual fashion using Chloraprep skin prep  The skin and subcutaneous tissue were anesthetized with 0 5% lidocaine  A 3 5 inch 22 gauge spinal needle was then advanced under fluoroscopic guidance to the posterior aspect of the left L4-5 neural foramen  Appropriate foraminal depth was determined with a lateral fluoroscopic view, and AP visualization confirmed needle positioning at approximately the 6 oclock position relative to the pedicle    After negative aspiration, 1 mL Omnipaque 300 contrast was injected using live fluoroscopy/digital subtraction angiography, confirming appropriate transforaminal spread without evidence of intravascular or intrathecal uptake  Next, a local anesthetic test dose consisting of 1 mL of 2% lidocaine was injected through the needle  After an appropriate period of observation, a directed neurological exam was performed which revealed no new neurologic deficits  Next, a 1 5 ml solution consisting of 7 5 mg of dexamethasone in sterile saline was injected slowly and incrementally into the epidural space  Following the injection the needle was withdrawn slightly and flushed with lidocaine as it was fully extracted  The same procedure performed the opposite side using same technique and achieving the same results     The patient tolerated the procedure well and there were no apparent complications  The patient did not develop any new neurologic deficits  After appropriate observation, the patient was dismissed from the clinic in good condition under their own power  COMMENTS  The patient received a total steroid dose of 15 mg of dexamethasone     I was present for the entire procedure    Patient Disposition:  hemodynamically stable      SIGNATURE: Pablo Adams MD  DATE: April 7, 2022  TIME: 1:42 PM

## 2022-04-07 NOTE — INTERVAL H&P NOTE
H&P reviewed  After examining the patient I find no changes in the patients condition since the H&P had been written      Vitals:    04/07/22 1313   BP: 125/94   Pulse: 104   Resp: 18   Temp: (!) 97 1 °F (36 2 °C)   SpO2: 100%

## 2022-04-07 NOTE — INTERVAL H&P NOTE
H&P reviewed  After examining the patient I find no changes in the patients condition since the H&P had been written      Vitals:    04/07/22 1313   Resp: 18   Temp: (!) 97 1 °F (36 2 °C)   SpO2: 100%

## 2022-04-07 NOTE — DISCHARGE INSTRUCTIONS
Epidural Steroid Injection   WHAT YOU NEED TO KNOW:   An epidural steroid injection (MARY) is a procedure to inject steroid medicine into the epidural space  The epidural space is between your spinal cord and vertebrae  Steroids reduce inflammation and fluid buildup in your spine that may be causing pain  You may be given pain medicine along with the steroids  ACTIVITY  · Do not drive or operate machinery today  · No strenuous activity today - bending, lifting, etc   · You may resume normal activites starting tomorrow - start slowly and as tolerated  · You may shower today, but no tub baths or hot tubs  · You may have numbness for several hours from the local anesthetic  Please use caution and common sense, especially with weight-bearing activities  CARE OF THE INJECTION SITE  · If you have soreness or pain, apply ice to the area today (20 minutes on/20 minutes off)  · Starting tomorrow, you may use warm, moist heat or ice if needed  · You may have an increase or change in your discomfort for 36-48 hours after your treatment  · Apply ice and continue with any pain medication you have been prescribed  · Notify [facility] if you have any of the following: redness, drainage, swelling, headache, stiff neck or fever above 100°F     SPECIAL INSTRUCTIONS  · Our office will contact you in approximately 7 days for a progress report  MEDICATIONS  · Continue to take all routine medications  · Our office may have instructed you to hold some medications  As no general anesthesia was used in today's procedure, you should not experience any side effects related to anesthesia  If you have a problem specifically related to your procedure, please call our office at [phone]  Problems not related to your procedure should be directed to your primary care physician

## 2022-04-14 ENCOUNTER — TELEPHONE (OUTPATIENT)
Dept: PAIN MEDICINE | Facility: CLINIC | Age: 27
End: 2022-04-14

## 2022-04-29 ENCOUNTER — VBI (OUTPATIENT)
Dept: ADMINISTRATIVE | Facility: OTHER | Age: 27
End: 2022-04-29

## 2022-05-09 ENCOUNTER — OFFICE VISIT (OUTPATIENT)
Dept: PAIN MEDICINE | Facility: CLINIC | Age: 27
End: 2022-05-09
Payer: COMMERCIAL

## 2022-05-09 VITALS
DIASTOLIC BLOOD PRESSURE: 73 MMHG | BODY MASS INDEX: 24.27 KG/M2 | SYSTOLIC BLOOD PRESSURE: 102 MMHG | HEART RATE: 114 BPM | HEIGHT: 63 IN | WEIGHT: 137 LBS

## 2022-05-09 DIAGNOSIS — G89.4 CHRONIC PAIN SYNDROME: Primary | ICD-10-CM

## 2022-05-09 DIAGNOSIS — M54.42 CHRONIC BILATERAL LOW BACK PAIN WITH BILATERAL SCIATICA: ICD-10-CM

## 2022-05-09 DIAGNOSIS — M51.16 LUMBAR DISC DISEASE WITH RADICULOPATHY: ICD-10-CM

## 2022-05-09 DIAGNOSIS — G89.29 CHRONIC BILATERAL LOW BACK PAIN WITH BILATERAL SCIATICA: ICD-10-CM

## 2022-05-09 DIAGNOSIS — M79.18 MYOFASCIAL PAIN SYNDROME: ICD-10-CM

## 2022-05-09 DIAGNOSIS — M54.41 CHRONIC BILATERAL LOW BACK PAIN WITH BILATERAL SCIATICA: ICD-10-CM

## 2022-05-09 PROCEDURE — 99214 OFFICE O/P EST MOD 30 MIN: CPT | Performed by: NURSE PRACTITIONER

## 2022-05-09 PROCEDURE — 4004F PT TOBACCO SCREEN RCVD TLK: CPT | Performed by: NURSE PRACTITIONER

## 2022-05-09 PROCEDURE — 3008F BODY MASS INDEX DOCD: CPT | Performed by: NURSE PRACTITIONER

## 2022-05-09 RX ORDER — IBUPROFEN 400 MG/1
400 TABLET ORAL EVERY 8 HOURS SCHEDULED
Qty: 90 TABLET | Refills: 1 | Status: SHIPPED | OUTPATIENT
Start: 2022-05-09 | End: 2022-05-14

## 2022-05-09 RX ORDER — CARISOPRODOL 350 MG/1
350 TABLET ORAL 4 TIMES DAILY
Qty: 30 TABLET | Refills: 0 | Status: SHIPPED | OUTPATIENT
Start: 2022-05-09 | End: 2022-05-10

## 2022-05-09 NOTE — PROGRESS NOTES
Assessment:  1  Chronic pain syndrome    2  Chronic bilateral low back pain with bilateral sciatica    3  Lumbar disc disease with radiculopathy    4  Myofascial pain syndrome        Plan:  While the patient was in the office today, I did have a thorough conversation regarding their chronic pain syndrome, medication management, and treatment plan options  Patient is being seen for follow-up visit  She recently underwent bilateral L4-5 transforaminal epidural steroid injection and states that her pain may have improved 5% at most   She continues to complain of low back pain, primarily right-sided  She reports almost constant numbness and tingling in both lower extremities and states that her legs feel weak time  States that legs have never actually given out due to weakness  She reports very little leg pain  Will send patient for neurosurgical evaluation  Will order an EMG of both lower extremities  Renewed Soma 350 milligrams 4 times daily if needed for spasms  Renewed ibuprofen 400 milligrams which she can take every 8 hours as needed for pain  Follow-up after neurosurgical evaluation  History of Present Illness: The patient is a 27 y o  female who presents for a follow up office visit in regards to Back Pain  The patients current symptoms include complaints of low back pain, primarily right-sided  She reports numbness tingling and weakness in both lower extremities  Reports very little leg pain  Current pain medications includes:  Soma 350 milligrams 4 times daily if needed for spasms, ibuprofen 400 milligram 3 times daily if needed for pain    The patient reports that this regimen is providing 5 % pain relief  The patient is reporting no side effects from this pain medication regimen  I have personally reviewed and/or updated the patient's past medical history, past surgical history, family history, social history, current medications, allergies, and vital signs today  Review of Systems  Review of Systems   Constitutional: Negative for chills and fever  HENT: Negative for ear pain and sore throat  Eyes: Negative for pain and visual disturbance  Respiratory: Negative for cough and shortness of breath  Cardiovascular: Negative for chest pain and palpitations  Gastrointestinal: Negative for abdominal pain and vomiting  Genitourinary: Negative for dysuria and hematuria  Musculoskeletal: Positive for gait problem  Negative for arthralgias and back pain  Skin: Negative for color change and rash  Neurological: Negative for seizures and syncope  All other systems reviewed and are negative          Past Medical History:   Diagnosis Date    Allergic     Back pain     Ectopic pregnancy     GERD (gastroesophageal reflux disease)     History of unilateral fallopian tube excision     RIGHT removed    Ovarian cyst     Wears glasses        Past Surgical History:   Procedure Laterality Date    DILATION AND CURETTAGE OF UTERUS      ECTOPIC PREGNANCY SURGERY      EPIDURAL BLOCK INJECTION Bilateral 4/7/2022    Procedure: Bilateral L4-5 transforaminal epidural steroid injection;  Surgeon: Asha Gloria MD;  Location: [facility];  Service: Pain Management     LAPAROSCOPY      GA LAP,DIAGNOSTIC ABDOMEN N/A 9/16/2017    Procedure: LAPAROSCOPY DIAGNOSTIC, left salpingectomy;  Surgeon: Javier Carrasco MD;  Location:  MAIN OR;  Service: Gynecology    WISDOM TOOTH EXTRACTION      WISDOM TOOTH EXTRACTION Bilateral        Family History   Problem Relation Age of Onset    No Known Problems Mother     No Known Problems Father        Social History     Occupational History    Not on file   Tobacco Use    Smoking status: Current Every Day Smoker     Packs/day: 1 00     Years: 6 00     Pack years: 6 00     Types: Cigarettes    Smokeless tobacco: Never Used   Vaping Use    Vaping Use: Never used   Substance and Sexual Activity    Alcohol use: Not Currently     Comment: "occasional"    Drug use: Yes     Types: Marijuana    Sexual activity: Yes     Partners: Male     Birth control/protection: None         Current Outpatient Medications:     acetaminophen (TYLENOL) 500 mg tablet, Take 1 tablet (500 mg total) by mouth every 6 (six) hours as needed for mild pain, Disp: 60 tablet, Rfl: 0    albuterol (PROVENTIL HFA,VENTOLIN HFA) 90 mcg/act inhaler, Inhale 2 puffs every 6 (six) hours as needed for wheezing or shortness of breath, Disp: 18 g, Rfl: 5    Diclofenac Sodium (VOLTAREN) 1 %, Apply 2 g topically 4 (four) times a day, Disp: 200 g, Rfl: 5    ibuprofen (MOTRIN) 400 mg tablet, Take 1 tablet (400 mg total) by mouth every 8 (eight) hours for 5 days With food, Disp: 90 tablet, Rfl: 1    lidocaine (LIDODERM) 5 %, Apply 1 patch topically daily Remove & Discard patch within 12 hours or as directed by MD, Disp: 30 patch, Rfl: 0    metroNIDAZOLE (METROGEL) 0 75 % vaginal gel, INSERT 1 APPLICATORFUL VAGINALLY NIGHTLY FOR 5 DAYS, Disp: , Rfl:     ondansetron (ZOFRAN) 8 mg tablet, Take 1 tablet (8 mg total) by mouth every 8 (eight) hours as needed for nausea or vomiting, Disp: 20 tablet, Rfl: 0    pantoprazole (PROTONIX) 40 mg tablet, Take 1 tablet (40 mg total) by mouth daily, Disp: 30 tablet, Rfl: 3    polyethylene glycol (GLYCOLAX) 17 GM/SCOOP powder, Take 17 g by mouth daily for 10 days, Disp: 238 g, Rfl: 5    PreviDent 5000 Booster Plus 1 1 % PSTE, , Disp: , Rfl:     Restasis 0 05 % ophthalmic emulsion, Administer 2 drops to both eyes 2 (two) times a day as needed, Disp: , Rfl:     sucralfate (CARAFATE) 1 g/10 mL suspension, Take 10 mL (1,000 mg total) by mouth every 6 (six) hours, Disp: 420 mL, Rfl: 0    triamcinolone (KENALOG) 0 1 % cream, Apply topically 2 (two) times a day, Disp: 30 g, Rfl: 5    carisoprodol (SOMA) 350 mg tablet, Take 1 tablet (350 mg total) by mouth 4 (four) times a day for 10 days, Disp: 30 tablet, Rfl: 0    Elastic Bandages & Supports (Wrist Brace/Left Medium) MISC, Use daily (Patient not taking: Reported on 1/14/2022 ), Disp: 1 each, Rfl: 0    Elastic Bandages & Supports (Wrist Brace/Right Medium) MISC, Use daily (Patient not taking: Reported on 1/14/2022 ), Disp: 1 each, Rfl: 0    Allergies   Allergen Reactions    Nitrofurantoin Itching    Nsaids Other (See Comments)     Due to issues with kidneys per pt    Oxycodone-Acetaminophen Itching     Reaction Date: 28Apr2011;     Oxycodone Itching    Tramadol Itching     Reaction Date: 28Apr2011;        Physical Exam:    /73   Pulse (!) 114   Ht 5' 3" (1 6 m)   Wt 62 1 kg (137 lb)   BMI 24 27 kg/m²     Constitutional:normal, well developed, well nourished, alert, in no distress and non-toxic and no overt pain behavior  Eyes:anicteric  HEENT:grossly intact  Neck:supple, symmetric, trachea midline and no masses   Pulmonary:even and unlabored  Cardiovascular:No edema or pitting edema present  Skin:Normal without rashes or lesions and well hydrated  Psychiatric:Mood and affect appropriate  Neurologic:Cranial Nerves II-XII grossly intact  Musculoskeletal:Range of motion of the lumbar spine is very limited in all planes  there are significant lumbar paraspinal muscle spasms present  She is able to heel and toe walk without difficulty      Imaging  No orders to display       Orders Placed This Encounter   Procedures    Ambulatory Referral to Neurosurgery    EMG 2 limb lower extremity

## 2022-05-17 ENCOUNTER — TELEPHONE (OUTPATIENT)
Dept: PAIN MEDICINE | Facility: CLINIC | Age: 27
End: 2022-05-17

## 2022-09-01 ENCOUNTER — APPOINTMENT (EMERGENCY)
Dept: CT IMAGING | Facility: HOSPITAL | Age: 27
End: 2022-09-01
Payer: COMMERCIAL

## 2022-09-01 ENCOUNTER — HOSPITAL ENCOUNTER (EMERGENCY)
Facility: HOSPITAL | Age: 27
Discharge: HOME | End: 2022-09-01
Attending: EMERGENCY MEDICINE
Payer: COMMERCIAL

## 2022-09-01 VITALS
DIASTOLIC BLOOD PRESSURE: 63 MMHG | RESPIRATION RATE: 16 BRPM | HEART RATE: 82 BPM | SYSTOLIC BLOOD PRESSURE: 101 MMHG | TEMPERATURE: 97.1 F | OXYGEN SATURATION: 97 %

## 2022-09-01 DIAGNOSIS — R10.9 ABDOMINAL PAIN: Primary | ICD-10-CM

## 2022-09-01 DIAGNOSIS — R11.0 NAUSEA: ICD-10-CM

## 2022-09-01 DIAGNOSIS — K52.9 ENTERITIS: ICD-10-CM

## 2022-09-01 LAB
ALBUMIN SERPL BCP-MCNC: 3.4 G/DL (ref 3.5–5)
ALP SERPL-CCNC: 63 U/L (ref 46–116)
ALT SERPL W P-5'-P-CCNC: 10 U/L (ref 12–78)
ANION GAP SERPL CALCULATED.3IONS-SCNC: 8 MMOL/L (ref 4–13)
AST SERPL W P-5'-P-CCNC: 7 U/L (ref 5–45)
BASOPHILS # BLD AUTO: 0.06 THOUSANDS/ΜL (ref 0–0.1)
BASOPHILS NFR BLD AUTO: 1 % (ref 0–1)
BILIRUB SERPL-MCNC: 0.18 MG/DL (ref 0.2–1)
BILIRUB UR QL STRIP: NEGATIVE
BUN SERPL-MCNC: 7 MG/DL (ref 5–25)
CALCIUM ALBUM COR SERPL-MCNC: 9.3 MG/DL (ref 8.3–10.1)
CALCIUM SERPL-MCNC: 8.8 MG/DL (ref 8.3–10.1)
CHLORIDE SERPL-SCNC: 102 MMOL/L (ref 96–108)
CLARITY UR: CLEAR
CO2 SERPL-SCNC: 29 MMOL/L (ref 21–32)
COLOR UR: YELLOW
CREAT SERPL-MCNC: 0.6 MG/DL (ref 0.6–1.3)
EOSINOPHIL # BLD AUTO: 0.19 THOUSAND/ΜL (ref 0–0.61)
EOSINOPHIL NFR BLD AUTO: 2 % (ref 0–6)
ERYTHROCYTE [DISTWIDTH] IN BLOOD BY AUTOMATED COUNT: 11.9 % (ref 11.6–15.1)
EXT PREG TEST URINE: NEGATIVE
EXT. CONTROL ED NAV: NORMAL
GFR SERPL CREATININE-BSD FRML MDRD: 123 ML/MIN/1.73SQ M
GLUCOSE SERPL-MCNC: 91 MG/DL (ref 65–140)
GLUCOSE UR STRIP-MCNC: NEGATIVE MG/DL
HCT VFR BLD AUTO: 38.8 % (ref 34.8–46.1)
HGB BLD-MCNC: 13.3 G/DL (ref 11.5–15.4)
HGB UR QL STRIP.AUTO: NEGATIVE
IMM GRANULOCYTES # BLD AUTO: 0.02 THOUSAND/UL (ref 0–0.2)
IMM GRANULOCYTES NFR BLD AUTO: 0 % (ref 0–2)
KETONES UR STRIP-MCNC: ABNORMAL MG/DL
LACTATE SERPL-SCNC: 1.2 MMOL/L (ref 0.5–2)
LEUKOCYTE ESTERASE UR QL STRIP: NEGATIVE
LIPASE SERPL-CCNC: 123 U/L (ref 73–393)
LYMPHOCYTES # BLD AUTO: 2.22 THOUSANDS/ΜL (ref 0.6–4.47)
LYMPHOCYTES NFR BLD AUTO: 23 % (ref 14–44)
MCH RBC QN AUTO: 31.3 PG (ref 26.8–34.3)
MCHC RBC AUTO-ENTMCNC: 34.3 G/DL (ref 31.4–37.4)
MCV RBC AUTO: 91 FL (ref 82–98)
MONOCYTES # BLD AUTO: 0.62 THOUSAND/ΜL (ref 0.17–1.22)
MONOCYTES NFR BLD AUTO: 6 % (ref 4–12)
NEUTROPHILS # BLD AUTO: 6.7 THOUSANDS/ΜL (ref 1.85–7.62)
NEUTS SEG NFR BLD AUTO: 68 % (ref 43–75)
NITRITE UR QL STRIP: NEGATIVE
NRBC BLD AUTO-RTO: 0 /100 WBCS
PH UR STRIP.AUTO: 6 [PH]
PLATELET # BLD AUTO: 333 THOUSANDS/UL (ref 149–390)
PMV BLD AUTO: 9.5 FL (ref 8.9–12.7)
POTASSIUM SERPL-SCNC: 4 MMOL/L (ref 3.5–5.3)
PROT SERPL-MCNC: 7 G/DL (ref 6.4–8.4)
PROT UR STRIP-MCNC: NEGATIVE MG/DL
RBC # BLD AUTO: 4.25 MILLION/UL (ref 3.81–5.12)
SODIUM SERPL-SCNC: 139 MMOL/L (ref 135–147)
SP GR UR STRIP.AUTO: >=1.03 (ref 1–1.03)
UROBILINOGEN UR QL STRIP.AUTO: 0.2 E.U./DL
WBC # BLD AUTO: 9.81 THOUSAND/UL (ref 4.31–10.16)

## 2022-09-01 PROCEDURE — 96365 THER/PROPH/DIAG IV INF INIT: CPT

## 2022-09-01 PROCEDURE — 85025 COMPLETE CBC W/AUTO DIFF WBC: CPT | Performed by: PHYSICIAN ASSISTANT

## 2022-09-01 PROCEDURE — 96375 TX/PRO/DX INJ NEW DRUG ADDON: CPT

## 2022-09-01 PROCEDURE — G1004 CDSM NDSC: HCPCS

## 2022-09-01 PROCEDURE — 83605 ASSAY OF LACTIC ACID: CPT | Performed by: PHYSICIAN ASSISTANT

## 2022-09-01 PROCEDURE — 74177 CT ABD & PELVIS W/CONTRAST: CPT

## 2022-09-01 PROCEDURE — 99284 EMERGENCY DEPT VISIT MOD MDM: CPT

## 2022-09-01 PROCEDURE — 81025 URINE PREGNANCY TEST: CPT | Performed by: PHYSICIAN ASSISTANT

## 2022-09-01 PROCEDURE — 96366 THER/PROPH/DIAG IV INF ADDON: CPT

## 2022-09-01 PROCEDURE — 81003 URINALYSIS AUTO W/O SCOPE: CPT | Performed by: PHYSICIAN ASSISTANT

## 2022-09-01 PROCEDURE — 80053 COMPREHEN METABOLIC PANEL: CPT | Performed by: PHYSICIAN ASSISTANT

## 2022-09-01 PROCEDURE — 99285 EMERGENCY DEPT VISIT HI MDM: CPT | Performed by: PHYSICIAN ASSISTANT

## 2022-09-01 PROCEDURE — 36415 COLL VENOUS BLD VENIPUNCTURE: CPT | Performed by: PHYSICIAN ASSISTANT

## 2022-09-01 PROCEDURE — 83690 ASSAY OF LIPASE: CPT | Performed by: PHYSICIAN ASSISTANT

## 2022-09-01 RX ORDER — METOCLOPRAMIDE HYDROCHLORIDE 5 MG/ML
10 INJECTION INTRAMUSCULAR; INTRAVENOUS ONCE
Status: COMPLETED | OUTPATIENT
Start: 2022-09-01 | End: 2022-09-01

## 2022-09-01 RX ORDER — DIPHENHYDRAMINE HYDROCHLORIDE 50 MG/ML
25 INJECTION INTRAMUSCULAR; INTRAVENOUS ONCE
Status: COMPLETED | OUTPATIENT
Start: 2022-09-01 | End: 2022-09-01

## 2022-09-01 RX ORDER — ONDANSETRON HYDROCHLORIDE 8 MG/1
8 TABLET, FILM COATED ORAL EVERY 8 HOURS PRN
Qty: 20 TABLET | Refills: 0 | Status: SHIPPED | OUTPATIENT
Start: 2022-09-01

## 2022-09-01 RX ORDER — DICYCLOMINE HYDROCHLORIDE 10 MG/1
10 CAPSULE ORAL
Qty: 28 CAPSULE | Refills: 0 | Status: SHIPPED | OUTPATIENT
Start: 2022-09-01

## 2022-09-01 RX ORDER — ONDANSETRON 2 MG/ML
4 INJECTION INTRAMUSCULAR; INTRAVENOUS ONCE
Status: COMPLETED | OUTPATIENT
Start: 2022-09-01 | End: 2022-09-01

## 2022-09-01 RX ORDER — DICYCLOMINE HCL 20 MG
20 TABLET ORAL ONCE
Status: COMPLETED | OUTPATIENT
Start: 2022-09-01 | End: 2022-09-01

## 2022-09-01 RX ORDER — FENTANYL CITRATE 50 UG/ML
50 INJECTION, SOLUTION INTRAMUSCULAR; INTRAVENOUS ONCE
Status: COMPLETED | OUTPATIENT
Start: 2022-09-01 | End: 2022-09-01

## 2022-09-01 RX ADMIN — DIPHENHYDRAMINE HYDROCHLORIDE 25 MG: 50 INJECTION, SOLUTION INTRAMUSCULAR; INTRAVENOUS at 18:14

## 2022-09-01 RX ADMIN — IOHEXOL 65 ML: 350 INJECTION, SOLUTION INTRAVENOUS at 17:00

## 2022-09-01 RX ADMIN — FENTANYL CITRATE 50 MCG: 50 INJECTION, SOLUTION INTRAMUSCULAR; INTRAVENOUS at 16:22

## 2022-09-01 RX ADMIN — ONDANSETRON 4 MG: 2 INJECTION INTRAMUSCULAR; INTRAVENOUS at 16:21

## 2022-09-01 RX ADMIN — SODIUM CHLORIDE, SODIUM LACTATE, POTASSIUM CHLORIDE, AND CALCIUM CHLORIDE 1000 ML: .6; .31; .03; .02 INJECTION, SOLUTION INTRAVENOUS at 16:21

## 2022-09-01 RX ADMIN — METOCLOPRAMIDE HYDROCHLORIDE 10 MG: 5 INJECTION INTRAMUSCULAR; INTRAVENOUS at 18:14

## 2022-09-01 RX ADMIN — DICYCLOMINE HYDROCHLORIDE 20 MG: 20 TABLET ORAL at 18:13

## 2022-09-01 NOTE — DISCHARGE INSTRUCTIONS
Rest, plenty of fluids  Clear liquid diet and advance as tolerated  Continue home medications as prescribed  Continue acid lowering medication for your stomach  Continue zofran as needed for nausea/vomiting  Bentyl four times a day as needed for bowel spasm  Follow up with PCP and GI or return to ER as needed

## 2022-09-01 NOTE — ED PROVIDER NOTES
History  Chief Complaint   Patient presents with    Groin Pain     Pt c/o left sided groin pain starting 4 days ago  Pt states she was lifting heavy objects  Pt also states she had nausea  32year old female with PMH GERD, h/o ovarian cysts, chronic back pain presents for evaluation of abdominal pain  Pt reports she had pain start in her lower abdomen the other day after she lifted something heavy  This was primarily left sided groin pain  No reported falls  Since that time pain has become more diffuse and generalized  Described as aching and sharp  Reports associated nausea  Denies vomiting  She reports she had some constipation  She has been taking miralax  Her last BM was reported to be yesterday  No reported diarrhea  Denies fever, chills  Denies chest pain, SOB  Denies cough, congestion or recent illness  Denies vaginal bleeding or discharge  Recently finished a menstrual cycle  No reported aggravating or alleviating factors  No specific treatments tried  No prior evaluation of symptoms  Pt has had prior laparoscopy for an ectopic pregnancy and D&C  History provided by:  Medical records and patient   used: No    Groin Pain  Associated symptoms: abdominal pain and nausea    Associated symptoms: no chest pain, no congestion, no cough, no diarrhea, no fatigue, no fever, no headaches, no myalgias, no rash, no rhinorrhea, no shortness of breath, no sore throat, no vomiting and no wheezing        Prior to Admission Medications   Prescriptions Last Dose Informant Patient Reported? Taking?    Diclofenac Sodium (VOLTAREN) 1 %   No No   Sig: Apply 2 g topically 4 (four) times a day   Elastic Bandages & Supports (Wrist Brace/Left Medium) MISC   No No   Sig: Use daily   Patient not taking: Reported on 1/14/2022    Elastic Bandages & Supports (Wrist Brace/Right Medium) MISC   No No   Sig: Use daily   Patient not taking: Reported on 1/14/2022    PreviDent 5000 Booster Plus 1 1 % PSTE   Yes No   Restasis 0 05 % ophthalmic emulsion   Yes No   Sig: Administer 2 drops to both eyes 2 (two) times a day as needed   acetaminophen (TYLENOL) 500 mg tablet   No No   Sig: Take 1 tablet (500 mg total) by mouth every 6 (six) hours as needed for mild pain   albuterol (PROVENTIL HFA,VENTOLIN HFA) 90 mcg/act inhaler   No No   Sig: Inhale 2 puffs every 6 (six) hours as needed for wheezing or shortness of breath   carisoprodol (SOMA) 350 mg tablet   No No   Sig: Take 1 tablet (350 mg total) by mouth 3 (three) times a day As needed for pain/spasms   ibuprofen (MOTRIN) 400 mg tablet   No No   Sig: Take 1 tablet (400 mg total) by mouth every 8 (eight) hours for 5 days With food   lidocaine (LIDODERM) 5 %   No No   Sig: Apply 1 patch topically daily Remove & Discard patch within 12 hours or as directed by MD   metroNIDAZOLE (METROGEL) 0 75 % vaginal gel   Yes No   Sig: INSERT 1 APPLICATORFUL VAGINALLY NIGHTLY FOR 5 DAYS   ondansetron (ZOFRAN) 8 mg tablet   No No   Sig: Take 1 tablet (8 mg total) by mouth every 8 (eight) hours as needed for nausea or vomiting   ondansetron (ZOFRAN) 8 mg tablet   No Yes   Sig: Take 1 tablet (8 mg total) by mouth every 8 (eight) hours as needed for nausea or vomiting   pantoprazole (PROTONIX) 40 mg tablet   No No   Sig: Take 1 tablet (40 mg total) by mouth daily   polyethylene glycol (GLYCOLAX) 17 GM/SCOOP powder   No No   Sig: Take 17 g by mouth daily for 10 days   sucralfate (CARAFATE) 1 g/10 mL suspension   No No   Sig: Take 10 mL (1,000 mg total) by mouth every 6 (six) hours   triamcinolone (KENALOG) 0 1 % cream   No No   Sig: Apply topically 2 (two) times a day      Facility-Administered Medications: None       Past Medical History:   Diagnosis Date    Allergic     Back pain     Ectopic pregnancy     GERD (gastroesophageal reflux disease)     History of unilateral fallopian tube excision     RIGHT removed    Ovarian cyst     Wears glasses        Past Surgical History: Procedure Laterality Date    DILATION AND CURETTAGE OF UTERUS      ECTOPIC PREGNANCY SURGERY      EPIDURAL BLOCK INJECTION Bilateral 4/7/2022    Procedure: Bilateral L4-5 transforaminal epidural steroid injection;  Surgeon: Matt Carnes MD;  Location: [facility];  Service: Pain Management     LAPAROSCOPY      [place] LAP,DIAGNOSTIC ABDOMEN N/A 9/16/2017    Procedure: LAPAROSCOPY DIAGNOSTIC, left salpingectomy;  Surgeon: Verma Spurling, MD;  Location:  MAIN OR;  Service: Gynecology    WISDOM TOOTH EXTRACTION      WISDOM TOOTH EXTRACTION Bilateral        Family History   Problem Relation Age of Onset    No Known Problems Mother     No Known Problems Father      I have reviewed and agree with the history as documented  E-Cigarette/Vaping    E-Cigarette Use Never User      E-Cigarette/Vaping Substances    Nicotine No     THC No     CBD No     Flavoring No     Other No     Unknown No      Social History     Tobacco Use    Smoking status: Current Every Day Smoker     Packs/day: 1 00     Years: 6 00     Pack years: 6 00     Types: Cigarettes    Smokeless tobacco: Never Used   Vaping Use    Vaping Use: Never used   Substance Use Topics    Alcohol use: Not Currently     Comment: "occasional"    Drug use: Yes     Types: Marijuana       Review of Systems   Constitutional: Negative  Negative for chills, fatigue and fever  HENT: Negative  Negative for congestion, rhinorrhea and sore throat  Eyes: Negative  Negative for visual disturbance  Respiratory: Negative  Negative for cough, shortness of breath and wheezing  Cardiovascular: Negative  Negative for chest pain, palpitations and leg swelling  Gastrointestinal: Positive for abdominal pain, constipation and nausea  Negative for diarrhea and vomiting  Genitourinary: Positive for pelvic pain  Negative for dysuria, flank pain, frequency and hematuria  Musculoskeletal: Negative  Negative for back pain, myalgias and neck pain  Skin: Negative  Negative for rash  Neurological: Positive for numbness (both hands)  Negative for dizziness, light-headedness and headaches  Psychiatric/Behavioral: Negative  Negative for confusion  All other systems reviewed and are negative  Physical Exam  Physical Exam  Vitals and nursing note reviewed  Constitutional:       General: She is not in acute distress  Appearance: She is well-developed  She is not toxic-appearing  HENT:      Head: Normocephalic and atraumatic  Right Ear: Hearing and external ear normal       Left Ear: Hearing and external ear normal       Nose: Nose normal       Mouth/Throat:      Mouth: Mucous membranes are moist       Tongue: Tongue does not deviate from midline  Pharynx: Oropharynx is clear  Uvula midline  Eyes:      General: Lids are normal  No scleral icterus  Extraocular Movements: Extraocular movements intact  Conjunctiva/sclera: Conjunctivae normal       Pupils: Pupils are equal, round, and reactive to light  Neck:      Trachea: Trachea and phonation normal  No tracheal deviation  Cardiovascular:      Rate and Rhythm: Normal rate and regular rhythm  Pulses: Normal pulses  Radial pulses are 2+ on the right side and 2+ on the left side  Dorsalis pedis pulses are 2+ on the right side and 2+ on the left side  Posterior tibial pulses are 2+ on the right side and 2+ on the left side  Heart sounds: Normal heart sounds, S1 normal and S2 normal  No murmur heard  Pulmonary:      Effort: Pulmonary effort is normal  No tachypnea or respiratory distress  Breath sounds: Normal breath sounds  No wheezing, rhonchi or rales  Abdominal:      General: Bowel sounds are normal  There is no distension  Palpations: Abdomen is soft  Tenderness: There is generalized abdominal tenderness  There is no right CVA tenderness, left CVA tenderness, guarding or rebound  Hernia: No hernia is present  Musculoskeletal:      Cervical back: Normal range of motion and neck supple  Right lower leg: No edema  Left lower leg: No edema  Skin:     General: Skin is warm and dry  Capillary Refill: Capillary refill takes less than 2 seconds  Findings: No rash  Neurological:      General: No focal deficit present  Mental Status: She is alert and oriented to person, place, and time  GCS: GCS eye subscore is 4  GCS verbal subscore is 5  GCS motor subscore is 6  Cranial Nerves: No cranial nerve deficit  Sensory: No sensory deficit  Motor: No abnormal muscle tone        Gait: Gait normal    Psychiatric:         Mood and Affect: Mood normal          Speech: Speech normal          Behavior: Behavior normal          Vital Signs  ED Triage Vitals   Temperature Pulse Respirations Blood Pressure SpO2   09/01/22 1520 09/01/22 1520 09/01/22 1520 09/01/22 1520 09/01/22 1520   (!) 97 1 °F (36 2 °C) 101 18 125/65 97 %      Temp Source Heart Rate Source Patient Position - Orthostatic VS BP Location FiO2 (%)   09/01/22 1520 09/01/22 1520 09/01/22 1830 09/01/22 1830 --   Temporal Monitor Lying Right arm       Pain Score       09/01/22 1518       7           Vitals:    09/01/22 1520 09/01/22 1830   BP: 125/65 101/63   Pulse: 101 82   Patient Position - Orthostatic VS:  Lying         Visual Acuity      ED Medications  Medications   ondansetron (ZOFRAN) injection 4 mg (4 mg Intravenous Given 9/1/22 1621)   fentanyl citrate (PF) 100 MCG/2ML 50 mcg (50 mcg Intravenous Given 9/1/22 1622)   lactated ringers bolus 1,000 mL (0 mL Intravenous Stopped 9/1/22 1819)   iohexol (OMNIPAQUE) 350 MG/ML injection (MULTI-DOSE) 65 mL (65 mL Intravenous Given 9/1/22 1700)   dicyclomine (BENTYL) tablet 20 mg (20 mg Oral Given 9/1/22 1813)   diphenhydrAMINE (BENADRYL) injection 25 mg (25 mg Intravenous Given 9/1/22 1814)   metoclopramide (REGLAN) injection 10 mg (10 mg Intravenous Given 9/1/22 1814)       Diagnostic Studies  Results Reviewed     Procedure Component Value Units Date/Time    Lactic acid [[id number]]  (Normal) Collected: 09/01/22 1621    Lab Status: Final result Specimen: Blood from Arm, Left Updated: 09/01/22 1647     LACTIC ACID 1 2 mmol/L     Narrative:      Result may be elevated if tourniquet was used during collection      Comprehensive metabolic panel [[id number]]  (Abnormal) Collected: 09/01/22 1621    Lab Status: Final result Specimen: Blood from Arm, Left Updated: 09/01/22 1644     Sodium 139 mmol/L      Potassium 4 0 mmol/L      Chloride 102 mmol/L      CO2 29 mmol/L      ANION GAP 8 mmol/L      BUN 7 mg/dL      Creatinine 0 60 mg/dL      Glucose 91 mg/dL      Calcium 8 8 mg/dL      Corrected Calcium 9 3 mg/dL      AST 7 U/L      ALT 10 U/L      Alkaline Phosphatase 63 U/L      Total Protein 7 0 g/dL      Albumin 3 4 g/dL      Total Bilirubin 0 18 mg/dL      eGFR 123 ml/min/1 73sq m     Narrative:      [place] guidelines for Chronic Kidney Disease (CKD):     Stage 1 with normal or high GFR (GFR > 90 mL/min/1 73 square meters)    Stage 2 Mild CKD (GFR = 60-89 mL/min/1 73 square meters)    Stage 3A Moderate CKD (GFR = 45-59 mL/min/1 73 square meters)    Stage 3B Moderate CKD (GFR = 30-44 mL/min/1 73 square meters)    Stage 4 Severe CKD (GFR = 15-29 mL/min/1 73 square meters)    Stage 5 End Stage CKD (GFR <15 mL/min/1 73 square meters)  Note: GFR calculation is accurate only with a steady state creatinine    Lipase [[id number]]  (Normal) Collected: 09/01/22 1621    Lab Status: Final result Specimen: Blood from Arm, Left Updated: 09/01/22 1644     Lipase 123 u/L     UA (URINE) with reflex to Scope [[id number]]  (Abnormal) Collected: 09/01/22 1624    Lab Status: Final result Specimen: Urine, Clean Catch Updated: 09/01/22 1635     Color, UA Yellow     Clarity, UA Clear     Specific Gravity, UA >=1 030     pH, UA 6 0     Leukocytes, UA Negative     Nitrite, UA Negative     Protein, UA Negative mg/dl      Glucose, UA Negative mg/dl      Ketones, UA Trace mg/dl      Urobilinogen, UA 0 2 E U /dl      Bilirubin, UA Negative     Occult Blood, UA Negative    CBC and differential [[id number]] Collected: 09/01/22 1621    Lab Status: Final result Specimen: Blood from Arm, Left Updated: 09/01/22 1630     WBC 9 81 Thousand/uL      RBC 4 25 Million/uL      Hemoglobin 13 3 g/dL      Hematocrit 38 8 %      MCV 91 fL      MCH 31 3 pg      MCHC 34 3 g/dL      RDW 11 9 %      MPV 9 5 fL      Platelets 584 Thousands/uL      nRBC 0 /100 WBCs      Neutrophils Relative 68 %      Immat GRANS % 0 %      Lymphocytes Relative 23 %      Monocytes Relative 6 %      Eosinophils Relative 2 %      Basophils Relative 1 %      Neutrophils Absolute 6 70 Thousands/µL      Immature Grans Absolute 0 02 Thousand/uL      Lymphocytes Absolute 2 22 Thousands/µL      Monocytes Absolute 0 62 Thousand/µL      Eosinophils Absolute 0 19 Thousand/µL      Basophils Absolute 0 06 Thousands/µL     POCT pregnancy, urine [[id number]]  (Normal) Resulted: 09/01/22 1620    Lab Status: Final result Updated: 09/01/22 1621     EXT PREG TEST UR (Ref: Negative) Negative     Control Valid                 CT abdomen pelvis with contrast   Final Result by Yaw Handley MD (09/01 1744)      Nondilated fluid-filled loops of small bowel with scattered areas of mild wall thickening suggesting an enteritis  Workstation performed: [id number]                    Procedures  Procedures         ED Course  ED Course as of 09/01/22 2113   u Sep 01, 2022   1627 PREGNANCY TEST URINE: Negative   1632 WBC: 9 81   1632 Hemoglobin: 13 3   1632 Platelet Count: 959   1643 Ketones, UA(!): Trace  UA shows trace ketones but otherwise negative     1648 Glucose, Random: 91   1648 Creatinine: 0 60   1648 BUN: 7   1648 Sodium: 139   1648 Potassium: 4 0   1648 Chloride: 102   1648 CO2: 29   1648 Anion Gap: 8   1648 CORRECTED CALCIUM: 9 3   1648 AST: 7   1648 ALT(!): 10   1648 Alkaline Phosphatase: 63   1648 Total Protein: 7 0   1648 Albumin(!): 3 4   1648 TOTAL BILIRUBIN(!): 0 18   1648 eGFR: 123   1648 Lipase: 123   1648 LACTIC ACID: 1 2   1712 CT performed and pending interpretation  1756 CT abdomen pelvis with contrast  IMPRESSION:     Nondilated fluid-filled loops of small bowel with scattered areas of mild wall thickening suggesting an enteritis  1756 Pt updated on results of CT  She reports still have some diffuse abdominal cramping and nausea  Will trial some additional medications  Pt afebrile, well appearing  Labs and urine are unremarkable  CT scan suggestive of a mild enteritis  Reviewed usual course and treatment  Pt afebrile, hemodynamically stable  There is no significant abdominal tenderness  Discussed continued symptomatic/supportive care  Advised rest, fluids, OTC meds as needed for symptoms  Rx bentyl  Continue zofran as needed  Recommended follow up with GI due to some ongoing abdominal issues  Strict return precautions outlined  Advised outpatient follow up with PCP or return to ER for change in condition as outlined  Pt verbalized understanding and had no further questions  SBIRT 20yo+    Flowsheet Row Most Recent Value   SBIRT (23 yo +)    In order to provide better care to our patients, we are screening all of our patients for alcohol and drug use  Would it be okay to ask you these screening questions? Yes Filed at: 09/01/2022 1628   Initial Alcohol Screen: US AUDIT-C     1  How often do you have a drink containing alcohol? 0 Filed at: 09/01/2022 1628   2  How many drinks containing alcohol do you have on a typical day you are drinking? 0 Filed at: 09/01/2022 1628   3b  FEMALE Any Age, or MALE 65+: How often do you have 4 or more drinks on one occassion? 0 Filed at: 09/01/2022 1628   Audit-C Score 0 Filed at: 09/01/2022 1628   JEOVANY: How many times in the past year have you        Used an illegal drug or used a prescription medication for non-medical reasons? Never Filed at: 09/01/2022 1628                    [facility]  Number of Diagnoses or Management Options  Abdominal pain: new and requires workup  Enteritis: new and requires workup     Amount and/or Complexity of Data Reviewed  Clinical lab tests: ordered and reviewed  Tests in the radiology section of CPT®: reviewed and ordered  Decide to obtain previous medical records or to obtain history from someone other than the patient: yes  Obtain history from someone other than the patient: yes  Review and summarize past medical records: yes  Independent visualization of images, tracings, or specimens: yes    Patient Progress  Patient progress: improved      Disposition  Final diagnoses:   Abdominal pain   Enteritis     Time reflects when diagnosis was documented in both MDM as applicable and the Disposition within this note     Time User Action Codes Description Comment    9/1/2022  6:34 PM Dewitte Peers [R10 9] Abdominal pain     9/1/2022  6:35 PM Dewitte Peers [K52 9] Enteritis     9/1/2022  6:36 PM Dewitte Peers [R11 0] Nausea     9/1/2022  6:37 PM Viviana Flores [R11 0] Nausea     9/1/2022  6:38 PM Edith Shallow Modify [R11 0] Nausea       ED Disposition     ED Disposition   Discharge    Condition   Stable    Date/Time   Thu Sep 1, 2022  6:34 PM    Comment   [address] discharge to home/self care                 Follow-up Information     Follow up With Specialties Details Why Contact Info Additional Anuj Avile[facility] Schedule an appointment as soon as possible for a visit   [address] [id number]  Mei Dickey [facility] Nadir Randhawa [facility] [place], [address] [place] [address] Emergency Department Emergency Medicine  As needed [address] Emergency Department, [facility], [place], [place], [address]          Discharge Medication List as of 9/1/2022  6:41 PM      START taking these medications    Details   dicyclomine (BENTYL) 10 mg capsule Take 1 capsule (10 mg total) by mouth 4 (four) times a day (before meals and at bedtime), Starting Thu 9/1/2022, Normal         CONTINUE these medications which have CHANGED    Details   ondansetron (ZOFRAN) 8 mg tablet Take 1 tablet (8 mg total) by mouth every 8 (eight) hours as needed for nausea or vomiting, Starting Thu 9/1/2022, Normal         CONTINUE these medications which have NOT CHANGED    Details   acetaminophen (TYLENOL) 500 mg tablet Take 1 tablet (500 mg total) by mouth every 6 (six) hours as needed for mild pain, Starting Mon 2/7/2022, Normal      albuterol (PROVENTIL HFA,VENTOLIN HFA) 90 mcg/act inhaler Inhale 2 puffs every 6 (six) hours as needed for wheezing or shortness of breath, Starting Wed 2/9/2022, Normal      carisoprodol (SOMA) 350 mg tablet Take 1 tablet (350 mg total) by mouth 3 (three) times a day As needed for pain/spasms, Starting Tue 5/10/2022, Normal      Diclofenac Sodium (VOLTAREN) 1 % Apply 2 g topically 4 (four) times a day, Starting Fri 1/14/2022, Normal      !!  Elastic Bandages & Supports (Wrist Brace/Left Medium) MISC Use daily, Starting Tue 11/23/2021, Print      !! Elastic Bandages & Supports (Wrist Brace/Right Medium) MISC Use daily, Starting Tue 11/23/2021, Print      ibuprofen (MOTRIN) 400 mg tablet Take 1 tablet (400 mg total) by mouth every 8 (eight) hours for 5 days With food, Starting Mon 5/9/2022, Until Sat 5/14/2022, Normal      lidocaine (LIDODERM) 5 % Apply 1 patch topically daily Remove & Discard patch within 12 hours or as directed by MD, Starting Wed 2/9/2022, Normal      metroNIDAZOLE (METROGEL) 0 75 % vaginal gel INSERT 1 APPLICATORFUL VAGINALLY NIGHTLY FOR 5 DAYS, Historical Med      pantoprazole (PROTONIX) 40 mg tablet Take 1 tablet (40 mg total) by mouth daily, Starting Fri 4/23/2021, Normal      polyethylene glycol (GLYCOLAX) 17 GM/SCOOP powder Take 17 g by mouth daily for 10 days, Starting Wed 3/24/2021, Until Mon 5/9/2022, Normal      PreviDent 5000 Booster Plus 1 1 % PSTE Starting Wed 11/17/2021, Historical Med      Restasis 0 05 % ophthalmic emulsion Administer 2 drops to both eyes 2 (two) times a day as needed, Starting Thu 7/22/2021, Historical Med      sucralfate (CARAFATE) 1 g/10 mL suspension Take 10 mL (1,000 mg total) by mouth every 6 (six) hours, Starting Wed 2/9/2022, Normal      triamcinolone (KENALOG) 0 1 % cream Apply topically 2 (two) times a day, Starting Tue 11/23/2021, Normal       !! - Potential duplicate medications found  Please discuss with provider                PDMP Review       Value Time User    PDMP Reviewed  Yes 5/9/2022  3:09 PM Cristian Sawant [place]          ED Provider  Electronically Signed by           Freddie Wharton PA-C  09/01/22 3290

## 2022-09-07 ENCOUNTER — OFFICE VISIT (OUTPATIENT)
Dept: FAMILY MEDICINE CLINIC | Facility: HOME HEALTHCARE | Age: 27
End: 2022-09-07
Payer: COMMERCIAL

## 2022-09-07 VITALS
TEMPERATURE: 97.7 F | HEART RATE: 94 BPM | OXYGEN SATURATION: 98 % | RESPIRATION RATE: 18 BRPM | BODY MASS INDEX: 23.21 KG/M2 | DIASTOLIC BLOOD PRESSURE: 72 MMHG | SYSTOLIC BLOOD PRESSURE: 104 MMHG | WEIGHT: 131 LBS | HEIGHT: 63 IN

## 2022-09-07 DIAGNOSIS — J45.909 ASTHMA DUE TO SEASONAL ALLERGIES: ICD-10-CM

## 2022-09-07 DIAGNOSIS — Z12.4 SCREENING FOR CERVICAL CANCER: ICD-10-CM

## 2022-09-07 DIAGNOSIS — K52.9 ENTERITIS: ICD-10-CM

## 2022-09-07 DIAGNOSIS — R10.84 ABDOMINAL PAIN, GENERALIZED: Primary | ICD-10-CM

## 2022-09-07 PROCEDURE — T1015 CLINIC SERVICE: HCPCS | Performed by: FAMILY MEDICINE

## 2022-09-07 RX ORDER — ALBUTEROL SULFATE 90 UG/1
2 AEROSOL, METERED RESPIRATORY (INHALATION) EVERY 6 HOURS PRN
Qty: 18 G | Refills: 5 | Status: SHIPPED | OUTPATIENT
Start: 2022-09-07

## 2022-09-07 RX ORDER — METRONIDAZOLE 7.5 MG/G
GEL VAGINAL
Qty: 70 G | Status: CANCELLED | OUTPATIENT
Start: 2022-09-07

## 2022-09-07 RX ORDER — IBUPROFEN 800 MG/1
TABLET ORAL
COMMUNITY
Start: 2022-07-26

## 2022-09-07 NOTE — PROGRESS NOTES
Assessment/Plan:     Diagnoses and all orders for this visit:    Abdominal pain, generalized  -     Ambulatory Referral to Gastroenterology; Future    Enteritis  -     Ambulatory Referral to Gastroenterology; Future    Asthma due to seasonal allergies  -     albuterol (PROVENTIL HFA,VENTOLIN HFA) 90 mcg/act inhaler; Inhale 2 puffs every 6 (six) hours as needed for wheezing or shortness of breath    Screening for cervical cancer  -     Ambulatory Referral to Obstetrics / Gynecology; Future    Other orders  -     ibuprofen (MOTRIN) 800 mg tablet; TAKE 1 TABLET BY MOUTH EVERY 6 TO 8 HOURS WITH FOOD AS NEEDED FOR PAIN      - Follow up with GI as referred  May continue Zofran and bentyl PRN  Discussed BRAT diet with increased fluids  Return to the ED if unable to tolerate oral intake or developing worsening pain/fever  - Referral provided to follow up with GYN for screening PAP    Return for Annual physical               Subjective:        Patient ID: Bernadette Grace is a 32 y o  female  Chief Complaint   Patient presents with    Abdominal Pain     Right side, Pt states that the pain Is getting worse over time  Klever Weems is a 32year old female presenting for ED follow up  Patient was evaluated in the ED 9/1 secondary to abdominal pain  CT scan revealed nondilated fluid-filled loops of small bowel with scattered areas of mild wall thickening suggesting an enteritis  She was prescribed bentyl and zofran PRN  Today patient continues to endorse pain which she feels has not improved  Pain was initially left sided but is now bilateral   Pain is constant without aggravating or alleviating factors  She has taken bentyl with mild improvement but feels that this medication causes her to have a vaginal yeast infection  Denies dysuria, hematuria, frequency, or urgency  Denies abnormal vaginal bleeding  Has had some diarrhea and nausea but denies vomiting or blood in the stool      Patient was last seen by GI in 9/2020 due to recurrent bouts of gastroenteritis and was recommended to have EGD/colonoscopy at that time which was never completed        The following portions of the patient's history were reviewed and updated as appropriate: allergies, current medications, past family history, past medical history, past social history, past surgical history and problem list     Patient Active Problem List   Diagnosis    Ectopic pregnancy    Flank pain    Diarrhea    Tobacco abuse    Hypokalemia    Leukocytosis    Microscopic hematuria    Microalbuminuria    Bradycardia    Low HDL (under 40)    Hepatitis C antibody test positive    Gastroenteritis    Chronic tubulointerstitial nephritis    Generalized abdominal pain    Dysmenorrhea    Nausea    Iron deficiency anemia    GERD (gastroesophageal reflux disease)    Ovarian cyst, left    Pelvic pain in female    Chronic bilateral low back pain with bilateral sciatica    Hoarseness    Lumbar disc disease with radiculopathy    Acute bilateral low back pain with left-sided sciatica    Chronic pain syndrome    Myofascial pain syndrome       Current Outpatient Medications   Medication Sig Dispense Refill    albuterol (PROVENTIL HFA,VENTOLIN HFA) 90 mcg/act inhaler Inhale 2 puffs every 6 (six) hours as needed for wheezing or shortness of breath 18 g 5    Diclofenac Sodium (VOLTAREN) 1 % Apply 2 g topically 4 (four) times a day 200 g 5    dicyclomine (BENTYL) 10 mg capsule Take 1 capsule (10 mg total) by mouth 4 (four) times a day (before meals and at bedtime) 28 capsule 0    ibuprofen (MOTRIN) 800 mg tablet TAKE 1 TABLET BY MOUTH EVERY 6 TO 8 HOURS WITH FOOD AS NEEDED FOR PAIN      ibuprofen (MOTRIN) 400 mg tablet Take 1 tablet (400 mg total) by mouth every 8 (eight) hours for 5 days With food 90 tablet 1    ondansetron (ZOFRAN) 8 mg tablet Take 1 tablet (8 mg total) by mouth every 8 (eight) hours as needed for nausea or vomiting (Patient not taking: Reported on 9/7/2022) 20 tablet 0     No current facility-administered medications for this visit          Past Medical History:   Diagnosis Date    Allergic     Back pain     Ectopic pregnancy     GERD (gastroesophageal reflux disease)     History of unilateral fallopian tube excision     RIGHT removed    Ovarian cyst     Wears glasses         Past Surgical History:   Procedure Laterality Date    DILATION AND CURETTAGE OF UTERUS      ECTOPIC PREGNANCY SURGERY      EPIDURAL BLOCK INJECTION Bilateral 4/7/2022    Procedure: Bilateral L4-5 transforaminal epidural steroid injection;  Surgeon: Jennifer Rowell MD;  Location: [facility];  Service: Pain Management     LAPAROSCOPY      LA LAP,DIAGNOSTIC ABDOMEN N/A 9/16/2017    Procedure: LAPAROSCOPY DIAGNOSTIC, left salpingectomy;  Surgeon: Kelli Rowell MD;  Location:  MAIN OR;  Service: Gynecology    WISDOM TOOTH EXTRACTION      WISDOM TOOTH EXTRACTION Bilateral         Social History     Socioeconomic History    Marital status: Single     Spouse name: Not on file    Number of children: Not on file    Years of education: Not on file    Highest education level: Not on file   Occupational History    Not on file   Tobacco Use    Smoking status: Current Every Day Smoker     Packs/day: 1 00     Years: 6 00     Pack years: 6 00     Types: Cigarettes    Smokeless tobacco: Never Used   Vaping Use    Vaping Use: Never used   Substance and Sexual Activity    Alcohol use: Not Currently     Comment: "occasional"    Drug use: Yes     Types: Marijuana    Sexual activity: Yes     Partners: Male     Birth control/protection: None   Other Topics Concern    Not on file   Social History Narrative    Not on file     Social Determinants of Health     Financial Resource Strain: Not on file   Food Insecurity: No Food Insecurity    Worried About Running Out of Food in the Last Year: Never true    Enrique of Food in the Last Year: Never true Transportation Needs: No Transportation Needs    Lack of Transportation (Medical): No    Lack of Transportation (Non-Medical): No   Physical Activity: Not on file   Stress: Not on file   Social Connections: Not on file   Intimate Partner Violence: Not on file   Housing Stability: Unknown    Unable to Pay for Housing in the Last Year: No    Number of Places Lived in the Last Year: Not on file    Unstable Housing in the Last Year: No        Review of Systems   Constitutional: Negative for chills, diaphoresis and fever  Respiratory: Negative for cough, chest tightness, shortness of breath and wheezing  Cardiovascular: Negative for chest pain, palpitations and leg swelling  Gastrointestinal: Positive for abdominal pain, diarrhea and nausea  Negative for blood in stool, constipation and vomiting  Skin: Negative for rash and wound  Neurological: Negative for dizziness, syncope, weakness, light-headedness and headaches  Objective:      /72 (BP Location: Left arm, Patient Position: Standing, Cuff Size: Adult)   Pulse 94   Temp 97 7 °F (36 5 °C)   Resp 18   Ht 5' 3" (1 6 m)   Wt 59 4 kg (131 lb)   SpO2 98%   BMI 23 21 kg/m²          Physical Exam  Vitals and nursing note reviewed  Constitutional:       General: She is not in acute distress  Appearance: Normal appearance  HENT:      Head: Normocephalic and atraumatic  Eyes:      Extraocular Movements: Extraocular movements intact  Conjunctiva/sclera: Conjunctivae normal       Pupils: Pupils are equal, round, and reactive to light  Cardiovascular:      Rate and Rhythm: Normal rate and regular rhythm  Heart sounds: Normal heart sounds  No murmur heard  Pulmonary:      Effort: Pulmonary effort is normal  No respiratory distress  Breath sounds: Normal breath sounds  No wheezing  Abdominal:      General: Abdomen is flat  Bowel sounds are normal  There is no distension  Palpations: Abdomen is soft   There is no mass  Tenderness: There is abdominal tenderness in the right lower quadrant, suprapubic area and left lower quadrant  There is no guarding or rebound  Musculoskeletal:         General: Normal range of motion  Cervical back: Normal range of motion and neck supple  Right lower leg: No edema  Left lower leg: No edema  Lymphadenopathy:      Cervical: No cervical adenopathy  Skin:     General: Skin is warm and dry  Neurological:      General: No focal deficit present  Mental Status: She is alert and oriented to person, place, and time  Cranial Nerves: No cranial nerve deficit  Motor: No weakness     Psychiatric:         Mood and Affect: Mood normal          Behavior: Behavior normal

## 2022-11-14 ENCOUNTER — TELEPHONE (OUTPATIENT)
Dept: GASTROENTEROLOGY | Facility: CLINIC | Age: 27
End: 2022-11-14

## 2023-01-23 ENCOUNTER — TELEPHONE (OUTPATIENT)
Dept: PAIN MEDICINE | Facility: MEDICAL CENTER | Age: 28
End: 2023-01-23

## 2023-01-23 NOTE — TELEPHONE ENCOUNTER
Caller: patient    Doctor:    Reason for call: missed a call, but it wasn't for the patient it was for her son, transferred  to ortho    Call back#:

## 2023-02-02 ENCOUNTER — OFFICE VISIT (OUTPATIENT)
Dept: PAIN MEDICINE | Facility: CLINIC | Age: 28
End: 2023-02-02

## 2023-02-02 VITALS
DIASTOLIC BLOOD PRESSURE: 75 MMHG | HEIGHT: 63 IN | HEART RATE: 94 BPM | SYSTOLIC BLOOD PRESSURE: 111 MMHG | BODY MASS INDEX: 23.21 KG/M2

## 2023-02-02 DIAGNOSIS — G89.29 CHRONIC BILATERAL LOW BACK PAIN WITH BILATERAL SCIATICA: Primary | ICD-10-CM

## 2023-02-02 DIAGNOSIS — M51.16 LUMBAR DISC DISEASE WITH RADICULOPATHY: ICD-10-CM

## 2023-02-02 DIAGNOSIS — M54.42 CHRONIC BILATERAL LOW BACK PAIN WITH BILATERAL SCIATICA: Primary | ICD-10-CM

## 2023-02-02 DIAGNOSIS — M79.18 MYOFASCIAL PAIN SYNDROME: ICD-10-CM

## 2023-02-02 DIAGNOSIS — M54.41 CHRONIC BILATERAL LOW BACK PAIN WITH BILATERAL SCIATICA: Primary | ICD-10-CM

## 2023-02-02 DIAGNOSIS — G89.4 CHRONIC PAIN SYNDROME: ICD-10-CM

## 2023-02-02 RX ORDER — CARISOPRODOL 350 MG/1
350 TABLET ORAL 3 TIMES DAILY
Qty: 90 TABLET | Refills: 1 | Status: SHIPPED | OUTPATIENT
Start: 2023-02-02

## 2023-02-02 RX ORDER — METRONIDAZOLE 500 MG/1
TABLET ORAL
COMMUNITY
Start: 2023-01-30

## 2023-02-02 RX ORDER — CLINDAMYCIN PHOSPHATE 10 UG/ML
LOTION TOPICAL
COMMUNITY
Start: 2023-01-30

## 2023-02-02 NOTE — PROGRESS NOTES
Assessment  1  Chronic bilateral low back pain with bilateral sciatica    2  Chronic pain syndrome    3  Lumbar disc disease with radiculopathy    4  Myofascial pain syndrome        Plan  While the patient was in the office today, I did have a thorough conversation regarding their chronic pain syndrome, medication management, and treatment plan options  Patient is being seen for a follow-up visit  He was last seen here in May 2022 at which time she was referred for neurosurgical evaluation and an EMG of both lower extremities  Patient never followed up with neurosurgery and never scheduled the EMG  She returns the office today stating that her pain has actually slightly improved and that the numbness and tingling down her legs is occasional now and not constant  She continues with constant low back pain  Review of her chart indicates that she only attended about 3 physical therapy sessions in the past   I explained to her that 3 sessions of physical therapy will not greatly impact her pain  She is willing to try aqua therapy  As such, I provided her with a referral to aqua therapy  Renewed Soma 350 mg which she takes 3 times daily on an as-needed basis  Previous MRI of the lumbar spine performed on 2/28/2022 was suboptimal due to moderate to severe motion artifact  If her pain persists after 6 weeks of aqua therapy, we will update an MRI of her lumbar spine  The patient will follow-up in 8 weeks for medication prescription refill and reevaluation  The patient was advised to contact the office should their symptoms worsen in the interim  The patient was agreeable and verbalized an understanding  My impressions and treatment recommendations were discussed in detail with the patient who verbalized understanding and had no further questions  Discharge instructions were provided  I personally saw and examined the patient and I agree with the above discussed plan of care      Orders Placed This Encounter   Procedures   • Ambulatory Referral to Physical Therapy     Standing Status:   Future     Standing Expiration Date:   2/2/2024     Referral Priority:   Routine     Referral Type:   Physical Therapy     Referral Reason:   Specialty Services Required     Requested Specialty:   Physical Therapy     Number of Visits Requested:   1     Expiration Date:   2/2/2024     New Medications Ordered This Visit   Medications   • metroNIDAZOLE (FLAGYL) 500 mg tablet   • clindamycin (CLEOCIN T) 1 % lotion     Sig: APPLY LOTION TOPICALLY TWICE DAILY FOR 14 DAYS   • carisoprodol (SOMA) 350 mg tablet     Sig: Take 1 tablet (350 mg total) by mouth 3 (three) times a day As needed for pain/spasms     Dispense:  90 tablet     Refill:  1       History of Present Illness    Agustin Anderson is a 32 y o  female plaints of low back pain  She reports occasional numbness and tingling on the lateral aspect of both legs  Current pain levels a 7/10  Quality pain is described as pressure-like  She has been out of Soma for a couple months  Pain is worse without it  I have personally reviewed and/or updated the patient's past medical history, past surgical history, family history, social history, current medications, allergies, and vital signs today  Review of Systems   Constitutional: Negative  HENT: Negative  Eyes: Negative  Respiratory: Negative  Cardiovascular: Negative  Gastrointestinal: Negative  Endocrine: Negative  Genitourinary: Negative  Musculoskeletal: Negative  Decrease rom  Joint stiffness  Pain in Extremity   Skin: Negative  Allergic/Immunologic: Negative  Neurological: Positive for dizziness  Hematological: Negative  Psychiatric/Behavioral: Negative          Patient Active Problem List   Diagnosis   • Ectopic pregnancy   • Flank pain   • Diarrhea   • Tobacco abuse   • Hypokalemia   • Leukocytosis   • Microscopic hematuria   • Microalbuminuria   • Bradycardia   • Low HDL (under 40)   • Hepatitis C antibody test positive   • Gastroenteritis   • Chronic tubulointerstitial nephritis   • Generalized abdominal pain   • Dysmenorrhea   • Nausea   • Iron deficiency anemia   • GERD (gastroesophageal reflux disease)   • Ovarian cyst, left   • Pelvic pain in female   • Chronic bilateral low back pain with bilateral sciatica   • Hoarseness   • Lumbar disc disease with radiculopathy   • Acute bilateral low back pain with left-sided sciatica   • Chronic pain syndrome   • Myofascial pain syndrome       Past Medical History:   Diagnosis Date   • Allergic    • Back pain    • Ectopic pregnancy    • GERD (gastroesophageal reflux disease)    • History of unilateral fallopian tube excision     RIGHT removed   • Ovarian cyst    • Wears glasses        Past Surgical History:   Procedure Laterality Date   • DILATION AND CURETTAGE OF UTERUS     • ECTOPIC PREGNANCY SURGERY     • EPIDURAL BLOCK INJECTION Bilateral 4/7/2022    Procedure: Bilateral L4-5 transforaminal epidural steroid injection;  Surgeon: Tyler De Jesus MD;  Location: [facility];  Service: Pain Management    • LAPAROSCOPY     • [place] LAPS ABD PRTM&OMENTUM DX W/WO SPEC BR/WA SPX N/A 9/16/2017    Procedure: LAPAROSCOPY DIAGNOSTIC, left salpingectomy;  Surgeon: Lucretia Dyer MD;  Location:  MAIN OR;  Service: Gynecology   • WISDOM TOOTH EXTRACTION     • WISDOM TOOTH EXTRACTION Bilateral        Family History   Problem Relation Age of Onset   • No Known Problems Mother    • No Known Problems Father        Social History     Occupational History   • Not on file   Tobacco Use   • Smoking status: Every Day     Packs/day: 1 00     Years: 6 00     Pack years: 6 00     Types: Cigarettes   • Smokeless tobacco: Never   Vaping Use   • Vaping Use: Never used   Substance and Sexual Activity   • Alcohol use: Not Currently     Comment: "occasional"   • Drug use: Yes     Types: Marijuana   • Sexual activity: Yes     Partners: Male     Birth control/protection: None       Current Outpatient Medications on File Prior to Visit   Medication Sig   • albuterol (PROVENTIL HFA,VENTOLIN HFA) 90 mcg/act inhaler Inhale 2 puffs every 6 (six) hours as needed for wheezing or shortness of breath   • clindamycin (CLEOCIN T) 1 % lotion APPLY LOTION TOPICALLY TWICE DAILY FOR 14 DAYS   • Diclofenac Sodium (VOLTAREN) 1 % Apply 2 g topically 4 (four) times a day   • metroNIDAZOLE (FLAGYL) 500 mg tablet    • dicyclomine (BENTYL) 10 mg capsule Take 1 capsule (10 mg total) by mouth 4 (four) times a day (before meals and at bedtime) (Patient not taking: Reported on 2/2/2023)   • ibuprofen (MOTRIN) 400 mg tablet Take 1 tablet (400 mg total) by mouth every 8 (eight) hours for 5 days With food (Patient not taking: Reported on 2/2/2023)   • ibuprofen (MOTRIN) 800 mg tablet TAKE 1 TABLET BY MOUTH EVERY 6 TO 8 HOURS WITH FOOD AS NEEDED FOR PAIN (Patient not taking: Reported on 2/2/2023)   • ondansetron (ZOFRAN) 8 mg tablet Take 1 tablet (8 mg total) by mouth every 8 (eight) hours as needed for nausea or vomiting (Patient not taking: Reported on 9/7/2022)     No current facility-administered medications on file prior to visit  Allergies   Allergen Reactions   • Nitrofurantoin Itching   • Nsaids Other (See Comments)     Due to issues with kidneys per pt   • Oxycodone-Acetaminophen Itching     Reaction Date: 28Apr2011;    • Oxycodone Itching   • Tramadol Itching     Reaction Date: 28Apr2011;        Physical Exam    /75 (BP Location: Left arm, Patient Position: Sitting, Cuff Size: Adult)   Pulse 94   Ht 5' 3" (1 6 m)   BMI 23 21 kg/m²     Constitutional: normal, well developed, well nourished, alert, in no distress and non-toxic and no overt pain behavior    Eyes: anicteric  HEENT: grossly intact  Neck: supple, symmetric, trachea midline and no masses   Pulmonary:even and unlabored  Cardiovascular:No edema or pitting edema present  Skin:Normal without rashes or lesions and well hydrated  Psychiatric:Mood and affect appropriate  Neurologic:Cranial Nerves II-XII grossly intact  Musculoskeletal:normal    Imaging

## 2023-06-27 NOTE — CASE MANAGEMENT
Case Management Discharge Planning Note    Patient name Jasmina Tariq  Location [facility] [id number] MRN [id number]  : 1995 Date 3/1/2022       Current Admission Date: 2022  Current Admission Diagnosis:Acute bilateral low back pain with left-sided sciatica   Patient Active Problem List    Diagnosis Date Noted    Acute bilateral low back pain with left-sided sciatica 2022    Lumbar disc disease with radiculopathy 2022    Hoarseness 2021    Chronic bilateral low back pain with bilateral sciatica 2021    Iron deficiency anemia 2020    Nausea 2020    Generalized abdominal pain 2020    Pelvic pain in female 2020    Chronic tubulointerstitial nephritis 2020    Low HDL (under 40) 2020    Hepatitis C antibody test positive 2020    Gastroenteritis 2020    Microalbuminuria 2020    Bradycardia 2020    Flank pain 2020    Diarrhea 2020    Hypokalemia 2020    Leukocytosis 2020    Microscopic hematuria 2020    Dysmenorrhea 2020    Tobacco abuse 2020    GERD (gastroesophageal reflux disease) 2020    Ovarian cyst, left 2020    Ectopic pregnancy 2017      LOS (days): 0  Geometric Mean LOS (GMLOS) (days):   Days to GMLOS:     OBJECTIVE:            Current admission status: Observation   Preferred Pharmacy:   [facility] [place] - [address] [facility], [address] [place], [address] [place] [address] [place] [address]  Phone: [phone] Fax: [phone]    Primary Care Provider: Irish Otto PA-C    Primary Insurance: [facility] [facility]  Secondary Insurance:     DISCHARGE DETAILS:    Discharge planning discussed with[de-identified] patient        CM contacted family/caregiver?: No- see comments (pt declines)     Discharge Destination Plan[de-identified] Home  Transport at Discharge : Other (Comment) (boyfriend)   Pt is being dc'd home on this date with OP follow up  Detail Level: Detailed Size Of Lesion In Cm (Optional): 0 Body Location Override (Optional - Billing Will Still Be Based On Selected Body Map Location If Applicable): right neck Size Of Lesion: 1.2 cm Detail Level: Simple Size Of Lesion In Cm (Optional): 2.2

## 2023-08-17 ENCOUNTER — TELEPHONE (OUTPATIENT)
Dept: FAMILY MEDICINE CLINIC | Facility: CLINIC | Age: 28
End: 2023-08-17

## 2023-08-17 DIAGNOSIS — Z20.7 EXPOSURE TO HEAD LICE: Primary | ICD-10-CM

## 2023-08-17 NOTE — TELEPHONE ENCOUNTER
Patient called stating her daughter has head lice. She is asking if permetherin can be called in for herself. Patients daughter got a script from her pcp.

## 2023-08-18 ENCOUNTER — TELEPHONE (OUTPATIENT)
Dept: FAMILY MEDICINE CLINIC | Facility: CLINIC | Age: 28
End: 2023-08-18

## 2023-08-18 DIAGNOSIS — Z20.7 EXPOSURE TO HEAD LICE: Primary | ICD-10-CM

## 2023-08-18 RX ORDER — MALATHION 0 G/ML
LOTION TOPICAL ONCE
Qty: 59 ML | Refills: 0 | Status: SHIPPED | OUTPATIENT
Start: 2023-08-18 | End: 2023-08-18

## 2023-09-07 DIAGNOSIS — M54.42 CHRONIC BILATERAL LOW BACK PAIN WITH BILATERAL SCIATICA: ICD-10-CM

## 2023-09-07 DIAGNOSIS — G89.29 CHRONIC BILATERAL LOW BACK PAIN WITH BILATERAL SCIATICA: ICD-10-CM

## 2023-09-07 DIAGNOSIS — M54.41 CHRONIC BILATERAL LOW BACK PAIN WITH BILATERAL SCIATICA: ICD-10-CM

## 2023-09-13 ENCOUNTER — TELEPHONE (OUTPATIENT)
Dept: FAMILY MEDICINE CLINIC | Facility: HOME HEALTHCARE | Age: 28
End: 2023-09-13

## 2023-09-29 ENCOUNTER — TELEPHONE (OUTPATIENT)
Dept: ADMINISTRATIVE | Facility: OTHER | Age: 28
End: 2023-09-29

## 2023-09-29 NOTE — TELEPHONE ENCOUNTER
----- Message from Mayra Hood MA sent at 9/29/2023  9:58 AM EDT -----  Regarding: pap  09/29/23 9:58 AM    Hello, our patient Jake Goodell has had Pap Smear (HPV) aka Cervical Cancer Screening completed/performed. Please assist in updating the patient chart by pulling the Care Everywhere (CE) document. The date of service is 10/05/2022.      Thank you,  Mayra Hood MA  [facility]

## 2023-09-29 NOTE — TELEPHONE ENCOUNTER
Upon review of the In Basket request we were able to locate, review, and update the patient chart as requested for Pap Smear (HPV) aka Cervical Cancer Screening. Any additional questions or concerns should be emailed to the Practice Liaisons via the appropriate education email address, please do not reply via In Basket.     Thank you  Jacqueline Austin MA

## 2023-10-02 ENCOUNTER — OFFICE VISIT (OUTPATIENT)
Dept: FAMILY MEDICINE CLINIC | Facility: HOME HEALTHCARE | Age: 28
End: 2023-10-02
Payer: COMMERCIAL

## 2023-10-02 VITALS
HEIGHT: 63 IN | RESPIRATION RATE: 18 BRPM | OXYGEN SATURATION: 98 % | HEART RATE: 70 BPM | TEMPERATURE: 99.1 F | BODY MASS INDEX: 24.45 KG/M2 | DIASTOLIC BLOOD PRESSURE: 72 MMHG | SYSTOLIC BLOOD PRESSURE: 110 MMHG | WEIGHT: 138 LBS

## 2023-10-02 DIAGNOSIS — G89.29 CHRONIC BILATERAL LOW BACK PAIN WITH BILATERAL SCIATICA: ICD-10-CM

## 2023-10-02 DIAGNOSIS — Z20.7 EXPOSURE TO HEAD LICE: ICD-10-CM

## 2023-10-02 DIAGNOSIS — J45.909 ASTHMA DUE TO SEASONAL ALLERGIES: ICD-10-CM

## 2023-10-02 DIAGNOSIS — Z72.0 TOBACCO ABUSE: ICD-10-CM

## 2023-10-02 DIAGNOSIS — M54.42 CHRONIC BILATERAL LOW BACK PAIN WITH BILATERAL SCIATICA: ICD-10-CM

## 2023-10-02 DIAGNOSIS — M54.41 CHRONIC BILATERAL LOW BACK PAIN WITH BILATERAL SCIATICA: ICD-10-CM

## 2023-10-02 DIAGNOSIS — Z00.00 ANNUAL PHYSICAL EXAM: Primary | ICD-10-CM

## 2023-10-02 PROCEDURE — T1015 CLINIC SERVICE: HCPCS | Performed by: FAMILY MEDICINE

## 2023-10-02 RX ORDER — MALATHION 0 G/ML
LOTION TOPICAL ONCE
Qty: 59 ML | Refills: 0 | Status: SHIPPED | OUTPATIENT
Start: 2023-10-02 | End: 2023-10-02

## 2023-10-02 RX ORDER — MALATHION 0 G/ML
LOTION TOPICAL
COMMUNITY
Start: 2023-08-18 | End: 2023-10-02 | Stop reason: SDUPTHER

## 2023-10-02 RX ORDER — ALBUTEROL SULFATE 90 UG/1
2 AEROSOL, METERED RESPIRATORY (INHALATION) EVERY 6 HOURS PRN
Qty: 18 G | Refills: 5 | Status: SHIPPED | OUTPATIENT
Start: 2023-10-02

## 2023-10-02 RX ORDER — NICOTINE 21 MG/24HR
1 PATCH, TRANSDERMAL 24 HOURS TRANSDERMAL EVERY 24 HOURS
Qty: 28 PATCH | Refills: 1 | Status: SHIPPED | OUTPATIENT
Start: 2023-10-02

## 2023-10-02 NOTE — PROGRESS NOTES
[address]    NAME: Marissa Wiley  AGE: 25 y.o. SEX: female  : 1995     DATE: 10/2/2023     Assessment and Plan:     Problem List Items Addressed This Visit        Respiratory    Asthma due to seasonal allergies    Relevant Medications    albuterol (PROVENTIL HFA,VENTOLIN HFA) 90 mcg/act inhaler       Nervous and Auditory    Chronic bilateral low back pain with bilateral sciatica    Relevant Medications    Diclofenac Sodium (VOLTAREN) 1 %       Other    Tobacco abuse    Relevant Medications    nicotine (NICODERM CQ) 21 mg/24 hr TD 24 hr patch   Other Visit Diagnoses     Annual physical exam    -  Primary    Exposure to head lice        Relevant Medications    malathion (OVIDE) 0.5 % lotion        - Currently smoking 2 ppd. Will send script for 21 mg nicotine patches    Immunizations and preventive care screenings were discussed with patient today. Appropriate education was printed on patient's after visit summary. Counseling:  Alcohol/drug use: discussed moderation in alcohol intake, the recommendations for healthy alcohol use, and avoidance of illicit drug use. Dental Health: discussed importance of regular tooth brushing, flossing, and dental visits. Injury prevention: discussed safety/seat belts, safety helmets, smoke detectors, carbon dioxide detectors, and smoking near bedding or upholstery. Sexual health: discussed sexually transmitted diseases, partner selection, use of condoms, avoidance of unintended pregnancy, and contraceptive alternatives. Exercise: the importance of regular exercise/physical activity was discussed. Recommend exercise 3-5 times per week for at least 30 minutes. Depression Screening and Follow-up Plan: Patient was screened for depression during today's encounter. They screened negative with a PHQ-2 score of 0.     Tobacco Cessation Counseling: Tobacco cessation counseling was provided. The patient is sincerely urged to quit consumption of tobacco. She is ready to quit tobacco. Medication options discussed. Nicotine patch was prescribed. Return in about 2 months (around 12/2/2023) for Next scheduled follow up. Chief Complaint:     Chief Complaint   Patient presents with   • Annual Exam   • Medication Refill      History of Present Illness:     Adult Annual Physical   Patient here for a comprehensive physical exam. The patient reports no problems. Diet and Physical Activity  Diet/Nutrition: poor diet, frequent junk food and limited fruits/vegetables. Exercise: no formal exercise. Depression Screening  PHQ-2/9 Depression Screening    Little interest or pleasure in doing things: 0 - not at all  Feeling down, depressed, or hopeless: 0 - not at all  PHQ-2 Score: 0  PHQ-2 Interpretation: Negative depression screen       General Health  Sleep: sleeps well and gets 7-8 hours of sleep on average. Hearing: normal - bilateral.  Vision: no vision problems and wears glasses. Dental: regular dental visits and brushes teeth once daily. /GYN Health  Last menstrual period: 9/2/23 approx  Contraceptive method: none. History of STDs?: yes. Review of Systems:     Review of Systems   Constitutional: Negative for chills, diaphoresis and fever. Respiratory: Negative for cough, chest tightness, shortness of breath and wheezing. Cardiovascular: Negative for chest pain, palpitations and leg swelling. Gastrointestinal: Negative for abdominal pain, blood in stool, constipation, diarrhea, nausea and vomiting. Musculoskeletal: Positive for back pain. Skin: Negative for rash and wound. Neurological: Negative for dizziness, syncope, weakness, light-headedness and headaches.       Past Medical History:     Past Medical History:   Diagnosis Date   • Allergic    • Back pain    • Ectopic pregnancy    • GERD (gastroesophageal reflux disease)    • History of unilateral fallopian tube excision     RIGHT removed   • Ovarian cyst    • Wears glasses       Past Surgical History:     Past Surgical History:   Procedure Laterality Date   • DILATION AND CURETTAGE OF UTERUS     • ECTOPIC PREGNANCY SURGERY     • EPIDURAL BLOCK INJECTION Bilateral 4/7/2022    Procedure: Bilateral L4-5 transforaminal epidural steroid injection;  Surgeon: Miladis Camp MD;  Location: [facility];  Service: Pain Management    • LAPAROSCOPY     • WA LAPS ABD PRTM&OMENTUM DX W/WO SPEC BR/WA SPX N/A 9/16/2017    Procedure: LAPAROSCOPY DIAGNOSTIC, left salpingectomy;  Surgeon: Jake Matias MD;  Location:  MAIN OR;  Service: Gynecology   • WISDOM TOOTH EXTRACTION     • WISDOM TOOTH EXTRACTION Bilateral       Social History:     Social History     Socioeconomic History   • Marital status: Single     Spouse name: None   • Number of children: None   • Years of education: None   • Highest education level: None   Occupational History   • None   Tobacco Use   • Smoking status: Every Day     Packs/day: 1.00     Years: 6.00     Total pack years: 6.00     Types: Cigarettes   • Smokeless tobacco: Never   Vaping Use   • Vaping Use: Never used   Substance and Sexual Activity   • Alcohol use: Not Currently     Comment: "occasional"   • Drug use: Yes     Types: Marijuana   • Sexual activity: Yes     Partners: Male     Birth control/protection: None   Other Topics Concern   • None   Social History Narrative   • None     Social Determinants of Health     Financial Resource Strain: Low Risk  (6/9/2021)    Overall Financial Resource Strain (CARDIA)    • Difficulty of Paying Living Expenses: Not hard at all   Food Insecurity: No Food Insecurity (3/1/2022)    Hunger Vital Sign    • Worried About Running Out of Food in the Last Year: Never true    • Ran Out of Food in the Last Year: Never true   Transportation Needs: No Transportation Needs (3/1/2022)    PRAPARE - Transportation    • Lack of Transportation (Medical):  No • Lack of Transportation (Non-Medical): No   Physical Activity: Unknown (6/9/2021)    Exercise Vital Sign    • Days of Exercise per Week: 3 days    • Minutes of Exercise per Session: Not on file   Stress: No Stress Concern Present (6/9/2021)    [address]    • Feeling of Stress :  Only a little   Social Connections: Unknown (6/9/2021)    Social Connection and Isolation Panel [NHANES]    • Frequency of Communication with Friends and Family: More than three times a week    • Frequency of Social Gatherings with Friends and Family: More than three times a week    • Attends Orthodoxy Services: Never    • Active Member of Clubs or Organizations: No    • Attends Club or Organization Meetings: Never    • Marital Status: Not on file   Intimate Partner Violence: Not At Risk (6/9/2021)    Humiliation, Afraid, Rape, and Kick questionnaire    • Fear of Current or Ex-Partner: No    • Emotionally Abused: No    • Physically Abused: No    • Sexually Abused: No   Housing Stability: Unknown (3/1/2022)    Housing Stability Vital Sign    • Unable to Pay for Housing in the Last Year: No    • Number of Places Lived in the Last Year: Not on file    • Unstable Housing in the Last Year: No      Family History:     Family History   Problem Relation Age of Onset   • No Known Problems Mother    • No Known Problems Father       Current Medications:     Current Outpatient Medications   Medication Sig Dispense Refill   • albuterol (PROVENTIL HFA,VENTOLIN HFA) 90 mcg/act inhaler Inhale 2 puffs every 6 (six) hours as needed for wheezing or shortness of breath 18 g 5   • carisoprodol (SOMA) 350 mg tablet Take 1 tablet (350 mg total) by mouth 3 (three) times a day As needed for pain/spasms 90 tablet 1   • clindamycin (CLEOCIN T) 1 % lotion APPLY LOTION TOPICALLY TWICE DAILY FOR 14 DAYS     • Diclofenac Sodium (VOLTAREN) 1 % Apply 2 g topically 4 (four) times a day 200 g 5   • malathion (OVIDE) 0.5 % lotion Apply topically once for 1 dose 59 mL 0   • metroNIDAZOLE (FLAGYL) 500 mg tablet      • nicotine (NICODERM CQ) 21 mg/24 hr TD 24 hr patch Place 1 patch on the skin over 24 hours every 24 hours 28 patch 1     No current facility-administered medications for this visit. Allergies: Allergies   Allergen Reactions   • Nitrofurantoin Itching   • Nsaids Other (See Comments)     Due to issues with kidneys per pt   • Oxycodone-Acetaminophen Itching     Reaction Date: 28Apr2011;    • Oxycodone Itching   • Tramadol Itching     Reaction Date: 28Apr2011;       Physical Exam:     /72 (BP Location: Left arm, Patient Position: Sitting, Cuff Size: Standard)   Pulse 70   Temp 99.1 °F (37.3 °C)   Resp 18   Ht 5' 3" (1.6 m)   Wt 62.6 kg (138 lb)   SpO2 98%   BMI 24.45 kg/m²     Physical Exam  Vitals and nursing note reviewed. Constitutional:       General: She is not in acute distress. Appearance: Normal appearance. HENT:      Head: Normocephalic and atraumatic. Right Ear: External ear normal.      Left Ear: External ear normal.      Nose: Nose normal.      Mouth/Throat:      Mouth: Mucous membranes are moist.      Pharynx: Oropharynx is clear. No oropharyngeal exudate. Eyes:      Extraocular Movements: Extraocular movements intact. Conjunctiva/sclera: Conjunctivae normal.      Pupils: Pupils are equal, round, and reactive to light. Cardiovascular:      Rate and Rhythm: Normal rate and regular rhythm. Heart sounds: Normal heart sounds. No murmur heard. Pulmonary:      Effort: Pulmonary effort is normal. No respiratory distress. Breath sounds: Normal breath sounds. No wheezing. Musculoskeletal:      Right lower leg: No edema. Left lower leg: No edema. Skin:     General: Skin is warm and dry. Neurological:      General: No focal deficit present. Mental Status: She is alert and oriented to person, place, and time. Cranial Nerves:  No cranial nerve deficit. Motor: No weakness.    Psychiatric:         Mood and Affect: Mood normal.         Behavior: Behavior normal.          Shelley Moyer, [address]

## 2023-10-03 ENCOUNTER — DOCUMENTATION (OUTPATIENT)
Dept: FAMILY MEDICINE CLINIC | Facility: CLINIC | Age: 28
End: 2023-10-03

## 2023-10-12 NOTE — PROGRESS NOTES
Insurance denied auth for malathion. Please review formulary alternatives scanned in on 10/4/23 thanks!

## 2023-11-07 ENCOUNTER — TELEPHONE (OUTPATIENT)
Dept: FAMILY MEDICINE CLINIC | Facility: HOME HEALTHCARE | Age: 28
End: 2023-11-07

## 2023-11-10 NOTE — TELEPHONE ENCOUNTER
I will send for enough to last until her appointment with [facility] 5/26  She does not need to see me on Monday  As this medication is not meant to be long term, Bennett and I did discuss tapering this in the near future  She should see pain management if she has ongoing back pain so that this can be treated appropriately as we will not continue to refill the oxycodone going forward  Patient/Father

## 2023-12-04 ENCOUNTER — TELEPHONE (OUTPATIENT)
Dept: FAMILY MEDICINE CLINIC | Facility: CLINIC | Age: 28
End: 2023-12-04

## 2023-12-04 ENCOUNTER — OFFICE VISIT (OUTPATIENT)
Dept: URGENT CARE | Facility: MEDICAL CENTER | Age: 28
End: 2023-12-04
Payer: COMMERCIAL

## 2023-12-04 VITALS
HEIGHT: 65 IN | OXYGEN SATURATION: 97 % | SYSTOLIC BLOOD PRESSURE: 118 MMHG | BODY MASS INDEX: 24.16 KG/M2 | RESPIRATION RATE: 18 BRPM | DIASTOLIC BLOOD PRESSURE: 68 MMHG | TEMPERATURE: 98.4 F | HEART RATE: 81 BPM | WEIGHT: 145 LBS

## 2023-12-04 DIAGNOSIS — B34.9 VIRAL ILLNESS: Primary | ICD-10-CM

## 2023-12-04 PROCEDURE — S9088 SERVICES PROVIDED IN URGENT: HCPCS | Performed by: PHYSICIAN ASSISTANT

## 2023-12-04 PROCEDURE — 99212 OFFICE O/P EST SF 10 MIN: CPT | Performed by: PHYSICIAN ASSISTANT

## 2023-12-04 NOTE — TELEPHONE ENCOUNTER
Patient called asking if ear drops can be ordered for her right ear pain.  No openings today or tomorrow

## 2023-12-04 NOTE — PROGRESS NOTES
[facility]        NAME: Latanya Molina is a 25 y.o. female  : 1995    MRN: [id number]  DATE: 2023  TIME: 1:47 PM    Assessment and Plan   Viral illness [B34.9]  1. Viral illness              Patient Instructions     If symptoms progress test yourself for Covid on day 3 of symptoms of later, you can call your PCP to have test done  Tylenol or Ibuprofen as needed for fever or pain  Drink plenty of fluids  Over the Counter cold medication to control symptoms  If symptoms fail to improve follow up with PCP  If symptoms worsen have yourself rechecked  Follow up with PCP in 3-5 days. Proceed to  ER if symptoms worsen. Chief Complaint     Chief Complaint   Patient presents with   • Earache   • Sore Throat         History of Present Illness       Patient presents with stuffy nos, sore throat, nausea and headaches which started this morning. She denies fever, cough, diarrhea or vomiting. Review of Systems   Review of Systems   Constitutional:  Negative for fever. HENT:  Positive for congestion and sore throat. Negative for rhinorrhea. Respiratory:  Negative for cough. Gastrointestinal:  Positive for nausea. Negative for diarrhea and vomiting. Musculoskeletal:  Negative for myalgias. Neurological:  Positive for headaches.          Current Medications       Current Outpatient Medications:   •  albuterol (PROVENTIL HFA,VENTOLIN HFA) 90 mcg/act inhaler, Inhale 2 puffs every 6 (six) hours as needed for wheezing or shortness of breath, Disp: 18 g, Rfl: 5  •  carisoprodol (SOMA) 350 mg tablet, Take 1 tablet (350 mg total) by mouth 3 (three) times a day As needed for pain/spasms (Patient not taking: Reported on 2023), Disp: 90 tablet, Rfl: 1  •  clindamycin (CLEOCIN T) 1 % lotion, APPLY LOTION TOPICALLY TWICE DAILY FOR 14 DAYS (Patient not taking: Reported on 2023), Disp: , Rfl:   •  Diclofenac Sodium (VOLTAREN) 1 %, Apply 2 g topically 4 (four) times a day (Patient not taking: Reported on 12/4/2023), Disp: 200 g, Rfl: 5  •  metroNIDAZOLE (FLAGYL) 500 mg tablet, , Disp: , Rfl:   •  nicotine (NICODERM CQ) 21 mg/24 hr TD 24 hr patch, Place 1 patch on the skin over 24 hours every 24 hours (Patient not taking: Reported on 12/4/2023), Disp: 28 patch, Rfl: 1    Current Allergies     Allergies as of 12/04/2023 - Reviewed 12/04/2023   Allergen Reaction Noted   • Nitrofurantoin Itching 02/26/2020   • Nsaids Other (See Comments) 08/31/2020   • Oxycodone-acetaminophen Itching 04/22/2012   • Oxycodone Itching 09/16/2017   • Tramadol Itching 04/22/2012            The following portions of the patient's history were reviewed and updated as appropriate: allergies, current medications, past family history, past medical history, past social history, past surgical history and problem list.     Past Medical History:   Diagnosis Date   • Allergic    • Back pain    • Ectopic pregnancy    • GERD (gastroesophageal reflux disease)    • History of unilateral fallopian tube excision     RIGHT removed   • Ovarian cyst    • Wears glasses        Past Surgical History:   Procedure Laterality Date   • DILATION AND CURETTAGE OF UTERUS     • ECTOPIC PREGNANCY SURGERY     • EPIDURAL BLOCK INJECTION Bilateral 4/7/2022    Procedure: Bilateral L4-5 transforaminal epidural steroid injection;  Surgeon: Ny Noriega MD;  Location: [facility];  Service: Pain Management    • LAPAROSCOPY     • DE LAPS ABD PRTM&OMENTUM DX W/WO SPEC BR/WA SPX N/A 9/16/2017    Procedure: LAPAROSCOPY DIAGNOSTIC, left salpingectomy;  Surgeon: Gogo Vicente MD;  Location:  MAIN OR;  Service: Gynecology   • WISDOM TOOTH EXTRACTION     • WISDOM TOOTH EXTRACTION Bilateral        Family History   Problem Relation Age of Onset   • Diabetes Mother    • No Known Problems Father          Medications have been verified.         Objective   /68   Pulse 81   Temp 98.4 °F (36.9 °C)   Resp 18   Ht 5' 5.12" (1.654 m)   Wt 65.8 kg (145 lb)   SpO2 97%   BMI 24.04 kg/m²   No LMP recorded. Physical Exam     Physical Exam  Vitals and nursing note reviewed. Constitutional:       Appearance: She is well-developed. HENT:      Head: Normocephalic and atraumatic. Right Ear: Tympanic membrane normal.      Left Ear: Tympanic membrane normal.      Ears:      Comments: Small amounts of cerumen both canals. Mouth/Throat:      Mouth: Mucous membranes are moist.      Pharynx: Uvula midline. Posterior oropharyngeal erythema present. Tonsils: Tonsillar exudate present. Eyes:      Conjunctiva/sclera: Conjunctivae normal.   Cardiovascular:      Rate and Rhythm: Normal rate and regular rhythm. Heart sounds: Normal heart sounds. Pulmonary:      Effort: Pulmonary effort is normal.      Breath sounds: Normal breath sounds. Musculoskeletal:      Cervical back: Neck supple. Lymphadenopathy:      Cervical: Cervical adenopathy present. Skin:     General: Skin is warm. Neurological:      Mental Status: She is alert.

## 2023-12-07 ENCOUNTER — DOCUMENTATION (OUTPATIENT)
Dept: FAMILY MEDICINE CLINIC | Facility: CLINIC | Age: 28
End: 2023-12-07

## 2023-12-07 NOTE — PROGRESS NOTES
Auth denied for malathion. Please review denial form scanned 12/4/23 into chart for formulary alternatives. Thanks!

## 2024-02-21 PROBLEM — K52.9 GASTROENTERITIS: Status: RESOLVED | Noted: 2020-08-14 | Resolved: 2024-02-21

## 2024-08-19 ENCOUNTER — OFFICE VISIT (OUTPATIENT)
Dept: FAMILY MEDICINE CLINIC | Facility: HOME HEALTHCARE | Age: 29
End: 2024-08-19
Payer: COMMERCIAL

## 2024-08-19 VITALS
OXYGEN SATURATION: 98 % | BODY MASS INDEX: 22.55 KG/M2 | WEIGHT: 136 LBS | HEART RATE: 68 BPM | TEMPERATURE: 98 F | SYSTOLIC BLOOD PRESSURE: 120 MMHG | DIASTOLIC BLOOD PRESSURE: 62 MMHG

## 2024-08-19 DIAGNOSIS — M54.41 CHRONIC BILATERAL LOW BACK PAIN WITH BILATERAL SCIATICA: ICD-10-CM

## 2024-08-19 DIAGNOSIS — G89.29 CHRONIC BILATERAL LOW BACK PAIN WITH BILATERAL SCIATICA: ICD-10-CM

## 2024-08-19 DIAGNOSIS — R14.0 BLOATING: Primary | ICD-10-CM

## 2024-08-19 DIAGNOSIS — M54.42 CHRONIC BILATERAL LOW BACK PAIN WITH BILATERAL SCIATICA: ICD-10-CM

## 2024-08-19 PROBLEM — R00.1 BRADYCARDIA: Status: RESOLVED | Noted: 2020-08-13 | Resolved: 2024-08-19

## 2024-08-19 PROBLEM — N94.10 DYSPAREUNIA, FEMALE: Status: ACTIVE | Noted: 2024-04-25

## 2024-08-19 PROBLEM — D50.9 IRON DEFICIENCY ANEMIA: Status: RESOLVED | Noted: 2020-11-09 | Resolved: 2024-08-19

## 2024-08-19 PROBLEM — R19.7 DIARRHEA: Status: RESOLVED | Noted: 2020-08-12 | Resolved: 2024-08-19

## 2024-08-19 PROBLEM — E87.6 HYPOKALEMIA: Status: RESOLVED | Noted: 2020-08-12 | Resolved: 2024-08-19

## 2024-08-19 PROBLEM — R10.9 FLANK PAIN: Status: RESOLVED | Noted: 2020-08-12 | Resolved: 2024-08-19

## 2024-08-19 PROBLEM — O00.90 ECTOPIC PREGNANCY: Status: RESOLVED | Noted: 2017-09-17 | Resolved: 2024-08-19

## 2024-08-19 PROBLEM — D72.829 LEUKOCYTOSIS: Status: RESOLVED | Noted: 2020-08-12 | Resolved: 2024-08-19

## 2024-08-19 PROBLEM — N87.1 DYSPLASIA OF CERVIX, HIGH GRADE CIN 2: Status: ACTIVE | Noted: 2024-02-21

## 2024-08-19 PROBLEM — N80.9 ENDOMETRIOSIS: Status: ACTIVE | Noted: 2024-04-25

## 2024-08-19 PROBLEM — Z90.710 S/P HYSTERECTOMY: Status: ACTIVE | Noted: 2024-08-19

## 2024-08-19 PROCEDURE — T1015 CLINIC SERVICE: HCPCS | Performed by: FAMILY MEDICINE

## 2024-08-19 NOTE — PROGRESS NOTES
Ambulatory Visit  Name: Shiane E Follweiler      : 1995      MRN: [id number]  Encounter Provider: Rodriguez Millan PA-C  Encounter Date: 2024   Encounter department: [facility]    Assessment & Plan   1. Bloating  -     Ambulatory Referral to Gastroenterology; Future  2. Chronic bilateral low back pain with bilateral sciatica  -     Diclofenac Sodium (VOLTAREN) 1 %; Apply 2 g topically 4 (four) times a day       History of Present Illness       Vijay is a 29 year old female presenting for follow up.    Patient was last seen by GI in 2020 due to recurrent bouts of gastroenteritis and was recommended to have EGD/colonoscopy at that time which was never completed.  She presents today requesting a new referral due to bloating.  Currently denies significant abdominal pain but reports some pressure and nausea.  Denies diarrhea, constipation, vomiting, or acid reflux.    Patient is also requesting a refill on diclofenac gel for chronic back pain.        Review of Systems   Constitutional:  Negative for chills, diaphoresis and fever.   Respiratory:  Negative for cough, chest tightness, shortness of breath and wheezing.    Cardiovascular:  Negative for chest pain, palpitations and leg swelling.   Gastrointestinal:  Positive for nausea. Negative for abdominal pain, constipation, diarrhea and vomiting.   Skin:  Negative for rash and wound.   Neurological:  Negative for dizziness, syncope, weakness, light-headedness and headaches.     Medical History Reviewed by provider this encounter:  Tobacco  Allergies  Meds  Problems  Med Hx  Surg Hx  Fam Hx       Current Outpatient Medications on File Prior to Visit   Medication Sig Dispense Refill   • albuterol (PROVENTIL HFA,VENTOLIN HFA) 90 mcg/act inhaler Inhale 2 puffs every 6 (six) hours as needed for wheezing or shortness of breath (Patient not taking: Reported on 2024) 18 g 5   • [DISCONTINUED] carisoprodol (SOMA) 350 mg  "tablet Take 1 tablet (350 mg total) by mouth 3 (three) times a day As needed for pain/spasms (Patient not taking: Reported on 2023) 90 tablet 1   • [DISCONTINUED] clindamycin (CLEOCIN T) 1 % lotion APPLY LOTION TOPICALLY TWICE DAILY FOR 14 DAYS (Patient not taking: Reported on 2023)     • [DISCONTINUED] Diclofenac Sodium (VOLTAREN) 1 % Apply 2 g topically 4 (four) times a day (Patient not taking: Reported on 2023) 200 g 5   • [DISCONTINUED] metroNIDAZOLE (FLAGYL) 500 mg tablet  (Patient not taking: Reported on 2023)     • [DISCONTINUED] nicotine (NICODERM CQ) 21 mg/24 hr TD 24 hr patch Place 1 patch on the skin over 24 hours every 24 hours (Patient not taking: Reported on 2023) 28 patch 1     No current facility-administered medications on file prior to visit.      Social History     Tobacco Use   • Smoking status: Former     Current packs/day: 0.00     Types: Cigarettes     Quit date: 2024     Years since quittin.0   • Smokeless tobacco: Never   Vaping Use   • Vaping status: Never Used   Substance and Sexual Activity   • Alcohol use: Not Currently     Comment: \"occasional\"   • Drug use: Yes     Types: Marijuana   • Sexual activity: Yes     Partners: Male     Birth control/protection: None     Objective     /62   Pulse 68   Temp 98 °F (36.7 °C)   Wt 61.7 kg (136 lb)   LMP  (LMP Unknown)   SpO2 98%   Breastfeeding No   BMI 22.55 kg/m²     Physical Exam  Vitals and nursing note reviewed.   Constitutional:       General: She is not in acute distress.     Appearance: Normal appearance.   HENT:      Head: Normocephalic and atraumatic.   Eyes:      Extraocular Movements: Extraocular movements intact.      Conjunctiva/sclera: Conjunctivae normal.      Pupils: Pupils are equal, round, and reactive to light.   Cardiovascular:      Rate and Rhythm: Normal rate and regular rhythm.      Heart sounds: Normal heart sounds. No murmur heard.  Pulmonary:      Effort: Pulmonary effort is " normal. No respiratory distress.      Breath sounds: Normal breath sounds. No wheezing.   Musculoskeletal:      Cervical back: Normal range of motion and neck supple.      Right lower leg: No edema.      Left lower leg: No edema.   Skin:     General: Skin is warm and dry.   Neurological:      General: No focal deficit present.      Mental Status: She is alert and oriented to person, place, and time.      Cranial Nerves: No cranial nerve deficit.      Motor: No weakness.   Psychiatric:         Mood and Affect: Mood normal.         Behavior: Behavior normal.       Administrative Statements

## 2024-09-19 ENCOUNTER — OFFICE VISIT (OUTPATIENT)
Dept: GASTROENTEROLOGY | Facility: CLINIC | Age: 29
End: 2024-09-19
Payer: COMMERCIAL

## 2024-09-19 VITALS
WEIGHT: 141.2 LBS | BODY MASS INDEX: 23.53 KG/M2 | TEMPERATURE: 98.5 F | DIASTOLIC BLOOD PRESSURE: 75 MMHG | SYSTOLIC BLOOD PRESSURE: 109 MMHG | HEART RATE: 50 BPM | HEIGHT: 65 IN | OXYGEN SATURATION: 98 %

## 2024-09-19 DIAGNOSIS — K92.1 MELENA: ICD-10-CM

## 2024-09-19 DIAGNOSIS — K58.0 IRRITABLE BOWEL SYNDROME WITH DIARRHEA: ICD-10-CM

## 2024-09-19 DIAGNOSIS — R14.0 BLOATING: Primary | ICD-10-CM

## 2024-09-19 DIAGNOSIS — K21.9 GASTROESOPHAGEAL REFLUX DISEASE WITHOUT ESOPHAGITIS: ICD-10-CM

## 2024-09-19 DIAGNOSIS — R19.7 DIARRHEA, UNSPECIFIED TYPE: ICD-10-CM

## 2024-09-19 PROBLEM — Z72.0 TOBACCO ABUSE: Status: RESOLVED | Noted: 2020-02-20 | Resolved: 2024-09-19

## 2024-09-19 PROCEDURE — 99204 OFFICE O/P NEW MOD 45 MIN: CPT | Performed by: NURSE PRACTITIONER

## 2024-09-19 RX ORDER — POLYETHYLENE GLYCOL 3350, SODIUM SULFATE ANHYDROUS, SODIUM BICARBONATE, SODIUM CHLORIDE, POTASSIUM CHLORIDE 236; 22.74; 6.74; 5.86; 2.97 G/4L; G/4L; G/4L; G/4L; G/4L
4000 POWDER, FOR SOLUTION ORAL ONCE
Qty: 4000 ML | Refills: 0 | Status: SHIPPED | OUTPATIENT
Start: 2024-09-19 | End: 2024-09-19

## 2024-09-19 RX ORDER — FAMOTIDINE 40 MG/1
40 TABLET, FILM COATED ORAL
Qty: 30 TABLET | Refills: 3 | Status: SHIPPED | OUTPATIENT
Start: 2024-09-19

## 2024-09-19 NOTE — ASSESSMENT & PLAN NOTE
I discussed with the patient that with their current symptoms and exam findings, I feel it would be beneficial to proceed with an EGD to further evaluate any underlying etiology that could explain their symptoms, with differential diagnoses that could include but are not limited to; GERD, gastric ulcers, Chambers's esophagus, EOE, H. pylori, etc. They were agreeable and verbalized and understanding.     I discussed the risks of procedure with the patient including, but not limited to: bleeding, infection, sore throat, and perforation. The patient gave verbal understanding and is agreeable to proceed. Patient's questions were answered to the best of my ability and until they verbalized an understanding.     In the meantime, to try to address some of her symptoms and discomfort I am going to have the patient started on an H2 blocker such as famotidine 40 mg at bedtime for now and see how she does.  She is to continue the famotidine until her follow-up visit after her EGD.  The patient was agreeable and verbalized an understanding.    Encouraged the patient to avoid acidic or trigger foods including alcohol as much as possible.  Encouraged the patient to consider purchasing a wedge pillow to help prevent reflux symptoms while sleeping.  Encouraged the patient not to lay down or sleep for at least 3 to 4 hours after eating and to try to avoid late night snacking.

## 2024-09-19 NOTE — ASSESSMENT & PLAN NOTE
While the patient was in the office today, I discussed with the patient that at this point in time even though she has had a chronic issue of diarrhea and loose stools, I do feel will be beneficial to proceed with repeat stool studies just to rule out any ongoing infectious or parasitic underlying etiology as well as to see if EPI is a component of her underlying issues.  I advised the patient that once we have the results of the stool studies, our office will contact her to review results and discuss any other treatment plan recommendations.    If everything would come back normal, we may include SIBO testing in the future.  The patient was agreeable and verbalized an understanding.    In the meantime, since it had been recommended many times in the past we will proceed with a colonoscopy at the same time as the EGD to rule out any other underlying etiology such as IBD that could be causing her symptoms.  The patient was agreeable and verbalized an understanding.    I obtained informed verbal consent from the patient. The risks/benefits/alternatives of the procedure were discussed with the patient. Risks included, but not limited to, infection, bleeding, perforation, injury to organs in the abdomen, missed lesion, and incomplete procedure were discussed. The patient gave verbal understanding and is agreeable to proceed. Bowel prep instructions were reviewed and given as ordered. Patient's questions were answered to the best of my ability and until they verbalized an understanding.

## 2024-09-19 NOTE — PROGRESS NOTES
[facility] - Outpatient Consultation  Shiane E Follweiler 29 y.o. female MRN: [id number]  Encounter: [id number]          ASSESSMENT AND PLAN:    The patient presents today for an initial consultation for her chronic bloating, GERD symptoms, diarrhea, and irritable bowel syndrome with diarrhea.    Exam:  Oral mucosa normal upon visual inspection, without any sores, lesions, or ulcerations. Sclera without icterus and benign. Lung sounds CTA b/l. Normal S1 & S2 upon exam. Abdomen is soft, nondistended, with mild to moderate tenderness noted upon exam in the epigastric region especially without any significant guarding or rebound tenderness noted upon exam, with hyperactive bowel sounds x 4. No edema noted of the b/l lower extremities upon exam today. Skin is non-icteric.     1. Bloating  -     Ambulatory Referral to Gastroenterology  -     famotidine (PEPCID) 40 MG tablet; Take 1 tablet (40 mg total) by mouth daily at bedtime  -     EGD; Future; Expected date: 09/19/2024  2. Melena  -     famotidine (PEPCID) 40 MG tablet; Take 1 tablet (40 mg total) by mouth daily at bedtime  -     EGD; Future; Expected date: 09/19/2024  3. Gastroesophageal reflux disease without esophagitis  Assessment & Plan:  I discussed with the patient that with their current symptoms and exam findings, I feel it would be beneficial to proceed with an EGD to further evaluate any underlying etiology that could explain their symptoms, with differential diagnoses that could include but are not limited to; GERD, gastric ulcers, Chambers's esophagus, EOE, H. pylori, etc. They were agreeable and verbalized and understanding.     I discussed the risks of procedure with the patient including, but not limited to: bleeding, infection, sore throat, and perforation. The patient gave verbal understanding and is agreeable to proceed. Patient's questions were answered to the best of my ability and until they verbalized an  understanding.     In the meantime, to try to address some of her symptoms and discomfort I am going to have the patient started on an H2 blocker such as famotidine 40 mg at bedtime for now and see how she does.  She is to continue the famotidine until her follow-up visit after her EGD.  The patient was agreeable and verbalized an understanding.    Encouraged the patient to avoid acidic or trigger foods including alcohol as much as possible.  Encouraged the patient to consider purchasing a wedge pillow to help prevent reflux symptoms while sleeping.  Encouraged the patient not to lay down or sleep for at least 3 to 4 hours after eating and to try to avoid late night snacking.    Orders:  -     famotidine (PEPCID) 40 MG tablet; Take 1 tablet (40 mg total) by mouth daily at bedtime  -     EGD; Future; Expected date: 09/19/2024  4. Diarrhea, unspecified type  -     Fecal fat, qualitative; Future  -     Giardia/Cryptosporidium EIA; Future  -     Pancreatic elastase, fecal; Future  -     Calprotectin,Fecal; Future  -     Clostridium difficile toxin by PCR with EIA; Future  -     IgA; Future  -     Ova and parasite examination; Future  -     Stool Enteric Bacterial Panel by PCR; Future  -     Tissue transglutaminase, IgA; Future  -     Colonoscopy; Future; Expected date: 09/19/2024  -     polyethylene glycol (Golytely) 4000 mL solution; Take 4,000 mL by mouth once for 1 dose Take 4000 mL by mouth once for 1 dose. Use as directed  5. Irritable bowel syndrome with diarrhea  Assessment & Plan:  While the patient was in the office today, I discussed with the patient that at this point in time even though she has had a chronic issue of diarrhea and loose stools, I do feel will be beneficial to proceed with repeat stool studies just to rule out any ongoing infectious or parasitic underlying etiology as well as to see if EPI is a component of her underlying issues.  I advised the patient that once we have the results of the  stool studies, our office will contact her to review results and discuss any other treatment plan recommendations.    If everything would come back normal, we may include SIBO testing in the future.  The patient was agreeable and verbalized an understanding.    In the meantime, since it had been recommended many times in the past we will proceed with a colonoscopy at the same time as the EGD to rule out any other underlying etiology such as IBD that could be causing her symptoms.  The patient was agreeable and verbalized an understanding.    I obtained informed verbal consent from the patient. The risks/benefits/alternatives of the procedure were discussed with the patient. Risks included, but not limited to, infection, bleeding, perforation, injury to organs in the abdomen, missed lesion, and incomplete procedure were discussed. The patient gave verbal understanding and is agreeable to proceed. Bowel prep instructions were reviewed and given as ordered. Patient's questions were answered to the best of my ability and until they verbalized an understanding.    Orders:  -     Fecal fat, qualitative; Future  -     Giardia/Cryptosporidium EIA; Future  -     Pancreatic elastase, fecal; Future  -     Calprotectin,Fecal; Future  -     Clostridium difficile toxin by PCR with EIA; Future  -     IgA; Future  -     Ova and parasite examination; Future  -     Stool Enteric Bacterial Panel by PCR; Future  -     Tissue transglutaminase, IgA; Future  -     Colonoscopy; Future; Expected date: 09/19/2024  -     polyethylene glycol (Golytely) 4000 mL solution; Take 4,000 mL by mouth once for 1 dose Take 4000 mL by mouth once for 1 dose. Use as directed    The patient will schedule a follow up office visit after her EGD and Colonoscopy, but understands to call or contact our office if there are any issues or concerns in the mean time.    ______________________________________________________________________    HPI: The patient is a 29  y.o. female who presents today for a consultation regarding her chronic bloating, GERD symptoms, diarrhea, and irritable bowel syndrome with diarrhea.  The patient reports that for years she has had issues with chronic bloating, GERD symptoms, diarrhea or irritable bowel syndrome with diarrhea for the most part.  The patient reports that typically after eating she bloats and it is usually relieved if she lays down and there are sometimes even bloats before eating.  She reports that she continues with intermittent nausea and GERD without vomiting at least once or twice a day.  She also reports epigastric discomfort during the bloating episodes.  She reports she has figured out that greasy or fatty foods definitely seem to be a trigger and make her discomfort worse.    Does report having 1-3 bowel movements daily and that most of the time they are loose or diarrhea-like without any blood, but does report intermittent melena.    The patient is status post a neurectomy in May and is healing relatively well from that.    The patient currently denies any dysphagia, vomiting, decreased appetite, or unplanned weight loss. Water Intake: Minimal daily.     The patient currently reports that they have a BM 1-3 x daily and reports that it is usually relieving, without any consistent diarrhea, nocturnal BMs, constipation, straining, or bloody stools. Glasscock: 5-7. Last BM: This morning. Flatus: Yes.      PMH/PSH:   Abdominal/Chest Surgery: Hysterectomy  Colon Cancer: Denied  Any Cancer: Denied  Pre-Cancerous Polyps: N/A  Crohn's: N/A  Ulcerative Colitis: N/A  Chambers's Esophagus: N/A  Celiac Disease: N/A  Liver Disease: Denied    Tobacco/Vaping: Quit smoking 2 months ago.  ETOH: Rarely  Marijuana: Daily  Illicit Drug Use: Denied    FH:  Colon Cancer: Denied  Any Cancer: Maternal Grandmother; Lung  Family Members with Pre-Cancerous Polyps: Denied  Crohn's: Denied  Ulcerative Colitis: Denied  Celiac Disease: Denied  Liver Disease:  Denied    Meds: None  Daily NSAID Use: Denied  Daily Tylenol Use: Denied  Any New Meds: Denied      Imaging: (9/1/22) CT of the abdomen pelvis with contrast: Nondilated fluid-filled loops of small bowel with scattered areas of mild wall thickening suggesting an enteritis.     Endoscopy History: EGD: (None):     COLONOSCOPY: (None):     REVIEW OF SYSTEMS:    CONSTITUTIONAL: Denies any fever, chills, rigors, and weight loss.  HEENT: No earache or tinnitus. Denies hearing loss or visual disturbances.  CARDIOVASCULAR: No chest pain or palpitations.   RESPIRATORY: Denies any cough, hemoptysis, shortness of breath or dyspnea on exertion.  GASTROINTESTINAL: As noted in the History of Present Illness.   GENITOURINARY: No problems with urination. Denies any hematuria or dysuria.  NEUROLOGIC: No dizziness or vertigo, denies headaches.   MUSCULOSKELETAL: Denies any muscle or joint pain.   SKIN: Denies skin rashes or itching.   ENDOCRINE: Denies excessive thirst. Denies intolerance to heat or cold.  PSYCHOSOCIAL: Denies depression or anxiety. Denies any recent memory loss.       Historical Information   Past Medical History:   Diagnosis Date    Allergic     Back pain     Ectopic pregnancy     GERD (gastroesophageal reflux disease)     History of unilateral fallopian tube excision     RIGHT removed    Ovarian cyst     Wears glasses      Past Surgical History:   Procedure Laterality Date    DILATION AND CURETTAGE OF UTERUS      ECTOPIC PREGNANCY SURGERY      EPIDURAL BLOCK INJECTION Bilateral 4/7/2022    Procedure: Bilateral L4-5 transforaminal epidural steroid injection;  Surgeon: Cele Escudero MD;  Location: [facility];  Service: Pain Management     LAPAROSCOPY      UT LAPS ABD PRTM&OMENTUM DX W/WO SPEC BR/WA SPX N/A 9/16/2017    Procedure: LAPAROSCOPY DIAGNOSTIC, left salpingectomy;  Surgeon: Park Cheema MD;  Location:  MAIN OR;  Service: Gynecology    WISDOM TOOTH EXTRACTION      WISDOM TOOTH EXTRACTION  "Bilateral      Social History   Social History     Substance and Sexual Activity   Alcohol Use Not Currently    Comment: \"occasional\"     Social History     Substance and Sexual Activity   Drug Use Yes    Types: Marijuana     Social History     Tobacco Use   Smoking Status Former    Current packs/day: 0.00    Types: Cigarettes    Quit date: 2024    Years since quittin.1   Smokeless Tobacco Never     Family History   Problem Relation Age of Onset    Diabetes Mother     No Known Problems Father        Meds/Allergies       Current Outpatient Medications:     albuterol (PROVENTIL HFA,VENTOLIN HFA) 90 mcg/act inhaler    Allergies   Allergen Reactions    Nitrofurantoin Itching    Nsaids Other (See Comments)     Due to issues with kidneys per pt    Oxycodone-Acetaminophen Itching     Reaction Date: 2011;     Oxycodone Itching    Tramadol Itching     Reaction Date: 2011;            Objective     Blood pressure 109/75, pulse (!) 50, temperature 98.5 °F (36.9 °C), height 5' 5\" (1.651 m), weight 64 kg (141 lb 3.2 oz), SpO2 98%, not currently breastfeeding. Body mass index is 23.5 kg/m².        PHYSICAL EXAM:      General Appearance:   Alert, cooperative, no distress   HEENT:   Normocephalic, atraumatic, anicteric.     Neck:  Supple, symmetrical, trachea midline   Lungs:   Clear to auscultation bilaterally; no rales, rhonchi or wheezing; respirations unlabored    Heart::   Regular rate and rhythm; no murmur, rub, or gallop.   Abdomen:   Soft, non-tender, non-distended; normal bowel sounds; no masses, no organomegaly    Genitalia:   Deferred    Rectal:   Deferred    Extremities:  No cyanosis, clubbing or edema    Pulses:  2+ and symmetric    Skin:  No jaundice, rashes, or lesions    Lymph nodes:  No palpable cervical lymphadenopathy        Lab Results:   No visits with results within 1 Day(s) from this visit.   Latest known visit with results is:   Admission on 2022, Discharged on 2022   Component " Date Value    WBC 09/01/2022 9.81     RBC 09/01/2022 4.25     Hemoglobin 09/01/2022 13.3     Hematocrit 09/01/2022 38.8     MCV 09/01/2022 91     MCH 09/01/2022 31.3     MCHC 09/01/2022 34.3     RDW 09/01/2022 11.9     MPV 09/01/2022 9.5     Platelets 09/01/2022 333     nRBC 09/01/2022 0     Segmented % 09/01/2022 68     Immature Grans % 09/01/2022 0     Lymphocytes % 09/01/2022 23     Monocytes % 09/01/2022 6     Eosinophils Relative 09/01/2022 2     Basophils Relative 09/01/2022 1     Absolute Neutrophils 09/01/2022 6.70     Absolute Immature Grans 09/01/2022 0.02     Absolute Lymphocytes 09/01/2022 2.22     Absolute Monocytes 09/01/2022 0.62     Eosinophils Absolute 09/01/2022 0.19     Basophils Absolute 09/01/2022 0.06     Sodium 09/01/2022 139     Potassium 09/01/2022 4.0     Chloride 09/01/2022 102     CO2 09/01/2022 29     ANION GAP 09/01/2022 8     BUN 09/01/2022 7     Creatinine 09/01/2022 0.60     Glucose 09/01/2022 91     Calcium 09/01/2022 8.8     Corrected Calcium 09/01/2022 9.3     AST 09/01/2022 7     ALT 09/01/2022 10 (L)     Alkaline Phosphatase 09/01/2022 63     Total Protein 09/01/2022 7.0     Albumin 09/01/2022 3.4 (L)     Total Bilirubin 09/01/2022 0.18 (L)     eGFR 09/01/2022 123     Lipase 09/01/2022 123     Color, UA 09/01/2022 Yellow     Clarity, UA 09/01/2022 Clear     Specific Gravity, UA 09/01/2022 >=1.030     pH, UA 09/01/2022 6.0     Leukocytes, UA 09/01/2022 Negative     Nitrite, UA 09/01/2022 Negative     Protein, UA 09/01/2022 Negative     Glucose, UA 09/01/2022 Negative     Ketones, UA 09/01/2022 Trace (A)     Urobilinogen, UA 09/01/2022 0.2     Bilirubin, UA 09/01/2022 Negative     Occult Blood, UA 09/01/2022 Negative     EXT PREG TEST UR (Ref: N* 09/01/2022 Negative     Control 09/01/2022 Valid     LACTIC ACID 09/01/2022 1.2          Radiology Results:   No results found.   Answers submitted by the patient for this visit:  Abdominal Pain Questionnaire (Submitted on  9/18/2024)  Chief Complaint: Abdominal pain  Chronicity: chronic  Onset: more than 1 year ago  Onset quality: sudden  Frequency: daily  Episode duration: 2 Hours  Progression since onset: waxing and waning  Pain location: epigastric region  Pain - numeric: 7/10  Pain quality: cramping  Radiates to: epigastric region, periumbilical region  anorexia: No  arthralgias: No  belching: No  constipation: No  diarrhea: Yes  dysuria: No  fever: No  flatus: No  frequency: No  headaches: No  hematochezia: No  hematuria: No  melena: No  myalgias: Yes  nausea: Yes  weight loss: No  vomiting: No  Aggravated by: eating  Relieved by: recumbency  Diagnostic workup: surgery

## 2024-09-19 NOTE — PATIENT INSTRUCTIONS
Proceed with EGD and Colonoscopy.   Proceed with stool studies.   Start famotidine 40 mg at bed time.   Encouraged the patient to avoid acidic or trigger foods including alcohol as much as possible.  Encouraged the patient to consider purchasing a wedge pillow to help prevent reflux symptoms while sleeping.  Encouraged the patient not to lay down or sleep for at least 3 to 4 hours after eating and to try to avoid late night snacking.  Schedule a f/u OV after EGD/Colonoscopy.

## 2024-09-20 ENCOUNTER — APPOINTMENT (OUTPATIENT)
Dept: LAB | Facility: MEDICAL CENTER | Age: 29
End: 2024-09-20
Payer: COMMERCIAL

## 2024-09-20 DIAGNOSIS — K58.0 IRRITABLE BOWEL SYNDROME WITH DIARRHEA: Primary | ICD-10-CM

## 2024-09-20 DIAGNOSIS — R19.7 DIARRHEA, UNSPECIFIED TYPE: ICD-10-CM

## 2024-09-20 LAB — IGA SERPL-MCNC: 128 MG/DL (ref 66–433)

## 2024-09-20 PROCEDURE — 82653 EL-1 FECAL QUANTITATIVE: CPT

## 2024-09-20 PROCEDURE — 82784 ASSAY IGA/IGD/IGG/IGM EACH: CPT

## 2024-09-20 PROCEDURE — 83993 ASSAY FOR CALPROTECTIN FECAL: CPT

## 2024-09-20 PROCEDURE — 87015 SPECIMEN INFECT AGNT CONCNTJ: CPT

## 2024-09-20 PROCEDURE — 86364 TISS TRNSGLTMNASE EA IG CLAS: CPT

## 2024-09-20 PROCEDURE — 87209 SMEAR COMPLEX STAIN: CPT

## 2024-09-20 PROCEDURE — 87506 IADNA-DNA/RNA PROBE TQ 6-11: CPT

## 2024-09-20 PROCEDURE — 82705 FATS/LIPIDS FECES QUAL: CPT

## 2024-09-20 PROCEDURE — 36415 COLL VENOUS BLD VENIPUNCTURE: CPT

## 2024-09-20 PROCEDURE — 87177 OVA AND PARASITES SMEARS: CPT

## 2024-09-20 PROCEDURE — 87206 SMEAR FLUORESCENT/ACID STAI: CPT

## 2024-09-21 LAB
C COLI+JEJUNI TUF STL QL NAA+PROBE: NEGATIVE
C DIFF TOX A+B STL QL IA: NEGATIVE
C DIFF TOX GENS STL QL NAA+PROBE: POSITIVE
EC STX1+STX2 GENES STL QL NAA+PROBE: NEGATIVE
G LAMBLIA AG STL QL IA: NEGATIVE
SALMONELLA SP SPAO STL QL NAA+PROBE: NEGATIVE
SHIGELLA SP+EIEC IPAH STL QL NAA+PROBE: NEGATIVE

## 2024-09-23 LAB
CRYPTOSP STL QL ACID FAST STN: NORMAL
I BELLI SPEC QL ACID FAST MOD KINY STN: NORMAL
TTG IGA SER-ACNC: <2 U/ML (ref 0–3)

## 2024-09-24 DIAGNOSIS — A04.72 C. DIFFICILE DIARRHEA: Primary | ICD-10-CM

## 2024-09-24 LAB
CALPROTECTIN STL-MCNC: 24.8 ΜG/G
ELASTASE PANC STL-MCNT: >800 UG/G

## 2024-09-24 RX ORDER — VANCOMYCIN HYDROCHLORIDE 125 MG/1
125 CAPSULE ORAL 4 TIMES DAILY
Qty: 56 CAPSULE | Refills: 0 | Status: SHIPPED | OUTPATIENT
Start: 2024-09-24 | End: 2024-09-26

## 2024-09-25 ENCOUNTER — NURSE TRIAGE (OUTPATIENT)
Age: 29
End: 2024-09-25

## 2024-09-25 NOTE — TELEPHONE ENCOUNTER
"Pt calling back to ask if provider answered question.  Reviewed chart and reviewed Bob SHRESTHA's note.  Advised that she is waiting to hear back from pt's regular provider.  Pt reports that since calling earlier today, she has constant abd pain, 7/10, from umbilicus to ribs.  States she feels \"woozy\", has a headache and nausea and her anxiety is \"through the roof.\" Pt has diarrhea, but states this is not unusual for her.        Please advise.     Answer Assessment - Initial Assessment Questions  1. LOCATION: \"Where does it hurt?\"       Umbilicus to ribs  2. RADIATION: \"Does the pain shoot anywhere else?\" (e.g., chest, back)      Ribs  3. ONSET: \"When did the pain begin?\" (e.g., minutes, hours or days ago)       Started since earlier call around 1230 today  4. SUDDEN: \"Gradual or sudden onset?\"      Sudden   5. PATTERN \"Does the pain come and go, or is it constant?\"     - If constant: \"Is it getting better, staying the same, or worsening?\"       (Note: Constant means the pain never goes away completely; most serious pain is constant and it progresses)      - If intermittent: \"How long does it last?\" \"Do you have pain now?\"      (Note: Intermittent means the pain goes away completely between bouts)      Constant   6. SEVERITY: \"How bad is the pain?\"  (e.g., Scale 1-10; mild, moderate, or severe)    - MILD (1-3): doesn't interfere with normal activities, abdomen soft and not tender to touch     - MODERATE (4-7): interferes with normal activities or awakens from sleep, tender to touch     - SEVERE (8-10): excruciating pain, doubled over, unable to do any normal activities       7/10  7. RECURRENT SYMPTOM: \"Have you ever had this type of stomach pain before?\" If Yes, ask: \"When was the last time?\" and \"What happened that time?\"       Ye s  8. CAUSE: \"What do you think is causing the stomach pain?\"      Unsure   9. RELIEVING/AGGRAVATING FACTORS: \"What makes it better or worse?\" (e.g., movement, antacids, bowel movement)      " "Nothing   10. OTHER SYMPTOMS: \"Has there been any vomiting, diarrhea, constipation, or urine problems?\"        Diarrhea, but states this is her usual.    Protocols used: Abdominal Pain - Female-ADULT-[place]    "

## 2024-09-25 NOTE — TELEPHONE ENCOUNTER
Called patient to discuss her symptoms.  She states since starting vancomycin, she had severe abdominal pain in the epigastric/periumbilical region with bloating and previously with dizziness that has since resolved.  She states the symptoms are much worse than her baseline, and she denies any significant diarrhea/change in her bowel habits from her baseline.    Given patient's side effects to vancomycin, as well as stool tests showing C. difficile colonization but no evidence for active infection, recommend discontinuing vancomycin at this time.  I also recommend patient can take Pepcid up to twice daily as needed to help with abdominal pain over the next few days.  I advised patient to call us back tomorrow if symptoms do not improve as expected with the discontinuation of her vancomycin.    Patient agrees with plan of care as outlined with no further questions at this time.

## 2024-09-25 NOTE — TELEPHONE ENCOUNTER
"Patient has been taking Vancomycin for 2 days after C-diff diagnosis.  Patient states she feels weird.  Last pill was taken at 6 am.  Next was due at 12 noon wihich would have been 4th pill.  C/O lower back pain, (kidney function is normally off per patient), dizziness, nauseated - states started 1 hour ago.  Anxiety through the roof per patient.  Advised patient to hold off on 12 pm pill until hears back from someone.      Reason for Disposition   Caller has URGENT medicine question about med that PCP or specialist prescribed and triager unable to answer question    Answer Assessment - Initial Assessment Questions  1. NAME of MEDICATION: \"What medicine are you calling about?\"      Vancomycin  2. QUESTION: \"What is your question?\" (e.g., medication refill, side effect)      Nausea, dizziness, low back pain, anxiety.  Patient states she just feels weird.    3. PRESCRIBING HCP: \"Who prescribed it?\" Reason: if prescribed by specialist, call should be referred to that group.      Donnie Merrill.    4. SYMPTOMS: \"Do you have any symptoms?\"         Nausea, dizziness, low back pain, anxiety.  Patient states she just feels weird.   5. SEVERITY: If symptoms are present, ask \"Are they mild, moderate or severe?\"      Moderate to severe.    Protocols used: Medication Question Call-ADULT-[place]    "

## 2024-09-26 LAB
FAT STL QL: NORMAL
NEUTRAL FAT STL QL: NORMAL

## 2024-10-14 ENCOUNTER — OFFICE VISIT (OUTPATIENT)
Dept: FAMILY MEDICINE CLINIC | Facility: HOME HEALTHCARE | Age: 29
End: 2024-10-14
Payer: COMMERCIAL

## 2024-10-14 VITALS
OXYGEN SATURATION: 98 % | WEIGHT: 146.8 LBS | HEART RATE: 67 BPM | DIASTOLIC BLOOD PRESSURE: 71 MMHG | RESPIRATION RATE: 18 BRPM | TEMPERATURE: 97.9 F | SYSTOLIC BLOOD PRESSURE: 116 MMHG | HEIGHT: 63 IN | BODY MASS INDEX: 26.01 KG/M2

## 2024-10-14 DIAGNOSIS — J30.2 SEASONAL ALLERGIES: ICD-10-CM

## 2024-10-14 DIAGNOSIS — Z00.00 ANNUAL PHYSICAL EXAM: Primary | ICD-10-CM

## 2024-10-14 DIAGNOSIS — M54.41 CHRONIC BILATERAL LOW BACK PAIN WITH BILATERAL SCIATICA: ICD-10-CM

## 2024-10-14 DIAGNOSIS — M79.18 MYOFASCIAL PAIN SYNDROME: ICD-10-CM

## 2024-10-14 DIAGNOSIS — M54.42 CHRONIC BILATERAL LOW BACK PAIN WITH BILATERAL SCIATICA: ICD-10-CM

## 2024-10-14 DIAGNOSIS — G89.29 CHRONIC BILATERAL LOW BACK PAIN WITH BILATERAL SCIATICA: ICD-10-CM

## 2024-10-14 PROCEDURE — T1015 CLINIC SERVICE: HCPCS | Performed by: FAMILY MEDICINE

## 2024-10-14 RX ORDER — FEXOFENADINE HCL 180 MG/1
180 TABLET ORAL DAILY
Qty: 90 TABLET | Refills: 1 | Status: SHIPPED | OUTPATIENT
Start: 2024-10-14

## 2024-10-14 NOTE — PATIENT INSTRUCTIONS
"Patient Education     Routine physical for adults   The Basics   Written by the doctors and editors at [facility]   What is a physical? -- A physical is a routine visit, or \"check-up,\" with your doctor. You might also hear it called a \"wellness visit\" or \"preventive visit.\"  During each visit, the doctor will:   Ask about your physical and mental health   Ask about your habits, behaviors, and lifestyle   Do an exam   Give you vaccines if needed   Talk to you about any medicines you take   Give advice about your health   Answer your questions  Getting regular check-ups is an important part of taking care of your health. It can help your doctor find and treat any problems you have. But it's also important for preventing health problems.  A routine physical is different from a \"sick visit.\" A sick visit is when you see a doctor because of a health concern or problem. Since physicals are scheduled ahead of time, you can think about what you want to ask the doctor.  How often should I get a physical? -- It depends on your age and health. In general, for people age 21 years and older:   If you are younger than 50 years, you might be able to get a physical every 3 years.   If you are 50 years or older, your doctor might recommend a physical every year.  If you have an ongoing health condition, like diabetes or high blood pressure, your doctor will probably want to see you more often.  What happens during a physical? -- In general, each visit will include:   Physical exam - The doctor or nurse will check your height, weight, heart rate, and blood pressure. They will also look at your eyes and ears. They will ask about how you are feeling and whether you have any symptoms that bother you.   Medicines - It's a good idea to bring a list of all the medicines you take to each doctor visit. Your doctor will talk to you about your medicines and answer any questions. Tell them if you are having any side effects that bother you. You " "should also tell them if you are having trouble paying for any of your medicines.   Habits and behaviors - This includes:   Your diet   Your exercise habits   Whether you smoke, drink alcohol, or use drugs   Whether you are sexually active   Whether you feel safe at home  Your doctor will talk to you about things you can do to improve your health and lower your risk of health problems. They will also offer help and support. For example, if you want to quit smoking, they can give you advice and might prescribe medicines. If you want to improve your diet or get more physical activity, they can help you with this, too.   Lab tests, if needed - The tests you get will depend on your age and situation. For example, your doctor might want to check your:   Cholesterol   Blood sugar   Iron level   Vaccines - The recommended vaccines will depend on your age, health, and what vaccines you already had. Vaccines are very important because they can prevent certain serious or deadly infections.   Discussion of screening - \"Screening\" means checking for diseases or other health problems before they cause symptoms. Your doctor can recommend screening based on your age, risk, and preferences. This might include tests to check for:   Cancer, such as breast, prostate, cervical, ovarian, colorectal, prostate, lung, or skin cancer   Sexually transmitted infections, such as chlamydia and gonorrhea   Mental health conditions like depression and anxiety  Your doctor will talk to you about the different types of screening tests. They can help you decide which screenings to have. They can also explain what the results might mean.   Answering questions - The physical is a good time to ask the doctor or nurse questions about your health. If needed, they can refer you to other doctors or specialists, too.  Adults older than 65 years often need other care, too. As you get older, your doctor will talk to you about:   How to prevent falling at " home   Hearing or vision tests   Memory testing   How to take your medicines safely   Making sure that you have the help and support you need at home  All topics are updated as new evidence becomes available and our peer review process is complete.  This topic retrieved from [employer] on: May 02, 2024.  Topic 782241 Version 1.0  Release: 32.4.3 - C32.122  © 2024 UpToDate, Inc. and/or its affiliates. All rights reserved.  Consumer Information Use and Disclaimer   Disclaimer: This generalized information is a limited summary of diagnosis, treatment, and/or medication information. It is not meant to be comprehensive and should be used as a tool to help the user understand and/or assess potential diagnostic and treatment options. It does NOT include all information about conditions, treatments, medications, side effects, or risks that may apply to a specific patient. It is not intended to be medical advice or a substitute for the medical advice, diagnosis, or treatment of a health care provider based on the health care provider's examination and assessment of a patient's specific and unique circumstances. Patients must speak with a health care provider for complete information about their health, medical questions, and treatment options, including any risks or benefits regarding use of medications. This information does not endorse any treatments or medications as safe, effective, or approved for treating a specific patient. UpToDate, Inc. and its affiliates disclaim any warranty or liability relating to this information or the use thereof.The use of this information is governed by the Terms of Use, available at https://www.wolterseucl3Duwer.com/en/know/clinical-effectiveness-terms. 2024© UpToDate, Inc. and its affiliates and/or licensors. All rights reserved.  Copyright   © 2024 UpToDate, Inc. and/or its affiliates. All rights reserved.

## 2024-10-14 NOTE — PROGRESS NOTES
Adult Annual Physical  Name: Shiane E Follweiler      : 1995      MRN: [id number]  Encounter Provider: Rodriguez Millan PA-C  Encounter Date: 10/14/2024   Encounter department: [facility]    Assessment & Plan  Annual physical exam         Chronic bilateral low back pain with bilateral sciatica    Orders:    Ambulatory referral to Spine & Pain Management; Future    Myofascial pain syndrome    Orders:    Ambulatory referral to Spine & Pain Management; Future    Seasonal allergies    Orders:    fexofenadine (ALLEGRA) 180 MG tablet; Take 1 tablet (180 mg total) by mouth daily    Immunizations and preventive care screenings were discussed with patient today. Appropriate education was printed on patient's after visit summary.    Counseling:  Alcohol/drug use: discussed moderation in alcohol intake, the recommendations for healthy alcohol use, and avoidance of illicit drug use.  Dental Health: discussed importance of regular tooth brushing, flossing, and dental visits.  Injury prevention: discussed safety/seat belts, safety helmets, smoke detectors, carbon monoxide detectors, and smoking near bedding or upholstery.  Sexual health: discussed sexually transmitted diseases, partner selection, use of condoms, avoidance of unintended pregnancy, and contraceptive alternatives.  Exercise: the importance of regular exercise/physical activity was discussed. Recommend exercise 3-5 times per week for at least 30 minutes.       Depression Screening and Follow-up Plan: Patient was screened for depression during today's encounter. They screened negative with a PHQ-2 score of 0.        History of Present Illness     Adult Annual Physical:  Patient presents for annual physical.     Diet and Physical Activity:  - Diet/Nutrition: well balanced diet.  - Exercise: walking.    Depression Screening:  - PHQ-2 Score: 0    General Health:  - Sleep: 7-8 hours of sleep on average and sleeps well.  - Hearing:  normal hearing bilateral ears.  - Vision: wears glasses and goes for regular eye exams.  - Dental: regular dental visits.    /GYN Health:  - Follows with GYN: yes.   - Menopause: premenopausal.   - Contraception:. s/p hysterectomy      Advanced Care Planning:  - Has an advanced directive?: no    - Has a durable medical POA?: no      Review of Systems   Constitutional:  Negative for chills, diaphoresis and fever.   Respiratory:  Negative for cough, chest tightness, shortness of breath and wheezing.    Cardiovascular:  Negative for chest pain, palpitations and leg swelling.   Gastrointestinal:  Negative for abdominal pain, constipation, diarrhea, nausea and vomiting.   Musculoskeletal:  Positive for back pain.   Skin:  Negative for rash and wound.   Neurological:  Negative for dizziness, syncope, weakness, light-headedness and headaches.     Medical History Reviewed by provider this encounter:  Tobacco  Allergies  Meds  Problems  Med Hx  Surg Hx  Fam Hx       Current Outpatient Medications on File Prior to Visit   Medication Sig Dispense Refill    albuterol (PROVENTIL HFA,VENTOLIN HFA) 90 mcg/act inhaler Inhale 2 puffs every 6 (six) hours as needed for wheezing or shortness of breath 18 g 5    famotidine (PEPCID) 40 MG tablet Take 1 tablet (40 mg total) by mouth daily at bedtime (Patient not taking: Reported on 10/14/2024) 30 tablet 3    [DISCONTINUED] polyethylene glycol (Golytely) 4000 mL solution Take 4,000 mL by mouth once for 1 dose Take 4000 mL by mouth once for 1 dose. Use as directed 4000 mL 0     No current facility-administered medications on file prior to visit.      Social History     Tobacco Use    Smoking status: Former     Current packs/day: 0.00     Types: Cigarettes     Quit date: 2024     Years since quittin.2    Smokeless tobacco: Never   Vaping Use    Vaping status: Some Days    Substances: THC, CBD   Substance and Sexual Activity    Alcohol use: Not Currently     Comment:  "\"occasional\"    Drug use: Yes     Types: Marijuana    Sexual activity: Yes     Partners: Male     Birth control/protection: None, Female Sterilization       Objective     /71   Pulse 67   Temp 97.9 °F (36.6 °C) (Tympanic)   Resp 18   Ht 5' 3\" (1.6 m)   Wt 66.6 kg (146 lb 12.8 oz)   LMP  (LMP Unknown)   SpO2 98%   BMI 26.00 kg/m²     Physical Exam  Vitals and nursing note reviewed.   Constitutional:       General: She is not in acute distress.     Appearance: Normal appearance.   HENT:      Head: Normocephalic and atraumatic.      Right Ear: External ear normal. There is impacted cerumen.      Left Ear: Tympanic membrane, ear canal and external ear normal.      Nose: Nose normal.      Mouth/Throat:      Mouth: Mucous membranes are moist.      Pharynx: Oropharynx is clear. No oropharyngeal exudate.   Eyes:      Extraocular Movements: Extraocular movements intact.      Conjunctiva/sclera: Conjunctivae normal.      Pupils: Pupils are equal, round, and reactive to light.   Cardiovascular:      Rate and Rhythm: Normal rate and regular rhythm.      Heart sounds: Normal heart sounds. No murmur heard.  Pulmonary:      Effort: Pulmonary effort is normal. No respiratory distress.      Breath sounds: Normal breath sounds. No wheezing.   Musculoskeletal:         General: Normal range of motion.      Cervical back: Normal range of motion and neck supple.      Right lower leg: No edema.      Left lower leg: No edema.   Skin:     General: Skin is warm and dry.   Neurological:      General: No focal deficit present.      Mental Status: She is alert and oriented to person, place, and time.      Cranial Nerves: No cranial nerve deficit.      Motor: No weakness.   Psychiatric:         Mood and Affect: Mood normal.         Behavior: Behavior normal.         "

## 2024-11-19 ENCOUNTER — ANESTHESIA (OUTPATIENT)
Dept: PERIOP | Facility: HOSPITAL | Age: 29
End: 2024-11-19
Payer: COMMERCIAL

## 2024-11-19 ENCOUNTER — HOSPITAL ENCOUNTER (OUTPATIENT)
Dept: PERIOP | Facility: HOSPITAL | Age: 29
Setting detail: OUTPATIENT SURGERY
Discharge: HOME | End: 2024-11-19
Payer: COMMERCIAL

## 2024-11-19 ENCOUNTER — ANESTHESIA EVENT (OUTPATIENT)
Dept: PERIOP | Facility: HOSPITAL | Age: 29
End: 2024-11-19
Payer: COMMERCIAL

## 2024-11-19 VITALS
OXYGEN SATURATION: 98 % | HEIGHT: 63 IN | TEMPERATURE: 97.3 F | RESPIRATION RATE: 18 BRPM | HEART RATE: 66 BPM | SYSTOLIC BLOOD PRESSURE: 124 MMHG | WEIGHT: 146 LBS | BODY MASS INDEX: 25.87 KG/M2 | DIASTOLIC BLOOD PRESSURE: 80 MMHG

## 2024-11-19 DIAGNOSIS — K21.9 GASTROESOPHAGEAL REFLUX DISEASE WITHOUT ESOPHAGITIS: ICD-10-CM

## 2024-11-19 DIAGNOSIS — R14.0 BLOATING: ICD-10-CM

## 2024-11-19 DIAGNOSIS — R19.7 DIARRHEA, UNSPECIFIED TYPE: ICD-10-CM

## 2024-11-19 DIAGNOSIS — K58.0 IRRITABLE BOWEL SYNDROME WITH DIARRHEA: ICD-10-CM

## 2024-11-19 DIAGNOSIS — K92.1 MELENA: ICD-10-CM

## 2024-11-19 PROCEDURE — 45380 COLONOSCOPY AND BIOPSY: CPT | Performed by: INTERNAL MEDICINE

## 2024-11-19 PROCEDURE — 43239 EGD BIOPSY SINGLE/MULTIPLE: CPT | Performed by: INTERNAL MEDICINE

## 2024-11-19 PROCEDURE — 88305 TISSUE EXAM BY PATHOLOGIST: CPT | Performed by: PATHOLOGY

## 2024-11-19 RX ORDER — SODIUM CHLORIDE, SODIUM LACTATE, POTASSIUM CHLORIDE, CALCIUM CHLORIDE 600; 310; 30; 20 MG/100ML; MG/100ML; MG/100ML; MG/100ML
125 INJECTION, SOLUTION INTRAVENOUS CONTINUOUS
Status: CANCELLED | OUTPATIENT
Start: 2024-11-19

## 2024-11-19 RX ORDER — LIDOCAINE HYDROCHLORIDE 20 MG/ML
INJECTION, SOLUTION EPIDURAL; INFILTRATION; INTRACAUDAL; PERINEURAL AS NEEDED
Status: DISCONTINUED | OUTPATIENT
Start: 2024-11-19 | End: 2024-11-19

## 2024-11-19 RX ORDER — SODIUM CHLORIDE, SODIUM LACTATE, POTASSIUM CHLORIDE, CALCIUM CHLORIDE 600; 310; 30; 20 MG/100ML; MG/100ML; MG/100ML; MG/100ML
125 INJECTION, SOLUTION INTRAVENOUS CONTINUOUS
Status: DISCONTINUED | OUTPATIENT
Start: 2024-11-19 | End: 2024-11-23 | Stop reason: HOSPADM

## 2024-11-19 RX ORDER — ONDANSETRON 2 MG/ML
4 INJECTION INTRAMUSCULAR; INTRAVENOUS ONCE AS NEEDED
Status: DISCONTINUED | OUTPATIENT
Start: 2024-11-19 | End: 2024-11-23 | Stop reason: HOSPADM

## 2024-11-19 RX ORDER — PROPOFOL 10 MG/ML
INJECTION, EMULSION INTRAVENOUS AS NEEDED
Status: DISCONTINUED | OUTPATIENT
Start: 2024-11-19 | End: 2024-11-19

## 2024-11-19 RX ADMIN — LIDOCAINE HYDROCHLORIDE 50 MG: 20 INJECTION, SOLUTION EPIDURAL; INFILTRATION; INTRACAUDAL; PERINEURAL at 08:55

## 2024-11-19 RX ADMIN — PROPOFOL 200 MG: 10 INJECTION, EMULSION INTRAVENOUS at 08:56

## 2024-11-19 RX ADMIN — SODIUM CHLORIDE, SODIUM LACTATE, POTASSIUM CHLORIDE, AND CALCIUM CHLORIDE: .6; .31; .03; .02 INJECTION, SOLUTION INTRAVENOUS at 08:52

## 2024-11-19 RX ADMIN — PROPOFOL 100 MG: 10 INJECTION, EMULSION INTRAVENOUS at 09:02

## 2024-11-19 RX ADMIN — PROPOFOL 100 MG: 10 INJECTION, EMULSION INTRAVENOUS at 09:05

## 2024-11-19 RX ADMIN — PROPOFOL 120 MCG/KG/MIN: 10 INJECTION, EMULSION INTRAVENOUS at 09:06

## 2024-11-19 NOTE — ANESTHESIA PREPROCEDURE EVALUATION
Procedure:  COLONOSCOPY  EGD    Relevant Problems   GI/HEPATIC   (+) GERD (gastroesophageal reflux disease)      /RENAL   (+) Chronic tubulointerstitial nephritis      MUSCULOSKELETAL   (+) Acute bilateral low back pain with left-sided sciatica   (+) Chronic bilateral low back pain with bilateral sciatica   (+) Myofascial pain syndrome      NEURO/PSYCH   (+) Chronic bilateral low back pain with bilateral sciatica   (+) Chronic pain syndrome   (+) Myofascial pain syndrome      PULMONARY   (+) Asthma due to seasonal allergies        Physical Exam    Airway    Mallampati score: II         Dental       Cardiovascular      Pulmonary      Other Findings  post-pubertal.      Anesthesia Plan  ASA Score- 2     Anesthesia Type- general with ASA Monitors.         Additional Monitors:     Airway Plan:            Plan Factors-    Induction-     Postoperative Plan-         Informed Consent- Anesthetic plan and risks discussed with patient.  I personally reviewed this patient with the CRNA. Discussed and agreed on the Anesthesia Plan with the CRNA..

## 2024-11-19 NOTE — ANESTHESIA POSTPROCEDURE EVALUATION
Post-Op Assessment Note    CV Status:  Stable    Pain management: adequate       Mental Status:  Sleepy   Hydration Status:  Euvolemic   PONV Controlled:  Controlled   Airway Patency:  Patent     Post Op Vitals Reviewed: Yes    No anethesia notable event occurred.    Staff: CRNA           Last Filed PACU Vitals:  Vitals Value Taken Time   Temp 97.8    Pulse 87 11/19/24 0924   /86    Resp 30 11/19/24 0924   SpO2 98 % 11/19/24 0924   Vitals shown include unfiled device data.    Modified Santosh:  Activity: 2 (11/19/2024  8:35 AM)  Respiration: 2 (11/19/2024  8:35 AM)  Circulation: 2 (11/19/2024  8:35 AM)  Consciousness: 2 (11/19/2024  8:35 AM)  Oxygen Saturation: 2 (11/19/2024  8:35 AM)  Modified Santosh Score: 10 (11/19/2024  8:35 AM)

## 2024-11-19 NOTE — H&P
"History and Physical -  [facility]  Shiane E Follweiler 29 y.o. female MRN: [id number]                  HPI: Shiane E Follweiler is a 29 y.o. year old female who presents for reflux, bloating, melena, and diarrhea.      REVIEW OF SYSTEMS: Per the HPI, and otherwise unremarkable.    Historical Information   Past Medical History:   Diagnosis Date    Allergic     Back pain     Ectopic pregnancy     GERD (gastroesophageal reflux disease)     History of unilateral fallopian tube excision     RIGHT removed    Ovarian cyst     Wears glasses      Past Surgical History:   Procedure Laterality Date    DILATION AND CURETTAGE OF UTERUS      ECTOPIC PREGNANCY SURGERY      EPIDURAL BLOCK INJECTION Bilateral 2022    Procedure: Bilateral L4-5 transforaminal epidural steroid injection;  Surgeon: Cele Escudero MD;  Location: [facility];  Service: Pain Management     HYSTERECTOMY      LAPAROSCOPY      [place] LAPS ABD PRTM&OMENTUM DX W/WO SPEC BR/WA SPX N/A 2017    Procedure: LAPAROSCOPY DIAGNOSTIC, left salpingectomy;  Surgeon: Park Cheema MD;  Location:  MAIN OR;  Service: Gynecology    WISDOM TOOTH EXTRACTION      WISDOM TOOTH EXTRACTION Bilateral      Social History   Social History     Substance and Sexual Activity   Alcohol Use Not Currently    Comment: \"occasional\"     Social History     Substance and Sexual Activity   Drug Use Yes    Frequency: 14.0 times per week    Types: Marijuana     Social History     Tobacco Use   Smoking Status Former    Current packs/day: 0.00    Types: Cigarettes    Quit date: 2024    Years since quittin.3   Smokeless Tobacco Never     Family History   Problem Relation Age of Onset    Diabetes Mother     Pulmonary embolism Father        Meds/Allergies       Current Outpatient Medications:     aspirin-acetaminophen-caffeine (EXCEDRIN MIGRAINE) 250-250-65 MG per tablet    albuterol (PROVENTIL HFA,VENTOLIN HFA) 90 mcg/act inhaler    fexofenadine " "(ALLEGRA) 180 MG tablet    Current Facility-Administered Medications:     lactated ringers infusion, 125 mL/hr, Intravenous, Continuous    Allergies   Allergen Reactions    Nitrofurantoin Itching    Nsaids Other (See Comments)     Due to issues with kidneys per pt    Oxycodone-Acetaminophen Itching     Reaction Date: 28Apr2011;     Oxycodone Itching    Tramadol Itching     Reaction Date: 28Apr2011;        Objective     /75   Pulse 67   Temp 98.2 °F (36.8 °C) (Temporal)   Resp 18   Ht 5' 3\" (1.6 m)   Wt 66.2 kg (146 lb)   LMP  (LMP Unknown)   SpO2 97%   BMI 25.86 kg/m²       PHYSICAL EXAM    Gen: NAD  Head: NCAT  CV: RRR  CHEST: Clear  ABD: soft, NT/ND  EXT: no edema      ASSESSMENT/PLAN:  This is a 29 y.o. year old female here for upper endoscopy and colonoscopy, and she is stable and optimized for her procedure.        "

## 2024-11-25 PROCEDURE — 88305 TISSUE EXAM BY PATHOLOGIST: CPT | Performed by: PATHOLOGY

## 2024-11-30 ENCOUNTER — RESULTS FOLLOW-UP (OUTPATIENT)
Dept: GASTROENTEROLOGY | Facility: CLINIC | Age: 29
End: 2024-11-30

## 2024-11-30 NOTE — RESULT ENCOUNTER NOTE
The results were negative (unremarkable). Your next colonoscopy can be at age 43. This was communicated via [employer]

## 2025-01-23 ENCOUNTER — OFFICE VISIT (OUTPATIENT)
Dept: GASTROENTEROLOGY | Facility: CLINIC | Age: 30
End: 2025-01-23
Payer: COMMERCIAL

## 2025-01-23 VITALS
TEMPERATURE: 98.5 F | SYSTOLIC BLOOD PRESSURE: 118 MMHG | HEIGHT: 63 IN | BODY MASS INDEX: 25.34 KG/M2 | WEIGHT: 143 LBS | DIASTOLIC BLOOD PRESSURE: 73 MMHG | OXYGEN SATURATION: 99 % | HEART RATE: 72 BPM

## 2025-01-23 DIAGNOSIS — K58.2 IRRITABLE BOWEL SYNDROME WITH BOTH CONSTIPATION AND DIARRHEA: Primary | ICD-10-CM

## 2025-01-23 DIAGNOSIS — R11.0 NAUSEA: ICD-10-CM

## 2025-01-23 DIAGNOSIS — R14.0 BLOATING: ICD-10-CM

## 2025-01-23 DIAGNOSIS — R10.84 GENERALIZED ABDOMINAL PAIN: ICD-10-CM

## 2025-01-23 PROBLEM — K92.1 MELENA: Status: RESOLVED | Noted: 2024-09-19 | Resolved: 2025-01-23

## 2025-01-23 PROBLEM — K21.9 GERD (GASTROESOPHAGEAL REFLUX DISEASE): Status: RESOLVED | Noted: 2020-02-20 | Resolved: 2025-01-23

## 2025-01-23 PROCEDURE — 99214 OFFICE O/P EST MOD 30 MIN: CPT | Performed by: NURSE PRACTITIONER

## 2025-01-23 RX ORDER — DOCUSATE SODIUM 100 MG/1
CAPSULE, LIQUID FILLED ORAL
Qty: 30 CAPSULE | Refills: 3 | Status: SHIPPED | OUTPATIENT
Start: 2025-01-23

## 2025-01-23 RX ORDER — COLESTIPOL HYDROCHLORIDE 1 G/1
TABLET ORAL
Qty: 30 TABLET | Refills: 3 | Status: SHIPPED | OUTPATIENT
Start: 2025-01-23

## 2025-01-23 NOTE — PATIENT INSTRUCTIONS
Start Colestipol, 1 pill daily with dinner.   Start Colace 100 mg,1 pill daily in AM.   Work on drinking 1-2 glasses/bottles of water daily.   Contact our office in 2-3 weeks with an update OR sooner if you are having issues.   Continue to avoid acidic or trigger foods as much as possible.   Continue to watch for red flag symptoms.   Schedule a f/u OV in 6 months.     We did review GI red flag symptoms, including, but not limited to: chronic nausea, vomiting, diarrhea, chills, fever, and unintentional weight loss and should call or contact our office with any changes or concerns. I reviewed with the patient that if they notice any blood while vomiting or in their stool they should contact or office or go to the nearest emergency room for immediate evaluation. The patient was agreeable and verbalized an understanding.

## 2025-01-23 NOTE — ASSESSMENT & PLAN NOTE
While the patient was in the office today, I discussed with the patient that at this point in time even though there is a degree of colonization of the C. difficile, I feel that her current symptoms are more related to irritable bowel syndrome symptoms with both constipation and overflow diarrhea.  I discussed with the patient that I feel it would be beneficial to start her on colestipol, 1 pill daily with dinner to try to help bulk up her stools as well as Colace 100 mg, 1 pill daily in the a.m. to try to soften the stools as hopefully the combination will allow her to have more relieving and emptying bowel movements.    Encouraged the patient to work on drinking at least 1 to 2 glasses/bottles of water daily.  Encouraged the patient continue try to avoid acidic or trigger foods is much as possible.  Encouraged the patient to contact our office in 2 to 3 weeks with an update or sooner if she is having any issues or side effects with the changes in her medication regimen.  Continue to watch for red flag symptoms.    Red flag symptoms reviewed with the patient while in the office today.  Orders:    colestipol (COLESTID) 1 g tablet; Take 1 pill daily with dinner.    docusate sodium (COLACE) 100 mg capsule; Take 1 pill daily in AM.

## 2025-01-23 NOTE — PROGRESS NOTES
Name: Shiane E Follweiler      : 1995      MRN: [id number]  Encounter Provider: ZOIE Becerra  Encounter Date: 2025   Encounter department: [facility] [place]  :  Assessment & Plan  Irritable bowel syndrome with both constipation and diarrhea  While the patient was in the office today, I discussed with the patient that at this point in time even though there is a degree of colonization of the C. difficile, I feel that her current symptoms are more related to irritable bowel syndrome symptoms with both constipation and overflow diarrhea.  I discussed with the patient that I feel it would be beneficial to start her on colestipol, 1 pill daily with dinner to try to help bulk up her stools as well as Colace 100 mg, 1 pill daily in the a.m. to try to soften the stools as hopefully the combination will allow her to have more relieving and emptying bowel movements.    Encouraged the patient to work on drinking at least 1 to 2 glasses/bottles of water daily.  Encouraged the patient continue try to avoid acidic or trigger foods is much as possible.  Encouraged the patient to contact our office in 2 to 3 weeks with an update or sooner if she is having any issues or side effects with the changes in her medication regimen.  Continue to watch for red flag symptoms.    Red flag symptoms reviewed with the patient while in the office today.  Orders:    colestipol (COLESTID) 1 g tablet; Take 1 pill daily with dinner.    docusate sodium (COLACE) 100 mg capsule; Take 1 pill daily in AM.    Generalized abdominal pain  Orders:    docusate sodium (COLACE) 100 mg capsule; Take 1 pill daily in AM.    Nausea  See above.       Bloating  Orders:    docusate sodium (COLACE) 100 mg capsule; Take 1 pill daily in AM.    The patient is to schedule a follow up office visit 6 months, but understands to call or contact our offices with any issues before then or as needed.     History of Present Illness    HPI Shiane E Follweiler is a 29 y.o. female who presents today for a follow-up office visit after her most recent EGD and colonoscopy secondary to irritable bowel syndrome with diarrhea, generalized abdominal pain, nausea, and bloating.  Since her last office visit the patient did proceed with both the EGD and colonoscopy on November 19, 2024 with Dr. Knox with the EGD showing a 4 cm hiatal hernia, otherwise being normal with the pathology coming back normal.  Her colonoscopy showed internal hemorrhoids, was otherwise normal with a recommendation of a repeat colonoscopy at the age of 43.  Since her last office visit she also proceeded with stool studies which came back positive for C. difficile colonization but no active infection, however, at that point because she was having symptoms we decided to proceed with vancomycin treatment, however, the patient could only tolerate a few doses and had to discontinue the vancomycin.    The patient reports that since the colonoscopy and EGD she actually feels her symptoms have somewhat improved and stabilized.  However, she still reports she continues with irritable bowel syndrome symptoms with loose stools and then at times does feel she is somewhat constipated but definitely has nausea and bloating when she is constipated.    The patient currently denies any reflux, dysphagia, vomiting, decreased appetite, or unplanned weight loss. Water Intake: Minimal to none per day.    The patient currently reports that they have a BM 2-3 and reports that it 5-7 relieving, without any nocturnal BMs, constipation, straining, melena or bloody stools. Last BM: Today. Flatus: Yes.    Meds: None  Daily NSAID Use: Deneid    Tobacco: Denied  ETOH: Denied  Marijuana: Daily  Illicit Drug Use: Denied    Imaging: (None):     Endoscopy History: EGD: (11/19/24): 4 cm hiatal hernia. Path: Normal.    COLONOSCOPY: (11/19/24): Internal hemorrhoids otherwise normal with a recommendation of a repeat  "colonoscopy at the age of 43.    DUE: At age 43.     History obtained from: patient    Review of Systems       Objective   /73 (BP Location: Right arm, Patient Position: Sitting, Cuff Size: Standard)   Pulse 72   Temp 98.5 °F (36.9 °C) (Temporal)   Ht 5' 3\" (1.6 m)   Wt 64.9 kg (143 lb)   LMP  (LMP Unknown)   SpO2 99%   BMI 25.33 kg/m²      Physical Exam  Abdomen is soft, nondistended, with hypoactive bowel sounds x 4.  No edema noted of the b/l lower extremities upon exam today. Skin is non-icteric.       "

## 2025-01-28 ENCOUNTER — APPOINTMENT (OUTPATIENT)
Dept: RADIOLOGY | Facility: MEDICAL CENTER | Age: 30
End: 2025-01-28
Payer: COMMERCIAL

## 2025-01-28 ENCOUNTER — OFFICE VISIT (OUTPATIENT)
Dept: PAIN MEDICINE | Facility: CLINIC | Age: 30
End: 2025-01-28
Payer: COMMERCIAL

## 2025-01-28 VITALS
BODY MASS INDEX: 25.3 KG/M2 | OXYGEN SATURATION: 98 % | TEMPERATURE: 98.5 F | WEIGHT: 142.8 LBS | HEART RATE: 89 BPM | RESPIRATION RATE: 16 BRPM | HEIGHT: 63 IN

## 2025-01-28 DIAGNOSIS — G89.29 CHRONIC BILATERAL LOW BACK PAIN WITH LEFT-SIDED SCIATICA: ICD-10-CM

## 2025-01-28 DIAGNOSIS — M51.16 LUMBAR DISC DISEASE WITH RADICULOPATHY: ICD-10-CM

## 2025-01-28 DIAGNOSIS — M46.1 SACROILIITIS (HCC): ICD-10-CM

## 2025-01-28 DIAGNOSIS — M79.18 MYOFASCIAL PAIN SYNDROME: ICD-10-CM

## 2025-01-28 DIAGNOSIS — G89.4 CHRONIC PAIN SYNDROME: Primary | ICD-10-CM

## 2025-01-28 DIAGNOSIS — G89.4 CHRONIC PAIN SYNDROME: ICD-10-CM

## 2025-01-28 DIAGNOSIS — M54.42 CHRONIC BILATERAL LOW BACK PAIN WITH LEFT-SIDED SCIATICA: ICD-10-CM

## 2025-01-28 PROCEDURE — 99214 OFFICE O/P EST MOD 30 MIN: CPT | Performed by: NURSE PRACTITIONER

## 2025-01-28 PROCEDURE — 72114 X-RAY EXAM L-S SPINE BENDING: CPT

## 2025-01-28 NOTE — PROGRESS NOTES
Assessment:  1. Chronic pain syndrome    2. Chronic bilateral low back pain with left-sided sciatica    3. Lumbar disc disease with radiculopathy    4. Sacroiliitis (HCC)    5. Myofascial pain syndrome        Plan:  While the patient was in the office today, I did have a thorough conversation regarding their chronic pain syndrome, medication management, and treatment plan options.  Patient is being seen for a follow-up visit.  She was last seen here in February 2023.  At that time, aqua therapy was recommended.  Patient never scheduled or attended aqua therapy.  She tells me that at the time, she was abusing methamphetamines.  She has been clean now for about 2 years and is in a much better place.    She continues with low back pain that can radiate to the left buttock and left posterior thigh.    Start physical therapy for lumbar core strengthening/stretching.    Use over-the-counter acetaminophen as needed.  Will avoid NSAIDs due to patient reported history of abnormal kidney functions.    Will order a flexion-extension x-ray of the lumbar spine.    Follow-up in 6 weeks.      History of Present Illness:  The patient is a 29 y.o. female who presents for a follow up office visit in regards to Back Pain.   The patient’s current symptoms include plaints of low back pain that radiates to the left buttock and left posterior thigh.  Current pain level is an 8/10.  Quality of pain is described as dull, aching, sharp, shooting.    Current pain medications includes: None.     I have personally reviewed and/or updated the patient's past medical history, past surgical history, family history, social history, current medications, allergies, and vital signs today.         Review of Systems  Review of Systems   Respiratory:  Positive for shortness of breath.    Musculoskeletal:  Positive for back pain and joint swelling.        Decreased ROM     All other systems reviewed and are negative.          Past Medical History:  "  Diagnosis Date    Allergic     Back pain     Ectopic pregnancy     GERD (gastroesophageal reflux disease)     History of unilateral fallopian tube excision     RIGHT removed    Ovarian cyst     Wears glasses        Past Surgical History:   Procedure Laterality Date    DILATION AND CURETTAGE OF UTERUS      ECTOPIC PREGNANCY SURGERY      EPIDURAL BLOCK INJECTION Bilateral 2022    Procedure: Bilateral L4-5 transforaminal epidural steroid injection;  Surgeon: Cele Escudero MD;  Location: [facility];  Service: Pain Management     HYSTERECTOMY      LAPAROSCOPY      MD LAPS ABD PRTM&OMENTUM DX W/WO SPEC BR/WA SPX N/A 2017    Procedure: LAPAROSCOPY DIAGNOSTIC, left salpingectomy;  Surgeon: Park Cheema MD;  Location:  MAIN OR;  Service: Gynecology    WISDOM TOOTH EXTRACTION      WISDOM TOOTH EXTRACTION Bilateral        Family History   Problem Relation Age of Onset    Diabetes Mother     Pulmonary embolism Father        Social History     Occupational History    Not on file   Tobacco Use    Smoking status: Former     Current packs/day: 0.00     Types: Cigarettes     Quit date: 2024     Years since quittin.5    Smokeless tobacco: Never   Vaping Use    Vaping status: Some Days    Substances: THC, CBD   Substance and Sexual Activity    Alcohol use: Not Currently     Comment: \"occasional\"    Drug use: Yes     Frequency: 14.0 times per week     Types: Marijuana    Sexual activity: Yes     Partners: Male     Birth control/protection: None, Female Sterilization         Current Outpatient Medications:     albuterol (PROVENTIL HFA,VENTOLIN HFA) 90 mcg/act inhaler, Inhale 2 puffs every 6 (six) hours as needed for wheezing or shortness of breath, Disp: 18 g, Rfl: 5    aspirin-acetaminophen-caffeine (EXCEDRIN MIGRAINE) 250-250-65 MG per tablet, Take 1 tablet by mouth every 6 (six) hours as needed for headaches, Disp: , Rfl:     colestipol (COLESTID) 1 g tablet, Take 1 pill daily with dinner., " "Disp: 30 tablet, Rfl: 3    Diclofenac Sodium (VOLTAREN) 1 %, Apply 2 g topically 4 (four) times a day, Disp: 240 g, Rfl: 5    docusate sodium (COLACE) 100 mg capsule, Take 1 pill daily in AM., Disp: 30 capsule, Rfl: 3    Allergies   Allergen Reactions    Nitrofurantoin Itching    Nsaids Other (See Comments)     Due to issues with kidneys per pt    Oxycodone-Acetaminophen Itching     Reaction Date: 28Apr2011;     Oxycodone Itching    Tramadol Itching     Reaction Date: 28Apr2011;        Physical Exam:    Pulse 89   Temp 98.5 °F (36.9 °C)   Resp 16   Ht 5' 3\" (1.6 m)   Wt 64.8 kg (142 lb 12.8 oz)   LMP  (LMP Unknown)   SpO2 98%   BMI 25.30 kg/m²     Constitutional:normal, well developed, well nourished, alert, in no distress and non-toxic and no overt pain behavior.  Eyes:anicteric  HEENT:grossly intact  Neck:supple, symmetric, trachea midline and no masses   Pulmonary:even and unlabored  Cardiovascular:No edema or pitting edema present  Skin:Normal without rashes or lesions and well hydrated  Psychiatric:Mood and affect appropriate  Neurologic:Cranial Nerves II-XII grossly intact  Musculoskeletal: Limited range of motion of the lumbar spine in all planes.  There is tenderness bilaterally L4-S1.  Tenderness over bilateral SI joints.    Imaging      Study Result    Narrative & Impression   MRI LUMBAR SPINE WITHOUT CONTRAST     INDICATION: intractable back pain.     COMPARISON:  MRI lumbar spine without contrast December 21, 2010.  CTA chest CT abdomen pelvis with contrast February 20, 2022.  MRI lumbar spine without contrast December 21, 2010.     TECHNIQUE:  Sagittal T1, sagittal T2, sagittal inversion recovery, axial T1 and axial T2, coronal T2.          IMAGE QUALITY:  Suboptimal due to moderate-to-severe motion artifact.     FINDINGS:     VERTEBRAL BODIES:  There is a transitional lumbosacral junction -sacralized L5 vertebral body..  Normal alignment of the lumbar spine.  No listhesis given motion degraded " sequences. No scoliosis.  No compression fracture.         Type I Modic endplate change at L4-L5.  Otherwise, normal bone marrow signal given given motion artifact.     SACRUM:  Normal signal within the sacrum. No evidence of insufficiency or stress fracture.     DISTAL CORD AND CONUS:  Normal size and signal within the distal cord and conus.     PARASPINAL SOFT TISSUES:  Intramuscular edematous signal in erector spinae muscles of lower lumbar spine, likely muscular strain injury.  Bilateral lower lumbar spine rectus PA musculature     LOWER THORACIC DISC SPACES:  Normal disc height and signal.  No disc herniation, canal stenosis or foraminal narrowing.     LUMBAR DISC SPACES:       L1-L2:  Normal given motion degraded sequences.     L2-L3:  Normal given motion degraded sequences.     L3-L4:  Normal given motion degraded sequences.     L4-L5:  Disc extrusion contributing to at least mild canal stenosis.  Probable mild to moderate bilateral foraminal narrowing given motion degraded sequences.     L5-S1:  Sacralized L5 vertebral body.  No significant canal stenosis or foraminal narrowing given motion degraded sequences.     IMPRESSION:     Suboptimal exam due to moderate-to-severe motion artifact with repeated sequences.  Consider repeat examination when patient's pain is better controlled.  - Transitional lumbosacral anatomy with sacralized L5 vertebral body.  - L4-L5: Disc extrusion contributing to at least small canal stenosis given motion degraded sequences.  Probable mild-to-moderate bilateral foraminal narrowing given motion degraded sequences.  -Type I Modic endplate change at L4-L5.  -Intramuscular edematous signal in erector spinae muscles of lower lumbar spine, likely muscular strain injury.     The study was marked in EPIC for immediate notification.        Workstation performed: [id number]           XR spine lumbar complete w bending minimum 6 views    (Results Pending)       Orders Placed This Encounter    Procedures    XR spine lumbar complete w bending minimum 6 views    Ambulatory Referral to Physical Therapy

## 2025-01-29 ENCOUNTER — RESULTS FOLLOW-UP (OUTPATIENT)
Age: 30
End: 2025-01-29

## 2025-01-29 NOTE — RESULT ENCOUNTER NOTE
S/w pt and advised of same. Pt verbalized understanding and stated she will be starting PT this week for Lumbar spine/core strengthening.

## 2025-02-04 ENCOUNTER — EVALUATION (OUTPATIENT)
Dept: PHYSICAL THERAPY | Facility: CLINIC | Age: 30
End: 2025-02-04
Payer: COMMERCIAL

## 2025-02-04 DIAGNOSIS — M51.16 LUMBAR DISC DISEASE WITH RADICULOPATHY: ICD-10-CM

## 2025-02-04 DIAGNOSIS — M79.18 MYOFASCIAL PAIN SYNDROME: ICD-10-CM

## 2025-02-04 DIAGNOSIS — G89.29 CHRONIC BILATERAL LOW BACK PAIN WITH LEFT-SIDED SCIATICA: ICD-10-CM

## 2025-02-04 DIAGNOSIS — G89.4 CHRONIC PAIN SYNDROME: Primary | ICD-10-CM

## 2025-02-04 DIAGNOSIS — M54.42 CHRONIC BILATERAL LOW BACK PAIN WITH LEFT-SIDED SCIATICA: ICD-10-CM

## 2025-02-04 PROCEDURE — 97110 THERAPEUTIC EXERCISES: CPT | Performed by: PHYSICAL THERAPIST

## 2025-02-04 PROCEDURE — 97112 NEUROMUSCULAR REEDUCATION: CPT | Performed by: PHYSICAL THERAPIST

## 2025-02-04 PROCEDURE — 97161 PT EVAL LOW COMPLEX 20 MIN: CPT | Performed by: PHYSICAL THERAPIST

## 2025-02-04 NOTE — PROGRESS NOTES
PT Evaluation     Today's date: 2025  Patient name: Shiane E Follweiler  : 1995  MRN: [id number]  Referring provider: Kocher, Barbara, CRNP  Dx:   Encounter Diagnosis     ICD-10-CM    1. Chronic pain syndrome  G89.4       2. Chronic bilateral low back pain with left-sided sciatica  M54.42     G89.29       3. Lumbar disc disease with radiculopathy  M51.16       4. Myofascial pain syndrome  M79.18                      Assessment  Impairments: abnormal or restricted ROM, activity intolerance, impaired physical strength, lacks appropriate home exercise program, pain with function, poor posture , poor body mechanics and activity limitations  Other impairment: decreased flexibility    Assessment details: The patient is a 30 y/o female who presents to OPPT with diagnosis of chronic pain syndrome, myofascial pain syndrome, chronic LBP with L sided sciatica and lumbar disc disease with radiculopathy.  She has complaints of constant pain.  Pain is across her lower back, into B hips and down LLE to her knee.  She denies any N&T into BLE.  Her BLE ROM is WFL at this time, however she does report popping when moving her RLE.  She demonstrates deficits with decreased BLE strength, decreased L/S ROM, decreased core strength, decreased flexibility, decreased postural awareness and TTP.  These deficits lead to difficulty sleeping, limited sitting and standing tolerances and decreased tolerance to functional activities.  She can go up and down the steps with reciprocal gait pattern though with pulling in her low back at times.  She has increased pain with prolonged standing, more pain by end of her work shift.  Also pain when bending forward and lifting items.  Difficulty sleeping is also noted, pain can wake her up overnight or she has a hard time finding a comfortable position to sleep.  Secondary to pain and above deficits she is limited with her overall mobility and function.  The patient would benefit from continued  PT to address deficits and improve function.  Tx to include ROM, stretching, strengthening, modalities, HEP, pt education, postural ed, lifting/body mechanics, neuro re-ed, balance/proprioception Te, MT and equipment.      Understanding of Dx/Px/POC: good     Prognosis: good    Goals  STGs:  1.  Initiate and complete HEP with verbal cues.  2.  Improve BLE strength by 1/2 grade in 4 weeks.  3.  Decrease LBP by > 25% in 4 weeks.  LTGs:  1.  Patient to be I with HEP in 8 weeks.  2.  Improve L/S ROM to WNL t/o in 8 weeks to improve function.  3.  Decrease LBP to < or = to 3-4/10 with activity in 8 weeks to improve function.  4.  Improve BLE strength to > or = to 4+ to 5/5 t/o in 8 weeks to improve function.    5.  Postural control is improved to maximal level of function in 8 weeks.  6.  Stair negotiation is improved to maximal level of function in 8 weeks.  7.  Recreational performance is improved to maximal level of function in 8 weeks.  8.  ADL performance is improved to maximal level of function in 8 weeks.  9.  Work performance is improved to maximal level of function in 8 weeks.  10.  Patient to report improved sleep in 8 weeks.        Plan  Patient would benefit from: skilled physical therapy  Planned modality interventions: cryotherapy and thermotherapy: hydrocollator packs    Planned therapy interventions: IASTM, abdominal trunk stabilization, manual therapy, body mechanics training, neuromuscular re-education, patient/caregiver education, postural training, self care, strengthening, stretching, therapeutic activities, therapeutic exercise, functional ROM exercises, flexibility and home exercise program    Frequency: 2x week  Duration in weeks: 8  Plan of Care beginning date: 2/4/2025  Plan of Care expiration date: 4/1/2025  Treatment plan discussed with: patient  Plan details: Modalities and therapy interventions prn.        Subjective Evaluation    History of Present Illness  Mechanism of injury: The patient  "states that she has had lower back pain for years.  She notes that she did have an injection about three years ago which did help.    She notes that last September she got a part time job (standing) and notes that since then her pain has increased.     She had seen pain management on 25.  She had x-rays taken, per patient they showed changes from her last set of x-rays.  She was referred to OPPT and she now presents for her evaluation.  She will be going back to see the doctor on 3/31/25 for her follow up appointment.      From EMR review of x-rays from :  FINDINGS:  There are 5 non rib bearing lumbar vertebral bodies.   There is no evidence of acute fracture or destructive osseous lesion.  Alignment is unremarkable.   There is mild disc space narrowing with endplate sclerosis and osteophytosis at L5-S1. The remainder of the disc levels are within normal limits.  The pedicles appear intact.  Soft tissues are unremarkable.  IMPRESSION:  Mild spondylosis at L5-S1. No acute abnormality.    She notes that she gets clicking in her lower back, only when lifting her R leg (like when putting on her shoes and socks).  The patient states that her pain is constant.  Pain is across her lower back.  She can get B hip pain and L leg pain down thigh to her knee.  She denies any R leg pain.  She denies any N&T into either leg.    More pain with standing and when bending forward.  She notes difficulty sleeping, hard to find a comfortable position to sleep.      Patient Goals  Patient goals for therapy: increased motion, decreased pain and increased strength  Patient goal: \"To have less pain in my back, to be able to bend over when lifting an item.\"  Pain  At best pain ratin  At worst pain ratin  Location: LBP - across low back, into B hip and down LLE to her knee  Quality: sharp and dull ache  Relieving factors: medications  Aggravating factors: standing and walking (bending forward)    Social Support  Steps to " enter house: yes  3  Stairs in house: yes   Lives in: multiple-level home  Lives with: significant other    Employment status: working (Part time - stands 8-9 hours a day - two days a week)    Diagnostic Tests  Abnormal x-ray: See Above.  Treatments  Previous treatment: physical therapy and injection treatment      Objective     Concurrent Complaints  Positive for disturbed sleep. Negative for bladder dysfunction, bowel dysfunction and saddle (S4) numbness    Static Posture     Lumbar Spine   Decreased lordosis.     Postural Observations  Seated posture: fair  Standing posture: fair  Correction of posture: has no consistent effect      Palpation   Left   Tenderness of the erector spinae, lumbar interspinals and lumbar paraspinals.     Right   Tenderness of the erector spinae, lumbar interspinals and lumbar paraspinals.     Active Range of Motion     Lumbar   Flexion: Active lumbar flexion: fingers to toes.   Extension:  with pain Restriction level: moderate  Left lateral flexion:  with pain Restriction level: minimal  Right lateral flexion:  WFL  Left rotation:  Restriction level: minimal  Right rotation:  Restriction level: minimal  Left Hip   Flexion: 95 degrees     Right Hip   Flexion: 95 degrees   Left Knee   Flexion: WFL  Extension: WFL    Right Knee   Flexion: WFL  Extension: WFL    Additional Active Range of Motion Details  L SLR: 60  R SLR: 60    Joint Play     Hypomobile: L1, L2, L3, L4, L5 and S1     Strength/Myotome Testing     Left Hip   Planes of Motion   Flexion: 4-  Abduction: 4-  Adduction: 4+  External rotation: 4  Internal rotation: 4    Right Hip   Planes of Motion   Flexion: 4  Abduction: 4-  Adduction: 4+  External rotation: 4  Internal rotation: 4    Left Knee   Flexion: WFL  Extension: WFL    Right Knee   Flexion: WFL  Extension: WFL    Ambulation     Ambulation: Stairs   Ascend stairs: independent  Pattern: reciprocal  Descend stairs: independent  Pattern: reciprocal    Additional Stairs  "Ambulation Details  Reciprocal gait pattern but pulling along her lower back.                  Precautions: Chronic back pain, GERD       Manuals 2/4/25       BLE - Hams, Piriformis, Quads                                Neuro Re-Ed         MTP/LTP        PPT 3\"x10       PPT w/march 10x Andrew       PPT w/SLR 10x Andrew       PPT w/BKFO        Palloff Press                Ther Ex        NuStep for LE strength        UBE Retro for Posture        3 way Pball Stretch         Bridges 3x10       LTR        SKTC Reviewed HEP       S/L Hip Abd 10x Andrew       Clamshells  3\"x10 Andrew        Prone LE Ext        Bird Dogs        Cat/Camel Stretch        Open Books                        Ther Activity                        Gait Training                        Modalities        HP/CP prn                   Access Code: [email or web address]  URL: [email or web address]  Date: 02/04/2025  Prepared by: Brenda    Exercises  - Supine Posterior Pelvic Tilt  - 1 x daily - 7 x weekly - 1 sets - 10 reps - 5\" hold  - Supine March with Posterior Pelvic Tilt  - 1 x daily - 7 x weekly - 1 sets - 10 reps  - Supine Pelvic Tilt with Straight Leg Raise  - 1 x daily - 7 x weekly - 1 sets - 10 reps  - Supine Bridge  - 1 x daily - 7 x weekly - 1 sets - 10 reps - 3\" hold  - Clamshell  - 1 x daily - 7 x weekly - 1 sets - 10 reps  - Sidelying Hip Abduction  - 1 x daily - 7 x weekly - 1 sets - 10 reps         "

## 2025-02-10 ENCOUNTER — OFFICE VISIT (OUTPATIENT)
Dept: PHYSICAL THERAPY | Facility: CLINIC | Age: 30
End: 2025-02-10
Payer: COMMERCIAL

## 2025-02-10 DIAGNOSIS — M51.16 LUMBAR DISC DISEASE WITH RADICULOPATHY: ICD-10-CM

## 2025-02-10 DIAGNOSIS — G89.4 CHRONIC PAIN SYNDROME: Primary | ICD-10-CM

## 2025-02-10 DIAGNOSIS — G89.29 CHRONIC BILATERAL LOW BACK PAIN WITH LEFT-SIDED SCIATICA: ICD-10-CM

## 2025-02-10 DIAGNOSIS — M79.18 MYOFASCIAL PAIN SYNDROME: ICD-10-CM

## 2025-02-10 DIAGNOSIS — M54.42 CHRONIC BILATERAL LOW BACK PAIN WITH LEFT-SIDED SCIATICA: ICD-10-CM

## 2025-02-10 PROCEDURE — 97110 THERAPEUTIC EXERCISES: CPT

## 2025-02-10 PROCEDURE — 97112 NEUROMUSCULAR REEDUCATION: CPT

## 2025-02-10 NOTE — PROGRESS NOTES
"Daily Note     Today's date: 2/10/2025  Patient name: Shiane E Follweiler  : 1995  MRN: [id number]  Referring provider: Kocher, Barbara, CRNP  Dx:   Encounter Diagnosis     ICD-10-CM    1. Chronic pain syndrome  G89.4       2. Chronic bilateral low back pain with left-sided sciatica  M54.42     G89.29       3. Lumbar disc disease with radiculopathy  M51.16       4. Myofascial pain syndrome  M79.18                      Subjective: 6/10 LBP this morning. Reports compliance with HEP, \"clicking\" on the R low back/hip with bridges.       Objective: See treatment diary below      Assessment: Tolerated treatment fair, low tolerance to TE d/t high symptoms irritability. Increased pain LBP noted with fwd seated flex, managed with  supine lying. She has some difficulty with PPT, tends to go into a shallow bridge, better with tactile cuing/visual cues. Guarding noted with manuals stretching, she may benefit from self stretching tasks vs manuals. Continued PT would be beneficial to improve function.          Plan: Continue per plan of care.          Precautions: Chronic back pain, GERD, high symptom irritability        Manuals 25 2/10      BLE - Hams, Piriformis, Quads  Bilat piriformis MB                              Neuro Re-Ed         MTP/LTP        PPT 3\"x10 3\"x10 on wedge      PPT w/march 10x Andrew 10x      PPT w/SLR 10x Andrew       PPT w/BKFO  10x      Palloff Press                Ther Ex        NuStep for LE strength  8' lvl 5      UBE Retro for Posture  3' lvl 1      3 way Pball Stretch - hold  Fwd only p!      Bridges 3x10 3x10 shallow to start      LTR        SKTC Reviewed HEP From  On wedge 10x5\"      S/L Hip Abd 10x Andrew nv      Clamshells  3\"x10 Andrew  nv      Prone LE Ext        Bird Dogs        Cat/Camel Stretch        Open Books                        Ther Activity                        Gait Training                        Modalities        HP/CP prn    with wedge 10'                 Access " "Code: [email or web address]  URL: [email or web address]  Date: 02/04/2025  Prepared by: Brenda    Exercises  - Supine Posterior Pelvic Tilt  - 1 x daily - 7 x weekly - 1 sets - 10 reps - 5\" hold  - Supine March with Posterior Pelvic Tilt  - 1 x daily - 7 x weekly - 1 sets - 10 reps  - Supine Pelvic Tilt with Straight Leg Raise  - 1 x daily - 7 x weekly - 1 sets - 10 reps  - Supine Bridge  - 1 x daily - 7 x weekly - 1 sets - 10 reps - 3\" hold  - Clamshell  - 1 x daily - 7 x weekly - 1 sets - 10 reps  - Sidelying Hip Abduction  - 1 x daily - 7 x weekly - 1 sets - 10 reps         "

## 2025-02-12 ENCOUNTER — OFFICE VISIT (OUTPATIENT)
Dept: PHYSICAL THERAPY | Facility: CLINIC | Age: 30
End: 2025-02-12
Payer: COMMERCIAL

## 2025-02-12 DIAGNOSIS — M54.42 CHRONIC BILATERAL LOW BACK PAIN WITH LEFT-SIDED SCIATICA: ICD-10-CM

## 2025-02-12 DIAGNOSIS — G89.4 CHRONIC PAIN SYNDROME: Primary | ICD-10-CM

## 2025-02-12 DIAGNOSIS — M79.18 MYOFASCIAL PAIN SYNDROME: ICD-10-CM

## 2025-02-12 DIAGNOSIS — M51.16 LUMBAR DISC DISEASE WITH RADICULOPATHY: ICD-10-CM

## 2025-02-12 DIAGNOSIS — G89.29 CHRONIC BILATERAL LOW BACK PAIN WITH LEFT-SIDED SCIATICA: ICD-10-CM

## 2025-02-12 PROCEDURE — 97112 NEUROMUSCULAR REEDUCATION: CPT

## 2025-02-12 PROCEDURE — 97110 THERAPEUTIC EXERCISES: CPT

## 2025-02-12 NOTE — PROGRESS NOTES
"Daily Note     Today's date: 2025  Patient name: Shiane E Follweiler  : 1995  MRN: [id number]  Referring provider: Kocher, Barbara, CRNP  Dx:   Encounter Diagnosis     ICD-10-CM    1. Chronic pain syndrome  G89.4       2. Chronic bilateral low back pain with left-sided sciatica  M54.42     G89.29       3. Lumbar disc disease with radiculopathy  M51.16       4. Myofascial pain syndrome  M79.18                      Subjective:  Patient reports that she continues to be sore.  She notes that her last visit was tough but she was feeling poorly coming in.  She denies worse symptoms.  Her soreness is the same.       Objective: See treatment diary below      Assessment:  Patient tolerated treatment well.  Patient performed ex as noted with direct supervision.  Patient presented with high irritability regarding her pain.  Patient noted increased pain post NuStep and UBE which subsided.  Patient appeared apprehensive to perform table exercises due to increased pain post her last visit.  Provided cues to improve ex technique with good patient response.  Patient began to note decreased pain with ex technique cues.  Patient noted a good response to ex today post treatment with decreased pain verses entry pain level.  Patient would benefit from continued PT intervention to address deficits and attain set goals.       Plan: Continue per plan of care.      Precautions: Chronic back pain, GERD, high symptom irritability        Manuals 2/4/25 2/10 2/12     BLE - Hams, Piriformis, Quads  Bilat piriformis MB Held today                             Neuro Re-Ed         MTP/LTP        PPT 3\"x10 3\"x10 on wedge 3\" on wedge under legs  2x10     PPT w/march 10x Andrew 10x 10x     PPT w/SLR 10x Andrew       PPT w/BKFO  10x 10x     Palloff Press                Ther Ex        NuStep for LE strength  8' lvl 5 8' L5     UBE Retro for Posture  3' lvl 1 4' L1     3 way Pball Stretch - hold  Fwd only p! Fwd x10     Bridges 3x10 3x10 shallow to " "start 3x10 low bridge cues for TrA and glute set prior to hip lift     LTR        SKTC Reviewed HEP From 90/90 On wedge 10x5\" With towel assist  10x5\"     S/L Hip Abd 10x Andrew nv 10x b/l     Clamshells  3\"x10 Andrew  nv 10x b/l     Prone LE Ext        Bird Dogs        Cat/Camel Stretch        Open Books                        Ther Activity                        Gait Training                        Modalities        HP/CP prn  90/90  with wedge 10' deferred                Access Code: [email or web address]  URL: [email or web address]  Date: 02/04/2025  Prepared by: Brenda    Exercises  - Supine Posterior Pelvic Tilt  - 1 x daily - 7 x weekly - 1 sets - 10 reps - 5\" hold  - Supine March with Posterior Pelvic Tilt  - 1 x daily - 7 x weekly - 1 sets - 10 reps  - Supine Pelvic Tilt with Straight Leg Raise  - 1 x daily - 7 x weekly - 1 sets - 10 reps  - Supine Bridge  - 1 x daily - 7 x weekly - 1 sets - 10 reps - 3\" hold  - Clamshell  - 1 x daily - 7 x weekly - 1 sets - 10 reps  - Sidelying Hip Abduction  - 1 x daily - 7 x weekly - 1 sets - 10 reps           "

## 2025-02-17 ENCOUNTER — OFFICE VISIT (OUTPATIENT)
Dept: PHYSICAL THERAPY | Facility: CLINIC | Age: 30
End: 2025-02-17
Payer: COMMERCIAL

## 2025-02-17 DIAGNOSIS — M54.42 CHRONIC BILATERAL LOW BACK PAIN WITH LEFT-SIDED SCIATICA: ICD-10-CM

## 2025-02-17 DIAGNOSIS — G89.29 CHRONIC BILATERAL LOW BACK PAIN WITH LEFT-SIDED SCIATICA: ICD-10-CM

## 2025-02-17 DIAGNOSIS — M51.16 LUMBAR DISC DISEASE WITH RADICULOPATHY: ICD-10-CM

## 2025-02-17 DIAGNOSIS — G89.4 CHRONIC PAIN SYNDROME: Primary | ICD-10-CM

## 2025-02-17 DIAGNOSIS — M79.18 MYOFASCIAL PAIN SYNDROME: ICD-10-CM

## 2025-02-17 PROCEDURE — 97112 NEUROMUSCULAR REEDUCATION: CPT | Performed by: PHYSICAL THERAPIST

## 2025-02-17 PROCEDURE — 97110 THERAPEUTIC EXERCISES: CPT | Performed by: PHYSICAL THERAPIST

## 2025-02-17 NOTE — PROGRESS NOTES
"Daily Note     Today's date: 2025  Patient name: Shiane E Follweiler  : 1995  MRN: [id number]  Referring provider: Kocher, Barbara, CRNP  Dx:   Encounter Diagnosis     ICD-10-CM    1. Chronic pain syndrome  G89.4       2. Chronic bilateral low back pain with left-sided sciatica  M54.42     G89.29       3. Lumbar disc disease with radiculopathy  M51.16       4. Myofascial pain syndrome  M79.18           Start Time: 0900  Stop Time: 935  Total time in clinic (min): 35 minutes    Subjective: The patient reports 7/10 pain in her B posterior hips today.       Objective: See treatment diary below      Assessment: Several exercises omitted today due to patient having to leave treatment early to put her child on the school bus. Improved tolerance to exercises noted today. LTR added which provided moderate pain relief. Tolerated treatment well. Patient would benefit from continued PT      Plan: Continue per plan of care.      Precautions: Chronic back pain, GERD, high symptom irritability        Manuals 2/4/25 2/10 2/12 2/17    BLE - Hams, Piriformis, Quads  Bilat piriformis MB Held today                             Neuro Re-Ed         MTP/LTP        PPT 3\"x10 3\"x10 on wedge 3\" on wedge under legs  2x10 3\"x10    PPT w/march 10x Andrew 10x 10x 10x    PPT w/SLR 10x Andrew   10x ea    PPT w/BKFO  10x 10x NP due to time constraints    Palloff Press                Ther Ex        NuStep for LE strength  8' lvl 5 8' L5 L5     UBE Retro for Posture  3' lvl 1 4' L1 NP due to time constraints    3 way Pball Stretch - hold  Fwd only p! Fwd x10 Fwd 10\"x10    Bridges 3x10 3x10 shallow to start 3x10 low bridge cues for TrA and glute set prior to hip lift 3\"x10    LTR    10\"x10    SKTC Reviewed HEP From 90/ On wedge 10x5\" With towel assist  10x5\" W/ towel 10x5\"    S/L Hip Abd 10x Andrew nv 10x b/l NP due to time constraints    Clamshells  3\"x10 Andrew  nv 10x b/l NP due to time constraints    Prone LE Ext        Bird Dogs      " "  Cat/Camel Stretch        Open Books                        Ther Activity                        Gait Training                        Modalities        HP/CP prn  90/90  with wedge 10' deferred                Access Code: [email or web address]  URL: [email or web address]  Date: 02/04/2025  Prepared by: Brenda    Exercises  - Supine Posterior Pelvic Tilt  - 1 x daily - 7 x weekly - 1 sets - 10 reps - 5\" hold  - Supine March with Posterior Pelvic Tilt  - 1 x daily - 7 x weekly - 1 sets - 10 reps  - Supine Pelvic Tilt with Straight Leg Raise  - 1 x daily - 7 x weekly - 1 sets - 10 reps  - Supine Bridge  - 1 x daily - 7 x weekly - 1 sets - 10 reps - 3\" hold  - Clamshell  - 1 x daily - 7 x weekly - 1 sets - 10 reps  - Sidelying Hip Abduction  - 1 x daily - 7 x weekly - 1 sets - 10 reps             "

## 2025-02-19 ENCOUNTER — OFFICE VISIT (OUTPATIENT)
Dept: PHYSICAL THERAPY | Facility: CLINIC | Age: 30
End: 2025-02-19
Payer: COMMERCIAL

## 2025-02-19 DIAGNOSIS — G89.4 CHRONIC PAIN SYNDROME: Primary | ICD-10-CM

## 2025-02-19 DIAGNOSIS — M79.18 MYOFASCIAL PAIN SYNDROME: ICD-10-CM

## 2025-02-19 DIAGNOSIS — M51.16 LUMBAR DISC DISEASE WITH RADICULOPATHY: ICD-10-CM

## 2025-02-19 DIAGNOSIS — G89.29 CHRONIC BILATERAL LOW BACK PAIN WITH LEFT-SIDED SCIATICA: ICD-10-CM

## 2025-02-19 DIAGNOSIS — M54.42 CHRONIC BILATERAL LOW BACK PAIN WITH LEFT-SIDED SCIATICA: ICD-10-CM

## 2025-02-19 PROCEDURE — 97112 NEUROMUSCULAR REEDUCATION: CPT | Performed by: PHYSICAL THERAPIST

## 2025-02-19 PROCEDURE — 97110 THERAPEUTIC EXERCISES: CPT | Performed by: PHYSICAL THERAPIST

## 2025-02-19 PROCEDURE — 97140 MANUAL THERAPY 1/> REGIONS: CPT | Performed by: PHYSICAL THERAPIST

## 2025-02-19 NOTE — PROGRESS NOTES
"Daily Note     Today's date: 2025  Patient name: Shiane E Follweiler  : 1995  MRN: [id number]  Referring provider: Kocher, Barbara, CRNP  Dx:   Encounter Diagnosis     ICD-10-CM    1. Chronic pain syndrome  G89.4       2. Chronic bilateral low back pain with left-sided sciatica  M54.42     G89.29       3. Lumbar disc disease with radiculopathy  M51.16       4. Myofascial pain syndrome  M79.18                      Subjective: Pt reports no carryover change in LBP and has intermittent L LE radicular sx.      Objective: See treatment diary below      Assessment: Tolerated treatment with change of exercises well. Sx centralized at end of session. Pt instructed her Lumbar spine will have carryover soreness from exercises, but as time persists, she will not have L LE radicular sx, and her LBP will dissipate. Patient demonstrated fatigue post treatment, exhibited good technique with therapeutic exercises, and would benefit from continued PT      Plan: Continue per plan of care.  Progress treatment as tolerated.         Precautions: Chronic back pain, GERD, high symptom irritability        Manuals 2/4/25 2/10 2/12 2/17 2/19/25   BLE - Hams, Piriformis, Quads  Bilat piriformis MB Held today     Prone P to A grade I, II Lumbosacral joint mobs     x10'                   Neuro Re-Ed         MARIA LUISA (via wedge table)     X10' with MHP   PPT 3\"x10 3\"x10 on wedge 3\" on wedge under legs  2x10 3\"x10    PPT w/march 10x Andrew 10x 10x 10x    PPT w/SLR 10x Andrew   10x ea    PPT w/BKFO  10x 10x NP due to time constraints    Palloff Press                Ther Ex        PPU     2x10   Cat/Camel     2x10   BirdDogs     2x5   On Elbows Hip Ext     2x10 ea           NuStep for LE strength  8' lvl 5 8' L5 L5     UBE Retro for Posture  3' lvl 1 4' L1 NP due to time constraints    3 way Pball Stretch - hold  Fwd only p! Fwd x10 Fwd 10\"x10    Bridges 3x10 3x10 shallow to start 3x10 low bridge cues for TrA and glute set prior to hip lift 3\"x10 " "   LTR    10\"x10    SKTC Reviewed HEP From 90/90 On wedge 10x5\" With towel assist  10x5\" W/ towel 10x5\"    S/L Hip Abd 10x Andrew nv 10x b/l NP due to time constraints    Clamshells  3\"x10 Andrew  nv 10x b/l NP due to time constraints    Prone LE Ext        Bird Dogs        Cat/Camel Stretch        Open Books                        Ther Activity                        Gait Training                        Modalities        HP/CP prn  90/90  with wedge 10' deferred     Prone wedge CP     x10'      Access Code: [email or web address]  URL: [email or web address]  Date: 02/04/2025  Prepared by: Brenda    Exercises  - Supine Posterior Pelvic Tilt  - 1 x daily - 7 x weekly - 1 sets - 10 reps - 5\" hold  - Supine March with Posterior Pelvic Tilt  - 1 x daily - 7 x weekly - 1 sets - 10 reps  - Supine Pelvic Tilt with Straight Leg Raise  - 1 x daily - 7 x weekly - 1 sets - 10 reps  - Supine Bridge  - 1 x daily - 7 x weekly - 1 sets - 10 reps - 3\" hold  - Clamshell  - 1 x daily - 7 x weekly - 1 sets - 10 reps  - Sidelying Hip Abduction  - 1 x daily - 7 x weekly - 1 sets - 10 reps             "

## 2025-02-24 ENCOUNTER — OFFICE VISIT (OUTPATIENT)
Dept: PHYSICAL THERAPY | Facility: CLINIC | Age: 30
End: 2025-02-24
Payer: COMMERCIAL

## 2025-02-24 DIAGNOSIS — G89.4 CHRONIC PAIN SYNDROME: Primary | ICD-10-CM

## 2025-02-24 DIAGNOSIS — M51.16 LUMBAR DISC DISEASE WITH RADICULOPATHY: ICD-10-CM

## 2025-02-24 DIAGNOSIS — G89.29 CHRONIC BILATERAL LOW BACK PAIN WITH LEFT-SIDED SCIATICA: ICD-10-CM

## 2025-02-24 DIAGNOSIS — M54.42 CHRONIC BILATERAL LOW BACK PAIN WITH LEFT-SIDED SCIATICA: ICD-10-CM

## 2025-02-24 DIAGNOSIS — M79.18 MYOFASCIAL PAIN SYNDROME: ICD-10-CM

## 2025-02-24 PROCEDURE — 97112 NEUROMUSCULAR REEDUCATION: CPT

## 2025-02-24 PROCEDURE — 97110 THERAPEUTIC EXERCISES: CPT

## 2025-02-24 NOTE — PROGRESS NOTES
"Daily Note     Today's date: 2025  Patient name: Shiane E Follweiler  : 1995  MRN: [id number]  Referring provider: Kocher, Barbara, CRNP  Dx:   Encounter Diagnosis     ICD-10-CM    1. Chronic pain syndrome  G89.4       2. Chronic bilateral low back pain with left-sided sciatica  M54.42     G89.29       3. Lumbar disc disease with radiculopathy  M51.16       4. Myofascial pain syndrome  M79.18                      Subjective: Patient reports that she has relief with extension position, but temporary at this time. She has pain with sitting for long periods of time.       Objective: See treatment diary below.      Assessment: Tolerated treatment well. Patient would benefit from continued PT to reduce lower back pain and improve upper trunk/core trunk. She has core weakness present. She prefers extension based TE at this time. Progress to tolerance.      Plan: Continue per plan of care.      Precautions: Chronic back pain, GERD, high symptom irritability        Manuals 24 2/10 2/12 2/17 2/19/25   BLE - Hams, Piriformis, Quads  Bilat piriformis MB Held today     Prone P to A grade I, II Lumbosacral joint mobs     x10'                   Neuro Re-Ed         MARIA LUISA (via wedge table) x10' with MHP Pre TE    X10' with MHP   PPT  3\"x10 on wedge 3\" on wedge under legs  2x10 3\"x10    PPT w/march  10x 10x 10x    PPT w/SLR    10x ea    PPT w/BKFO  10x 10x NP due to time constraints    Supermans 10x ea       Palloff Press P1 10x ea       CC BENJAMIN P4 2x10       Ther Ex        PPU 2x10    2x10   PPU w/ OP 5\"x10       Cat/Camel     2x10   BirdDogs     2x5   Hip Ext     2x10 ea   Back ext  50# 2x10       NuStep for LE strength  8' lvl 5 8' L5 L5     UBE Retro for Posture  3' lvl 1 4' L1 NP due to time constraints    3 way Pball Stretch - hold  Fwd only p! Fwd x10 Fwd 10\"x10    Bridges  3x10 shallow to start 3x10 low bridge cues for TrA and glute set prior to hip lift 3\"x10    LTR    10\"x10    SKTC  From 90/90 On wedge " "10x5\" With towel assist  10x5\" W/ towel 10x5\"    S/L Hip Abd  nv 10x b/l NP due to time constraints    Clamshells   nv 10x b/l NP due to time constraints                                                    Ther Activity                        Gait Training                        Modalities        HP/CP prn  90/90  with wedge 10' deferred     Prone wedge CP declined    x10'                 "

## 2025-02-26 ENCOUNTER — OFFICE VISIT (OUTPATIENT)
Dept: PHYSICAL THERAPY | Facility: CLINIC | Age: 30
End: 2025-02-26
Payer: COMMERCIAL

## 2025-02-26 DIAGNOSIS — M51.16 LUMBAR DISC DISEASE WITH RADICULOPATHY: ICD-10-CM

## 2025-02-26 DIAGNOSIS — G89.4 CHRONIC PAIN SYNDROME: Primary | ICD-10-CM

## 2025-02-26 DIAGNOSIS — G89.29 CHRONIC BILATERAL LOW BACK PAIN WITH LEFT-SIDED SCIATICA: ICD-10-CM

## 2025-02-26 DIAGNOSIS — M54.42 CHRONIC BILATERAL LOW BACK PAIN WITH LEFT-SIDED SCIATICA: ICD-10-CM

## 2025-02-26 DIAGNOSIS — M79.18 MYOFASCIAL PAIN SYNDROME: ICD-10-CM

## 2025-02-26 PROCEDURE — 97110 THERAPEUTIC EXERCISES: CPT

## 2025-02-26 PROCEDURE — 97112 NEUROMUSCULAR REEDUCATION: CPT

## 2025-02-26 NOTE — PROGRESS NOTES
"Daily Note     Today's date: 2025  Patient name: Shiane E Follweiler  : 1995  MRN: [id number]  Referring provider: Kocher, Barbara, CRNP  Dx:   Encounter Diagnosis     ICD-10-CM    1. Chronic pain syndrome  G89.4       2. Chronic bilateral low back pain with left-sided sciatica  M54.42     G89.29       3. Lumbar disc disease with radiculopathy  M51.16       4. Myofascial pain syndrome  M79.18                      Subjective: Patient reports that her LBP has improved. She had general soreness after last session, but today she feels \"pretty good.\"      Objective: See treatment diary below. Incorporated prone hip ER/IR into program.       Assessment: Tolerated treatment well. Patient would benefit from continued PT to reduce lower back pain and improve upper trunk/core trunk. She has core and upper trunk strength deficits. She prefers extension based TE at this time. Requires dumbbells to reduce wrist discomfort during PPU. Progress to tolerance.      Plan: Continue per plan of care.      Precautions: Chronic back pain, GERD, high symptom irritability        Manuals 24 2 2   BLE - Hams, Piriformis, Quads   Held today     Prone P to A grade I, II Lumbosacral joint mobs     x10'                   Neuro Re-Ed         MARIA LUISA (via wedge table) x10' with MHP Pre TE x10' with MHP   X10' with MHP   PPT   3\" on wedge under legs  2x10 3\"x10    PPT w/march   10x 10x    PPT w/SLR    10x ea    PPT w/BKFO   10x NP due to time constraints    Supermans 10x ea 3\" holds 10x ea      Palloff Press P1 10x ea P1 2x10 ea      CC BENJAMIN P4 2x10 P4 2x10      Ther Ex        PPU 2x10 5\"x20 holding 5# DB for wrist   2x10   PPU w/ OP 5\"x10 5\"x10      Cat/Camel     2x10   BirdDogs     2x5   Hip Ext     2x10 ea   Back ext  50# 2x10 50# 2x10      NuStep for LE strength   8' L5 L5     Prone Hip IR  L2 2x10      Prone Hip ER  5\" holds 10x      Bridges   3x10 low bridge cues for TrA and glute set prior to hip lift 3\"x10  " "  LTR    10\"x10    SKTC   With towel assist  10x5\" W/ towel 10x5\"    S/L Hip Abd   10x b/l NP due to time constraints    Clamshells    10x b/l NP due to time constraints                                                    Ther Activity                        Gait Training                        Modalities        HP/CP prn   deferred     Prone wedge CP declined declined   x10'                   "

## 2025-03-03 ENCOUNTER — EVALUATION (OUTPATIENT)
Dept: PHYSICAL THERAPY | Facility: CLINIC | Age: 30
End: 2025-03-03
Payer: COMMERCIAL

## 2025-03-03 DIAGNOSIS — G89.29 CHRONIC BILATERAL LOW BACK PAIN WITH LEFT-SIDED SCIATICA: ICD-10-CM

## 2025-03-03 DIAGNOSIS — M79.18 MYOFASCIAL PAIN SYNDROME: ICD-10-CM

## 2025-03-03 DIAGNOSIS — M51.16 LUMBAR DISC DISEASE WITH RADICULOPATHY: ICD-10-CM

## 2025-03-03 DIAGNOSIS — M54.42 CHRONIC BILATERAL LOW BACK PAIN WITH LEFT-SIDED SCIATICA: ICD-10-CM

## 2025-03-03 DIAGNOSIS — G89.4 CHRONIC PAIN SYNDROME: Primary | ICD-10-CM

## 2025-03-03 PROCEDURE — 97110 THERAPEUTIC EXERCISES: CPT | Performed by: PHYSICAL THERAPIST

## 2025-03-03 PROCEDURE — 97112 NEUROMUSCULAR REEDUCATION: CPT | Performed by: PHYSICAL THERAPIST

## 2025-03-03 NOTE — PROGRESS NOTES
PT Re-Evaluation     Today's date: 3/3/2025  Patient name: Shiane E Follweiler  : 1995  MRN: [id number]  Referring provider: Kocher, Barbara, CRNP  Dx:   Encounter Diagnosis     ICD-10-CM    1. Chronic pain syndrome  G89.4       2. Chronic bilateral low back pain with left-sided sciatica  M54.42     G89.29       3. Lumbar disc disease with radiculopathy  M51.16       4. Myofascial pain syndrome  M79.18           Start Time: 915  Stop Time: 1000  Total time in clinic (min): 45 minutes    Assessment  Impairments: abnormal or restricted ROM, activity intolerance, impaired physical strength, lacks appropriate home exercise program, pain with function, poor posture , poor body mechanics and activity limitations  Other impairment: decreased flexibility    Assessment details: The patient is responding well to physical therapy treatment with gains noted in lumbar mobility and strength. She notes a 50% improvement since start of therapy. Her pain has mostly centralized to her lower back. She continues to have difficulty finding a comfortable position to sleep. Deficits remain in lumbar strength and stability. She would benefit from continued PT tp address ongoing deficits and to further improve functional status.     Goals  STGs:  1.  Initiate and complete HEP with verbal cues.  MET  2.  Improve BLE strength by 1/2 grade in 4 weeks.  MET  3.  Decrease LBP by > 25% in 4 weeks.  MET  LTGs:  ONGOING  1.  Patient to be I with HEP in 8 weeks.  2.  Improve L/S ROM to WNL t/o in 8 weeks to improve function.  3.  Decrease LBP to < or = to 3-4/10 with activity in 8 weeks to improve function.  4.  Improve BLE strength to > or = to 4+ to 5/5 t/o in 8 weeks to improve function.    5.  Postural control is improved to maximal level of function in 8 weeks.  6.  Stair negotiation is improved to maximal level of function in 8 weeks.  7.  Recreational performance is improved to maximal level of function in 8 weeks.  8.  ADL  performance is improved to maximal level of function in 8 weeks.  9.  Work performance is improved to maximal level of function in 8 weeks.  10.  Patient to report improved sleep in 8 weeks.        Plan  Patient would benefit from: skilled physical therapy  Planned modality interventions: cryotherapy and thermotherapy: hydrocollator packs    Planned therapy interventions: IASTM, abdominal trunk stabilization, manual therapy, body mechanics training, neuromuscular re-education, patient/caregiver education, postural training, self care, strengthening, stretching, therapeutic activities, therapeutic exercise, functional ROM exercises, flexibility and home exercise program    Frequency: 2x week  Duration in weeks: 8  Plan of Care beginning date: 2/4/2025  Plan of Care expiration date: 4/1/2025  Treatment plan discussed with: patient  Plan details: Modalities and therapy interventions prn.        Subjective Evaluation    History of Present Illness  Mechanism of injury: The patient states that she has had lower back pain for years.  She notes that she did have an injection about three years ago which did help.    She notes that last September she got a part time job (standing) and notes that since then her pain has increased.     She had seen pain management on 1/28/25.  She had x-rays taken, per patient they showed changes from her last set of x-rays.  She was referred to OPPT and she now presents for her evaluation.  She will be going back to see the doctor on 3/31/25 for her follow up appointment.      From EMR review of x-rays from 1/28:  FINDINGS:  There are 5 non rib bearing lumbar vertebral bodies.   There is no evidence of acute fracture or destructive osseous lesion.  Alignment is unremarkable.   There is mild disc space narrowing with endplate sclerosis and osteophytosis at L5-S1. The remainder of the disc levels are within normal limits.  The pedicles appear intact.  Soft tissues are  "unremarkable.  IMPRESSION:  Mild spondylosis at L5-S1. No acute abnormality.    She notes that she gets clicking in her lower back, only when lifting her R leg (like when putting on her shoes and socks).  The patient states that her pain is constant.  Pain is across her lower back.  She can get B hip pain and L leg pain down thigh to her knee.  She denies any R leg pain.  She denies any N&T into either leg.    More pain with standing and when bending forward.  She notes difficulty sleeping, hard to find a comfortable position to sleep.      UPDATE 3/3/25:  The patient reports 6/10 pain in the lower back today. She states that she feels better after therapy. She has not had symptom in the L leg for a while. She continues to struggle to find a comfortable position to sleep. She reports a 50% improvement since start of therapy.     Patient Goals  Patient goals for therapy: increased motion, decreased pain and increased strength  Patient goal: \"To have less pain in my back, to be able to bend over when lifting an item.\"  Pain  Exacerbated by: bending forward.    Social Support  Steps to enter house: yes  3  Stairs in house: yes   Lives in: multiple-level home  Lives with: significant other    Employment status: working (Part time - stands 8-9 hours a day - two days a week)    Diagnostic Tests  Abnormal x-ray: See Above.  Treatments  Previous treatment: physical therapy and injection treatment      Objective     Concurrent Complaints  Positive for disturbed sleep.     Static Posture     Lumbar Spine   Decreased lordosis.     Palpation   Left   Tenderness of the erector spinae, lumbar interspinals and lumbar paraspinals.     Right   Tenderness of the erector spinae, lumbar interspinals and lumbar paraspinals.     Active Range of Motion     Lumbar   Flexion: Active lumbar flexion: fingers to toes.   Extension:  with pain Restriction level: minimal  Left lateral flexion:  Restriction level: minimal  Right lateral flexion:  " "WFL  Left rotation:  WFL  Right rotation:  WFL  Left Hip   Flexion: 95 degrees     Right Hip   Flexion: 95 degrees   Left Knee   Flexion: WFL  Extension: WFL    Right Knee   Flexion: WFL  Extension: WFL    Additional Active Range of Motion Details  L SLR: 60  R SLR: 60    Joint Play     Hypomobile: L1, L2, L3, L4, L5 and S1     Strength/Myotome Testing     Left Hip   Planes of Motion   Flexion: 4  Abduction: 4  Adduction: 4+  External rotation: 4  Internal rotation: 4    Right Hip   Planes of Motion   Flexion: 4  Abduction: 4+  Adduction: 4+  External rotation: 4  Internal rotation: 4    Left Knee   Flexion: WFL  Extension: WFL    Right Knee   Flexion: WFL  Extension: WFL    Ambulation     Ambulation: Stairs   Ascend stairs: independent  Pattern: reciprocal  Descend stairs: independent  Pattern: reciprocal    Additional Stairs Ambulation Details  Reciprocal gait pattern but pulling along her lower back.                  Precautions: Chronic back pain, GERD       Manuals 2/24/24 2/26 3/3 2/17 2/19/25   BLE - Hams, Piriformis, Quads        Prone P to A grade I, II Lumbosacral joint mobs     x10'                   Neuro Re-Ed         MARIA LUISA (via wedge table) x10' with MHP Pre TE x10' with MHP 10' w/ MHP  X10' with MHP   PPT    3\"x10    PPT w/march    10x    PPT w/SLR    10x ea    PPT w/BKFO    NP due to time constraints    Supermans 10x ea 3\" holds 10x ea 3\" holds 10x ea     Palloff Press P1 10x ea P1 2x10 ea P1 2x10 ea     CC BENJAMIN P4 2x10 P4 2x10 P4 2x10     Ther Ex        PPU 2x10 5\"x20 holding 5# DB for wrist 5\"x20 holding 5# DB for wrist  2x10   PPU w/ OP 5\"x10 5\"x10 5\"x10     Cat/Camel     2x10   BirdDogs     2x5   Hip Ext     2x10 ea   Back ext  50# 2x10 50# 2x10 50# 2x10     NuStep for LE strength    L5     Prone Hip IR  L2 2x10 L2 2x10     Prone Hip ER  5\" holds 10x 5\" holds 10x     Bridges    3\"x10    LTR    10\"x10    SKTC    W/ towel 10x5\"    S/L Hip Abd    NP due to time constraints    Clamshells     NP due to " time constraints                                                    Ther Activity                        Gait Training                        Modalities        HP/CP prn        Prone wedge CP declined declined   x10'         [Time Spent: ___ minutes] : I have spent [unfilled] minutes of time on the encounter which excludes teaching and separately reported services.

## 2025-03-05 ENCOUNTER — OFFICE VISIT (OUTPATIENT)
Dept: PHYSICAL THERAPY | Facility: CLINIC | Age: 30
End: 2025-03-05
Payer: COMMERCIAL

## 2025-03-05 DIAGNOSIS — M79.18 MYOFASCIAL PAIN SYNDROME: ICD-10-CM

## 2025-03-05 DIAGNOSIS — M51.16 LUMBAR DISC DISEASE WITH RADICULOPATHY: ICD-10-CM

## 2025-03-05 DIAGNOSIS — M54.42 CHRONIC BILATERAL LOW BACK PAIN WITH LEFT-SIDED SCIATICA: ICD-10-CM

## 2025-03-05 DIAGNOSIS — G89.4 CHRONIC PAIN SYNDROME: Primary | ICD-10-CM

## 2025-03-05 DIAGNOSIS — G89.29 CHRONIC BILATERAL LOW BACK PAIN WITH LEFT-SIDED SCIATICA: ICD-10-CM

## 2025-03-05 PROCEDURE — 97112 NEUROMUSCULAR REEDUCATION: CPT | Performed by: PHYSICAL THERAPIST

## 2025-03-05 PROCEDURE — 97110 THERAPEUTIC EXERCISES: CPT | Performed by: PHYSICAL THERAPIST

## 2025-03-05 NOTE — PROGRESS NOTES
"Daily Note     Today's date: 3/5/2025  Patient name: Shiane E Follweiler  : 1995  MRN: [id number]  Referring provider: Kocher, Barbara, CRNP  Dx:   Encounter Diagnosis     ICD-10-CM    1. Chronic pain syndrome  G89.4       2. Chronic bilateral low back pain with left-sided sciatica  M54.42     G89.29       3. Lumbar disc disease with radiculopathy  M51.16       4. Myofascial pain syndrome  M79.18           Start Time: 0900  Stop Time: 945  Total time in clinic (min): 45 minutes    Subjective: The patient reports 8/10 pain described as \"pinching\" in her lower back.      Objective: See treatment diary below      Assessment: The patient reported a release in her LB with MH and prone position. Patient able to perform all prone activities but then noted an increase in pain in her sacral area and L lumbar region. CP applied in prone to reduce pain and inflammation. Tolerated treatment fair. Patient would benefit from continued PT      Plan: Continue per plan of care.         Precautions: Chronic back pain, GERD       Manuals 24 3/3 3/5 2/19/25   BLE - Hams, Piriformis, Quads        Prone P to A grade I, II Lumbosacral joint mobs     x10'                   Neuro Re-Ed         MARIA LUISA (via wedge table) x10' with MHP Pre TE x10' with MHP 10' w/ MHP 10' w/ MHP X10' with MHP   PPT        PPT w/march        PPT w/SLR        PPT w/BKFO        Supermans 10x ea 3\" holds 10x ea 3\" holds 10x ea 3\" holds 10x ea    Palloff Press P1 10x ea P1 2x10 ea P1 2x10 ea DNP    CC BENJAMIN P4 2x10 P4 2x10 P4 2x10 DNP    Ther Ex        PPU 2x10 5\"x20 holding 5# DB for wrist 5\"x20 holding 5# DB for wrist 5\"x20 holding 5# DB for wrist 2x10   PPU w/ OP 5\"x10 5\"x10 5\"x10 5\"x10    Cat/Camel     2x10   BirdDogs     2x5   Hip Ext     2x10 ea   Back ext  50# 2x10 50# 2x10 50# 2x10 DNP    NuStep for LE strength        Prone Hip IR  L2 2x10 L2 2x10 L2 2x10    Prone Hip ER  5\" holds 10x 5\" holds 10x 5\" holds 10x    Bridges        LTR        SKTC   "      S/L Hip Abd        Clamshells                                                         Ther Activity                        Gait Training                        Modalities        HP/CP prn        Prone wedge CP declined declined  10' to LB  x10'

## 2025-03-10 ENCOUNTER — APPOINTMENT (OUTPATIENT)
Dept: PHYSICAL THERAPY | Facility: CLINIC | Age: 30
End: 2025-03-10
Payer: COMMERCIAL

## 2025-03-11 ENCOUNTER — OFFICE VISIT (OUTPATIENT)
Dept: PHYSICAL THERAPY | Facility: CLINIC | Age: 30
End: 2025-03-11
Payer: COMMERCIAL

## 2025-03-11 DIAGNOSIS — G89.4 CHRONIC PAIN SYNDROME: Primary | ICD-10-CM

## 2025-03-11 DIAGNOSIS — M79.18 MYOFASCIAL PAIN SYNDROME: ICD-10-CM

## 2025-03-11 DIAGNOSIS — G89.29 CHRONIC BILATERAL LOW BACK PAIN WITH LEFT-SIDED SCIATICA: ICD-10-CM

## 2025-03-11 DIAGNOSIS — M54.42 CHRONIC BILATERAL LOW BACK PAIN WITH LEFT-SIDED SCIATICA: ICD-10-CM

## 2025-03-11 DIAGNOSIS — M51.16 LUMBAR DISC DISEASE WITH RADICULOPATHY: ICD-10-CM

## 2025-03-11 PROCEDURE — 97110 THERAPEUTIC EXERCISES: CPT

## 2025-03-11 PROCEDURE — 97112 NEUROMUSCULAR REEDUCATION: CPT

## 2025-03-11 NOTE — PROGRESS NOTES
"Daily Note     Today's date: 3/11/2025  Patient name: Shiane E Follweiler  : 1995  MRN: [id number]  Referring provider: Kocher, Barbara, CRNP  Dx:   Encounter Diagnosis     ICD-10-CM    1. Chronic pain syndrome  G89.4       2. Chronic bilateral low back pain with left-sided sciatica  M54.42     G89.29       3. Lumbar disc disease with radiculopathy  M51.16       4. Myofascial pain syndrome  M79.18                      Subjective: Patient reports her LBP is still present. She feels it's unstable. She is unable to perform simple house hold tasks at this time. Patient reports she has trouble lifting her LE to get into her car. Feels weakness in her BLE at times. She has frustration that she is not showing more progress.       Objective: See treatment diary below.       Assessment: Tolerated treatment fair. Patient would benefit from continued PT to address weakness and reduce LBP. Patient's program will consist of core strengthening to address core weakness. She has pain in HL position and SL positions. She can tolerate core/hip strengthening in standing and sitting on PB. She has some pain relief post treatment, but this seems temporary. Pain reappears by next session.      Plan: Continue per plan of care.      Precautions: Chronic back pain, GERD       Manuals 2/24/24 2/26 3/3 3/5 3/11   Prone P to A grade I, II Lumbosacral joint mobs                        Neuro Re-Ed         MARIA LUISA (via wedge table) x10' with MHP Pre TE x10' with MHP 10' w/ MHP 10' w/ MHP Prone no wedge 10'   PPT     On PB 10x   PPT w/ LAQ/marches     10x ea   Supermans 10x ea 3\" holds 10x ea 3\" holds 10x ea 3\" holds 10x ea 3\" holds 10x ea   Palloff Press P1 10x ea P1 2x10 ea P1 2x10 ea DNP    CC BENJAMIN P4 2x10 P4 2x10 P4 2x10 DNP    Ther Ex        PPU 2x10 5\"x20 holding 5# DB for wrist 5\"x20 holding 5# DB for wrist 5\"x20 holding 5# DB for wrist held   PPU w/ OP 5\"x10 5\"x10 5\"x10 5\"x10 held   Cat/Camel        BirdDogs        Standing hip abd/ext  " "   2x10 ea   Back ext  50# 2x10 50# 2x10 50# 2x10 DNP    Prone Hip IR  L2 2x10 L2 2x10 L2 2x10 L2 10x   Prone Hip ER  5\" holds 10x 5\" holds 10x 5\" holds 10x 5\"x10   Side stepping      L2 in hallways 10 steps x2   HS stretch     8\" step 10\"x4  Pain after                                                                   Ther Activity                        Gait Training                        Modalities        HP/CP prn        Prone wedge CP declined declined  10' to LB  Prone post TE 10'          "

## 2025-03-12 ENCOUNTER — OFFICE VISIT (OUTPATIENT)
Dept: PHYSICAL THERAPY | Facility: CLINIC | Age: 30
End: 2025-03-12
Payer: COMMERCIAL

## 2025-03-12 ENCOUNTER — TELEPHONE (OUTPATIENT)
Age: 30
End: 2025-03-12

## 2025-03-12 DIAGNOSIS — M51.16 LUMBAR DISC DISEASE WITH RADICULOPATHY: Primary | ICD-10-CM

## 2025-03-12 DIAGNOSIS — M79.18 MYOFASCIAL PAIN SYNDROME: ICD-10-CM

## 2025-03-12 DIAGNOSIS — M51.16 LUMBAR DISC DISEASE WITH RADICULOPATHY: ICD-10-CM

## 2025-03-12 DIAGNOSIS — G89.4 CHRONIC PAIN SYNDROME: Primary | ICD-10-CM

## 2025-03-12 DIAGNOSIS — G89.29 CHRONIC BILATERAL LOW BACK PAIN WITH LEFT-SIDED SCIATICA: ICD-10-CM

## 2025-03-12 DIAGNOSIS — M54.42 CHRONIC BILATERAL LOW BACK PAIN WITH LEFT-SIDED SCIATICA: ICD-10-CM

## 2025-03-12 PROCEDURE — 97112 NEUROMUSCULAR REEDUCATION: CPT

## 2025-03-12 PROCEDURE — 97110 THERAPEUTIC EXERCISES: CPT

## 2025-03-12 NOTE — PROGRESS NOTES
"Daily Note     Today's date: 3/12/2025  Patient name: Shiane E Follweiler  : 1995  MRN: [id number]  Referring provider: Kocher, Barbara, CRNP  Dx:   Encounter Diagnosis     ICD-10-CM    1. Chronic pain syndrome  G89.4       2. Chronic bilateral low back pain with left-sided sciatica  M54.42     G89.29       3. Lumbar disc disease with radiculopathy  M51.16       4. Myofascial pain syndrome  M79.18                      Subjective: Patient reports that her pain in her lower back has remained unchanged at this time. She has general soreness in her lower back today but not as intense. Some days are more painful that others. She feels her lower back is unstable. She did call her PCP for a follow up or imaging.     .  Objective: See treatment diary below.      Assessment: Tolerated treatment fair. Patient would benefit from continued PT to address core weakness and reduce LBP. Patient's program consisted of core strengthening in tolerable positions. Unable to perform PPT in a HL w/o and increase in pain. Her pain is always present but fluctuated in intensity. Progress core strength as able.       Plan: Continue per plan of care.      Precautions: Chronic back pain, GERD       Manuals 3/12 2/26 3/3 3/5 3/11   Prone P to A grade I, II Lumbosacral joint mobs                        Neuro Re-Ed         MARIA LUISA (via wedge table) Prone no wedge 10' x10' with MHP 10' w/ MHP 10' w/ MHP Prone no wedge 10'   PPT On PB 2x10    On PB 10x   PPT w/ LAQ/marches 10x ea    10x ea   Supermans 3\" holds 10x ea 3\" holds 10x ea 3\" holds 10x ea 3\" holds 10x ea 3\" holds 10x ea   Palloff Press P1 25x  P1 2x10 ea P1 2x10 ea DNP    CC BENJAMIN P4 2x10 P4 2x10 P4 2x10 DNP    Ther Ex        PPU  5\"x20 holding 5# DB for wrist 5\"x20 holding 5# DB for wrist 5\"x20 holding 5# DB for wrist held   PPU w/ OP  5\"x10 5\"x10 5\"x10 held   Cat/Camel        Bird[employer]ogs        Standing hip abd/ext 2x10 ea    2x10 ea   Back ext   50# 2x10 50# 2x10 DNP    Prone Hip IR L2 " "2x10 L2 2x10 L2 2x10 L2 2x10 L2 10x   Prone Hip ER 5\"x20 5\" holds 10x 5\" holds 10x 5\" holds 10x 5\"x10   Side stepping  L2 5x// bars    L2 in hallways 10 steps x2   HS stretch     8\" step 10\"x4  Pain after                                                                   Ther Activity                        Gait Training                        Modalities        HP/CP prn        Prone wedge CP  declined  10' to LB  Prone post TE 10'            "

## 2025-03-12 NOTE — TELEPHONE ENCOUNTER
Pt has completed 10 sessions over the course of 5 wks PT lumbar spine.   Pls advise on MRI ordering.   LOV 1/28/25 LILIAN  Thank you!

## 2025-03-12 NOTE — TELEPHONE ENCOUNTER
Caller: Patient     Doctor: Barbara Kocher     Reason for call: Patient has been in PT and it does not seem to be helping. Low back pain is at a 7 when standing when bending over its a 9.     She spoke to PT who suggested she see if the provider can order an MRI.    Please advise     Call back#: [phone]

## 2025-03-17 ENCOUNTER — OFFICE VISIT (OUTPATIENT)
Dept: PHYSICAL THERAPY | Facility: CLINIC | Age: 30
End: 2025-03-17
Payer: COMMERCIAL

## 2025-03-17 DIAGNOSIS — M51.16 LUMBAR DISC DISEASE WITH RADICULOPATHY: ICD-10-CM

## 2025-03-17 DIAGNOSIS — G89.29 CHRONIC BILATERAL LOW BACK PAIN WITH LEFT-SIDED SCIATICA: ICD-10-CM

## 2025-03-17 DIAGNOSIS — G89.4 CHRONIC PAIN SYNDROME: Primary | ICD-10-CM

## 2025-03-17 DIAGNOSIS — M79.18 MYOFASCIAL PAIN SYNDROME: ICD-10-CM

## 2025-03-17 DIAGNOSIS — M54.42 CHRONIC BILATERAL LOW BACK PAIN WITH LEFT-SIDED SCIATICA: ICD-10-CM

## 2025-03-17 PROCEDURE — 97110 THERAPEUTIC EXERCISES: CPT

## 2025-03-17 PROCEDURE — 97112 NEUROMUSCULAR REEDUCATION: CPT

## 2025-03-17 NOTE — PROGRESS NOTES
"Daily Note     Today's date: 3/17/2025  Patient name: Shiane E Follweiler  : 1995  MRN: [id number]  Referring provider: Kocher, Barbara, CRNP  Dx:   Encounter Diagnosis     ICD-10-CM    1. Chronic pain syndrome  G89.4       2. Chronic bilateral low back pain with left-sided sciatica  M54.42     G89.29       3. Lumbar disc disease with radiculopathy  M51.16       4. Myofascial pain syndrome  M79.18                      Subjective: Patient reports that her LBP is not \"too bad\" today. She has more R side LBP than L side. She has her MRI scheduled for .       Objective: See treatment diary below. Incorporated tandem stance to improve core strength and hip stability.       Assessment: Tolerated treatment fair. Patient would benefit from continued PT to address core weakness. Patient's program consisted of core mostly strengthening at this time. She has balance deficits in a tandem stance. Progress as able and to tolerance.       Plan: Continue per plan of care.      Precautions: Chronic back pain, GERD       Manuals 3/12 3/17    3/5 3/11   Prone P to A grade I, II Lumbosacral joint mobs                        Neuro Re-Ed         MARIA LUISA (via wedge table) Prone no wedge 10' Prone no wedge 10'  10' w/ MHP Prone no wedge 10'   PPT On PB 2x10 On PB 2x10   On PB 10x   PPT w/ LAQ/marches 10x ea 2x10 ea   10x ea   Supermans 3\" holds 10x ea 3\" holds 10x ea On PB  3\" holds 10x ea 3\" holds 10x ea   Palloff Press P1 25x  P1 25x   DNP    CC BENJAMIN P4 2x10 P4 2x10   DNP    Tandem Stance  On Firm 30\"x2      Ther Ex        PPU    5\"x20 holding 5# DB for wrist held   PPU w/ OP    5\"x10 held   Standing hip abd/ext 2x10 ea 2x10 ea   2x10 ea   Back ext     DNP    Prone Hip IR L2 2x10 L2 2x10  L2 2x10 L2 10x   Prone Hip ER 5\"x20 5\"x20  5\" holds 10x 5\"x10   Side stepping  L2 5x// bars L2 10 steps 2x    L2 in hallways 10 steps x2   HS stretch     8\" step 10\"x4  Pain after                                                              "      Ther Activity                        Gait Training                        Modalities        HP/CP prn        Prone wedge CP    10' to LB  Prone post TE 10'

## 2025-03-19 ENCOUNTER — OFFICE VISIT (OUTPATIENT)
Dept: PHYSICAL THERAPY | Facility: CLINIC | Age: 30
End: 2025-03-19
Payer: COMMERCIAL

## 2025-03-19 DIAGNOSIS — M79.18 MYOFASCIAL PAIN SYNDROME: ICD-10-CM

## 2025-03-19 DIAGNOSIS — M54.42 CHRONIC BILATERAL LOW BACK PAIN WITH LEFT-SIDED SCIATICA: ICD-10-CM

## 2025-03-19 DIAGNOSIS — G89.4 CHRONIC PAIN SYNDROME: Primary | ICD-10-CM

## 2025-03-19 DIAGNOSIS — M51.16 LUMBAR DISC DISEASE WITH RADICULOPATHY: ICD-10-CM

## 2025-03-19 DIAGNOSIS — G89.29 CHRONIC BILATERAL LOW BACK PAIN WITH LEFT-SIDED SCIATICA: ICD-10-CM

## 2025-03-19 PROCEDURE — 97112 NEUROMUSCULAR REEDUCATION: CPT

## 2025-03-19 PROCEDURE — 97110 THERAPEUTIC EXERCISES: CPT

## 2025-03-19 NOTE — PROGRESS NOTES
"Daily Note     Today's date: 3/19/2025  Patient name: Shiane E Follweiler  : 1995  MRN: [id number]  Referring provider: Kocher, Barbara, CRNP  Dx:   Encounter Diagnosis     ICD-10-CM    1. Chronic pain syndrome  G89.4       2. Chronic bilateral low back pain with left-sided sciatica  M54.42     G89.29       3. Lumbar disc disease with radiculopathy  M51.16       4. Myofascial pain syndrome  M79.18                      Subjective: Patient reports that her LBP is present today because she tried lifting her daughter.      Objective: See treatment diary below. Held superman's due to patient having lower back pain.       Assessment:Tolerated treatment fair. Patient would benefit from continued PT to address core weakness. Patient was able to tolerate the rest of program. She was able to tolerate resistance on ankles for hip strengthening. Patient has core and B hip weakness still present.  Progress as able and to tolerance.       Plan: Continue per plan of care.      Precautions: Chronic back pain, GERD       Manuals 3/12 3/17 3/19   3/5 3/11   Prone P to A grade I, II Lumbosacral joint mobs                        Neuro Re-Ed         MARIA LUISA (via wedge table) Prone no wedge 10' Prone no wedge 10' Prone no wedge 10' 10' w/ MHP Prone no wedge 10'   PPT On PB 2x10 On PB 2x10 On PB 2x10  On PB 10x   PPT w/ LAQ/marches 10x ea 2x10 ea 2x10   10x ea   Supermans 3\" holds 10x ea 3\" holds 10x ea held 3\" holds 10x ea 3\" holds 10x ea   Palloff Press P1 25x  P1 25x  P1 25x DNP    CC BENJAMIN P4 2x10 P4 2x10  P5 2x10 DNP    Tandem Stance  On Firm 30\"x2 On AE 30\"x2     Ther Ex        PPU    5\"x20 holding 5# DB for wrist held   PPU w/ OP    5\"x10 held   Standing hip abd/ext 2x10 ea 2x10 ea 2# 2x10 ea  2x10 ea   Back ext     DNP    Prone Hip IR L2 2x10 L2 2x10 L2 2x10 L2 2x10 L2 10x   Prone Hip ER 5\"x20 5\"x20 5\"x20 5\" holds 10x 5\"x10   Side stepping  L2 5x// bars L2 10 steps 2x  L2 10 steps 3x   L2 in hallways 10 steps x2   HS stretch     " "8\" step 10\"x4  Pain after                                                                   Ther Activity                        Gait Training                        Modalities        HP/CP prn        Prone wedge CP    10' to LB  Prone post TE 10'                "

## 2025-03-24 ENCOUNTER — OFFICE VISIT (OUTPATIENT)
Dept: PHYSICAL THERAPY | Facility: CLINIC | Age: 30
End: 2025-03-24
Payer: COMMERCIAL

## 2025-03-24 DIAGNOSIS — G89.29 CHRONIC BILATERAL LOW BACK PAIN WITH LEFT-SIDED SCIATICA: ICD-10-CM

## 2025-03-24 DIAGNOSIS — M51.16 LUMBAR DISC DISEASE WITH RADICULOPATHY: ICD-10-CM

## 2025-03-24 DIAGNOSIS — M79.18 MYOFASCIAL PAIN SYNDROME: ICD-10-CM

## 2025-03-24 DIAGNOSIS — G89.4 CHRONIC PAIN SYNDROME: Primary | ICD-10-CM

## 2025-03-24 DIAGNOSIS — M54.42 CHRONIC BILATERAL LOW BACK PAIN WITH LEFT-SIDED SCIATICA: ICD-10-CM

## 2025-03-24 PROCEDURE — 97110 THERAPEUTIC EXERCISES: CPT

## 2025-03-24 PROCEDURE — 97112 NEUROMUSCULAR REEDUCATION: CPT

## 2025-03-24 NOTE — PROGRESS NOTES
"Daily Note     Today's date: 3/24/2025  Patient name: Shiane E Follweiler  : 1995  MRN: [id number]  Referring provider: Kocher, Barbara, CRNP  Dx:   Encounter Diagnosis     ICD-10-CM    1. Chronic pain syndrome  G89.4       2. Chronic bilateral low back pain with left-sided sciatica  M54.42     G89.29       3. Lumbar disc disease with radiculopathy  M51.16       4. Myofascial pain syndrome  M79.18                      Subjective: Patient reports her LBP is present and remains the same. MRI scheduled for 3/25.       Objective: See treatment diary below.      Assessment: Tolerated treatment fair. Patient would benefit from continued PT to address core weakness. Patient was able to tolerate program today. Patient has core and B hip weakness still present, but has been improving. Pain still remains in her lower back. Progress as able and to tolerance.       Plan: Continue per plan of care.      Precautions: Chronic back pain, GERD       Manuals 3/12 3/17 3/19   3/24 3/11   Prone P to A grade I, II Lumbosacral joint mobs                        Neuro Re-Ed         MARIA LUISA (via wedge table) Prone no wedge 10' Prone no wedge 10' Prone no wedge 10' Prone no wedge 10' Prone no wedge 10'   PPT On PB 2x10 On PB 2x10 On PB 2x10 On PB 2x10 On PB 10x   PPT w/ LAQ/marches 10x ea 2x10 ea 2x10  2x10 10x ea   Supermans 3\" holds 10x ea 3\" holds 10x ea held  3\" holds 10x ea   Palloff Press P1 25x  P1 25x  P1 25x P 25x    CC BENJAMIN P4 2x10 P4 2x10  P5 2x10 P5 2x10    Tandem Stance  On Firm 30\"x2 On AE 30\"x2 On AE 30\"x2    Ther Ex        PPU     held   PPU w/ OP     held   Standing hip abd/ext 2x10 ea 2x10 ea 2# 2x10 ea 2# 2x10 ea 2x10 ea   Back ext         Prone Hip IR L2 2x10 L2 2x10 L2 2x10 L2 2x10 L2 10x   Prone Hip ER 5\"x20 5\"x20 5\"x20 5\"x20 5\"x10   Side stepping  L2 5x// bars L2 10 steps 2x  L2 10 steps 3x  L2 10 steps 3x L2 in hallways 10 steps x2   HS stretch     8\" step 10\"x4  Pain after                                         "                           Ther Activity                        Gait Training                        Modalities        HP/CP prn        Prone wedge CP     Prone post TE 10'

## 2025-03-25 ENCOUNTER — HOSPITAL ENCOUNTER (OUTPATIENT)
Dept: MRI IMAGING | Facility: HOSPITAL | Age: 30
Discharge: HOME | End: 2025-03-25
Payer: COMMERCIAL

## 2025-03-25 DIAGNOSIS — M51.16 LUMBAR DISC DISEASE WITH RADICULOPATHY: ICD-10-CM

## 2025-03-25 PROCEDURE — 72148 MRI LUMBAR SPINE W/O DYE: CPT

## 2025-03-26 ENCOUNTER — OFFICE VISIT (OUTPATIENT)
Dept: PHYSICAL THERAPY | Facility: CLINIC | Age: 30
End: 2025-03-26
Payer: COMMERCIAL

## 2025-03-26 ENCOUNTER — RESULTS FOLLOW-UP (OUTPATIENT)
Age: 30
End: 2025-03-26

## 2025-03-26 DIAGNOSIS — M79.18 MYOFASCIAL PAIN SYNDROME: ICD-10-CM

## 2025-03-26 DIAGNOSIS — G89.4 CHRONIC PAIN SYNDROME: Primary | ICD-10-CM

## 2025-03-26 DIAGNOSIS — G89.29 CHRONIC BILATERAL LOW BACK PAIN WITH LEFT-SIDED SCIATICA: ICD-10-CM

## 2025-03-26 DIAGNOSIS — M51.16 LUMBAR DISC DISEASE WITH RADICULOPATHY: ICD-10-CM

## 2025-03-26 DIAGNOSIS — M54.42 CHRONIC BILATERAL LOW BACK PAIN WITH LEFT-SIDED SCIATICA: ICD-10-CM

## 2025-03-26 PROCEDURE — 97110 THERAPEUTIC EXERCISES: CPT

## 2025-03-26 PROCEDURE — 97112 NEUROMUSCULAR REEDUCATION: CPT

## 2025-03-26 NOTE — PROGRESS NOTES
"Daily Note     Today's date: 3/26/2025  Patient name: Shiane E Follweiler  : 1995  MRN: [id number]  Referring provider: Kocher, Barbara, CRNP  Dx:   Encounter Diagnosis     ICD-10-CM    1. Chronic pain syndrome  G89.4       2. Chronic bilateral low back pain with left-sided sciatica  M54.42     G89.29       3. Lumbar disc disease with radiculopathy  M51.16       4. Myofascial pain syndrome  M79.18                      Subjective: Patient reports that her LBP is the same. Patient reports that her L LE feels more weak than her R LE. MRI results pending.       Objective: See treatment diary below. Progressed program to increase strength.       Assessment: Tolerated treatment fair. Patient would benefit from continued PT to address core weakness. Patient was able to tolerate program progression today. Patient has core and B hip/LE weakness still present, but it is improving. Progress to tolerance.      Plan: Continue per plan of care.      Precautions: Chronic back pain, GERD       Manuals 3/12 3/17 3/19   3/24 3/26   Prone P to A grade I, II Lumbosacral joint mobs                        Neuro Re-Ed         MARIA LUISA (via wedge table) Prone no wedge 10' Prone no wedge 10' Prone no wedge 10' Prone no wedge 10' Prone no wedge 10'   PPT On PB 2x10 On PB 2x10 On PB 2x10 On PB 2x10 On PB 2x10   PPT w/ LAQ/marches 10x ea 2x10 ea 2x10  2x10 2x10   Supermans 3\" holds 10x ea 3\" holds 10x ea held     Palloff Press P1 25x  P1 25x  P1 25x P1 25x P2 2x10   CC BENJAMIN P4 2x10 P4 2x10  P5 2x10 P5 2x10 P5 2x10   Tandem Stance  On Firm 30\"x2 On AE 30\"x2 On AE 30\"x2 On AE 30\"x2   SLS      On firm 30\"x2   Ther Ex        PPU        PPU w/ OP        Standing hip abd/ext 2x10 ea 2x10 ea 2# 2x10 ea 2# 2x10 ea 2# 2x10 ea   Back ext         Prone Hip IR L2 2x10 L2 2x10 L2 2x10 L2 2x10 L2 2x10   Prone Hip ER 5\"x20 5\"x20 5\"x20 5\"x20 5\"x20   Side stepping  L2 5x// bars L2 10 steps 2x  L2 10 steps 3x  L2 10 steps 3x L2 10 steps 3x   HS curl S:7     " P4 2x10   Leg ext S:1     P3 2x10                                                           Ther Activity                        Gait Training                        Modalities        HP/CP prn        Prone wedge CP

## 2025-03-31 ENCOUNTER — APPOINTMENT (OUTPATIENT)
Dept: PHYSICAL THERAPY | Facility: CLINIC | Age: 30
End: 2025-03-31
Payer: COMMERCIAL

## 2025-03-31 ENCOUNTER — OFFICE VISIT (OUTPATIENT)
Dept: PAIN MEDICINE | Facility: CLINIC | Age: 30
End: 2025-03-31
Payer: COMMERCIAL

## 2025-03-31 VITALS
HEIGHT: 63 IN | HEART RATE: 64 BPM | RESPIRATION RATE: 16 BRPM | BODY MASS INDEX: 25.73 KG/M2 | TEMPERATURE: 98.1 F | OXYGEN SATURATION: 99 % | WEIGHT: 145.2 LBS

## 2025-03-31 DIAGNOSIS — M51.16 LUMBAR DISC DISEASE WITH RADICULOPATHY: ICD-10-CM

## 2025-03-31 DIAGNOSIS — G89.29 CHRONIC BILATERAL LOW BACK PAIN WITHOUT SCIATICA: ICD-10-CM

## 2025-03-31 DIAGNOSIS — M54.50 CHRONIC BILATERAL LOW BACK PAIN WITHOUT SCIATICA: ICD-10-CM

## 2025-03-31 DIAGNOSIS — G89.4 CHRONIC PAIN SYNDROME: Primary | ICD-10-CM

## 2025-03-31 PROCEDURE — 99214 OFFICE O/P EST MOD 30 MIN: CPT | Performed by: NURSE PRACTITIONER

## 2025-03-31 NOTE — PROGRESS NOTES
Assessment:  1. Chronic pain syndrome    2. Chronic bilateral low back pain without sciatica    3. Lumbar disc disease with radiculopathy        Plan:  While the patient was in the office today, I did have a thorough conversation regarding their chronic pain syndrome, medication management, and treatment plan options.  Patient is being seen for a follow-up visit.  She was last seen here on 1/28/2025 at which time she was referred to physical therapy.  Patient has been attending physical therapy regularly since 2/4/2025.  Unfortunately, she denies any significant improvement in her symptoms yet.  She recently had an updated MRI of the lumbar spine.  MRI did not show significant change from 2022 study.  However, review of the films does show pretty significant disc space narrowing and degenerative disc disease at L4-5.    Patient underwent bilateral L4-5 transforaminal epidural steroid injections in 2022 which provided her significant improvement for over a year.  Patient would like to try to repeat injections.  As such, we will plan for bilateral L4-5 transforaminal epidural steroid injections in the near future.    Continue with physical therapy.    Follow-up 1 month after injections.    Continue with over-the-counter acetaminophen as needed.  Avoid NSAIDs due to patient reported history of abnormal kidney functions.    History of Present Illness:  The patient is a 29 y.o. female who presents for a follow up office visit in regards to Back Pain.   The patient’s current symptoms include complaining of low back pain, nonradiating.  She does report numbness and tingling in bilateral hips and left thigh.  Current pain level is an 8/10.  Quality of pain is described as dull, aching, sharp, shooting.    I have personally reviewed and/or updated the patient's past medical history, past surgical history, family history, social history, current medications, allergies, and vital signs today.         Review of Systems  Review  "of Systems   Musculoskeletal:  Positive for back pain.        Decreased Rom  pain   All other systems reviewed and are negative.          Past Medical History:   Diagnosis Date    Allergic     Back pain     Ectopic pregnancy     GERD (gastroesophageal reflux disease)     History of unilateral fallopian tube excision     RIGHT removed    Ovarian cyst     Wears glasses        Past Surgical History:   Procedure Laterality Date    DILATION AND CURETTAGE OF UTERUS      ECTOPIC PREGNANCY SURGERY      EPIDURAL BLOCK INJECTION Bilateral 2022    Procedure: Bilateral L4-5 transforaminal epidural steroid injection;  Surgeon: Cele Escudero MD;  Location: [facility];  Service: Pain Management     HYSTERECTOMY      LAPAROSCOPY      NM LAPS ABD PRTM&OMENTUM DX W/WO SPEC BR/WA SPX N/A 2017    Procedure: LAPAROSCOPY DIAGNOSTIC, left salpingectomy;  Surgeon: Park Cheema MD;  Location:  MAIN OR;  Service: Gynecology    WISDOM TOOTH EXTRACTION      WISDOM TOOTH EXTRACTION Bilateral        Family History   Problem Relation Age of Onset    Diabetes Mother     Pulmonary embolism Father        Social History     Occupational History    Not on file   Tobacco Use    Smoking status: Former     Current packs/day: 0.00     Types: Cigarettes     Quit date: 2024     Years since quittin.6    Smokeless tobacco: Never   Vaping Use    Vaping status: Some Days    Substances: THC, CBD   Substance and Sexual Activity    Alcohol use: Not Currently     Comment: \"occasional\"    Drug use: Yes     Frequency: 14.0 times per week     Types: Marijuana    Sexual activity: Yes     Partners: Male     Birth control/protection: None, Female Sterilization         Current Outpatient Medications:     albuterol (PROVENTIL HFA,VENTOLIN HFA) 90 mcg/act inhaler, Inhale 2 puffs every 6 (six) hours as needed for wheezing or shortness of breath, Disp: 18 g, Rfl: 5    aspirin-acetaminophen-caffeine (EXCEDRIN MIGRAINE) 250-250-65 MG per " "tablet, Take 1 tablet by mouth every 6 (six) hours as needed for headaches, Disp: , Rfl:     colestipol (COLESTID) 1 g tablet, Take 1 pill daily with dinner., Disp: 30 tablet, Rfl: 3    Diclofenac Sodium (VOLTAREN) 1 %, Apply 2 g topically 4 (four) times a day, Disp: 240 g, Rfl: 5    docusate sodium (COLACE) 100 mg capsule, Take 1 pill daily in AM., Disp: 30 capsule, Rfl: 3    Allergies   Allergen Reactions    Nitrofurantoin Itching    Nsaids Other (See Comments)     Due to issues with kidneys per pt    Oxycodone-Acetaminophen Itching     Reaction Date: 28Apr2011;     Oxycodone Itching    Tramadol Itching     Reaction Date: 28Apr2011;        Physical Exam:    Pulse 64   Temp 98.1 °F (36.7 °C)   Resp 16   Ht 5' 3\" (1.6 m)   Wt 65.9 kg (145 lb 3.2 oz)   LMP  (LMP Unknown)   SpO2 99%   BMI 25.72 kg/m²     Constitutional:normal, well developed, well nourished, alert, in no distress and non-toxic and no overt pain behavior.  Eyes:anicteric  HEENT:grossly intact  Neck:supple, symmetric, trachea midline and no masses   Pulmonary:even and unlabored  Cardiovascular:No edema or pitting edema present  Skin:Normal without rashes or lesions and well hydrated  Psychiatric:Mood and affect appropriate  Neurologic:Cranial Nerves II-XII grossly intact  Musculoskeletal: Limited range of motion of the lumbar spine in all planes.  There is tenderness bilaterally L4-S1.  Tenderness over bilateral SI joints.    Imaging        Study Result    Narrative & Impression   MRI LUMBAR SPINE WITHOUT CONTRAST     INDICATION: M51.16: Intervertebral disc disorders with radiculopathy, lumbar region.   Low back and left leg pain.     COMPARISON: Prior MRI February 28, 2022     TECHNIQUE:  Multiplanar, multisequence imaging of the lumbar spine was performed. .        IMAGE QUALITY: Degraded by patient motion artifact     FINDINGS:     VERTEBRAL BODIES: There is a transitional lumbosacral junction. L5 is a transitional segment is seen with " included transverse processes articulating with the sacral base on the left and fused with the sacral base on the right. Numbering is kept   consistent with that on the prior MRI. Normal alignment of the lumbar spine.  No spondylolysis or spondylolisthesis. No scoliosis.  No compression fracture.    Modic type I degenerative marrow signal L4-5.     SACRUM:  Normal signal within the sacrum. No evidence of insufficiency or stress fracture.     DISTAL CORD AND CONUS:  Normal size and signal within the distal cord and conus.     PARASPINAL SOFT TISSUES:  Paraspinal soft tissues are unremarkable.     LOWER THORACIC DISC SPACES:  Normal disc height and signal.  No disc herniation, canal stenosis or foraminal narrowing.     LUMBAR DISC SPACES:     L1-L2: No central or foraminal narrowing.     L2-L3: No central or foraminal narrowing.     L3-L4: Mild facet perjury. No central or foraminal     L4-L5: Mild annular bulge with marginal osteophytes. Mild bilateral foraminal narrowing noted with facet hypertrophy.     L5-S1: Axial images not obtained through this level. This is a transitional segment.     OTHER FINDINGS:  None.     IMPRESSION:     Transitional lumbosacral junction. Spondylotic degenerative changes at L4-5 result in mild bilateral foraminal narrowing.        Workstation performed: [id number]            No orders to display       No orders of the defined types were placed in this encounter.

## 2025-04-02 ENCOUNTER — EVALUATION (OUTPATIENT)
Dept: PHYSICAL THERAPY | Facility: CLINIC | Age: 30
End: 2025-04-02
Payer: COMMERCIAL

## 2025-04-02 DIAGNOSIS — M51.16 LUMBAR DISC DISEASE WITH RADICULOPATHY: ICD-10-CM

## 2025-04-02 DIAGNOSIS — M54.42 CHRONIC BILATERAL LOW BACK PAIN WITH LEFT-SIDED SCIATICA: ICD-10-CM

## 2025-04-02 DIAGNOSIS — M79.18 MYOFASCIAL PAIN SYNDROME: ICD-10-CM

## 2025-04-02 DIAGNOSIS — G89.4 CHRONIC PAIN SYNDROME: Primary | ICD-10-CM

## 2025-04-02 DIAGNOSIS — G89.29 CHRONIC BILATERAL LOW BACK PAIN WITH LEFT-SIDED SCIATICA: ICD-10-CM

## 2025-04-02 PROCEDURE — 97110 THERAPEUTIC EXERCISES: CPT | Performed by: PHYSICAL THERAPIST

## 2025-04-02 PROCEDURE — 97112 NEUROMUSCULAR REEDUCATION: CPT | Performed by: PHYSICAL THERAPIST

## 2025-04-02 NOTE — PROGRESS NOTES
PT Re-Evaluation     Today's date: 2025  Patient name: Shiane E Follweiler  : 1995  MRN: [id number]  Referring provider: Kocher, Barbara, CRNP  Dx:   Encounter Diagnosis     ICD-10-CM    1. Chronic pain syndrome  G89.4       2. Chronic bilateral low back pain with left-sided sciatica  G89.29     M54.42       3. Lumbar disc disease with radiculopathy  M51.16       4. Myofascial pain syndrome  M79.18           Start Time: 0900  Stop Time: 945  Total time in clinic (min): 45 minutes    Assessment  Impairments: abnormal or restricted ROM, activity intolerance, impaired physical strength, lacks appropriate home exercise program, pain with function and activity limitations  Other impairment: decreased flexibility    Assessment details: The patient is responding fairly well to physical therapy treatment with gains noted in lumbar mobility and strength. She continues to have lower back pain which is aggravated by sitting. She feels moderate relief following her PT visits. A recent MRI revealed L4-L5 mild annular bulge with marginal osteophytes She is scheduled for injections next month. Deficits remain in core stability and functional endurance. She would benefit from continued PT tp address ongoing deficits and to further improve functional status.     Goals  STGs:  1.  Initiate and complete HEP with verbal cues.  MET  2.  Improve BLE strength by 1/2 grade in 4 weeks.  MET  3.  Decrease LBP by > 25% in 4 weeks.  MET  LTGs:    1.  Patient to be I with HEP in 8 weeks.  MET  2.  Improve L/S ROM to WNL t/o in 8 weeks to improve function.  ONGOING  3.  Decrease LBP to < or = to 3-4/10 with activity in 8 weeks to improve function.  ONGOING  4.  Improve BLE strength to > or = to 4+ to 5/5 t/o in 8 weeks to improve function. PARTIALLY MET   5.  Postural control is improved to maximal level of function in 8 weeks.  PARTIALLY MET  6.  Stair negotiation is improved to maximal level of function in 8 weeks.  ONGOING  7.   Recreational performance is improved to maximal level of function in 8 weeks. ONGOING  8.  ADL performance is improved to maximal level of function in 8 weeks.  PARTIALLY MET  9.  Work performance is improved to maximal level of function in 8 weeks.  ONGOING  10.  Patient to report improved sleep in 8 weeks.  ONGOING      Plan  Patient would benefit from: skilled physical therapy  Planned modality interventions: cryotherapy and thermotherapy: hydrocollator packs    Planned therapy interventions: IASTM, abdominal trunk stabilization, manual therapy, body mechanics training, neuromuscular re-education, patient/caregiver education, postural training, strengthening, stretching, therapeutic activities, therapeutic exercise, functional ROM exercises, flexibility and home exercise program    Frequency: 2x week  Duration in weeks: 4  Plan of Care beginning date: 4/2/2025  Plan of Care expiration date: 4/30/2025  Treatment plan discussed with: patient  Plan details:        Subjective Evaluation    History of Present Illness  Mechanism of injury: The patient states that she has had lower back pain for years.  She notes that she did have an injection about three years ago which did help.    She notes that last September she got a part time job (standing) and notes that since then her pain has increased.     She had seen pain management on 1/28/25.  She had x-rays taken, per patient they showed changes from her last set of x-rays.  She was referred to OPPT and she now presents for her evaluation.  She will be going back to see the doctor on 3/31/25 for her follow up appointment.      From EMR review of x-rays from 1/28:  FINDINGS:  There are 5 non rib bearing lumbar vertebral bodies.   There is no evidence of acute fracture or destructive osseous lesion.  Alignment is unremarkable.   There is mild disc space narrowing with endplate sclerosis and osteophytosis at L5-S1. The remainder of the disc levels are within normal  "limits.  The pedicles appear intact.  Soft tissues are unremarkable.  IMPRESSION:  Mild spondylosis at L5-S1. No acute abnormality.    She notes that she gets clicking in her lower back, only when lifting her R leg (like when putting on her shoes and socks).  The patient states that her pain is constant.  Pain is across her lower back.  She can get B hip pain and L leg pain down thigh to her knee.  She denies any R leg pain.  She denies any N&T into either leg.    More pain with standing and when bending forward.  She notes difficulty sleeping, hard to find a comfortable position to sleep.      UPDATE 3/3/25:  The patient reports 6/10 pain in the lower back today. She states that she feels better after therapy. She has not had symptom in the L leg for a while. She continues to struggle to find a comfortable position to sleep. She reports a 50% improvement since start of therapy.     UPDATE 4/2/25:  The patient reports 6-7/10 on average in her lower back. She states that sitting seems to aggravate the pain. A recent MRI did show degenerative changes and a bulging disc in her her lower lumbar region. L4-L5: Mild annular bulge with marginal osteophytes. Mild bilateral foraminal narrowing noted with facet hypertrophy.She is scheduled for injections next month.     Patient Goals  Patient goals for therapy: increased motion, decreased pain and increased strength  Patient goal: \"To have less pain in my back, to be able to bend over when lifting an item.\"  Pain  Exacerbated by: bending forward.    Social Support  Steps to enter house: yes  3  Stairs in house: yes   Lives in: multiple-level home  Lives with: significant other    Employment status: working (Part time - stands 8-9 hours a day - two days a week)    Diagnostic Tests  Abnormal x-ray: See Above.  Treatments  Previous treatment: physical therapy and injection treatment      Objective     Concurrent Complaints  Positive for disturbed sleep.     Static Posture " "    Lumbar Spine   Decreased lordosis.     Palpation   Left   Tenderness of the erector spinae, lumbar interspinals and lumbar paraspinals.     Right   Tenderness of the erector spinae, lumbar interspinals and lumbar paraspinals.     Active Range of Motion     Lumbar   Flexion: Active lumbar flexion: fingers to toes.   Extension:  WFL  Left lateral flexion:  Restriction level: minimal  Right lateral flexion:  WFL  Left rotation:  WFL  Right rotation:  WFL  Left Hip   Flexion: 95 degrees     Right Hip   Flexion: 95 degrees   Left Knee   Flexion: WFL  Extension: WFL    Right Knee   Flexion: WFL  Extension: WFL    Additional Active Range of Motion Details  L SLR: 60  R SLR: 60    Joint Play     Hypomobile: L1, L2, L3, L4, L5 and S1     Strength/Myotome Testing     Left Hip   Planes of Motion   Flexion: 4+  Extension: 4-  Abduction: 4  Adduction: 4+  External rotation: 4  Internal rotation: 4    Right Hip   Planes of Motion   Flexion: 4+  Extension: 4-  Abduction: 4+  Adduction: 4+  External rotation: 4  Internal rotation: 4    Left Knee   Flexion: WFL  Extension: WFL    Right Knee   Flexion: WFL  Extension: WFL    Ambulation     Ambulation: Stairs   Ascend stairs: independent  Pattern: reciprocal  Descend stairs: independent  Pattern: reciprocal    Additional Stairs Ambulation Details  Reciprocal gait pattern but pulling along her lower back.                  Precautions: Chronic back pain, GERD       Manuals 4/2 3/17 3/19   3/24 3/26   Prone P to A grade I, II Lumbosacral joint mobs                        Neuro Re-Ed         MARIA LUISA (via wedge table) Prone no wedge 10' w/ MH Prone no wedge 10' Prone no wedge 10' Prone no wedge 10' Prone no wedge 10'   PPT On PB 2x10 On PB 2x10 On PB 2x10 On PB 2x10 On PB 2x10   PPT w/ LAQ/marches On PB 2x10 2x10 ea 2x10  2x10 2x10   Supermans  3\" holds 10x ea held     Palloff Press P2 25x  P1 25x  P1 25x P1 25x P2 2x10   CC BENJAMIN P5 2x10 P4 2x10  P5 2x10 P5 2x10 P5 2x10   Tandem Stance On " "AE 30\"x2 On Firm 30\"x2 On AE 30\"x2 On AE 30\"x2 On AE 30\"x2   SLS  On firm 30\"x2    On firm 30\"x2   Ther Ex        PPU        PPU w/ OP        Standing hip abd/ext 2# 2x10 ea 2x10 ea 2# 2x10 ea 2# 2x10 ea 2# 2x10 ea   Back ext         Prone Hip IR L2 2x10 L2 2x10 L2 2x10 L2 2x10 L2 2x10   Prone Hip ER 5\"x20 5\"x20 5\"x20 5\"x20 5\"x20   Side stepping  L2 10 steps 3x L2 10 steps 2x  L2 10 steps 3x  L2 10 steps 3x L2 10 steps 3x   HS curl S:7 P4 2x10    P4 2x10   Leg ext S:1 P3 2x10    P3 2x10                                                           Ther Activity                        Gait Training                        Modalities        HP/CP prn        Prone wedge CP             "

## 2025-04-07 ENCOUNTER — OFFICE VISIT (OUTPATIENT)
Dept: PHYSICAL THERAPY | Facility: CLINIC | Age: 30
End: 2025-04-07
Payer: COMMERCIAL

## 2025-04-07 DIAGNOSIS — G89.29 CHRONIC BILATERAL LOW BACK PAIN WITH LEFT-SIDED SCIATICA: ICD-10-CM

## 2025-04-07 DIAGNOSIS — M51.16 LUMBAR DISC DISEASE WITH RADICULOPATHY: ICD-10-CM

## 2025-04-07 DIAGNOSIS — M79.18 MYOFASCIAL PAIN SYNDROME: ICD-10-CM

## 2025-04-07 DIAGNOSIS — M54.42 CHRONIC BILATERAL LOW BACK PAIN WITH LEFT-SIDED SCIATICA: ICD-10-CM

## 2025-04-07 DIAGNOSIS — G89.4 CHRONIC PAIN SYNDROME: Primary | ICD-10-CM

## 2025-04-07 PROCEDURE — 97112 NEUROMUSCULAR REEDUCATION: CPT

## 2025-04-07 PROCEDURE — 97110 THERAPEUTIC EXERCISES: CPT

## 2025-04-07 NOTE — PROGRESS NOTES
"Daily Note     Today's date: 2025  Patient name: Shiane E Follweiler  : 1995  MRN: [id number]  Referring provider: Kocher, Barbara, CRNP  Dx:   Encounter Diagnosis     ICD-10-CM    1. Chronic pain syndrome  G89.4       2. Chronic bilateral low back pain with left-sided sciatica  G89.29     M54.42       3. Lumbar disc disease with radiculopathy  M51.16       4. Myofascial pain syndrome  M79.18                      Subjective: Patient reports that she still feels her LBP.       Objective: See treatment diary below. Incorporated planks into program today.      Assessment:  Tolerated treatment fair. Patient would benefit from continued PT to address core weakness. Patient was able to tolerate planks today. Patient's core strength is improving. Progress to tolerance.       Plan: Continue per plan of care.         Precautions: Chronic back pain, GERD       Manuals 4/2 4/7 3/19   3/24 3/26   Prone P to A grade I, II Lumbosacral joint mobs                        Neuro Re-Ed         MARIA LUISA (via wedge table) Prone no wedge 10' w/ MH Prone no wedge 10' w/ MH Prone no wedge 10' Prone no wedge 10' Prone no wedge 10'   PPT On PB 2x10 On PB 2x10 On PB 2x10 On PB 2x10 On PB 2x10   PPT w/ LAQ/ On PB 2x10 On PB 2x10 2x10  2x10 2x10   Planks  30\"x2 reg on elbows held     Palloff Press P2 25x  P2 2x10 P1 25x P1 25x P2 2x10   CC BENJAMIN P5 2x10 P5 2x10 P5 2x10 P5 2x10 P5 2x10   Tandem Stance On AE 30\"x2 On AE 30\"x2 ea On AE 30\"x2 On AE 30\"x2 On AE 30\"x2   SLS  On firm 30\"x2 On AE 30\"x3 ea   On firm 30\"x2   Ther Ex        PPU        PPU w/ OP        Standing hip abd/ext 2# 2x10 ea 3# 2x10 ea 2# 2x10 ea 2# 2x10 ea 2# 2x10 ea   Back ext         Prone Hip IR L2 2x10 L3 2x10 L2 2x10 L2 2x10 L2 2x10   Prone Hip ER 5\"x20 5\"x20 5\"x20 5\"x20 5\"x20   Side stepping  L2 10 steps 3x L2 10 steps 3x L2 10 steps 3x  L2 10 steps 3x L2 10 steps 3x   HS curl S:7 P4 2x10 P4 2x10   P4 2x10   Leg ext S:1 P3 2x10 P3 2x10   P3 2x10                 "                                           Ther Activity                        Gait Training                        Modalities        HP/CP prn        Prone wedge CP

## 2025-04-09 ENCOUNTER — OFFICE VISIT (OUTPATIENT)
Dept: PHYSICAL THERAPY | Facility: CLINIC | Age: 30
End: 2025-04-09
Payer: COMMERCIAL

## 2025-04-09 DIAGNOSIS — G89.29 CHRONIC BILATERAL LOW BACK PAIN WITH LEFT-SIDED SCIATICA: ICD-10-CM

## 2025-04-09 DIAGNOSIS — G89.4 CHRONIC PAIN SYNDROME: Primary | ICD-10-CM

## 2025-04-09 DIAGNOSIS — M79.18 MYOFASCIAL PAIN SYNDROME: ICD-10-CM

## 2025-04-09 DIAGNOSIS — M51.16 LUMBAR DISC DISEASE WITH RADICULOPATHY: ICD-10-CM

## 2025-04-09 DIAGNOSIS — M54.42 CHRONIC BILATERAL LOW BACK PAIN WITH LEFT-SIDED SCIATICA: ICD-10-CM

## 2025-04-09 PROCEDURE — 97112 NEUROMUSCULAR REEDUCATION: CPT

## 2025-04-09 PROCEDURE — 97110 THERAPEUTIC EXERCISES: CPT

## 2025-04-09 NOTE — PROGRESS NOTES
"Daily Note     Today's date: 2025  Patient name: Shiane E Follweiler  : 1995  MRN: [id number]  Referring provider: Kocher, Barbara, CRNP  Dx:   Encounter Diagnosis     ICD-10-CM    1. Chronic pain syndrome  G89.4       2. Chronic bilateral low back pain with left-sided sciatica  G89.29     M54.42       3. Lumbar disc disease with radiculopathy  M51.16       4. Myofascial pain syndrome  M79.18                      Subjective: Patient reports that her strength is improving. She notices that she is able to do more at home. She still has trouble with bending and doing laundry.       Objective: See treatment diary below. Incorporated HS stretch today.      Assessment: Tolerated treatment fair. Patient would benefit from continued PT to address core weakness. Patient was able to tolerate HS stretch in standing today. Patient's core strength and overall strength is improving. Progress to tolerance.       Plan: Continue per plan of care.      Precautions: Chronic back pain, GERD       Manuals 4/2 4/7 4/9 3/24 3/26   Prone P to A grade I, II Lumbosacral joint mobs                        Neuro Re-Ed         MARIA LUISA (via wedge table) Prone no wedge 10' w/ MH Prone no wedge 10' w/ MH Prone no wedge 10' w/ MH Prone no wedge 10' Prone no wedge 10'   PPT On PB 2x10 On PB 2x10 On PB 2x10 On PB 2x10 On PB 2x10   PPT w/ LAQ/marches On PB 2x10 On PB 2x10 On PB 2x10 2x10 2x10   Planks  30\"x2 reg on elbows Reg 30\"x3 on elbows     Palloff Press P2 25x  P2 2x10 P2x10 P1 25x P2 2x10   CC BENJAMIN P5 2x10 P5 2x10 P5 2x10 P5 2x10 P5 2x10   Tandem Stance On AE 30\"x2 On AE 30\"x2 ea On AE 30\"x2 eaa On AE 30\"x2 On AE 30\"x2   SLS  On firm 30\"x2 On AE 30\"x3 ea On AE 30\"x3 ea  On firm 30\"x2   Ther Ex        PPU        PPU w/ OP        Standing hip abd/ext 2# 2x10 ea 3# 2x10 ea 3# 2x10 ea 2# 2x10 ea 2# 2x10 ea   HS stretch   8\" step 30\"x2 ea     Prone Hip IR L2 2x10 L3 2x10 L3 2x10 L2 2x10 L2 2x10   Prone Hip ER 5\"x20 5\"x20 5\"x20 5\"x20 5\"x20 "   Side stepping  L2 10 steps 3x L2 10 steps 3x L3 5x// bars L2 10 steps 3x L2 10 steps 3x   HS curl S:7 P4 2x10 P4 2x10 P4 2x10  P4 2x10   Leg ext S:1 P3 2x10 P3 2x10 P3 2x10  P3 2x10                                                           Ther Activity                        Gait Training                        Modalities        HP/CP prn        Prone wedge CP

## 2025-04-14 ENCOUNTER — OFFICE VISIT (OUTPATIENT)
Dept: PHYSICAL THERAPY | Facility: CLINIC | Age: 30
End: 2025-04-14
Payer: COMMERCIAL

## 2025-04-14 DIAGNOSIS — M54.42 CHRONIC BILATERAL LOW BACK PAIN WITH LEFT-SIDED SCIATICA: ICD-10-CM

## 2025-04-14 DIAGNOSIS — G89.4 CHRONIC PAIN SYNDROME: Primary | ICD-10-CM

## 2025-04-14 DIAGNOSIS — G89.29 CHRONIC BILATERAL LOW BACK PAIN WITH LEFT-SIDED SCIATICA: ICD-10-CM

## 2025-04-14 DIAGNOSIS — M79.18 MYOFASCIAL PAIN SYNDROME: ICD-10-CM

## 2025-04-14 DIAGNOSIS — M51.16 LUMBAR DISC DISEASE WITH RADICULOPATHY: ICD-10-CM

## 2025-04-14 PROCEDURE — 97112 NEUROMUSCULAR REEDUCATION: CPT

## 2025-04-14 PROCEDURE — 97110 THERAPEUTIC EXERCISES: CPT

## 2025-04-14 NOTE — PROGRESS NOTES
"Daily Note     Today's date: 2025  Patient name: Shiane E Follweiler  : 1995  MRN: [id number]  Referring provider: Kocher, Barbara, CRNP  Dx:   Encounter Diagnosis     ICD-10-CM    1. Chronic pain syndrome  G89.4       2. Chronic bilateral low back pain with left-sided sciatica  G89.29     M54.42       3. Lumbar disc disease with radiculopathy  M51.16       4. Myofascial pain syndrome  M79.18           Start Time: 0900  Stop Time: 1000  Total time in clinic (min): 60 minutes    Subjective: The patient states that her low back pain is about 6/10 this morning. She states that she is still experiencing radicular symptoms into her left lateral thigh on a daily basis. She states that she is starting a new position at work and has training tomorrow.      Objective: See treatment diary below      Assessment: Tolerated treatment well. The patient was able to complete all exercises with no increased complaints of pain. Patient demonstrated fatigue post treatment, exhibited good technique with therapeutic exercises, and would benefit from continued PT      Plan: Continue per plan of care.      Precautions: Chronic back pain, GERD       Manuals 4/2 4/7 4/9 4/14 3/26   Prone P to A grade I, II Lumbosacral joint mobs                        Neuro Re-Ed         MARIA LUISA (via wedge table) Prone no wedge 10' w/ MH Prone no wedge 10' w/ MH Prone no wedge 10' w/ MH Prone no wedge 10' Prone no wedge 10'   PPT On PB 2x10 On PB 2x10 On PB 2x10 On PB 2x10 On PB 2x10   PPT w/ LAQ/marches On PB 2x10 On PB 2x10 On PB 2x10 On PB 2x10 2x10   Planks  30\"x2 reg on elbows Reg 30\"x3 on elbows 30\" x3 reg on elbows    Palloff Press P2 25x  P2 2x10 P2 2x10 P2 25x P2 2x10   CC BENJAMIN P5 2x10 P5 2x10 P5 2x10 P5 2x10 P5 2x10   Tandem Stance On AE 30\"x2 On AE 30\"x2 ea On AE 30\"x2 eaa On AE 30\"x2 On AE 30\"x2   SLS  On firm 30\"x2 On AE 30\"x3 ea On AE 30\"x3 ea On AE 30\"x3 ea On firm 30\"x2   Ther Ex        PPU        PPU w/ OP        Standing hip " "abd/ext 2# 2x10 ea 3# 2x10 ea 3# 2x10 ea 3# 2x10 ea 2# 2x10 ea   HS stretch   8\" step 30\"x2 ea 16\" step 30\" x2 ea    Prone Hip IR L2 2x10 L3 2x10 L3 2x10 L3 2x10 L2 2x10   Prone Hip ER 5\"x20 5\"x20 5\"x20 5\"x20 5\"x20   Side stepping  L2 10 steps 3x L2 10 steps 3x L3 5x// bars L3 5x //bars L2 10 steps 3x   HS curl S:7 P4 2x10 P4 2x10 P4 2x10 P4 2x10 P4 2x10   Leg ext S:1 P3 2x10 P3 2x10 P3 2x10 P3 2x10 P3 2x10                                                           Ther Activity                        Gait Training                        Modalities        HP/CP prn        Prone wedge CP                 "

## 2025-04-16 ENCOUNTER — APPOINTMENT (OUTPATIENT)
Dept: PHYSICAL THERAPY | Facility: CLINIC | Age: 30
End: 2025-04-16
Payer: COMMERCIAL

## 2025-04-21 ENCOUNTER — OFFICE VISIT (OUTPATIENT)
Dept: PHYSICAL THERAPY | Facility: CLINIC | Age: 30
End: 2025-04-21
Payer: COMMERCIAL

## 2025-04-21 DIAGNOSIS — G89.4 CHRONIC PAIN SYNDROME: Primary | ICD-10-CM

## 2025-04-21 DIAGNOSIS — M54.42 CHRONIC BILATERAL LOW BACK PAIN WITH LEFT-SIDED SCIATICA: ICD-10-CM

## 2025-04-21 DIAGNOSIS — G89.29 CHRONIC BILATERAL LOW BACK PAIN WITH LEFT-SIDED SCIATICA: ICD-10-CM

## 2025-04-21 DIAGNOSIS — M51.16 LUMBAR DISC DISEASE WITH RADICULOPATHY: ICD-10-CM

## 2025-04-21 DIAGNOSIS — M79.18 MYOFASCIAL PAIN SYNDROME: ICD-10-CM

## 2025-04-21 PROCEDURE — 97112 NEUROMUSCULAR REEDUCATION: CPT

## 2025-04-21 PROCEDURE — 97110 THERAPEUTIC EXERCISES: CPT
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
## 2025-04-23 ENCOUNTER — APPOINTMENT (OUTPATIENT)
Dept: PHYSICAL THERAPY | Facility: CLINIC | Age: 30
End: 2025-04-23
Payer: COMMERCIAL

## 2025-04-28 ENCOUNTER — OFFICE VISIT (OUTPATIENT)
Dept: PHYSICAL THERAPY | Facility: CLINIC | Age: 30
End: 2025-04-28
Payer: COMMERCIAL

## 2025-04-28 DIAGNOSIS — G89.4 CHRONIC PAIN SYNDROME: Primary | ICD-10-CM

## 2025-04-28 DIAGNOSIS — G89.29 CHRONIC BILATERAL LOW BACK PAIN WITH LEFT-SIDED SCIATICA: ICD-10-CM

## 2025-04-28 DIAGNOSIS — M54.42 CHRONIC BILATERAL LOW BACK PAIN WITH LEFT-SIDED SCIATICA: ICD-10-CM

## 2025-04-28 DIAGNOSIS — M51.16 LUMBAR DISC DISEASE WITH RADICULOPATHY: ICD-10-CM

## 2025-04-28 DIAGNOSIS — M79.18 MYOFASCIAL PAIN SYNDROME: ICD-10-CM

## 2025-04-28 PROCEDURE — 97112 NEUROMUSCULAR REEDUCATION: CPT

## 2025-04-28 PROCEDURE — 97110 THERAPEUTIC EXERCISES: CPT

## 2025-04-28 NOTE — PROGRESS NOTES
"Daily Note     Today's date: 2025  Patient name: Shiane E Follweiler  : 1995  MRN: [id number]  Referring provider: Kocher, Barbara, CRNP  Dx:   Encounter Diagnosis     ICD-10-CM    1. Chronic pain syndrome  G89.4       2. Chronic bilateral low back pain with left-sided sciatica  G89.29     M54.42       3. Lumbar disc disease with radiculopathy  M51.16       4. Myofascial pain syndrome  M79.18                      Subjective: Patient reports that her strength is improving but her LBP still remains.      Objective: See treatment diary below.       Assessment: Tolerated treatment fair. Patient would benefit from continued PT to increase core/upper trunk strength and stability to improve lower back stability to perform functional activities for longer periods of time. Patient's core strength is improving. She was able to tolerate an increase in resistance to hip TE today. Progress to tolerance.       Plan: Continue per plan of care.      Precautions: Chronic back pain, GERD       Manuals    Prone P to A grade I, II Lumbosacral joint mobs                        Neuro Re-Ed         MARIA LUISA (via wedge table) Prone no wedge 10' w/ MH Prone no wedge 10' w/ MH Prone no wedge 10' w/ MH Prone no wedge 10' w/ MH Prone no wedge 10' w/ MH   PPT On PB 2x10 On PB 2x10 On PB 2x10 On PB 2x10 On PB 2x10   PPT w/ LAQ/marches On PB 2x10 On PB 2x10 On PB 2x10 On PB 2x10 On PB 2x10   Planks 30\" x3 reg on elbows 30\"x2 reg on elbows Reg 30\"x3 on elbows 30\" x3 reg on elbows 30\" x3 reg on elbows   Palloff Press P2 25x P2 2x10 P2 2x10 P2 25x P2 25x   CC BENJAMIN P6 2x10 P5 2x10 P5 2x10 P5 2x10 P6 2x10   Tandem Stance On AE 30\"x3 On AE 30\"x2 ea On AE 30\"x2 eaa On AE 30\"x2 On AE 30\"x3   SLS  On AE 30\"x3 On AE 30\"x3 ea On AE 30\"x3 ea On AE 30\"x3 ea On AE 30\"x3 ea   Ther Ex        PPU        PPU w/ OP        Standing hip abd/ext 4# 2x10 ea 3# 2x10 ea 3# 2x10 ea 3# 2x10 ea 3# 2x10 ea   HS stretch 16\" steps 30\"x3 BLE  8\" " "step 30\"x2 ea 16\" step 30\" x2 ea 16\" step 30\"x2 ea   Prone Hip IR L3 2x10 L3 2x10 L3 2x10 L3 2x10 L3 2x10   Prone Hip ER 5\"x20 5\"x20 5\"x20 5\"x20 5\"x20   Side stepping  L3 3x10 steps L2 10 steps 3x L3 5x// bars L3 5x //bars L3 10 steps x2   HS curl S:7 P5 3x10 P4 2x10 P4 2x10 P4 2x10 P5 3x10   Leg ext S:1 P3 3x10 P3 2x10 P3 2x10 P3 2x10 P3 2x10                                                           Ther Activity                        Gait Training                        Modalities        HP/CP prn        Prone wedge CP                     "

## 2025-04-30 ENCOUNTER — APPOINTMENT (OUTPATIENT)
Dept: PHYSICAL THERAPY | Facility: CLINIC | Age: 30
End: 2025-04-30
Payer: COMMERCIAL

## 2025-05-15 ENCOUNTER — APPOINTMENT (OUTPATIENT)
Dept: RADIOLOGY | Facility: HOSPITAL | Age: 30
End: 2025-05-15
Payer: COMMERCIAL

## 2025-05-15 ENCOUNTER — HOSPITAL ENCOUNTER (OUTPATIENT)
Facility: HOSPITAL | Age: 30
Setting detail: OUTPATIENT SURGERY
Discharge: HOME | End: 2025-05-15
Attending: ANESTHESIOLOGY | Admitting: ANESTHESIOLOGY
Payer: COMMERCIAL

## 2025-05-15 VITALS
HEART RATE: 58 BPM | DIASTOLIC BLOOD PRESSURE: 76 MMHG | HEIGHT: 63 IN | WEIGHT: 145 LBS | OXYGEN SATURATION: 100 % | TEMPERATURE: 98.5 F | BODY MASS INDEX: 25.69 KG/M2 | SYSTOLIC BLOOD PRESSURE: 126 MMHG | RESPIRATION RATE: 18 BRPM

## 2025-05-15 DIAGNOSIS — M54.50 CHRONIC BILATERAL LOW BACK PAIN WITHOUT SCIATICA: ICD-10-CM

## 2025-05-15 DIAGNOSIS — M54.42 ACUTE BILATERAL LOW BACK PAIN WITH LEFT-SIDED SCIATICA: Primary | ICD-10-CM

## 2025-05-15 DIAGNOSIS — M51.16 LUMBAR DISC DISEASE WITH RADICULOPATHY: ICD-10-CM

## 2025-05-15 DIAGNOSIS — G89.29 CHRONIC BILATERAL LOW BACK PAIN WITHOUT SCIATICA: ICD-10-CM

## 2025-05-15 PROCEDURE — 64483 NJX AA&/STRD TFRM EPI L/S 1: CPT | Performed by: ANESTHESIOLOGY

## 2025-05-15 RX ORDER — LIDOCAINE HYDROCHLORIDE 10 MG/ML
INJECTION, SOLUTION EPIDURAL; INFILTRATION; INTRACAUDAL; PERINEURAL AS NEEDED
Status: DISCONTINUED | OUTPATIENT
Start: 2025-05-15 | End: 2025-05-15 | Stop reason: HOSPADM

## 2025-05-15 RX ORDER — DEXAMETHASONE SODIUM PHOSPHATE 10 MG/ML
INJECTION, SOLUTION INTRAMUSCULAR; INTRAVENOUS AS NEEDED
Status: DISCONTINUED | OUTPATIENT
Start: 2025-05-15 | End: 2025-05-15 | Stop reason: HOSPADM

## 2025-05-15 NOTE — OP NOTE
OPERATIVE REPORT  PATIENT NAME: Shiane E Follweiler    :  1995  MRN: [id number]  Pt Location: [facility]    SURGERY DATE: 5/15/2025    Surgeons and Role:     * Cele Escudero MD - Primary    Preop Diagnosis:  Chronic pain syndrome [G89.4]  Chronic bilateral low back pain without sciatica [M54.50, G89.29]  Lumbar disc disease with radiculopathy [M51.16]    Post-Op Diagnosis Codes:     * Chronic pain syndrome [G89.4]     * Chronic bilateral low back pain without sciatica [M54.50, G89.29]     * Lumbar disc disease with radiculopathy [M51.16]    Procedure(s):  Bilateral - BLOCK / INJECTION EPIDURAL STEROID LUMBAR  B/L L4-5 TFESI    Specimen(s):  * No specimens in log *    Estimated Blood Loss:   Minimal    Drains:  * No LDAs found *    Anesthesia Type:   Local    Operative Indications:  Chronic pain syndrome [G89.4]  Chronic bilateral low back pain without sciatica [M54.50, G89.29]  Lumbar disc disease with radiculopathy [M51.16]      Operative Findings:  same      Complications:   None    Procedure and Technique:  Fluoroscopically-guided bilateral L4-L5 transforaminal epidural steroid injection under fluoroscopy      After discussing the risks, benefits, and alternatives to the procedure, the patient expressed understanding and wished to proceed.  The patient was brought to the fluoroscopy suite and placed in the prone position.  A procedural pause was conducted to verify:  correct patient identity, procedure to be performed and as applicable, correct side and site, correct patient position, and availability of implants, special equipment and special requirements.  After identifying the left L4 pedicle fluoroscopically with an oblique view, the skin was sterilely prepped and draped in the usual fashion using Chloraprep skin prep.  The skin and subcutaneous tissue were anesthetized with 0.5% lidocaine.  A 3.5 inch 22 gauge spinal needle was then advanced under fluoroscopic guidance to the posterior  aspect of the left L4-L5 neural foramen.  Appropriate foraminal depth was determined with a lateral fluoroscopic view, and AP visualization confirmed needle positioning at approximately the 6 o’clock position relative to the pedicle.  After negative aspiration, 1 mL Omnipaque 300 contrast was injected using live fluoroscopy/digital subtraction angiography, confirming appropriate transforaminal spread without evidence of intravascular or intrathecal uptake.   Next, a local anesthetic test dose consisting of 1 mL of 2% lidocaine was injected through the needle.  After an appropriate period of observation, a directed neurological exam was performed which revealed no new neurologic deficits.  Next, a 1.5 ml solution consisting of 7.5 mg of dexamethasone in sterile saline was injected slowly and incrementally into the epidural space.  Following the injection the needle was withdrawn slightly and flushed with lidocaine as it was fully extracted.  The same procedure was performed on the opposite side using the same technique and achieving the same results.  The patient tolerated the procedure well and there were no apparent complications.  The patient did not develop any new neurologic deficits.  After appropriate observation, the patient was dismissed from the clinic in good condition under their own power.    COMMENTS  The patient received a total steroid dose of 15 mg of dexamethasone.    I was present for the entire procedure.    Patient Disposition:  hemodynamically stable             SIGNATURE: Cele Escudero MD  DATE: May 15, 2025  TIME: 12:08 PM

## 2025-05-15 NOTE — H&P
Assessment:  1. Acute bilateral low back pain with left-sided sciatica  FL spine and pain procedure    FL spine and pain procedure      2. Chronic bilateral low back pain without sciatica  FL spine and pain procedure    FL spine and pain procedure      3. Lumbar disc disease with radiculopathy  FL spine and pain procedure    FL spine and pain procedure          Plan:  Shiane E Follweiler is a 29 y.o. female with complaints of low back and leg pain presents to surgical center for procedure.  We will perform a Procedure(s) (LRB):  BLOCK / INJECTION EPIDURAL STEROID LUMBAR  B/L L4-5 TFESI (Bilateral)   2. Follow-up 1 month after injection    Complete risks and benefits including bleeding, infection, tissue reaction, nerve injury and allergic reaction were discussed. The approach was demonstrated using models and literature was provided. Verbal and written consent was obtained.    My impressions and treatment recommendations were discussed in detail with the patient who verbalized understanding and had no further questions.  Discharge instructions were provided. I personally saw and examined the patient and I agree with the above discussed plan of care.    Orders Placed This Encounter   Procedures    FL spine and pain procedure     Standing Status:   Standing     Number of Occurrences:   1     Reason for Exam::   BILATERAL L4-L5 TFESI     Is the patient pregnant?:   No     Anticoagulant hold needed?:   NA     No orders of the defined types were placed in this encounter.      History of Present Illness:  Shiane E Follweiler is a 29 y.o. female who presents for a follow up office visit in regards to low back and leg pain.   The patient’s current symptoms include 8 out of 10 burning, sharp, stabbing throbbing pain time particular time pattern.      I have personally reviewed and/or updated the patient's past medical history, past surgical history, family history, social history, current medications, allergies, and vital  "signs today.     Review of Systems   Musculoskeletal:  Positive for neck pain and neck stiffness.   All other systems reviewed and are negative.      Problem List[1]    Past Medical History:   Diagnosis Date    Allergic     Back pain     Ectopic pregnancy     GERD (gastroesophageal reflux disease)     History of unilateral fallopian tube excision     RIGHT removed    Ovarian cyst     Wears glasses        Past Surgical History:   Procedure Laterality Date    DILATION AND CURETTAGE OF UTERUS      ECTOPIC PREGNANCY SURGERY      EPIDURAL BLOCK INJECTION Bilateral 2022    Procedure: Bilateral L4-5 transforaminal epidural steroid injection;  Surgeon: Cele Escudero MD;  Location: [facility];  Service: Pain Management     HYSTERECTOMY      LAPAROSCOPY      MA LAPS ABD PRTM&OMENTUM DX W/WO SPEC BR/WA SPX N/A 2017    Procedure: LAPAROSCOPY DIAGNOSTIC, left salpingectomy;  Surgeon: Park Cheema MD;  Location:  MAIN OR;  Service: Gynecology    WISDOM TOOTH EXTRACTION      WISDOM TOOTH EXTRACTION Bilateral        Family History   Problem Relation Age of Onset    Diabetes Mother     Pulmonary embolism Father        Social History     Occupational History    Not on file   Tobacco Use    Smoking status: Former     Current packs/day: 0.00     Types: Cigarettes     Quit date: 2024     Years since quittin.8    Smokeless tobacco: Never   Vaping Use    Vaping status: Former    Substances: THC, CBD   Substance and Sexual Activity    Alcohol use: Not Currently     Comment: \"occasional\"    Drug use: Yes     Frequency: 14.0 times per week     Types: Marijuana    Sexual activity: Yes     Partners: Male     Birth control/protection: None, Female Sterilization       No current facility-administered medications on file prior to encounter.     Current Outpatient Medications on File Prior to Encounter   Medication Sig    aspirin-acetaminophen-caffeine (EXCEDRIN MIGRAINE) 250-250-65 MG per tablet Take 1 tablet " "by mouth every 6 (six) hours as needed for headaches    albuterol (PROVENTIL HFA,VENTOLIN HFA) 90 mcg/act inhaler Inhale 2 puffs every 6 (six) hours as needed for wheezing or shortness of breath    colestipol (COLESTID) 1 g tablet Take 1 pill daily with dinner.    Diclofenac Sodium (VOLTAREN) 1 % Apply 2 g topically 4 (four) times a day    docusate sodium (COLACE) 100 mg capsule Take 1 pill daily in AM.       Allergies[2]    Physical Exam:    /69   Pulse 55   Temp 98.7 °F (37.1 °C) (Temporal)   Resp 20   Ht 5' 3\" (1.6 m)   Wt 65.8 kg (145 lb)   LMP  (LMP Unknown)   SpO2 99%   BMI 25.69 kg/m²     Constitutional:normal, well developed, well nourished, alert, in no distress and non-toxic and no overt pain behavior.  Eyes:anicteric  HEENT:grossly intact  Neck:supple, symmetric, trachea midline and no masses   Pulmonary:even and unlabored  Cardiovascular:No edema or pitting edema present  Skin:Normal without rashes or lesions and well hydrated  Psychiatric:Mood and affect appropriate  Neurologic:Cranial Nerves II-XII grossly intact  Musculoskeletal:antalgic             [1]   Patient Active Problem List  Diagnosis    Diarrhea    Microscopic hematuria    Microalbuminuria    Low HDL (under 40)    Hepatitis C antibody test positive    Chronic tubulointerstitial nephritis    Generalized abdominal pain    Dysmenorrhea    Nausea    Ovarian cyst, left    Pelvic pain in female    Chronic bilateral low back pain without sciatica    Hoarseness    Lumbar disc disease with radiculopathy    Acute bilateral low back pain with left-sided sciatica    Chronic pain syndrome    Myofascial pain syndrome    Asthma due to seasonal allergies    Endometriosis    Dysplasia of cervix, high grade SWAPNA 2    Dyspareunia, female    S/P hysterectomy    Bloating    Irritable bowel syndrome with diarrhea    Sacroiliitis (HCC)   [2]   Allergies  Allergen Reactions    Nitrofurantoin Itching    Nsaids Other (See Comments)     Due to issues with " kidneys per pt    Oxycodone-Acetaminophen Itching     Reaction Date: 28Apr2011;     Oxycodone Itching    Tramadol Itching     Reaction Date: 28Apr2011;

## 2025-05-15 NOTE — DISCHARGE INSTR - AVS FIRST PAGE
Epidural Steroid Injection   WHAT YOU NEED TO KNOW:   An epidural steroid injection (MARY) is a procedure to inject steroid medicine into the epidural space. The epidural space is between your spinal cord and vertebrae. Steroids reduce inflammation and fluid buildup in your spine that may be causing pain. You may be given pain medicine along with the steroids.          ACTIVITY  Do not drive or operate machinery today.  No strenuous activity today - bending, lifting, etc.  You may resume normal activites starting tomorrow - start slowly and as tolerated.  You may shower today, but no tub baths or hot tubs.  You may have numbness for several hours from the local anesthetic. Please use caution and common sense, especially with weight-bearing activities.    CARE OF THE INJECTION SITE  If you have soreness or pain, apply ice to the area today (20 minutes on/20 minutes off).  Starting tomorrow, you may use warm, moist heat or ice if needed.  You may have an increase or change in your discomfort for 36-48 hours after your treatment.  Apply ice and continue with any pain medication you have been prescribed.  Notify the [facility] if you have any of the following: redness, drainage, swelling, headache, stiff neck or fever above 100°F.    SPECIAL INSTRUCTIONS  Our office will contact you in approximately 14 days for a progress report.    MEDICATIONS  Continue to take all routine medications.  Our office may have instructed you to hold some medications.    As no general anesthesia was used in today's procedure, you should not experience any side effects related to anesthesia.     If you are diabetic, the steroids used in today's injection may temporarily increase your blood sugar levels after the first few days after your injection. Please keep a close eye on your sugars and alert the doctor who manages your diabetes if your sugars are significantly high from your baseline or you are symptomatic.     If you have a  problem specifically related to your procedure, please call our office at [phone].  Problems not related to your procedure should be directed to your primary care physician.

## 2025-05-20 ENCOUNTER — TELEPHONE (OUTPATIENT)
Age: 30
End: 2025-05-20

## 2025-05-20 NOTE — TELEPHONE ENCOUNTER
--FYI--    S/W pt.  Pt s/p b/l L4-5 TFESI on 5/15.  Pt stated she is not getting better and is progressively getting worse since the injection.  Advised can have an increase or change in discomfort for 36-48 hrs after the injection, takes 3-5 days for the steroids to kick in and takes up to 2 weeks to see the full effect.  Advised pt to treat her pain like she normally would.  Pt stated she can't take NSAIDS.  She didn't realize Excedrin was a NSAID and is taking it.  Pt stated she can take tylenol.  Advised pt to take up to 3,000 mg of tylenol in 24 hrs.  Advised to try ice/heat and OTC pain patches and creams and to give the injection more time to work.  She verbalized understanding.

## 2025-05-20 NOTE — TELEPHONE ENCOUNTER
Caller: Patient     Doctor: Dr. Escudero     Reason for call: Patient states the pain in her back felt ok and better the day she had the injections but now she is back to where she was at with the pain. She states the pain feels as though it is getting worse.    Please assist     Call back#: [phone]

## 2025-05-23 NOTE — TELEPHONE ENCOUNTER
Male person Ivan answered and said pt was not available . Will ask her to give the office a call back.

## 2025-05-27 DIAGNOSIS — M51.16 LUMBAR DISC DISEASE WITH RADICULOPATHY: Primary | ICD-10-CM

## 2025-05-27 DIAGNOSIS — M79.18 MYOFASCIAL PAIN SYNDROME: ICD-10-CM

## 2025-05-27 DIAGNOSIS — G89.4 CHRONIC PAIN SYNDROME: ICD-10-CM

## 2025-05-27 RX ORDER — METHYLPREDNISOLONE 4 MG/1
TABLET ORAL
Qty: 21 TABLET | Refills: 0 | Status: SHIPPED | OUTPATIENT
Start: 2025-05-27

## 2025-06-02 ENCOUNTER — EVALUATION (OUTPATIENT)
Dept: PHYSICAL THERAPY | Facility: CLINIC | Age: 30
End: 2025-06-02
Payer: COMMERCIAL

## 2025-06-02 DIAGNOSIS — M54.42 ACUTE BACK PAIN WITH SCIATICA, LEFT: ICD-10-CM

## 2025-06-02 DIAGNOSIS — M51.16 LUMBAR DISC DISEASE WITH RADICULOPATHY: ICD-10-CM

## 2025-06-02 DIAGNOSIS — G89.29 CHRONIC BILATERAL LOW BACK PAIN WITHOUT SCIATICA: ICD-10-CM

## 2025-06-02 DIAGNOSIS — G89.4 CHRONIC PAIN SYNDROME: Primary | ICD-10-CM

## 2025-06-02 DIAGNOSIS — M79.18 MYOFASCIAL PAIN SYNDROME: ICD-10-CM

## 2025-06-02 DIAGNOSIS — M54.50 CHRONIC BILATERAL LOW BACK PAIN WITHOUT SCIATICA: ICD-10-CM

## 2025-06-02 PROCEDURE — 97140 MANUAL THERAPY 1/> REGIONS: CPT | Performed by: PHYSICAL THERAPIST

## 2025-06-02 PROCEDURE — 97110 THERAPEUTIC EXERCISES: CPT | Performed by: PHYSICAL THERAPIST

## 2025-06-02 NOTE — PROGRESS NOTES
PT Re-Evaluation     Today's date: 2025  Patient name: Shiane E Follweiler  : 1995  MRN: [id number]  Referring provider: Kocher, Barbara, CRNP  Dx:   Encounter Diagnosis     ICD-10-CM    1. Chronic pain syndrome  G89.4       2. Chronic bilateral low back pain without sciatica  M54.50     G89.29       3. Acute back pain with sciatica, left  M54.42       4. Lumbar disc disease with radiculopathy  M51.16       5. Myofascial pain syndrome  M79.18             Start Time: 0900  Stop Time: 945  Total time in clinic (min): 45 minutes    Assessment  Impairments: abnormal or restricted ROM, lacks appropriate home exercise program, pain with function and activity limitations  Other impairment: decreased flexibility    Assessment details: The patient returns to the PT clinic 2 weeks after receiving epidural injection in the lumbar spine. She reports no significant change in her pain. She was then prescribed a 6 day course of prednisone which she finishes today. She continue to have pain across her lower back and into B hips. Sitting , standing, walking cause increased pain. Deficits are noted in spinal mobility and hip flexibility. B LE strength has improved since start of therapy. She returns to doctor in 2 weeks for a follow-up. She would benefit from continued PT to improve lumbar vertebral mobility as well as active spinal ROM.     Goals  STGs:  1.  Initiate and complete HEP with verbal cues.  MET  2.  Improve BLE strength by 1/2 grade in 4 weeks.  MET  3.  Decrease LBP by > 25% in 4 weeks.  MET  LTGs:    1.  Patient to be I with HEP in 8 weeks.  MET  2.  Improve L/S ROM to WNL t/o in 8 weeks to improve function.  ONGOING  3.  Decrease LBP to < or = to 3-4/10 with activity in 8 weeks to improve function.  ONGOING  4.  Improve BLE strength to > or = to 4+ to 5/5 t/o in 8 weeks to improve function. MET   5.  Postural control is improved to maximal level of function in 8 weeks.  MET  6.  Stair negotiation is  improved to maximal level of function in 8 weeks.  ONGOING  7.  Recreational performance is improved to maximal level of function in 8 weeks. ONGOING  8.  ADL performance is improved to maximal level of function in 8 weeks.  PARTIALLY MET  9.  Work performance is improved to maximal level of function in 8 weeks.  ONGOING  10.  Patient to report improved sleep in 8 weeks.  MET      Plan  Patient would benefit from: skilled physical therapy  Planned modality interventions: cryotherapy and thermotherapy: hydrocollator packs    Planned therapy interventions: IASTM, abdominal trunk stabilization, manual therapy, body mechanics training, neuromuscular re-education, patient/caregiver education, postural training, strengthening, stretching, therapeutic activities, therapeutic exercise, functional ROM exercises, flexibility and home exercise program    Frequency: 2x week  Duration in weeks: 4  Plan of Care beginning date: 6/2/2025  Plan of Care expiration date: 6/30/2025  Treatment plan discussed with: patient  Plan details:        Subjective Evaluation    History of Present Illness  Mechanism of injury: The patient states that she has had lower back pain for years.  She notes that she did have an injection about three years ago which did help.    She notes that last September she got a part time job (standing) and notes that since then her pain has increased.     She had seen pain management on 1/28/25.  She had x-rays taken, per patient they showed changes from her last set of x-rays.  She was referred to OPPT and she now presents for her evaluation.  She will be going back to see the doctor on 3/31/25 for her follow up appointment.      From EMR review of x-rays from 1/28:  FINDINGS:  There are 5 non rib bearing lumbar vertebral bodies.   There is no evidence of acute fracture or destructive osseous lesion.  Alignment is unremarkable.   There is mild disc space narrowing with endplate sclerosis and osteophytosis at L5-S1.  "The remainder of the disc levels are within normal limits.  The pedicles appear intact.  Soft tissues are unremarkable.  IMPRESSION:  Mild spondylosis at L5-S1. No acute abnormality.    She notes that she gets clicking in her lower back, only when lifting her R leg (like when putting on her shoes and socks).  The patient states that her pain is constant.  Pain is across her lower back.  She can get B hip pain and L leg pain down thigh to her knee.  She denies any R leg pain.  She denies any N&T into either leg.    More pain with standing and when bending forward.  She notes difficulty sleeping, hard to find a comfortable position to sleep.      UPDATE 3/3/25:  The patient reports 6/10 pain in the lower back today. She states that she feels better after therapy. She has not had symptom in the L leg for a while. She continues to struggle to find a comfortable position to sleep. She reports a 50% improvement since start of therapy.     UPDATE 4/2/25:  The patient reports 6-7/10 on average in her lower back. She states that sitting seems to aggravate the pain. A recent MRI did show degenerative changes and a bulging disc in her her lower lumbar region. L4-L5: Mild annular bulge with marginal osteophytes. Mild bilateral foraminal narrowing noted with facet hypertrophy.She is scheduled for injections next month.       UPDATE 6/2/25:  The patient received an epidural injection on 5-15-25. She notes no significant improvement. She was prescribed a 6 day course of prednisone. She reports 6/10 pain in in lower back. She is recently noting tingling in B feet.  She continues to work. Sitting in her car, standing and walking aggravates her pain. RTD 6-16-25.   Patient Goals  Patient goals for therapy: increased motion, decreased pain and increased strength  Patient goal: \"To have less pain in my back, to be able to bend over when lifting an item.\"  Pain  Exacerbated by: bending forward.    Social Support  Steps to enter house: " yes  3  Stairs in house: yes   Lives in: multiple-level home  Lives with: significant other    Employment status: working (Part time - stands 8-9 hours a day - two days a week)    Diagnostic Tests  Abnormal x-ray: See Above.  Treatments  Previous treatment: physical therapy and injection treatment      Objective     Static Posture     Lumbar Spine   Decreased lordosis.     Palpation   Left   Tenderness of the erector spinae, lumbar interspinals and lumbar paraspinals.     Right   Tenderness of the erector spinae, lumbar interspinals and lumbar paraspinals.     Active Range of Motion     Lumbar   Flexion:  with pain Restriction level: moderate  Extension:  WFL  Left lateral flexion:  WFL  Right lateral flexion:  WFL  Left rotation:  WFL  Right rotation:  WFL  Left Hip   Flexion: 95 degrees     Right Hip   Flexion: 95 degrees   Left Knee   Flexion: WFL  Extension: WFL    Right Knee   Flexion: WFL  Extension: WFL    Additional Active Range of Motion Details  L SLR: 60  R SLR: 60    Joint Play     Hypomobile: L1, L2, L3, L4, L5 and S1     Pain: L3, L4 and L5     Strength/Myotome Testing     Left Hip   Planes of Motion   Flexion: 4+  Extension: 4  Abduction: 4+  Adduction: 4+    Right Hip   Planes of Motion   Flexion: 4+  Extension: 4  Abduction: 4+  Adduction: 4+    Left Knee   Flexion: 5  Extension: 5    Right Knee   Flexion: 5  Extension: 5    Ambulation     Ambulation: Stairs   Ascend stairs: independent  Pattern: reciprocal  Descend stairs: independent  Pattern: reciprocal                Precautions: Chronic back pain, GERD       Manuals 4/28 6/2 4/9 4/14 4/21   Prone P to A grade I, II Lumbosacral joint mobs  JM-thoracic and lumbar                      Neuro Re-Ed         MARIA LUISA (via wedge table) Prone no wedge 10' w/ MH Prone no wedge 10' w/ MH Prone no wedge 10' w/ MH Prone no wedge 10' w/ MH Prone no wedge 10' w/ MH   PPT On PB 2x10  On PB 2x10 On PB 2x10 On PB 2x10   PPT w/ LAQ/marches On PB 2x10  On PB 2x10 On PB  "2x10 On PB 2x10   Planks 30\" x3 reg on elbows  Reg 30\"x3 on elbows 30\" x3 reg on elbows 30\" x3 reg on elbows   Palloff Press P2 25x  P2 2x10 P2 25x P2 25x   CC BENJAMIN P6 2x10  P5 2x10 P5 2x10 P6 2x10   Tandem Stance On AE 30\"x3  On AE 30\"x2 eaa On AE 30\"x2 On AE 30\"x3   SLS  On AE 30\"x3  On AE 30\"x3 ea On AE 30\"x3 ea On AE 30\"x3 ea   Ther Ex        PPU        PPU w/ OP        Standing hip abd/ext 4# 2x10 ea  3# 2x10 ea 3# 2x10 ea 3# 2x10 ea   HS stretch 16\" steps 30\"x3 BLE  8\" step 30\"x2 ea 16\" step 30\" x2 ea 16\" step 30\"x2 ea   Prone Hip IR L3 2x10  L3 2x10 L3 2x10 L3 2x10   Prone Hip ER 5\"x20  5\"x20 5\"x20 5\"x20   Side stepping  L3 3x10 steps  L3 5x// bars L3 5x //bars L3 10 steps x2   HS curl S:7 P5 3x10  P4 2x10 P4 2x10 P5 3x10   Leg ext S:1 P3 3x10  P3 2x10 P3 2x10 P3 2x10   Child's pose stretch  20\"x5      LTR  20\"x5 ea w/ self OP                                              Ther Activity                        Gait Training                        Modalities        HP/CP prn        Prone wedge CP             "

## 2025-06-10 PROBLEM — N87.0 MILD DYSPLASIA OF CERVIX (CIN I): Status: ACTIVE | Noted: 2024-02-21

## 2025-06-16 ENCOUNTER — OFFICE VISIT (OUTPATIENT)
Dept: PAIN MEDICINE | Facility: CLINIC | Age: 30
End: 2025-06-16
Payer: COMMERCIAL

## 2025-06-16 VITALS
HEIGHT: 63 IN | WEIGHT: 142 LBS | RESPIRATION RATE: 16 BRPM | TEMPERATURE: 98.2 F | BODY MASS INDEX: 25.16 KG/M2 | OXYGEN SATURATION: 98 % | HEART RATE: 66 BPM

## 2025-06-16 DIAGNOSIS — G89.29 CHRONIC BILATERAL LOW BACK PAIN WITHOUT SCIATICA: ICD-10-CM

## 2025-06-16 DIAGNOSIS — M54.50 CHRONIC BILATERAL LOW BACK PAIN WITHOUT SCIATICA: ICD-10-CM

## 2025-06-16 DIAGNOSIS — M51.16 LUMBAR DISC DISEASE WITH RADICULOPATHY: ICD-10-CM

## 2025-06-16 DIAGNOSIS — G89.4 CHRONIC PAIN SYNDROME: Primary | ICD-10-CM

## 2025-06-16 DIAGNOSIS — M46.1 SACROILIITIS (HCC): ICD-10-CM

## 2025-06-16 PROCEDURE — 99214 OFFICE O/P EST MOD 30 MIN: CPT | Performed by: NURSE PRACTITIONER

## 2025-06-16 RX ORDER — TIZANIDINE 2 MG/1
2 TABLET ORAL EVERY 8 HOURS PRN
Qty: 90 TABLET | Refills: 0 | Status: SHIPPED | OUTPATIENT
Start: 2025-06-16

## 2025-06-16 NOTE — ASSESSMENT & PLAN NOTE
Orders:    EMG 2 limb lower extremity; Future    Case request operating room: BLOCK / INJECTION SACROILIAC; Standing    tiZANidine (ZANAFLEX) 2 mg tablet; Take 1 tablet (2 mg total) by mouth every 8 (eight) hours as needed for muscle spasms    While the patient was in the office today, I did have a thorough conversation regarding their chronic pain syndrome, medication management, and treatment plan options.  Patient is being seen for a follow-up visit.  She recently underwent bilateral L4-5 transforaminal epidural steroid injections on 5/15/2025.  Unfortunately, she denies improvement.  In fact, she states that her pain has actually been worsening.  She was prescribed a Medrol Dosepak which provided her with minimal/temporary results.  The same injection performed in 2022 provided her with pretty significant results for about a year.    She has been experiencing increased tingling in both lower extremities to her feet.  Will order an EMG of both lower extremities.    Continue physical therapy.    On exam, patient demonstrates findings consistent with facetogenic pain as well as sacroiliac mediated pain.  She has radiological evidence of facet arthritis at L3-4 and L4-5.    Schedule patient for diagnostic/therapeutic bilateral sacroiliac joint injections.  To consider medial branch blocks to determine if she is a candidate for radiofrequency ablation if pain persists.    Since the patient is having myofascial pain and/or spasms, I advised the patient that starting on a muscle relaxer could help decrease some of the myofascial pain and/or spasms.  I advised patient that they should not drive or operate heavy machinery while on this medication as it could cause lethargy.  I advised the patient to call our office if they experience any side effects.  The patient verbalized understanding.  Try tizanidine 2 mg every 8 hours as needed for spasms.

## 2025-06-16 NOTE — PROGRESS NOTES
Name: Shiane E Follweiler      : 1995      MRN: [id number]  Encounter Provider: Barbara Kocher, CRNP  Encounter Date: 2025   Encounter department: [facility] [place]  :  Assessment & Plan  Chronic pain syndrome  Chronic bilateral low back pain without sciatica  Lumbar disc disease with radiculopathy  Sacroiliitis (HCC)    Orders:    EMG 2 limb lower extremity; Future    Case request operating room: BLOCK / INJECTION SACROILIAC; Standing    tiZANidine (ZANAFLEX) 2 mg tablet; Take 1 tablet (2 mg total) by mouth every 8 (eight) hours as needed for muscle spasms    While the patient was in the office today, I did have a thorough conversation regarding their chronic pain syndrome, medication management, and treatment plan options.  Patient is being seen for a follow-up visit.  She recently underwent bilateral L4-5 transforaminal epidural steroid injections on 5/15/2025.  Unfortunately, she denies improvement.  In fact, she states that her pain has actually been worsening.  She was prescribed a Medrol Dosepak which provided her with minimal/temporary results.  The same injection performed in  provided her with pretty significant results for about a year.    She has been experiencing increased tingling in both lower extremities to her feet.  Will order an EMG of both lower extremities.    Continue physical therapy.    On exam, patient demonstrates findings consistent with facetogenic pain as well as sacroiliac mediated pain.  She has radiological evidence of facet arthritis at L3-4 and L4-5.    Schedule patient for diagnostic/therapeutic bilateral sacroiliac joint injections.  To consider medial branch blocks to determine if she is a candidate for radiofrequency ablation if pain persists.    Since the patient is having myofascial pain and/or spasms, I advised the patient that starting on a muscle relaxer could help decrease some of the myofascial pain and/or spasms.  I advised patient that  "they should not drive or operate heavy machinery while on this medication as it could cause lethargy.  I advised the patient to call our office if they experience any side effects.  The patient verbalized understanding.  Try tizanidine 2 mg every 8 hours as needed for spasms.    Complete risks and benefits including bleeding, infection, tissue reaction, nerve injury and allergic reaction were discussed. The approach was demonstrated using models and literature was provided. Verbal and written consent was obtained.    Follow-up 1 month after injection.    My impressions and treatment recommendations were discussed in detail with the patient who verbalized understanding and had no further questions.  Discharge instructions were provided. I personally saw and examined the patient and I agree with the above discussed plan of care.    History of Present Illness     Shiane E Follweiler is a 30 y.o. female who presents for a follow up office visit in regards to Back Pain (Injection didn't help much ). The patient’s current symptoms include complaints of low back pain that radiates down the lateral aspect of both thighs.  She is complaining of increased tingling in both lower extremities to her feet.  Current pain level is a 7-8/10.  Quality pain is described as constant, dull, aching, sharp, shooting.  Has been taking Excedrin as needed for pain without relief.        Review of Systems   Musculoskeletal:  Positive for back pain.        Shooting pain down legs: back gets swollen   All other systems reviewed and are negative.      Medical History Reviewed by provider this encounter:  Tobacco  Allergies  Meds  Problems  Med Hx  Surg Hx  Fam Hx     .  Medications Ordered Prior to Encounter[1]      Objective   Pulse 66   Temp 98.2 °F (36.8 °C) (Temporal)   Resp 16   Ht 5' 3\" (1.6 m)   Wt 64.4 kg (142 lb)   LMP  (LMP Unknown)   SpO2 98%   BMI 25.15 kg/m²      Pain Score:   7  Physical Exam  Constitutional: normal, " well developed, well nourished, alert, in no distress and non-toxic and no overt pain behavior.  Eyes: anicteric  HEENT: grossly intact  Neck: supple, symmetric, trachea midline and no masses   Pulmonary: even and unlabored  Cardiovascular: No edema or pitting edema present  Skin: Normal without rashes or lesions and well hydrated  Psychiatric: Mood and affect appropriate  Neurologic: Cranial Nerves II-XII grossly intact  Musculoskeletal: Limited range of motion of the lumbar spine in all planes.  There is tenderness bilaterally L4-S1.  There is tenderness over bilateral SI joints.  Compression test, distraction test, sacral thrust are positive      MRI LUMBAR SPINE WITHOUT CONTRAST     INDICATION: M51.16: Intervertebral disc disorders with radiculopathy, lumbar region.   Low back and left leg pain.     COMPARISON: Prior MRI February 28, 2022     TECHNIQUE:  Multiplanar, multisequence imaging of the lumbar spine was performed. .        IMAGE QUALITY: Degraded by patient motion artifact     FINDINGS:     VERTEBRAL BODIES: There is a transitional lumbosacral junction. L5 is a transitional segment is seen with included transverse processes articulating with the sacral base on the left and fused with the sacral base on the right. Numbering is kept   consistent with that on the prior MRI. Normal alignment of the lumbar spine.  No spondylolysis or spondylolisthesis. No scoliosis.  No compression fracture.    Modic type I degenerative marrow signal L4-5.     SACRUM:  Normal signal within the sacrum. No evidence of insufficiency or stress fracture.     DISTAL CORD AND CONUS:  Normal size and signal within the distal cord and conus.     PARASPINAL SOFT TISSUES:  Paraspinal soft tissues are unremarkable.     LOWER THORACIC DISC SPACES:  Normal disc height and signal.  No disc herniation, canal stenosis or foraminal narrowing.     LUMBAR DISC SPACES:     L1-L2: No central or foraminal narrowing.     L2-L3: No central or  foraminal narrowing.     L3-L4: Mild facet perjury. No central or foraminal     L4-L5: Mild annular bulge with marginal osteophytes. Mild bilateral foraminal narrowing noted with facet hypertrophy.     L5-S1: Axial images not obtained through this level. This is a transitional segment.     OTHER FINDINGS:  None.     IMPRESSION:     Transitional lumbosacral junction. Spondylotic degenerative changes at L4-5 result in mild bilateral foraminal narrowing.        Workstation performed: [id number]         [1]   Current Outpatient Medications on File Prior to Visit   Medication Sig Dispense Refill    albuterol (PROVENTIL HFA,VENTOLIN HFA) 90 mcg/act inhaler Inhale 2 puffs every 6 (six) hours as needed for wheezing or shortness of breath 18 g 5    aspirin-acetaminophen-caffeine (EXCEDRIN MIGRAINE) 250-250-65 MG per tablet Take 1 tablet by mouth every 6 (six) hours as needed for headaches      colestipol (COLESTID) 1 g tablet Take 1 pill daily with dinner. 30 tablet 3    Diclofenac Sodium (VOLTAREN) 1 % Apply 2 g topically 4 (four) times a day 240 g 5    docusate sodium (COLACE) 100 mg capsule Take 1 pill daily in AM. 30 capsule 3    [DISCONTINUED] methylPREDNISolone 4 MG tablet therapy pack Use as directed on package 21 tablet 0     No current facility-administered medications on file prior to visit.

## 2025-06-17 ENCOUNTER — OFFICE VISIT (OUTPATIENT)
Dept: FAMILY MEDICINE CLINIC | Facility: HOME HEALTHCARE | Age: 30
End: 2025-06-17
Payer: COMMERCIAL

## 2025-06-17 ENCOUNTER — APPOINTMENT (OUTPATIENT)
Dept: RADIOLOGY | Facility: MEDICAL CENTER | Age: 30
End: 2025-06-17
Payer: COMMERCIAL

## 2025-06-17 VITALS
OXYGEN SATURATION: 99 % | BODY MASS INDEX: 25.3 KG/M2 | RESPIRATION RATE: 18 BRPM | DIASTOLIC BLOOD PRESSURE: 74 MMHG | SYSTOLIC BLOOD PRESSURE: 122 MMHG | HEIGHT: 63 IN | WEIGHT: 142.8 LBS | HEART RATE: 65 BPM

## 2025-06-17 DIAGNOSIS — M20.62 ACQUIRED DEFORMITY OF LEFT TOE: ICD-10-CM

## 2025-06-17 DIAGNOSIS — R20.0 NUMBNESS AND TINGLING OF BOTH FEET: ICD-10-CM

## 2025-06-17 DIAGNOSIS — R20.2 NUMBNESS AND TINGLING OF BOTH FEET: Primary | ICD-10-CM

## 2025-06-17 DIAGNOSIS — R20.2 NUMBNESS AND TINGLING OF BOTH FEET: ICD-10-CM

## 2025-06-17 DIAGNOSIS — R20.0 NUMBNESS AND TINGLING OF BOTH FEET: Primary | ICD-10-CM

## 2025-06-17 DIAGNOSIS — Z13.220 SCREENING FOR LIPID DISORDERS: ICD-10-CM

## 2025-06-17 PROCEDURE — 73630 X-RAY EXAM OF FOOT: CPT

## 2025-06-17 PROCEDURE — T1015 CLINIC SERVICE: HCPCS | Performed by: FAMILY MEDICINE

## 2025-06-17 NOTE — PROGRESS NOTES
Name: Shiane E Follweiler      : 1995      MRN: [id number]  Encounter Provider: Rodriguez Millan PA-C  Encounter Date: 2025   Encounter department: [facility]  :  Assessment & Plan  Numbness and tingling of both feet  EMG as scheduled per spine & pain.  Will check labs as well as bilateral foot xrays and refer to podiatry for follow up.  Orders:  •  TSH, 3rd generation with Free T4 reflex; Future  •  Comprehensive metabolic panel; Future  •  CBC and differential; Future  •  Vitamin B12; Future  •  XR foot 3+ vw left; Future  •  XR foot 3+ vw right; Future  •  Ambulatory Referral to Podiatry; Future    Screening for lipid disorders    Orders:  •  Lipid panel; Future    Acquired deformity of left toe    Orders:  •  XR foot 3+ vw left; Future  •  Ambulatory Referral to Podiatry; Future           History of Present Illness   Vijay is a 30 year old female presenting with concern for foot pain.    Patient endorses bilateral foot pain, numbness, and tingling which has been progressively worsening over the past year.  She saw spine and pain yesterday who started her on tizanidine, referred for an EMG, and scheduled a lumbar nerve block for next month.  Patient also endorses deformity of her left toe which has been causing pain and discomfort.      Review of Systems   Constitutional:  Negative for chills, diaphoresis and fever.   Respiratory:  Negative for cough, chest tightness, shortness of breath and wheezing.    Cardiovascular:  Negative for chest pain, palpitations and leg swelling.   Gastrointestinal:  Negative for abdominal pain, constipation, diarrhea, nausea and vomiting.   Musculoskeletal:  Positive for arthralgias, back pain and myalgias.   Skin:  Negative for rash and wound.   Neurological:  Positive for numbness. Negative for dizziness, syncope, weakness, light-headedness and headaches.       Objective   /74 (BP Location: Left arm, Patient Position: Sitting,  "Cuff Size: Standard)   Pulse 65   Resp 18   Ht 5' 3\" (1.6 m)   Wt 64.8 kg (142 lb 12.8 oz)   LMP  (LMP Unknown)   SpO2 99%   BMI 25.30 kg/m²      Physical Exam  Vitals and nursing note reviewed.   Constitutional:       General: She is not in acute distress.     Appearance: Normal appearance.   HENT:      Head: Normocephalic and atraumatic.     Eyes:      Extraocular Movements: Extraocular movements intact.      Conjunctiva/sclera: Conjunctivae normal.      Pupils: Pupils are equal, round, and reactive to light.       Cardiovascular:      Rate and Rhythm: Normal rate and regular rhythm.      Heart sounds: Normal heart sounds. No murmur heard.  Pulmonary:      Effort: Pulmonary effort is normal. No respiratory distress.      Breath sounds: Normal breath sounds. No wheezing.     Musculoskeletal:         General: Normal range of motion.      Cervical back: Normal range of motion and neck supple.      Right lower leg: No edema.      Left lower leg: No edema.      Left foot: Deformity present.   Feet:      Right foot:      Skin integrity: Skin integrity normal. No ulcer, erythema or warmth.      Left foot:      Skin integrity: No ulcer, erythema or warmth.      Comments: Overlapping of her left 3rd and 4th toes causing skin irritation and breakdown    Skin:     General: Skin is warm and dry.     Neurological:      General: No focal deficit present.      Mental Status: She is alert and oriented to person, place, and time.      Cranial Nerves: No cranial nerve deficit.      Motor: No weakness.     Psychiatric:         Mood and Affect: Mood normal.         Behavior: Behavior normal.         "

## 2025-06-23 ENCOUNTER — OFFICE VISIT (OUTPATIENT)
Dept: PHYSICAL THERAPY | Facility: CLINIC | Age: 30
End: 2025-06-23
Payer: COMMERCIAL

## 2025-06-23 ENCOUNTER — RESULTS FOLLOW-UP (OUTPATIENT)
Dept: FAMILY MEDICINE CLINIC | Facility: HOME HEALTHCARE | Age: 30
End: 2025-06-23

## 2025-06-23 DIAGNOSIS — M79.18 MYOFASCIAL PAIN SYNDROME: ICD-10-CM

## 2025-06-23 DIAGNOSIS — M54.42 ACUTE BACK PAIN WITH SCIATICA, LEFT: ICD-10-CM

## 2025-06-23 DIAGNOSIS — M51.16 LUMBAR DISC DISEASE WITH RADICULOPATHY: ICD-10-CM

## 2025-06-23 DIAGNOSIS — G89.29 CHRONIC BILATERAL LOW BACK PAIN WITHOUT SCIATICA: ICD-10-CM

## 2025-06-23 DIAGNOSIS — G89.4 CHRONIC PAIN SYNDROME: Primary | ICD-10-CM

## 2025-06-23 DIAGNOSIS — M54.50 CHRONIC BILATERAL LOW BACK PAIN WITHOUT SCIATICA: ICD-10-CM

## 2025-06-23 PROCEDURE — 97140 MANUAL THERAPY 1/> REGIONS: CPT | Performed by: PHYSICAL THERAPIST

## 2025-06-23 PROCEDURE — 97112 NEUROMUSCULAR REEDUCATION: CPT | Performed by: PHYSICAL THERAPIST

## 2025-06-23 PROCEDURE — 97110 THERAPEUTIC EXERCISES: CPT | Performed by: PHYSICAL THERAPIST

## 2025-06-23 NOTE — PROGRESS NOTES
"Daily Note     Today's date: 2025  Patient name: Shiane E Follweiler  : 1995  MRN: [id number]  Referring provider: Kocher, Barbara, CRNP  Dx:   Encounter Diagnosis     ICD-10-CM    1. Chronic pain syndrome  G89.4       2. Chronic bilateral low back pain without sciatica  M54.50     G89.29       3. Acute back pain with sciatica, left  M54.42       4. Lumbar disc disease with radiculopathy  M51.16       5. Myofascial pain syndrome  M79.18           Start Time: 0900  Stop Time: 945  Total time in clinic (min): 45 minutes    Subjective: The patient reports 7/10 pain across her lower back. She has pins and needles in both legs to her feet. She is scheduled for another round of injections in a month. She is scheduled for an EMG in September.       Objective: See treatment diary below      Assessment: The patient notes no change in her pain level throughout session. She is instructed to perform her HEP during this week and will be reassessed at her next visit next week. Tolerated treatment fair. Patient would benefit from continued PT      Plan: Continue per plan of care.         Precautions: Chronic back pain, GERD       Manuals    Prone P to A grade I, II Lumbosacral joint mobs  JM-thoracic and lumbar JM-thoracic and lumbar                     Neuro Re-Ed         MARIA LUISA (via wedge table) Prone no wedge 10' w/ MH Prone no wedge 10' w/ MH Prone no wedge 10' w/ MH Prone no wedge 10' w/ MH Prone no wedge 10' w/ MH   PPT On PB 2x10   On PB 2x10 On PB 2x10   PPT w/ LAQ/marches On PB 2x10   On PB 2x10 On PB 2x10   Planks 30\" x3 reg on elbows   30\" x3 reg on elbows 30\" x3 reg on elbows   Palloff Press P2 25x  P2 25x P2 25x P2 25x   CC BENJAMIN P6 2x10  P6 20x P5 2x10 P6 2x10   Tandem Stance On AE 30\"x3   On AE 30\"x2 On AE 30\"x3   SLS  On AE 30\"x3   On AE 30\"x3 ea On AE 30\"x3 ea   Ther Ex        PPU        PPU w/ OP        Standing hip abd/ext 4# 2x10 ea   3# 2x10 ea 3# 2x10 ea   HS stretch 16\" steps " "30\"x3 BLE  16\" step 30\"x3 BLE 16\" step 30\" x2 ea 16\" step 30\"x2 ea   Prone Hip IR L3 2x10 L3 2x10 L3 2x10 L3 2x10 L3 2x10   Prone Hip ER 5\"x20 5\"X20 5\"x20 5\"x20 5\"x20   Side stepping  L3 3x10 steps   L3 5x //bars L3 10 steps x2   HS curl S:7 P5 3x10  P5 25x P4 2x10 P5 3x10   Leg ext S:1 P3 3x10  P3 20x P3 2x10 P3 2x10   Child's pose stretch  20\"x5 20\"x5      LTR  20\"x5 ea w/ self OP 20\"x5 ea w/ self OP                                             Ther Activity                        Gait Training                        Modalities        HP/CP prn        Prone wedge CP             "

## 2025-06-23 NOTE — PROGRESS NOTES
PT Re-Evaluation     Today's date: 2025  Patient name: Shiane E Follweiler  : 1995  MRN: [id number]  Referring provider: Kocher, Barbara, CRNP  Dx:   Encounter Diagnosis     ICD-10-CM    1. Chronic pain syndrome  G89.4       2. Chronic bilateral low back pain without sciatica  M54.50     G89.29       3. Acute back pain with sciatica, left  M54.42       4. Lumbar disc disease with radiculopathy  M51.16       5. Myofascial pain syndrome  M79.18                        Assessment  Impairments: abnormal or restricted ROM, lacks appropriate home exercise program, pain with function and activity limitations  Other impairment: decreased flexibility    Assessment details: The patient returns to the PT clinic 2 weeks after receiving epidural injection in the lumbar spine. She reports no significant change in her pain. She was then prescribed a 6 day course of prednisone which she finishes today. She continue to have pain across her lower back and into B hips. Sitting , standing, walking cause increased pain. Deficits are noted in spinal mobility and hip flexibility. B LE strength has improved since start of therapy. She returns to doctor in 2 weeks for a follow-up. She would benefit from continued PT to improve lumbar vertebral mobility as well as active spinal ROM.     Goals  STGs:  1.  Initiate and complete HEP with verbal cues.  MET  2.  Improve BLE strength by 1/2 grade in 4 weeks.  MET  3.  Decrease LBP by > 25% in 4 weeks.  MET  LTGs:    1.  Patient to be I with HEP in 8 weeks.  MET  2.  Improve L/S ROM to WNL t/o in 8 weeks to improve function.  ONGOING  3.  Decrease LBP to < or = to 3-4/10 with activity in 8 weeks to improve function.  ONGOING  4.  Improve BLE strength to > or = to 4+ to 5/5 t/o in 8 weeks to improve function. MET   5.  Postural control is improved to maximal level of function in 8 weeks.  MET  6.  Stair negotiation is improved to maximal level of function in 8 weeks.  ONGOING  7.   Recreational performance is improved to maximal level of function in 8 weeks. ONGOING  8.  ADL performance is improved to maximal level of function in 8 weeks.  PARTIALLY MET  9.  Work performance is improved to maximal level of function in 8 weeks.  ONGOING  10.  Patient to report improved sleep in 8 weeks.  MET      Plan  Patient would benefit from: skilled physical therapy  Planned modality interventions: cryotherapy and thermotherapy: hydrocollator packs    Planned therapy interventions: IASTM, abdominal trunk stabilization, manual therapy, body mechanics training, neuromuscular re-education, patient/caregiver education, postural training, strengthening, stretching, therapeutic activities, therapeutic exercise, functional ROM exercises, flexibility and home exercise program    Frequency: 2x week  Duration in weeks: 4  Plan of Care beginning date: 6/2/2025  Plan of Care expiration date: 6/30/2025  Treatment plan discussed with: patient  Plan details:      Subjective Evaluation    History of Present Illness  Mechanism of injury: The patient states that she has had lower back pain for years.  She notes that she did have an injection about three years ago which did help.    She notes that last September she got a part time job (standing) and notes that since then her pain has increased.     She had seen pain management on 1/28/25.  She had x-rays taken, per patient they showed changes from her last set of x-rays.  She was referred to OPPT and she now presents for her evaluation.  She will be going back to see the doctor on 3/31/25 for her follow up appointment.      From EMR review of x-rays from 1/28:  FINDINGS:  There are 5 non rib bearing lumbar vertebral bodies.   There is no evidence of acute fracture or destructive osseous lesion.  Alignment is unremarkable.   There is mild disc space narrowing with endplate sclerosis and osteophytosis at L5-S1. The remainder of the disc levels are within normal limits.  The  "pedicles appear intact.  Soft tissues are unremarkable.  IMPRESSION:  Mild spondylosis at L5-S1. No acute abnormality.    She notes that she gets clicking in her lower back, only when lifting her R leg (like when putting on her shoes and socks).  The patient states that her pain is constant.  Pain is across her lower back.  She can get B hip pain and L leg pain down thigh to her knee.  She denies any R leg pain.  She denies any N&T into either leg.    More pain with standing and when bending forward.  She notes difficulty sleeping, hard to find a comfortable position to sleep.      UPDATE 3/3/25:  The patient reports 6/10 pain in the lower back today. She states that she feels better after therapy. She has not had symptom in the L leg for a while. She continues to struggle to find a comfortable position to sleep. She reports a 50% improvement since start of therapy.     UPDATE 4/2/25:  The patient reports 6-7/10 on average in her lower back. She states that sitting seems to aggravate the pain. A recent MRI did show degenerative changes and a bulging disc in her her lower lumbar region. L4-L5: Mild annular bulge with marginal osteophytes. Mild bilateral foraminal narrowing noted with facet hypertrophy.She is scheduled for injections next month.       UPDATE 6/2/25:  The patient received an epidural injection on 5-15-25. She notes no significant improvement. She was prescribed a 6 day course of prednisone. She reports 6/10 pain in in lower back. She is recently noting tingling in B feet.  She continues to work. Sitting in her car, standing and walking aggravates her pain. RTD 6-16-25.   Patient Goals  Patient goals for therapy: increased motion, decreased pain and increased strength  Patient goal: \"To have less pain in my back, to be able to bend over when lifting an item.\"  Pain  Exacerbated by: bending forward.    Social Support  Steps to enter house: yes  3  Stairs in house: yes   Lives in: multiple-level " "home  Lives with: significant other    Employment status: working (Part time - stands 8-9 hours a day - two days a week)    Diagnostic Tests  Abnormal x-ray: See Above.  Treatments  Previous treatment: physical therapy and injection treatment    Objective     Static Posture     Lumbar Spine   Decreased lordosis.     Palpation   Left   Tenderness of the erector spinae, lumbar interspinals and lumbar paraspinals.     Right   Tenderness of the erector spinae, lumbar interspinals and lumbar paraspinals.     Active Range of Motion     Lumbar   Flexion:  with pain Restriction level: moderate  Extension:  WFL  Left lateral flexion:  WFL  Right lateral flexion:  WFL  Left rotation:  WFL  Right rotation:  WFL  Left Hip   Flexion: 95 degrees     Right Hip   Flexion: 95 degrees   Left Knee   Flexion: WFL  Extension: WFL    Right Knee   Flexion: WFL  Extension: WFL    Additional Active Range of Motion Details  L SLR: 60  R SLR: 60    Joint Play     Hypomobile: L1, L2, L3, L4, L5 and S1     Pain: L3, L4 and L5     Strength/Myotome Testing     Left Hip   Planes of Motion   Flexion: 4+  Extension: 4  Abduction: 4+  Adduction: 4+    Right Hip   Planes of Motion   Flexion: 4+  Extension: 4  Abduction: 4+  Adduction: 4+    Left Knee   Flexion: 5  Extension: 5    Right Knee   Flexion: 5  Extension: 5    Ambulation     Ambulation: Stairs   Ascend stairs: independent  Pattern: reciprocal  Descend stairs: independent  Pattern: reciprocal                Precautions: Chronic back pain, GERD       Manuals 4/28 6/2 4/9 4/14 4/21   Prone P to A grade I, II Lumbosacral joint mobs  JM-thoracic and lumbar                      Neuro Re-Ed         MARIA LUISA (via wedge table) Prone no wedge 10' w/ MH Prone no wedge 10' w/ MH Prone no wedge 10' w/ MH Prone no wedge 10' w/ MH Prone no wedge 10' w/ MH   PPT On PB 2x10  On PB 2x10 On PB 2x10 On PB 2x10   PPT w/ LAQ/marches On PB 2x10  On PB 2x10 On PB 2x10 On PB 2x10   Planks 30\" x3 reg on elbows  Reg 30\"x3 " "on elbows 30\" x3 reg on elbows 30\" x3 reg on elbows   Palloff Press P2 25x  P2 2x10 P2 25x P2 25x   CC BENJAMIN P6 2x10  P5 2x10 P5 2x10 P6 2x10   Tandem Stance On AE 30\"x3  On AE 30\"x2 eaa On AE 30\"x2 On AE 30\"x3   SLS  On AE 30\"x3  On AE 30\"x3 ea On AE 30\"x3 ea On AE 30\"x3 ea   Ther Ex        PPU        PPU w/ OP        Standing hip abd/ext 4# 2x10 ea  3# 2x10 ea 3# 2x10 ea 3# 2x10 ea   HS stretch 16\" steps 30\"x3 BLE  8\" step 30\"x2 ea 16\" step 30\" x2 ea 16\" step 30\"x2 ea   Prone Hip IR L3 2x10  L3 2x10 L3 2x10 L3 2x10   Prone Hip ER 5\"x20  5\"x20 5\"x20 5\"x20   Side stepping  L3 3x10 steps  L3 5x// bars L3 5x //bars L3 10 steps x2   HS curl S:7 P5 3x10  P4 2x10 P4 2x10 P5 3x10   Leg ext S:1 P3 3x10  P3 2x10 P3 2x10 P3 2x10   Child's pose stretch  20\"x5      LTR  20\"x5 ea w/ self OP                                              Ther Activity                        Gait Training                        Modalities        HP/CP prn        Prone wedge CP             "

## 2025-06-25 ENCOUNTER — OFFICE VISIT (OUTPATIENT)
Dept: PODIATRY | Facility: CLINIC | Age: 30
End: 2025-06-25
Attending: PHYSICIAN ASSISTANT
Payer: COMMERCIAL

## 2025-06-25 VITALS
HEIGHT: 63 IN | WEIGHT: 142 LBS | BODY MASS INDEX: 25.16 KG/M2 | OXYGEN SATURATION: 98 % | TEMPERATURE: 98.4 F | RESPIRATION RATE: 16 BRPM | HEART RATE: 60 BPM

## 2025-06-25 DIAGNOSIS — M20.42 HAMMERTOE, BILATERAL: ICD-10-CM

## 2025-06-25 DIAGNOSIS — M20.62 ACQUIRED DEFORMITY OF LEFT TOE: ICD-10-CM

## 2025-06-25 DIAGNOSIS — L03.032 PARONYCHIA OF FOURTH TOE, LEFT: ICD-10-CM

## 2025-06-25 DIAGNOSIS — R20.2 NUMBNESS AND TINGLING OF BOTH FEET: ICD-10-CM

## 2025-06-25 DIAGNOSIS — Q66.72 CAVUS DEFORMITY OF BOTH FEET: Primary | ICD-10-CM

## 2025-06-25 DIAGNOSIS — M20.41 HAMMERTOE, BILATERAL: ICD-10-CM

## 2025-06-25 DIAGNOSIS — R20.0 NUMBNESS AND TINGLING OF BOTH FEET: ICD-10-CM

## 2025-06-25 DIAGNOSIS — Q66.71 CAVUS DEFORMITY OF BOTH FEET: Primary | ICD-10-CM

## 2025-06-25 PROCEDURE — 99203 OFFICE O/P NEW LOW 30 MIN: CPT | Performed by: PODIATRIST

## 2025-06-25 RX ORDER — MUPIROCIN 2 %
OINTMENT (GRAM) TOPICAL DAILY
Qty: 22 G | Refills: 1 | Status: SHIPPED | OUTPATIENT
Start: 2025-06-25

## 2025-06-25 NOTE — PROGRESS NOTES
Name: Shiane E Follweiler      : 1995      MRN: [id number]  Encounter Provider: Heena Birmingham DPM  Encounter Date: 2025   Encounter department: [facility] [place]  :  Assessment & Plan  Numbness and tingling of both feet    Orders:    Ambulatory Referral to Podiatry    Ambulatory referral to Physical Therapy; Future    Orthotics B/L    Acquired deformity of left toe    Orders:    Ambulatory Referral to Podiatry    Cavus deformity of both feet    Orders:    Ambulatory referral to Physical Therapy; Future    Orthotics B/L    Paronychia of fourth toe, left    Orders:    mupirocin (BACTROBAN) 2 % ointment; Apply topically daily    Hammertoe, bilateral             Bilateral foot x-rays reviewed  Cavus foot type, fat pad atrophy and changes at level of heel.  Patient counseled on cavus deformity and pathophysiology as well as biomechanical forces causing pressure points on the plantar aspect of the foot leading to discomfort.  Reviewed conservative treatment options such as appropriate shoe gear and orthotics for offloading and distribution of force  Reviewed hammertoe deformity bilateral 4th and 5th digits with adductovarus deformity.  Toe spacers dispensed.  Patient educated on silicone foam toe spacers from rubbing and irritation.  Patient educated on the importance of wide toebox/upper shoes to prevent rubbing and irritation.  Briefly reviewed surgical intervention however patient would like to continue with conservative care at this time.  Paronychia fourth toe, soak in Epsom salts apply bacitracin ointment  Patient would benefit from orthotics whether CMO or OTC, OTC information placed in chart  Patient counseled on appropriate shoe gear for better support of her feet and to diffuse pressure  Patient encouraged to initiate at home stretching  PT notes reviewed, management for chronic pain syndrome  Family medicine note reviewed 2025.  Labs pending, referral to podiatry  Pain  management note reviewed 6/16/2025.  EMG ordered for lower extremities, pending.  Sacroiliac injection planned for 7/23/2025  Counseled on worsening signs of infection will contact the office or present to the ED for evaluation of these occur  Follow-up 2 weeks for reevaluation of fourth toe left foot    Imaging Reviewed at this visit (I personally reviewed/independently interpreted images and reports in PACS)  XR left foot WB 3v 6/17/2025 date: No acute osseous abnormalities noted.  No acute fracture or dislocation noted.  Mild cavus deformity.  4th and 5th adductovarus hammertoes noted.  Fourth digit middle phalanx atavistic.  XR  Right. foot WB 3v 6/17/2025 date: No acute osseous abnormalities noted.  No acute fracture or dislocation noted.   Mild cavus deformity noted atavistic 4th and 5th digit with mild hammering.  Fifth digit synphalangism       History of Present Illness   HPI  Shiane E Follweiler is a 30 y.o. female who presents for evaluation of bilateral feet.  Patient has diffuse bilateral foot pain primarily at her heels as well as her fourth digits bilaterally due to an incurvation.  Patient reports that her left fourth toe curves into her third toe causing rubbing and blistering.  She recently had a blister and now has residual mild irritation in the area.  She denies any drainage but she does report redness and swelling.  Patient is under the care of pain management for chronic pain syndrome and lumbar disc disease with radiculopathy as well as sacroiliitis.  She reports paresthesias and symptoms throughout her whole lower extremity however her foot pain seems to exacerbate her discomfort.  She presents today wearing flip-flops.  Patient states it is painful for her to walk barefoot.  She states pain is most acute when she gets up from rest and steps down on her feet.  She denies any posterior calf pain or cramping      Review of Systems   Constitutional: Negative.    Respiratory:  Negative for chest  "tightness and shortness of breath.    Cardiovascular:  Negative for leg swelling.   Musculoskeletal:  Positive for arthralgias and back pain.   Skin:  Negative for wound.          Objective   Pulse 60   Temp 98.4 °F (36.9 °C)   Resp 16   Ht 5' 3\" (1.6 m)   Wt 64.4 kg (142 lb)   LMP  (LMP Unknown)   SpO2 98%   BMI 25.15 kg/m²      Physical Exam  Constitutional:       General: She is not in acute distress.     Appearance: She is not ill-appearing.     Cardiovascular:      Pulses: Normal pulses.     Musculoskeletal:      Comments: Bilateral mild cavus foot type  Plantar heel fat pad atrophy with medial shift of fat pad at level of calcaneus  Tenderness with palpation plantar medial tubercle  Ankle range of motion within normal limits, MMT 5/5  Dorsiflexion at level of ankle bilaterally approximately 15 degrees with knee bent and extended  Negative Tinel's sign  No pain with palpation over primitivo pedis bilaterally  Mild tenderness with palpation plantar lateral fifth MPJ, no pain with range of motion of fifth MPJ  STJ WNL    Bilateral 4th and 5th digit adductovarus  Bilateral fourth digit     Skin:     Capillary Refill: Capillary refill takes less than 2 seconds.      Comments: Left fourth digit trace edema and erythema noted along cuticle.  No drainage expressed, no purulence expressed.  No crusting.  No crepitus, no fluctuance.  Nail intact.  Cuticle receded     Neurological:      Sensory: No sensory deficit.      Comments: Protective sensation intact with monofilament exam         "

## 2025-06-25 NOTE — ASSESSMENT & PLAN NOTE
Orders:    Ambulatory Referral to Podiatry    Ambulatory referral to Physical Therapy; Future    Orthotics B/L

## 2025-06-25 NOTE — PATIENT INSTRUCTIONS
I recommend Redi-thotic Orange inserts for your shoes  I recommend stiff bottomed sneakers/shoes (ex Asics, Vionic, New balance, Galindo, etc) for daily use and Filiberto Rome (recovery slides) for in home use     Soak left foot in Epsom salts and warm water for 15 minutes a day, apply mupirocin ointment to fourth toe and area of irritation

## 2025-06-30 ENCOUNTER — EVALUATION (OUTPATIENT)
Dept: PHYSICAL THERAPY | Facility: CLINIC | Age: 30
End: 2025-06-30
Payer: COMMERCIAL

## 2025-06-30 DIAGNOSIS — M54.50 CHRONIC BILATERAL LOW BACK PAIN WITHOUT SCIATICA: ICD-10-CM

## 2025-06-30 DIAGNOSIS — M79.18 MYOFASCIAL PAIN SYNDROME: ICD-10-CM

## 2025-06-30 DIAGNOSIS — M54.42 ACUTE BACK PAIN WITH SCIATICA, LEFT: ICD-10-CM

## 2025-06-30 DIAGNOSIS — M51.16 LUMBAR DISC DISEASE WITH RADICULOPATHY: ICD-10-CM

## 2025-06-30 DIAGNOSIS — G89.4 CHRONIC PAIN SYNDROME: Primary | ICD-10-CM

## 2025-06-30 DIAGNOSIS — G89.29 CHRONIC BILATERAL LOW BACK PAIN WITHOUT SCIATICA: ICD-10-CM

## 2025-06-30 PROCEDURE — 97112 NEUROMUSCULAR REEDUCATION: CPT

## 2025-06-30 PROCEDURE — 97110 THERAPEUTIC EXERCISES: CPT

## 2025-06-30 NOTE — PROGRESS NOTES
"Daily Note     Today's date: 2025  Patient name: Shiane E Follweiler  : 1995  MRN: [id number]  Referring provider: Kocher, Barbara, CRNP  Dx:   Encounter Diagnosis     ICD-10-CM    1. Chronic pain syndrome  G89.4       2. Chronic bilateral low back pain without sciatica  M54.50     G89.29       3. Acute back pain with sciatica, left  M54.42       4. Lumbar disc disease with radiculopathy  M51.16       5. Myofascial pain syndrome  M79.18                      Subjective: Patient reports that she did not have relief from her 1st injection, she now has numbness and radicular symptoms in her BLE which is a new symptom. This is progressively increasing. Pain levels are still very high at times. She is scheduled for another injection .      Objective: See treatment diary below.      Assessment: Tolerated treatment fair. Patient would benefit from continued PT to improve upper trunk/core strength to increase lower back stability. She still experiences pain t/o session and radicular symptoms are present w/o decrease. Progress strengthening to tolerance.      Plan: Continue per plan of care.      Precautions: Chronic back pain, GERD       Manuals    Prone P to A grade I, II Lumbosacral joint mobs  JM-thoracic and lumbar JM-thoracic and lumbar     Assessment    JM            Neuro Re-Ed         MARIA LUISA (via wedge table) Prone no wedge 10' w/ MH Prone no wedge 10' w/ MH Prone no wedge 10' w/ MH Prone no wedge 10' w/ MH Prone no wedge 10' w/ MH   PPT On PB 2x10    On PB 2x10   PPT w/ LAQ/marches On PB 2x10    On PB 2x10   Planks 30\" x3 reg on elbows    30\" x3 reg on elbows   Palloff Press P2 25x  P2 25x P2 35x  P2 25x   CC BENJAMIN P6 2x10  P6 20x P6 25x P6 2x10   Tandem Stance On AE 30\"x3    On AE 30\"x3   SLS  On AE 30\"x3    On AE 30\"x3 ea   Ther Ex        PPU        PPU w/ OP        Standing hip abd/ext 4# 2x10 ea    3# 2x10 ea   HS stretch 16\" steps 30\"x3 BLE  16\" step 30\"x3 BLE 16\" step 30\"x3 " "BLE 16\" step 30\"x2 ea   Prone Hip IR L3 2x10 L3 2x10 L3 2x10 L3 2x10 L3 2x10   Prone Hip ER 5\"x20 5\"X20 5\"x20 5\"x20 5\"x20   Side stepping  L3 3x10 steps    L3 10 steps x2   HS curl S:7 P5 3x10  P5 25x P5 3x10 P5 3x10   Leg ext S:1 P3 3x10  P3 20x P3 3x10 P3 2x10   Child's pose stretch  20\"x5 20\"x5  20\"x5    LTR  20\"x5 ea w/ self OP 20\"x5 ea w/ self OP 20\"x5 ea w/ self OP                                            Ther Activity                        Gait Training                        Modalities        HP/CP prn        Prone wedge CP               "

## 2025-07-01 ENCOUNTER — APPOINTMENT (OUTPATIENT)
Dept: LAB | Facility: MEDICAL CENTER | Age: 30
End: 2025-07-01
Attending: PHYSICIAN ASSISTANT
Payer: COMMERCIAL

## 2025-07-01 DIAGNOSIS — R20.0 NUMBNESS AND TINGLING OF BOTH FEET: ICD-10-CM

## 2025-07-01 DIAGNOSIS — Z13.220 SCREENING FOR LIPID DISORDERS: ICD-10-CM

## 2025-07-01 DIAGNOSIS — R20.2 NUMBNESS AND TINGLING OF BOTH FEET: ICD-10-CM

## 2025-07-01 LAB
ALBUMIN SERPL BCG-MCNC: 4.6 G/DL (ref 3.5–5)
ALP SERPL-CCNC: 34 U/L (ref 34–104)
ALT SERPL W P-5'-P-CCNC: 9 U/L (ref 7–52)
ANION GAP SERPL CALCULATED.3IONS-SCNC: 7 MMOL/L (ref 4–13)
AST SERPL W P-5'-P-CCNC: 11 U/L (ref 13–39)
BASOPHILS # BLD AUTO: 0.05 THOUSANDS/ÂΜL (ref 0–0.1)
BASOPHILS NFR BLD AUTO: 1 % (ref 0–1)
BILIRUB SERPL-MCNC: 0.5 MG/DL (ref 0.2–1)
BUN SERPL-MCNC: 13 MG/DL (ref 5–25)
CALCIUM SERPL-MCNC: 9.1 MG/DL (ref 8.4–10.2)
CHLORIDE SERPL-SCNC: 104 MMOL/L (ref 96–108)
CHOLEST SERPL-MCNC: 134 MG/DL (ref ?–200)
CO2 SERPL-SCNC: 29 MMOL/L (ref 21–32)
CREAT SERPL-MCNC: 0.65 MG/DL (ref 0.6–1.3)
EOSINOPHIL # BLD AUTO: 0.1 THOUSAND/ÂΜL (ref 0–0.61)
EOSINOPHIL NFR BLD AUTO: 2 % (ref 0–6)
ERYTHROCYTE [DISTWIDTH] IN BLOOD BY AUTOMATED COUNT: 11.9 % (ref 11.6–15.1)
GFR SERPL CREATININE-BSD FRML MDRD: 117 ML/MIN/1.73SQ M
GLUCOSE P FAST SERPL-MCNC: 77 MG/DL (ref 65–99)
HCT VFR BLD AUTO: 42.6 % (ref 34.8–46.1)
HDLC SERPL-MCNC: 58 MG/DL
HGB BLD-MCNC: 14.2 G/DL (ref 11.5–15.4)
IMM GRANULOCYTES # BLD AUTO: 0.02 THOUSAND/UL (ref 0–0.2)
IMM GRANULOCYTES NFR BLD AUTO: 0 % (ref 0–2)
LDLC SERPL CALC-MCNC: 70 MG/DL (ref 0–100)
LYMPHOCYTES # BLD AUTO: 1.61 THOUSANDS/ÂΜL (ref 0.6–4.47)
LYMPHOCYTES NFR BLD AUTO: 26 % (ref 14–44)
MCH RBC QN AUTO: 30.7 PG (ref 26.8–34.3)
MCHC RBC AUTO-ENTMCNC: 33.3 G/DL (ref 31.4–37.4)
MCV RBC AUTO: 92 FL (ref 82–98)
MONOCYTES # BLD AUTO: 0.36 THOUSAND/ÂΜL (ref 0.17–1.22)
MONOCYTES NFR BLD AUTO: 6 % (ref 4–12)
NEUTROPHILS # BLD AUTO: 4 THOUSANDS/ÂΜL (ref 1.85–7.62)
NEUTS SEG NFR BLD AUTO: 65 % (ref 43–75)
NONHDLC SERPL-MCNC: 76 MG/DL
NRBC BLD AUTO-RTO: 0 /100 WBCS
PLATELET # BLD AUTO: 336 THOUSANDS/UL (ref 149–390)
PMV BLD AUTO: 10.9 FL (ref 8.9–12.7)
POTASSIUM SERPL-SCNC: 4 MMOL/L (ref 3.5–5.3)
PROT SERPL-MCNC: 6.7 G/DL (ref 6.4–8.4)
RBC # BLD AUTO: 4.62 MILLION/UL (ref 3.81–5.12)
SODIUM SERPL-SCNC: 140 MMOL/L (ref 135–147)
TRIGL SERPL-MCNC: 28 MG/DL (ref ?–150)
TSH SERPL DL<=0.05 MIU/L-ACNC: 0.55 UIU/ML (ref 0.45–4.5)
VIT B12 SERPL-MCNC: 254 PG/ML (ref 180–914)
WBC # BLD AUTO: 6.14 THOUSAND/UL (ref 4.31–10.16)

## 2025-07-01 PROCEDURE — 36415 COLL VENOUS BLD VENIPUNCTURE: CPT

## 2025-07-01 PROCEDURE — 80061 LIPID PANEL: CPT

## 2025-07-01 PROCEDURE — 80053 COMPREHEN METABOLIC PANEL: CPT

## 2025-07-01 PROCEDURE — 85025 COMPLETE CBC W/AUTO DIFF WBC: CPT

## 2025-07-01 PROCEDURE — 84443 ASSAY THYROID STIM HORMONE: CPT

## 2025-07-01 PROCEDURE — 82607 VITAMIN B-12: CPT

## 2025-07-01 NOTE — PROGRESS NOTES
PT Re-Evaluation     Today's date: 2025  Patient name: Shiane E Follweiler  : 1995  MRN: [id number]  Referring provider: Kocher, Barbara, CRNP  Dx:   Encounter Diagnosis     ICD-10-CM    1. Chronic pain syndrome  G89.4       2. Chronic bilateral low back pain without sciatica  M54.50     G89.29       3. Acute back pain with sciatica, left  M54.42       4. Lumbar disc disease with radiculopathy  M51.16       5. Myofascial pain syndrome  M79.18             Start Time: 0900  Stop Time: 945  Total time in clinic (min): 45 minutes    Assessment  Impairments: abnormal or restricted ROM, lacks appropriate home exercise program, pain with function and activity limitations  Other impairment: decreased flexibility    Assessment details: The patient reports no significant improvement since her injection. She is now experiencing numbness and tingling in both legs. She is having difficulty driving her manual transmission car. She is scheduled for an SI injection on 25. Sensation is intact to light touch and reflexes are normal. B hip strength is grossly 4+/5. Deficits are noted in core stability and spinal mobility. She would benefit from continued therapy to improve functional status.     Goals  STGs:  1.  Initiate and complete HEP with verbal cues.  MET  2.  Improve BLE strength by 1/2 grade in 4 weeks.  MET  3.  Decrease LBP by > 25% in 4 weeks.  MET  LTGs:    1.  Patient to be I with HEP in 8 weeks.  MET  2.  Improve L/S ROM to WNL t/o in 8 weeks to improve function.  ONGOING  3.  Decrease LBP to < or = to 3-4/10 with activity in 8 weeks to improve function.  ONGOING  4.  Improve BLE strength to > or = to 4+ to 5/5 t/o in 8 weeks to improve function. MET   5.  Postural control is improved to maximal level of function in 8 weeks.  MET  6.  Stair negotiation is improved to maximal level of function in 8 weeks.  ONGOING  7.  Recreational performance is improved to maximal level of function in 8 weeks.  ONGOING  8.  ADL performance is improved to maximal level of function in 8 weeks.  PARTIALLY MET  9.  Work performance is improved to maximal level of function in 8 weeks.  ONGOING  10.  Patient to report improved sleep in 8 weeks.  MET      Plan  Patient would benefit from: skilled physical therapy  Planned modality interventions: cryotherapy and thermotherapy: hydrocollator packs    Planned therapy interventions: IASTM, abdominal trunk stabilization, manual therapy, body mechanics training, neuromuscular re-education, patient/caregiver education, postural training, strengthening, stretching, therapeutic activities, therapeutic exercise, functional ROM exercises, flexibility and home exercise program    Frequency: 2x week  Duration in weeks: 4  Plan of Care beginning date: 6/30/2025  Plan of Care expiration date: 7/29/2025  Treatment plan discussed with: patient  Plan details:        Subjective Evaluation    History of Present Illness  Mechanism of injury: The patient states that she has had lower back pain for years.  She notes that she did have an injection about three years ago which did help.    She notes that last September she got a part time job (standing) and notes that since then her pain has increased.     She had seen pain management on 1/28/25.  She had x-rays taken, per patient they showed changes from her last set of x-rays.  She was referred to OPPT and she now presents for her evaluation.  She will be going back to see the doctor on 3/31/25 for her follow up appointment.      From EMR review of x-rays from 1/28:  FINDINGS:  There are 5 non rib bearing lumbar vertebral bodies.   There is no evidence of acute fracture or destructive osseous lesion.  Alignment is unremarkable.   There is mild disc space narrowing with endplate sclerosis and osteophytosis at L5-S1. The remainder of the disc levels are within normal limits.  The pedicles appear intact.  Soft tissues are unremarkable.  IMPRESSION:  Mild  "spondylosis at L5-S1. No acute abnormality.    She notes that she gets clicking in her lower back, only when lifting her R leg (like when putting on her shoes and socks).  The patient states that her pain is constant.  Pain is across her lower back.  She can get B hip pain and L leg pain down thigh to her knee.  She denies any R leg pain.  She denies any N&T into either leg.    More pain with standing and when bending forward.  She notes difficulty sleeping, hard to find a comfortable position to sleep.      UPDATE 3/3/25:  The patient reports 6/10 pain in the lower back today. She states that she feels better after therapy. She has not had symptom in the L leg for a while. She continues to struggle to find a comfortable position to sleep. She reports a 50% improvement since start of therapy.     UPDATE 4/2/25:  The patient reports 6-7/10 on average in her lower back. She states that sitting seems to aggravate the pain. A recent MRI did show degenerative changes and a bulging disc in her her lower lumbar region. L4-L5: Mild annular bulge with marginal osteophytes. Mild bilateral foraminal narrowing noted with facet hypertrophy.She is scheduled for injections next month.       UPDATE 6/2/25:  The patient received an epidural injection on 5-15-25. She notes no significant improvement. She was prescribed a 6 day course of prednisone. She reports 6/10 pain in in lower back. She is recently noting tingling in B feet.  She continues to work. Sitting in her car, standing and walking aggravates her pain. RTD 6-16-25.     UPDATE 6/30/25:  The patient reports continued pain in her R LB and SIJ. She is scheduled for another injection on 7-23-25. She is having numbness and tingling in both legs. She is having trouble driving her manual shift car.   Patient Goals  Patient goals for therapy: increased motion, decreased pain and increased strength  Patient goal: \"To have less pain in my back, to be able to bend over when lifting " "an item.\"  Pain  Exacerbated by: bending forward.    Social Support  Steps to enter house: yes  3  Stairs in house: yes   Lives in: multiple-level home  Lives with: significant other    Employment status: working (Part time - stands 8-9 hours a day - two days a week)    Diagnostic Tests  Abnormal x-ray: See Above.  Treatments  Previous treatment: physical therapy and injection treatment      Objective     Static Posture     Lumbar Spine   Decreased lordosis.     Palpation   Left   Tenderness of the erector spinae, lumbar interspinals and lumbar paraspinals.     Right   Tenderness of the erector spinae, lumbar interspinals and lumbar paraspinals.     Active Range of Motion     Lumbar   Flexion:  with pain Restriction level: minimal  Extension:  WFL  Left lateral flexion:  WFL  Right lateral flexion:  WFL  Left rotation:  WFL  Right rotation:  WFL  Left Hip   Flexion: 95 degrees     Right Hip   Flexion: 95 degrees   Left Knee   Flexion: WFL  Extension: WFL    Right Knee   Flexion: WFL  Extension: WFL    Additional Active Range of Motion Details  L SLR: 60  R SLR: 60    Joint Play     Hypomobile: L3, L4, L5 and S1     Pain: L4, L5 and S1     Strength/Myotome Testing     Left Hip   Planes of Motion   Flexion: 4+  Extension: 4+  Abduction: 4+  Adduction: 4+    Right Hip   Planes of Motion   Flexion: 4+  Extension: 4+  Abduction: 4+  Adduction: 4+    Left Knee   Flexion: 5  Extension: 5    Right Knee   Flexion: 5  Extension: 5    Tests     Lumbar     Left   Negative passive SLR.     Right   Negative passive SLR.     Right Hip   Positive long sit.     Additional Tests Details  (+) upslip R SIJ-increased pain noted with R LE LAD    Ambulation     Ambulation: Stairs   Ascend stairs: independent  Pattern: reciprocal  Descend stairs: independent  Pattern: reciprocal                Precautions: Chronic back pain, GERD            "

## 2025-07-07 ENCOUNTER — OFFICE VISIT (OUTPATIENT)
Dept: PHYSICAL THERAPY | Facility: CLINIC | Age: 30
End: 2025-07-07
Payer: COMMERCIAL

## 2025-07-07 DIAGNOSIS — M54.50 CHRONIC BILATERAL LOW BACK PAIN WITHOUT SCIATICA: ICD-10-CM

## 2025-07-07 DIAGNOSIS — M51.16 LUMBAR DISC DISEASE WITH RADICULOPATHY: ICD-10-CM

## 2025-07-07 DIAGNOSIS — M54.42 ACUTE BACK PAIN WITH SCIATICA, LEFT: ICD-10-CM

## 2025-07-07 DIAGNOSIS — G89.29 CHRONIC BILATERAL LOW BACK PAIN WITHOUT SCIATICA: ICD-10-CM

## 2025-07-07 DIAGNOSIS — G89.4 CHRONIC PAIN SYNDROME: Primary | ICD-10-CM

## 2025-07-07 DIAGNOSIS — M79.18 MYOFASCIAL PAIN SYNDROME: ICD-10-CM

## 2025-07-07 PROCEDURE — 97110 THERAPEUTIC EXERCISES: CPT

## 2025-07-07 PROCEDURE — 97112 NEUROMUSCULAR REEDUCATION: CPT

## 2025-07-07 NOTE — PROGRESS NOTES
"Daily Note     Today's date: 2025  Patient name: Shiane E Follweiler  : 1995  MRN: [id number]  Referring provider: Kocher, Barbara, CRNP  Dx:   Encounter Diagnosis     ICD-10-CM    1. Chronic pain syndrome  G89.4       2. Chronic bilateral low back pain without sciatica  M54.50     G89.29       3. Acute back pain with sciatica, left  M54.42       4. Lumbar disc disease with radiculopathy  M51.16       5. Myofascial pain syndrome  M79.18                      Subjective: Patient reports that she is continuing to have LBP. Numbness in her BLE is less today. Second injection is scheduled for .      Objective: See treatment diary below. Resumed PB LAQ/marches.      Assessment: Tolerated treatment fair. Patient would benefit from continued PT to improve upper trunk/core strength to increase lower back stability. She still experiences pain after flexion ROM stretching. Can tolerate PPT better. She has pain with resisted back extensions. Progress strengthening to tolerance.       Plan: Continue per plan of care.         Precautions: Chronic back pain, GERD    Manuals    Prone P to A grade I, II Lumbosacral joint mobs   JM-thoracic and lumbar JM-thoracic and lumbar       Assessment       JM                   Neuro Re-Ed              MARIA LUISA (via wedge table) Prone no wedge 10' w/ MH Prone no wedge 10' w/ MH Prone no wedge 10' w/ MH Prone no wedge 10' w/ MH Prone no wedge 10' w/ MH   PPT On PB 2x10       5\"x20   PPT w/ LAQ/marches On PB 2x10       2x10   Planks 30\" x3 reg on elbows          Palloff Press P2 25x   P2 25x P2 35x  P3 3x10   CC BENJAMIN P6 2x10   P6 20x P6 25x P7 3x10   Tandem Stance On AE 30\"x3          SLS  On AE 30\"x3          Ther Ex            PPU            PPU w/ OP            Standing hip abd/ext 4# 2x10 ea          HS stretch 16\" steps 30\"x3 BLE   16\" step 30\"x3 BLE 16\" step 30\"x3 BLE 16\" step 30\"x3 BLE   Prone Hip IR L3 2x10 L3 2x10 L3 2x10 L3 2x10 L3 2x10   Prone Hip " "ER 5\"x20 5\"X20 5\"x20 5\"x20 5\"x20   Side stepping  L3 3x10 steps          HS curl S:7 P5 3x10   P5 25x P5 3x10 P7 3x10   Leg ext S:1 P3 3x10   P3 20x P3 3x10 P3 3x10   Child's pose stretch   20\"x5 20\"x5  20\"x5 20\"x5   LTR   20\"x5 ea w/ self OP 20\"x5 ea w/ self OP 20\"x5 ea w/ self OP 20\"x5 ea w/ self OP                                                                       Ther Activity                                         Gait Training                                         Modalities             HP/CP prn             Prone wedge CP                      "

## 2025-07-14 ENCOUNTER — OFFICE VISIT (OUTPATIENT)
Dept: PODIATRY | Facility: CLINIC | Age: 30
End: 2025-07-14
Payer: COMMERCIAL

## 2025-07-14 ENCOUNTER — OFFICE VISIT (OUTPATIENT)
Dept: PHYSICAL THERAPY | Facility: CLINIC | Age: 30
End: 2025-07-14
Payer: COMMERCIAL

## 2025-07-14 VITALS
HEART RATE: 59 BPM | TEMPERATURE: 97.2 F | WEIGHT: 142 LBS | BODY MASS INDEX: 25.16 KG/M2 | HEIGHT: 63 IN | OXYGEN SATURATION: 98 % | RESPIRATION RATE: 14 BRPM

## 2025-07-14 DIAGNOSIS — G89.4 CHRONIC PAIN SYNDROME: Primary | ICD-10-CM

## 2025-07-14 DIAGNOSIS — M51.16 LUMBAR DISC DISEASE WITH RADICULOPATHY: ICD-10-CM

## 2025-07-14 DIAGNOSIS — M54.42 ACUTE BACK PAIN WITH SCIATICA, LEFT: ICD-10-CM

## 2025-07-14 DIAGNOSIS — M79.18 MYOFASCIAL PAIN SYNDROME: ICD-10-CM

## 2025-07-14 DIAGNOSIS — Q66.72 CAVUS DEFORMITY OF BOTH FEET: ICD-10-CM

## 2025-07-14 DIAGNOSIS — M54.50 CHRONIC BILATERAL LOW BACK PAIN WITHOUT SCIATICA: ICD-10-CM

## 2025-07-14 DIAGNOSIS — M20.41 HAMMERTOE, BILATERAL: ICD-10-CM

## 2025-07-14 DIAGNOSIS — Q66.71 CAVUS DEFORMITY OF BOTH FEET: ICD-10-CM

## 2025-07-14 DIAGNOSIS — G89.29 CHRONIC BILATERAL LOW BACK PAIN WITHOUT SCIATICA: ICD-10-CM

## 2025-07-14 DIAGNOSIS — L03.032 PARONYCHIA OF FOURTH TOE, LEFT: Primary | ICD-10-CM

## 2025-07-14 DIAGNOSIS — M20.42 HAMMERTOE, BILATERAL: ICD-10-CM

## 2025-07-14 PROCEDURE — 97110 THERAPEUTIC EXERCISES: CPT

## 2025-07-14 PROCEDURE — 99213 OFFICE O/P EST LOW 20 MIN: CPT | Performed by: PODIATRIST

## 2025-07-14 PROCEDURE — 97112 NEUROMUSCULAR REEDUCATION: CPT

## 2025-07-14 NOTE — PROGRESS NOTES
Name: Shiane E Follweiler      : 1995      MRN: [id number]  Encounter Provider: Heena Birmingham DPM  Encounter Date: 2025   Encounter department: [facility] [place]  :  Assessment & Plan  Cavus deformity of both feet         Paronychia of fourth toe, left         Hammertoe, bilateral           Left fourth toe paronychia resolved.  Patient counseled on callus care including use of emery board, keratolytic moisturizers, and offloading padding  Discontinue mupirocin 2% ointment  Cavus foot type, fat pad atrophy and changes at level of heel.  Patient evaluated at PT for CMO's however patient cannot afford custom orthotics  PT note from 2025 reviewed  PT note from 2025 reviewed  Patient counseled on cavus deformity and pathophysiology as well as biomechanical forces causing pressure points on the plantar aspect of the foot leading to discomfort.  Reviewed conservative treatment options such as appropriate shoe gear and orthotics for offloading and distribution of force  Reviewed hammertoe deformity bilateral 4th and 5th digits with adductovarus deformity.  Patient will continue with conservative treatment modalities.  Reviewed surgical intervention for hammertoe deformity at today's visit for possible future intervention.  Patient counseled on appropriate shoe gear for better support of her feet and to diffuse pressure  Patient to continue home stretching exercises  Follow-up as needed      History of Present Illness   HPI  Shiane E Follweiler is a 30 y.o. female who presents for evaluation management of left fourth toe paronychia.  Patient states she has been applying 2% mupirocin ointment as directed and has had resolution of her symptoms however she can still feel dry skin in the area.  Patient did go to PT for evaluation of custom molded orthotics however her insurance will not cover them.  She has been wearing flip-flops and has not utilized or experimented with padding recently.   "Patient states she has an upcoming pain procedure to hopefully help with her back and her lower extremity symptoms      Review of Systems  Constitutional: Negative.    Respiratory:  Negative for chest tightness and shortness of breath.    Cardiovascular:  Negative for leg swelling.   Musculoskeletal:  Positive for arthralgias and back pain.   Skin:  Negative for wound.        Objective   Pulse 59   Temp (!) 97.2 °F (36.2 °C)   Resp 14   Ht 5' 3\" (1.6 m)   Wt 64.4 kg (142 lb)   LMP  (LMP Unknown)   SpO2 98%   BMI 25.15 kg/m²      Physical Exam  Constitutional:       General: She is not in acute distress.     Appearance: She is not ill-appearing.      Cardiovascular:      Pulses: Normal pulses.      Musculoskeletal:      Comments: Bilateral mild cavus foot type  Plantar heel fat pad atrophy with medial shift of fat pad at level of calcaneus  Tenderness with palpation plantar medial tubercle  Ankle range of motion within normal limits, MMT 5/5  Dorsiflexion at level of ankle bilaterally approximately 15 degrees with knee bent and extended  Negative Tinel's sign  No pain with palpation over primitivo pedis bilaterally  Mild tenderness with palpation plantar lateral fifth MPJ, no pain with range of motion of fifth MPJ  STJ WNL     Bilateral 4th and 5th digit adductovarus  Bilateral fourth digit      Skin:     Capillary Refill: Capillary refill takes less than 2 seconds.      Comments: L left fourth toe no erythema or edema.  With noted  Neurological:      Sensory: No sensory deficit.      Comments: Protective sensation intact with monofilament exam     "

## 2025-07-14 NOTE — PROGRESS NOTES
"Daily Note     Today's date: 2025  Patient name: Shiane E Follweiler  : 1995  MRN: [id number]  Referring provider: Kocher, Barbara, CRNP  Dx:   Encounter Diagnosis     ICD-10-CM    1. Chronic pain syndrome  G89.4       2. Chronic bilateral low back pain without sciatica  M54.50     G89.29       3. Acute back pain with sciatica, left  M54.42       4. Lumbar disc disease with radiculopathy  M51.16       5. Myofascial pain syndrome  M79.18                      Subjective: Patient reports her lower back is \"pretty sore\" today. Injection scheduled for .      Objective: See treatment diary below.      Assessment: Tolerated treatment fair. Patient would benefit from continued PT to improve upper trunk/core strength to increase lower back stability. She is still experiencing LBP at this time. Progress strengthening to tolerance.       Plan: Continue per plan of care.      Precautions: Chronic back pain, GERD    Manuals    Prone P to A grade I, II Lumbosacral joint mobs  JM-thoracic and lumbar JM-thoracic and lumbar       Assessment      JM                  Neuro Re-Ed             MARIA LUISA (via wedge table) Prone no wedge 10' w/ MHP Prone no wedge 10' w/ MH Prone no wedge 10' w/ MH Prone no wedge 10' w/ MH Prone no wedge 10' w/ MH   PPT 5\"x20 on PB       5\"x20   PPT w/ LAQ/marches 2x10       2x10   Planks           Palloff Press P3 3x10   P2 25x P2 35x  P3 3x10   CC BENJAMIN P7 3x10   P6 20x P6 25x P7 3x10   Tandem Stance           SLS            Ther Ex           PPU           PPU w/ OP           Standing hip abd/ext           HS stretch 16\" step 30\"x3 BLE   16\" step 30\"x3 BLE 16\" step 30\"x3 BLE 16\" step 30\"x3 BLE   Prone Hip IR L3 2x10 L3 2x10 L3 2x10 L3 2x10 L3 2x10   Prone Hip ER 5\"x20 5\"X20 5\"x20 5\"x20 5\"x20   Side stepping            HS curl S:7 P7 3x10   P5 25x P5 3x10 P7 3x10   Leg ext S:1 P3 3x10   P3 20x P3 3x10 P3 3x10   Child's pose stretch 20\"x5  20\"x5 20\"x5  20\"x5 20\"x5   LTR " "20\"x5 ea w/ self OP 20\"x5 ea w/ self OP 20\"x5 ea w/ self OP 20\"x5 ea w/ self OP 20\"x5 ea w/ self OP                                                                  Ther Activity                                      Gait Training                                      Modalities            HP/CP prn            Prone wedge CP                       "

## 2025-07-14 NOTE — PATIENT INSTRUCTIONS
I recommend stiff bottomed sneakers/shoes (ex Asics, Vionic, New balance, Galindo, etc) for daily use and Filiberto Rome (recovery slides) for in home use     I recommend obtaining OTC orthotics which can be purchased online.  I recommend specifically Redi-thotic Comfort     Or     I recommend stiff bottomed sneakers/shoes (ex Asics, Vionic, New balance, Galindo, etc) for daily use and Filiberto Rome (recovery slides) for in home use     I recommend obtaining OTC orthotics which can be purchased online.  They can be purchased directly from the manufacture or on [employer].  I recommend specifically Redi-thotic CONTROL, HEEL SPURS

## 2025-07-21 ENCOUNTER — OFFICE VISIT (OUTPATIENT)
Dept: PHYSICAL THERAPY | Facility: CLINIC | Age: 30
End: 2025-07-21
Payer: COMMERCIAL

## 2025-07-21 DIAGNOSIS — M51.16 LUMBAR DISC DISEASE WITH RADICULOPATHY: ICD-10-CM

## 2025-07-21 DIAGNOSIS — M54.42 ACUTE BACK PAIN WITH SCIATICA, LEFT: ICD-10-CM

## 2025-07-21 DIAGNOSIS — M79.18 MYOFASCIAL PAIN SYNDROME: ICD-10-CM

## 2025-07-21 DIAGNOSIS — G89.4 CHRONIC PAIN SYNDROME: Primary | ICD-10-CM

## 2025-07-21 DIAGNOSIS — G89.29 CHRONIC BILATERAL LOW BACK PAIN WITHOUT SCIATICA: ICD-10-CM

## 2025-07-21 DIAGNOSIS — M54.50 CHRONIC BILATERAL LOW BACK PAIN WITHOUT SCIATICA: ICD-10-CM

## 2025-07-21 PROCEDURE — 97110 THERAPEUTIC EXERCISES: CPT

## 2025-07-21 PROCEDURE — 97112 NEUROMUSCULAR REEDUCATION: CPT

## 2025-07-21 NOTE — PROGRESS NOTES
"Daily Note     Today's date: 2025  Patient name: Shiane E Follweiler  : 1995  MRN: [id number]  Referring provider: Kocher, Barbara, CRNP  Dx:   Encounter Diagnosis     ICD-10-CM    1. Chronic pain syndrome  G89.4       2. Chronic bilateral low back pain without sciatica  M54.50     G89.29       3. Acute back pain with sciatica, left  M54.42       4. Lumbar disc disease with radiculopathy  M51.16       5. Myofascial pain syndrome  M79.18                      Subjective: Patient is still experiencing LBP. Injections scheduled for 25.      Objective: See treatment diary below.      Assessment: Tolerated treatment fair. Patient would benefit from continued PT to improve upper trunk/core strength to increase lower back stability. She has a constant LBP at this time that has not change during session. Progress strengthening to tolerance.       Plan: Continue per plan of care.      Precautions: Chronic back pain, GERD    Manuals    Prone P to A grade I, II Lumbosacral joint mobs   JM-thoracic and lumbar       Assessment     JM                 Neuro Re-Ed            MARIA LUISA (via wedge table) Prone no wedge 10' w/ MHP Prone no wedge 10' w/ MHP Prone no wedge 10' w/ MH Prone no wedge 10' w/ MH Prone no wedge 10' w/ MH   PPT 5\"x20 on PB 5\"x20 on PB     5\"x20   PPT w/ LAQ/marches 2x10 2x10 ea     2x10   Planks          Palloff Press P3 3x10 P3 3x10 P2 25x P2 35x  P3 3x10   CC BENJAMIN P7 3x10 P7 3x10 P6 20x P6 25x P7 3x10   Tandem Stance          SLS           Ther Ex          PPU          PPU w/ OP          Standing hip abd/ext          HS stretch 16\" step 30\"x3 BLE 16\" step 30\"x3 BLE 16\" step 30\"x3 BLE 16\" step 30\"x3 BLE 16\" step 30\"x3 BLE   Prone Hip IR L3 2x10 L3 2x10 L3 2x10 L3 2x10 L3 2x10   Prone Hip ER 5\"x20 5\"x20 5\"x20 5\"x20 5\"x20   Side stepping           HS curl S:7 P7 3x10 P7 3x10 P5 25x P5 3x10 P7 3x10   Leg ext S:1 P3 3x10 P3 3x10 P3 20x P3 3x10 P3 3x10   Child's pose stretch 20\"x5  " "20\"x5 20\"x5  20\"x5 20\"x5   LTR 20\"x5 ea w/ self OP 20\"x5 ea w/ self OP 20\"x5 ea w/ self OP 20\"x5 ea w/ self OP 20\"x5 ea w/ self OP                                                             Ther Activity                                   Gait Training                                   Modalities           HP/CP prn           Prone wedge CP                        "

## 2025-07-23 ENCOUNTER — APPOINTMENT (OUTPATIENT)
Dept: RADIOLOGY | Facility: HOSPITAL | Age: 30
End: 2025-07-23
Payer: COMMERCIAL

## 2025-07-23 ENCOUNTER — HOSPITAL ENCOUNTER (OUTPATIENT)
Facility: HOSPITAL | Age: 30
Setting detail: OUTPATIENT SURGERY
Discharge: HOME | End: 2025-07-23
Attending: ANESTHESIOLOGY | Admitting: ANESTHESIOLOGY
Payer: COMMERCIAL

## 2025-07-23 VITALS
DIASTOLIC BLOOD PRESSURE: 67 MMHG | BODY MASS INDEX: 25.16 KG/M2 | HEART RATE: 68 BPM | TEMPERATURE: 98.3 F | SYSTOLIC BLOOD PRESSURE: 108 MMHG | HEIGHT: 63 IN | RESPIRATION RATE: 18 BRPM | WEIGHT: 142 LBS | OXYGEN SATURATION: 99 %

## 2025-07-23 DIAGNOSIS — M54.42 ACUTE BILATERAL LOW BACK PAIN WITH LEFT-SIDED SCIATICA: Primary | ICD-10-CM

## 2025-07-23 DIAGNOSIS — M46.1 SACROILIITIS (HCC): ICD-10-CM

## 2025-07-23 PROCEDURE — 27096 INJECT SACROILIAC JOINT: CPT | Performed by: ANESTHESIOLOGY

## 2025-07-23 RX ORDER — BUPIVACAINE HYDROCHLORIDE 2.5 MG/ML
INJECTION, SOLUTION EPIDURAL; INFILTRATION; INTRACAUDAL; PERINEURAL AS NEEDED
Status: DISCONTINUED | OUTPATIENT
Start: 2025-07-23 | End: 2025-07-23 | Stop reason: HOSPADM

## 2025-07-23 RX ORDER — LIDOCAINE HYDROCHLORIDE 10 MG/ML
INJECTION, SOLUTION EPIDURAL; INFILTRATION; INTRACAUDAL; PERINEURAL AS NEEDED
Status: DISCONTINUED | OUTPATIENT
Start: 2025-07-23 | End: 2025-07-23 | Stop reason: HOSPADM

## 2025-07-23 RX ORDER — METHYLPREDNISOLONE ACETATE 40 MG/ML
INJECTION, SUSPENSION INTRA-ARTICULAR; INTRALESIONAL; INTRAMUSCULAR; SOFT TISSUE AS NEEDED
Status: DISCONTINUED | OUTPATIENT
Start: 2025-07-23 | End: 2025-07-23 | Stop reason: HOSPADM

## 2025-07-23 NOTE — H&P
"Assessment:  1. Sacroiliitis (HCC)  FL spine and pain procedure    FL spine and pain procedure          Plan:  Shiane E Follweiler is a 30 y.o. female with complaints of low back and bilateral hip pain presents to surgical center for procedure.  We will perform a Procedure(s) (LRB):  BLOCK / INJECTION SACROILIAC (Bilateral)   2. Follow-up 1 month after injection    Complete risks and benefits including bleeding, infection, tissue reaction, nerve injury and allergic reaction were discussed. The approach was demonstrated using models and literature was provided. Verbal and written consent was obtained.    My impressions and treatment recommendations were discussed in detail with the patient who verbalized understanding and had no further questions.  Discharge instructions were provided. I personally saw and examined the patient and I agree with the above discussed plan of care.    Orders Placed This Encounter   Procedures    FL spine and pain procedure     Standing Status:   Standing     Number of Occurrences:   1     Reason for Exam::   BILATERAL SIIASI     Is the patient pregnant?:   No     Is an anticoagulant hold needed?:   NA     No orders of the defined types were placed in this encounter.      History of Present Illness:  Shiane E Follweiler is a 30 y.o. female who presents for a follow up office visit in regards to low back and bilateral hip pain.   The patient’s current symptoms include 8 of 10 sharp stabbing throbbing without particular time pattern.      I have personally reviewed and/or updated the patient's past medical history, past surgical history, family history, social history, current medications, allergies, and vital signs today.     Review of Systems   Musculoskeletal:  Positive for back pain.   All other systems reviewed and are negative.      Past Surgical History[1]    Allergies[2]    Physical Exam:    /60   Pulse 70   Temp 98.1 °F (36.7 °C) (Tympanic)   Resp 18   Ht 5' 3\" (1.6 m)   " Wt 64.4 kg (142 lb)   LMP  (LMP Unknown)   SpO2 98%   BMI 25.15 kg/m²     Constitutional:normal, well developed, well nourished, alert, in no distress and non-toxic and no overt pain behavior.  Eyes:anicteric  HEENT:grossly intact  Neck:supple, symmetric, trachea midline and no masses   Pulmonary:even and unlabored  Cardiovascular:No edema or pitting edema present  Skin:Normal without rashes or lesions and well hydrated  Psychiatric:Mood and affect appropriate  Neurologic:Cranial Nerves II-XII grossly intact  Musculoskeletal:normal             [1]   Past Surgical History:  Procedure Laterality Date    DILATION AND CURETTAGE OF UTERUS      ECTOPIC PREGNANCY SURGERY      EPIDURAL BLOCK INJECTION Bilateral 04/07/2022    Procedure: Bilateral L4-5 transforaminal epidural steroid injection;  Surgeon: Cele Escudero MD;  Location: [facility];  Service: Pain Management     EPIDURAL BLOCK INJECTION Bilateral 5/15/2025    Procedure: BLOCK / INJECTION EPIDURAL STEROID LUMBAR  B/L L4-5 TFESI;  Surgeon: Cele Escudero MD;  Location: [facility];  Service: Pain Management     HYSTERECTOMY      LAPAROSCOPY      AR LAPS ABD PRTM&OMENTUM DX W/WO SPEC BR/WA SPX N/A 09/16/2017    Procedure: LAPAROSCOPY DIAGNOSTIC, left salpingectomy;  Surgeon: Park Cheema MD;  Location:  MAIN OR;  Service: Gynecology    WISDOM TOOTH EXTRACTION      WISDOM TOOTH EXTRACTION Bilateral    [2]   Allergies  Allergen Reactions    Nitrofurantoin Itching    Nsaids Other (See Comments)     Due to issues with kidneys per pt    Oxycodone-Acetaminophen Itching     Reaction Date: 28Apr2011;     Oxycodone Itching    Tramadol Itching     Reaction Date: 28Apr2011;

## 2025-07-23 NOTE — DISCHARGE INSTR - AVS FIRST PAGE

## 2025-07-23 NOTE — OP NOTE
OPERATIVE REPORT  PATIENT NAME: Shiane E Follweiler    :  1995  MRN: [id number]  Pt Location: [facility] IR    SURGERY DATE: 2025    Surgeons and Role:     * Cele Escudero MD - Primary    Preop Diagnosis:  Chronic pain syndrome [G89.4]  Chronic bilateral low back pain without sciatica [M54.50, G89.29]  Sacroiliitis (HCC) [M46.1]    Post-Op Diagnosis Codes:   * Sacroiliitis (HCC) [M46.1]    * Chronic pain syndrome [G89.4]           Procedure(s):  Bilateral - BLOCK / INJECTION SACROILIAC    Specimen(s):  * No specimens in log *    Estimated Blood Loss:   Minimal    Drains:  * No LDAs found *    Anesthesia Type:   Local    Operative Indications:  Sacroiliitis (HCC) [M46.1]  Chronic pain syndrome [G89.4]          Operative Findings:  same       Complications:   None    Procedure and Technique:  Fluoroscopically-guided injection of the Bilateral sacroiliac joint(s)    After discussing the risks, benefits, and alternatives to the procedure, the patient expressed understanding and wished to proceed.  The patient was brought to the fluoroscopy suite and placed in the prone position.  Procedural pause conducted to verify:  correct patient identity, procedure to be performed and as applicable, correct side and site, correct patient position, and availability of implants, special equipment and special requirements.  Using fluoroscopy, the inferior portion of the left sacroiliac joint was identified. The skin was sterilely prepped and draped in the usual fashion using Chloraprep skin prep.  The skin and subcutaneous tissue were anesthetized with 0.5% lidocaine.  Using fluoroscopic guidance, a 3.5 inch 22 gauge spinal needle was advanced into joint.  Proper needle positioning was confirmed using multiple fluoroscopic views.  After negative aspiration, 0.5 mL Omnipaque 300 contrast was injected, showing intraarticular spread of contrast without any evidence of intravascular uptake.  A 2.5 mL solution  consisting of 40 mg of Depo-Medrol in 0.25% bupivacaine was injected slowly and incrementally into the joint.  Following the injection, the needle was withdrawn slightly and flushed with lidocaine as it was fully extracted. The procedure was repeated in the exact same way on the opposite side.The patient tolerated the procedure well and there were no apparent complications.  After appropriate observation, the patient was dismissed from the clinic in good condition under their own power.    COMMENTS  The patient received a total steroid dose of 80 mg.    I was present for the entire procedure.    Patient Disposition:  hemodynamically stable         SIGNATURE: Cele Escudero MD  DATE: July 23, 2025  TIME: 10:05 AM

## 2025-07-28 ENCOUNTER — EVALUATION (OUTPATIENT)
Dept: PHYSICAL THERAPY | Facility: CLINIC | Age: 30
End: 2025-07-28
Payer: COMMERCIAL

## 2025-07-28 ENCOUNTER — TELEPHONE (OUTPATIENT)
Age: 30
End: 2025-07-28

## 2025-07-28 DIAGNOSIS — M54.50 CHRONIC BILATERAL LOW BACK PAIN WITHOUT SCIATICA: ICD-10-CM

## 2025-07-28 DIAGNOSIS — M54.42 ACUTE BACK PAIN WITH SCIATICA, LEFT: ICD-10-CM

## 2025-07-28 DIAGNOSIS — M79.18 MYOFASCIAL PAIN SYNDROME: ICD-10-CM

## 2025-07-28 DIAGNOSIS — G89.29 CHRONIC BILATERAL LOW BACK PAIN WITHOUT SCIATICA: ICD-10-CM

## 2025-07-28 DIAGNOSIS — M51.16 LUMBAR DISC DISEASE WITH RADICULOPATHY: ICD-10-CM

## 2025-07-28 DIAGNOSIS — G89.4 CHRONIC PAIN SYNDROME: Primary | ICD-10-CM

## 2025-07-28 PROCEDURE — 97110 THERAPEUTIC EXERCISES: CPT | Performed by: PHYSICAL THERAPIST

## 2025-08-04 ENCOUNTER — OFFICE VISIT (OUTPATIENT)
Dept: PHYSICAL THERAPY | Facility: CLINIC | Age: 30
End: 2025-08-04
Payer: COMMERCIAL

## 2025-08-04 DIAGNOSIS — G89.4 CHRONIC PAIN SYNDROME: Primary | ICD-10-CM

## 2025-08-04 DIAGNOSIS — M54.42 ACUTE BACK PAIN WITH SCIATICA, LEFT: ICD-10-CM

## 2025-08-04 DIAGNOSIS — M54.50 CHRONIC BILATERAL LOW BACK PAIN WITHOUT SCIATICA: ICD-10-CM

## 2025-08-04 DIAGNOSIS — M79.18 MYOFASCIAL PAIN SYNDROME: ICD-10-CM

## 2025-08-04 DIAGNOSIS — M51.16 LUMBAR DISC DISEASE WITH RADICULOPATHY: ICD-10-CM

## 2025-08-04 DIAGNOSIS — G89.29 CHRONIC BILATERAL LOW BACK PAIN WITHOUT SCIATICA: ICD-10-CM

## 2025-08-04 PROCEDURE — 97112 NEUROMUSCULAR REEDUCATION: CPT

## 2025-08-04 PROCEDURE — 97110 THERAPEUTIC EXERCISES: CPT

## 2025-08-06 ENCOUNTER — TELEPHONE (OUTPATIENT)
Dept: PAIN MEDICINE | Facility: CLINIC | Age: 30
End: 2025-08-06

## 2025-08-18 ENCOUNTER — OFFICE VISIT (OUTPATIENT)
Dept: PHYSICAL THERAPY | Facility: CLINIC | Age: 30
End: 2025-08-18
Payer: COMMERCIAL

## 2025-08-18 DIAGNOSIS — G89.29 CHRONIC BILATERAL LOW BACK PAIN WITHOUT SCIATICA: ICD-10-CM

## 2025-08-18 DIAGNOSIS — M79.18 MYOFASCIAL PAIN SYNDROME: ICD-10-CM

## 2025-08-18 DIAGNOSIS — M51.16 LUMBAR DISC DISEASE WITH RADICULOPATHY: ICD-10-CM

## 2025-08-18 DIAGNOSIS — M54.42 ACUTE BACK PAIN WITH SCIATICA, LEFT: ICD-10-CM

## 2025-08-18 DIAGNOSIS — M54.50 CHRONIC BILATERAL LOW BACK PAIN WITHOUT SCIATICA: ICD-10-CM

## 2025-08-18 DIAGNOSIS — G89.4 CHRONIC PAIN SYNDROME: Primary | ICD-10-CM

## 2025-08-18 PROCEDURE — 97110 THERAPEUTIC EXERCISES: CPT

## 2025-08-18 PROCEDURE — 97112 NEUROMUSCULAR REEDUCATION: CPT

## 2025-08-25 PROBLEM — M47.816 LUMBAR SPONDYLOSIS: Status: ACTIVE | Noted: 2025-08-25

## (undated) DEVICE — GLOVE SRG BIOGEL ECLIPSE 6.5

## (undated) DEVICE — ENSEAL LAPAROSCOPIC TISSUE SEALER G2 CURVED JAW FOR USE WITH G2 GENERATOR 5MM DIAMETER 35CM SHAFT LENGTH: Brand: ENSEAL

## (undated) DEVICE — NEEDLE SPINAL 22G X 3.5IN  QUINCKE

## (undated) DEVICE — Device

## (undated) DEVICE — GLOVE INDICATOR PI UNDERGLOVE SZ 6.5 BLUE

## (undated) DEVICE — UNIVERSAL GYN LAPAROSCOPY,KIT: Brand: CARDINAL HEALTH

## (undated) DEVICE — FLEXIBLE ADHESIVE BANDAGE,X-LARGE: Brand: CURITY

## (undated) DEVICE — 3000CC GUARDIAN II: Brand: GUARDIAN

## (undated) DEVICE — ENDOPATH XCEL UNIVERSAL TROCAR STABLILITY SLEEVES: Brand: ENDOPATH XCEL

## (undated) DEVICE — IRRIG ENDO FLO TUBING

## (undated) DEVICE — TRAY EPIDURAL PERIFIX 20GA X 3.5IN TUOHY 8ML

## (undated) DEVICE — ADHESIVE SKN CLSR HISTOACRYL FLEX 0.5ML LF

## (undated) DEVICE — CHLORAPREP HI-LITE 10.5ML ORANGE

## (undated) DEVICE — ENDOPATH XCEL BLADELESS TROCARS WITH STABILITY SLEEVES: Brand: ENDOPATH XCEL

## (undated) DEVICE — INSUFLATION TUBING INSUFLOW (LEXION)

## (undated) DEVICE — TRAY FOLEY 16FR URIMETER SURESTEP

## (undated) DEVICE — ENDOPOUCH RETRIEVER SPECIMEN RETRIEVAL BAGS: Brand: ENDOPOUCH RETRIEVER

## (undated) DEVICE — CHLORAPREP HI-LITE 26ML ORANGE

## (undated) DEVICE — IV EXTENSION TUBING 33 IN

## (undated) DEVICE — SCD SEQUENTIAL COMPRESSION COMFORT SLEEVE MEDIUM KNEE LENGTH: Brand: KENDALL SCD

## (undated) DEVICE — GLOVE INDICATOR PI UNDERGLOVE SZ 8.5 BLUE

## (undated) DEVICE — GLOVE SRG BIOGEL 8

## (undated) DEVICE — SYRINGE 5ML LL

## (undated) DEVICE — INTENDED FOR TISSUE SEPARATION, AND OTHER PROCEDURES THAT REQUIRE A SHARP SURGICAL BLADE TO PUNCTURE OR CUT.: Brand: BARD-PARKER SAFETY BLADES SIZE 11, STERILE

## (undated) DEVICE — SYRINGE EPI 8ML LUER SLIP LOSS OF RESISTANCE PLASTIC PERFIX